# Patient Record
Sex: MALE | Race: WHITE | URBAN - METROPOLITAN AREA
[De-identification: names, ages, dates, MRNs, and addresses within clinical notes are randomized per-mention and may not be internally consistent; named-entity substitution may affect disease eponyms.]

---

## 2017-06-29 ENCOUNTER — TRANSFERRED RECORDS (OUTPATIENT)
Dept: HEALTH INFORMATION MANAGEMENT | Facility: CLINIC | Age: 72
End: 2017-06-29

## 2017-07-07 ENCOUNTER — ANESTHESIA (OUTPATIENT)
Dept: SURGERY | Facility: CLINIC | Age: 72
DRG: 028 | End: 2017-07-07
Payer: COMMERCIAL

## 2017-07-07 ENCOUNTER — APPOINTMENT (OUTPATIENT)
Dept: GENERAL RADIOLOGY | Facility: CLINIC | Age: 72
DRG: 028 | End: 2017-07-07
Attending: NEUROLOGICAL SURGERY
Payer: COMMERCIAL

## 2017-07-07 ENCOUNTER — HOSPITAL ENCOUNTER (INPATIENT)
Facility: CLINIC | Age: 72
LOS: 19 days | Discharge: ANOTHER HOSPITAL | DRG: 028 | End: 2017-07-26
Attending: NEUROLOGICAL SURGERY | Admitting: NEUROLOGICAL SURGERY
Payer: COMMERCIAL

## 2017-07-07 ENCOUNTER — ANESTHESIA EVENT (OUTPATIENT)
Dept: SURGERY | Facility: CLINIC | Age: 72
DRG: 028 | End: 2017-07-07
Payer: COMMERCIAL

## 2017-07-07 ENCOUNTER — APPOINTMENT (OUTPATIENT)
Dept: MRI IMAGING | Facility: CLINIC | Age: 72
DRG: 028 | End: 2017-07-07
Attending: STUDENT IN AN ORGANIZED HEALTH CARE EDUCATION/TRAINING PROGRAM
Payer: COMMERCIAL

## 2017-07-07 PROBLEM — G06.2 EPIDURAL ABSCESS: Status: ACTIVE | Noted: 2017-07-07

## 2017-07-07 PROBLEM — M48.02 CERVICAL STENOSIS OF SPINE: Status: ACTIVE | Noted: 2017-07-07

## 2017-07-07 LAB
ABO + RH BLD: NORMAL
ABO + RH BLD: NORMAL
ALBUMIN UR-MCNC: 10 MG/DL
ANION GAP SERPL CALCULATED.3IONS-SCNC: 9 MMOL/L (ref 3–14)
APPEARANCE UR: CLEAR
APTT PPP: 29 SEC (ref 22–37)
BASE DEFICIT BLDA-SCNC: 1.4 MMOL/L
BASE EXCESS BLDA CALC-SCNC: 0 MMOL/L
BILIRUB UR QL STRIP: NEGATIVE
BLD GP AB SCN SERPL QL: NORMAL
BLOOD BANK CMNT PATIENT-IMP: NORMAL
BUN SERPL-MCNC: 18 MG/DL (ref 7–30)
CA-I BLD-MCNC: 4.8 MG/DL (ref 4.4–5.2)
CA-I BLD-MCNC: 5 MG/DL (ref 4.4–5.2)
CALCIUM SERPL-MCNC: 9.2 MG/DL (ref 8.5–10.1)
CHLORIDE SERPL-SCNC: 98 MMOL/L (ref 94–109)
CO2 SERPL-SCNC: 26 MMOL/L (ref 20–32)
COLOR UR AUTO: YELLOW
CREAT SERPL-MCNC: 0.7 MG/DL (ref 0.66–1.25)
CRP SERPL-MCNC: 27 MG/L (ref 0–8)
ERYTHROCYTE [DISTWIDTH] IN BLOOD BY AUTOMATED COUNT: 14 % (ref 10–15)
ERYTHROCYTE [SEDIMENTATION RATE] IN BLOOD BY WESTERGREN METHOD: 50 MM/H (ref 0–20)
GFR SERPL CREATININE-BSD FRML MDRD: ABNORMAL ML/MIN/1.7M2
GLUCOSE BLD-MCNC: 186 MG/DL (ref 70–99)
GLUCOSE BLD-MCNC: 191 MG/DL (ref 70–99)
GLUCOSE BLDC GLUCOMTR-MCNC: 143 MG/DL (ref 70–99)
GLUCOSE BLDC GLUCOMTR-MCNC: 146 MG/DL (ref 70–99)
GLUCOSE BLDC GLUCOMTR-MCNC: 151 MG/DL (ref 70–99)
GLUCOSE BLDC GLUCOMTR-MCNC: 166 MG/DL (ref 70–99)
GLUCOSE BLDC GLUCOMTR-MCNC: 188 MG/DL (ref 70–99)
GLUCOSE SERPL-MCNC: 190 MG/DL (ref 70–99)
GLUCOSE UR STRIP-MCNC: 30 MG/DL
GRAM STN SPEC: NORMAL
HCO3 BLD-SCNC: 23 MMOL/L (ref 21–28)
HCO3 BLD-SCNC: 25 MMOL/L (ref 21–28)
HCT VFR BLD AUTO: 34 % (ref 40–53)
HGB BLD-MCNC: 11.5 G/DL (ref 13.3–17.7)
HGB BLD-MCNC: 11.8 G/DL (ref 13.3–17.7)
HGB BLD-MCNC: 11.8 G/DL (ref 13.3–17.7)
HGB UR QL STRIP: ABNORMAL
HYALINE CASTS #/AREA URNS LPF: 1 /LPF (ref 0–2)
INR PPP: 1.1 (ref 0.86–1.14)
KETONES UR STRIP-MCNC: 80 MG/DL
LEUKOCYTE ESTERASE UR QL STRIP: ABNORMAL
Lab: NORMAL
MAGNESIUM SERPL-MCNC: 2 MG/DL (ref 1.6–2.3)
MCH RBC QN AUTO: 33.2 PG (ref 26.5–33)
MCHC RBC AUTO-ENTMCNC: 34.7 G/DL (ref 31.5–36.5)
MCV RBC AUTO: 96 FL (ref 78–100)
MICRO REPORT STATUS: NORMAL
MRSA DNA SPEC QL NAA+PROBE: NORMAL
MUCOUS THREADS #/AREA URNS LPF: PRESENT /LPF
NITRATE UR QL: NEGATIVE
O2/TOTAL GAS SETTING VFR VENT: 50 %
O2/TOTAL GAS SETTING VFR VENT: 73 %
PCO2 BLD: 35 MM HG (ref 35–45)
PCO2 BLD: 42 MM HG (ref 35–45)
PH BLD: 7.38 PH (ref 7.35–7.45)
PH BLD: 7.42 PH (ref 7.35–7.45)
PH UR STRIP: 5.5 PH (ref 5–7)
PHOSPHATE SERPL-MCNC: 3.7 MG/DL (ref 2.5–4.5)
PLATELET # BLD AUTO: 217 10E9/L (ref 150–450)
PO2 BLD: 290 MM HG (ref 80–105)
PO2 BLD: 380 MM HG (ref 80–105)
POTASSIUM BLD-SCNC: 4 MMOL/L (ref 3.4–5.3)
POTASSIUM BLD-SCNC: 4.1 MMOL/L (ref 3.4–5.3)
POTASSIUM SERPL-SCNC: 4.6 MMOL/L (ref 3.4–5.3)
RBC # BLD AUTO: 3.55 10E12/L (ref 4.4–5.9)
RBC #/AREA URNS AUTO: 10 /HPF (ref 0–2)
SODIUM BLD-SCNC: 133 MMOL/L (ref 133–144)
SODIUM BLD-SCNC: 133 MMOL/L (ref 133–144)
SODIUM SERPL-SCNC: 133 MMOL/L (ref 133–144)
SP GR UR STRIP: 1.02 (ref 1–1.03)
SPECIMEN EXP DATE BLD: NORMAL
SPECIMEN SOURCE: NORMAL
SPECIMEN SOURCE: NORMAL
SQUAMOUS #/AREA URNS AUTO: <1 /HPF (ref 0–1)
URN SPEC COLLECT METH UR: ABNORMAL
UROBILINOGEN UR STRIP-MCNC: NORMAL MG/DL (ref 0–2)
WBC # BLD AUTO: 7.3 10E9/L (ref 4–11)
WBC #/AREA URNS AUTO: 2 /HPF (ref 0–2)

## 2017-07-07 PROCEDURE — 84100 ASSAY OF PHOSPHORUS: CPT | Performed by: STUDENT IN AN ORGANIZED HEALTH CARE EDUCATION/TRAINING PROGRAM

## 2017-07-07 PROCEDURE — 86140 C-REACTIVE PROTEIN: CPT | Performed by: STUDENT IN AN ORGANIZED HEALTH CARE EDUCATION/TRAINING PROGRAM

## 2017-07-07 PROCEDURE — 25000128 H RX IP 250 OP 636: Performed by: STUDENT IN AN ORGANIZED HEALTH CARE EDUCATION/TRAINING PROGRAM

## 2017-07-07 PROCEDURE — 25000128 H RX IP 250 OP 636

## 2017-07-07 PROCEDURE — 40000556 ZZH STATISTIC PERIPHERAL IV START W US GUIDANCE

## 2017-07-07 PROCEDURE — 25000125 ZZHC RX 250: Performed by: ANESTHESIOLOGY

## 2017-07-07 PROCEDURE — 00NW0ZZ RELEASE CERVICAL SPINAL CORD, OPEN APPROACH: ICD-10-PCS | Performed by: NEUROLOGICAL SURGERY

## 2017-07-07 PROCEDURE — 87070 CULTURE OTHR SPECIMN AEROBIC: CPT | Performed by: NEUROLOGICAL SURGERY

## 2017-07-07 PROCEDURE — 00000146 ZZHCL STATISTIC GLUCOSE BY METER IP

## 2017-07-07 PROCEDURE — 36000061 ZZH SURGERY LEVEL 3 W FLUORO 1ST 30 MIN - UMMC: Performed by: NEUROLOGICAL SURGERY

## 2017-07-07 PROCEDURE — 83735 ASSAY OF MAGNESIUM: CPT | Performed by: STUDENT IN AN ORGANIZED HEALTH CARE EDUCATION/TRAINING PROGRAM

## 2017-07-07 PROCEDURE — 85027 COMPLETE CBC AUTOMATED: CPT | Performed by: STUDENT IN AN ORGANIZED HEALTH CARE EDUCATION/TRAINING PROGRAM

## 2017-07-07 PROCEDURE — 25000565 ZZH ISOFLURANE, EA 15 MIN: Performed by: NEUROLOGICAL SURGERY

## 2017-07-07 PROCEDURE — A9585 GADOBUTROL INJECTION: HCPCS | Performed by: NEUROLOGICAL SURGERY

## 2017-07-07 PROCEDURE — 86850 RBC ANTIBODY SCREEN: CPT | Performed by: STUDENT IN AN ORGANIZED HEALTH CARE EDUCATION/TRAINING PROGRAM

## 2017-07-07 PROCEDURE — 84132 ASSAY OF SERUM POTASSIUM: CPT | Performed by: ANESTHESIOLOGY

## 2017-07-07 PROCEDURE — 36415 COLL VENOUS BLD VENIPUNCTURE: CPT | Performed by: STUDENT IN AN ORGANIZED HEALTH CARE EDUCATION/TRAINING PROGRAM

## 2017-07-07 PROCEDURE — 40000277 XR SURGERY CARM FLUORO LESS THAN 5 MIN W STILLS: Mod: TC

## 2017-07-07 PROCEDURE — 82803 BLOOD GASES ANY COMBINATION: CPT | Performed by: ANESTHESIOLOGY

## 2017-07-07 PROCEDURE — 40000014 ZZH STATISTIC ARTERIAL MONITORING DAILY

## 2017-07-07 PROCEDURE — 87641 MR-STAPH DNA AMP PROBE: CPT | Performed by: NEUROLOGICAL SURGERY

## 2017-07-07 PROCEDURE — 40000275 ZZH STATISTIC RCP TIME EA 10 MIN

## 2017-07-07 PROCEDURE — 25000128 H RX IP 250 OP 636: Performed by: NEUROLOGICAL SURGERY

## 2017-07-07 PROCEDURE — 25000125 ZZHC RX 250: Performed by: STUDENT IN AN ORGANIZED HEALTH CARE EDUCATION/TRAINING PROGRAM

## 2017-07-07 PROCEDURE — 36000059 ZZH SURGERY LEVEL 3 EA 15 ADDTL MIN UMMC: Performed by: NEUROLOGICAL SURGERY

## 2017-07-07 PROCEDURE — 25000125 ZZHC RX 250: Performed by: NURSE ANESTHETIST, CERTIFIED REGISTERED

## 2017-07-07 PROCEDURE — 84295 ASSAY OF SERUM SODIUM: CPT | Performed by: ANESTHESIOLOGY

## 2017-07-07 PROCEDURE — 25000128 H RX IP 250 OP 636: Performed by: NURSE ANESTHETIST, CERTIFIED REGISTERED

## 2017-07-07 PROCEDURE — 80048 BASIC METABOLIC PNL TOTAL CA: CPT | Performed by: STUDENT IN AN ORGANIZED HEALTH CARE EDUCATION/TRAINING PROGRAM

## 2017-07-07 PROCEDURE — 37000009 ZZH ANESTHESIA TECHNICAL FEE, EACH ADDTL 15 MIN: Performed by: NEUROLOGICAL SURGERY

## 2017-07-07 PROCEDURE — 37000008 ZZH ANESTHESIA TECHNICAL FEE, 1ST 30 MIN: Performed by: NEUROLOGICAL SURGERY

## 2017-07-07 PROCEDURE — 87040 BLOOD CULTURE FOR BACTERIA: CPT | Performed by: NEUROLOGICAL SURGERY

## 2017-07-07 PROCEDURE — 25000308 HC RX OP HPI UCR WEL MED 250 IP 250: Performed by: STUDENT IN AN ORGANIZED HEALTH CARE EDUCATION/TRAINING PROGRAM

## 2017-07-07 PROCEDURE — 85610 PROTHROMBIN TIME: CPT | Performed by: STUDENT IN AN ORGANIZED HEALTH CARE EDUCATION/TRAINING PROGRAM

## 2017-07-07 PROCEDURE — 82947 ASSAY GLUCOSE BLOOD QUANT: CPT | Performed by: ANESTHESIOLOGY

## 2017-07-07 PROCEDURE — 87640 STAPH A DNA AMP PROBE: CPT | Performed by: NEUROLOGICAL SURGERY

## 2017-07-07 PROCEDURE — C9399 UNCLASSIFIED DRUGS OR BIOLOG: HCPCS | Performed by: NURSE ANESTHETIST, CERTIFIED REGISTERED

## 2017-07-07 PROCEDURE — 82330 ASSAY OF CALCIUM: CPT | Performed by: ANESTHESIOLOGY

## 2017-07-07 PROCEDURE — 86900 BLOOD TYPING SEROLOGIC ABO: CPT | Performed by: STUDENT IN AN ORGANIZED HEALTH CARE EDUCATION/TRAINING PROGRAM

## 2017-07-07 PROCEDURE — 71000016 ZZH RECOVERY PHASE 1 LEVEL 3 FIRST HR: Performed by: NEUROLOGICAL SURGERY

## 2017-07-07 PROCEDURE — 72156 MRI NECK SPINE W/O & W/DYE: CPT

## 2017-07-07 PROCEDURE — 85730 THROMBOPLASTIN TIME PARTIAL: CPT | Performed by: STUDENT IN AN ORGANIZED HEALTH CARE EDUCATION/TRAINING PROGRAM

## 2017-07-07 PROCEDURE — P9047 ALBUMIN (HUMAN), 25%, 50ML: HCPCS | Performed by: NEUROLOGICAL SURGERY

## 2017-07-07 PROCEDURE — 87205 SMEAR GRAM STAIN: CPT | Performed by: NEUROLOGICAL SURGERY

## 2017-07-07 PROCEDURE — 27110028 ZZH OR GENERAL SUPPLY NON-STERILE: Performed by: NEUROLOGICAL SURGERY

## 2017-07-07 PROCEDURE — 25000131 ZZH RX MED GY IP 250 OP 636 PS 637: Performed by: STUDENT IN AN ORGANIZED HEALTH CARE EDUCATION/TRAINING PROGRAM

## 2017-07-07 PROCEDURE — 25000132 ZZH RX MED GY IP 250 OP 250 PS 637: Performed by: STUDENT IN AN ORGANIZED HEALTH CARE EDUCATION/TRAINING PROGRAM

## 2017-07-07 PROCEDURE — 81001 URINALYSIS AUTO W/SCOPE: CPT | Performed by: NEUROLOGICAL SURGERY

## 2017-07-07 PROCEDURE — 27210794 ZZH OR GENERAL SUPPLY STERILE: Performed by: NEUROLOGICAL SURGERY

## 2017-07-07 PROCEDURE — 85652 RBC SED RATE AUTOMATED: CPT | Performed by: STUDENT IN AN ORGANIZED HEALTH CARE EDUCATION/TRAINING PROGRAM

## 2017-07-07 PROCEDURE — 71010 XR CHEST PORT 1 VW: CPT

## 2017-07-07 PROCEDURE — 86901 BLOOD TYPING SEROLOGIC RH(D): CPT | Performed by: STUDENT IN AN ORGANIZED HEALTH CARE EDUCATION/TRAINING PROGRAM

## 2017-07-07 PROCEDURE — 71000017 ZZH RECOVERY PHASE 1 LEVEL 3 EA ADDTL HR: Performed by: NEUROLOGICAL SURGERY

## 2017-07-07 PROCEDURE — 20000004 ZZH R&B ICU UMMC

## 2017-07-07 PROCEDURE — 36415 COLL VENOUS BLD VENIPUNCTURE: CPT | Performed by: NEUROLOGICAL SURGERY

## 2017-07-07 PROCEDURE — 40000170 ZZH STATISTIC PRE-PROCEDURE ASSESSMENT II: Performed by: NEUROLOGICAL SURGERY

## 2017-07-07 RX ORDER — HYDROMORPHONE HYDROCHLORIDE 1 MG/ML
.3-.5 INJECTION, SOLUTION INTRAMUSCULAR; INTRAVENOUS; SUBCUTANEOUS EVERY 5 MIN PRN
Status: DISCONTINUED | OUTPATIENT
Start: 2017-07-07 | End: 2017-07-07 | Stop reason: HOSPADM

## 2017-07-07 RX ORDER — CEFAZOLIN SODIUM 1 G/3ML
1 INJECTION, POWDER, FOR SOLUTION INTRAMUSCULAR; INTRAVENOUS SEE ADMIN INSTRUCTIONS
Status: DISCONTINUED | OUTPATIENT
Start: 2017-07-07 | End: 2017-07-07 | Stop reason: HOSPADM

## 2017-07-07 RX ORDER — FENTANYL CITRATE 50 UG/ML
25-50 INJECTION, SOLUTION INTRAMUSCULAR; INTRAVENOUS
Status: DISCONTINUED | OUTPATIENT
Start: 2017-07-07 | End: 2017-07-07 | Stop reason: HOSPADM

## 2017-07-07 RX ORDER — LIDOCAINE 50 MG/G
3 PATCH TOPICAL
Status: DISCONTINUED | OUTPATIENT
Start: 2017-07-07 | End: 2017-07-26 | Stop reason: HOSPADM

## 2017-07-07 RX ORDER — CEFAZOLIN SODIUM 2 G/100ML
2 INJECTION, SOLUTION INTRAVENOUS
Status: COMPLETED | OUTPATIENT
Start: 2017-07-07 | End: 2017-07-07

## 2017-07-07 RX ORDER — SODIUM CHLORIDE 9 MG/ML
INJECTION, SOLUTION INTRAVENOUS CONTINUOUS
Status: DISCONTINUED | OUTPATIENT
Start: 2017-07-07 | End: 2017-07-18

## 2017-07-07 RX ORDER — POTASSIUM CHLORIDE 1500 MG/1
20-40 TABLET, EXTENDED RELEASE ORAL
Status: DISCONTINUED | OUTPATIENT
Start: 2017-07-07 | End: 2017-07-09

## 2017-07-07 RX ORDER — SODIUM CHLORIDE 9 MG/ML
INJECTION, SOLUTION INTRAVENOUS CONTINUOUS
Status: DISCONTINUED | OUTPATIENT
Start: 2017-07-07 | End: 2017-07-07

## 2017-07-07 RX ORDER — DEXTROSE MONOHYDRATE 25 G/50ML
25-50 INJECTION, SOLUTION INTRAVENOUS
Status: DISCONTINUED | OUTPATIENT
Start: 2017-07-07 | End: 2017-07-13

## 2017-07-07 RX ORDER — SODIUM CHLORIDE, SODIUM LACTATE, POTASSIUM CHLORIDE, CALCIUM CHLORIDE 600; 310; 30; 20 MG/100ML; MG/100ML; MG/100ML; MG/100ML
INJECTION, SOLUTION INTRAVENOUS CONTINUOUS PRN
Status: DISCONTINUED | OUTPATIENT
Start: 2017-07-07 | End: 2017-07-07

## 2017-07-07 RX ORDER — FOLIC ACID 1 MG/1
1 TABLET ORAL DAILY
Status: DISCONTINUED | OUTPATIENT
Start: 2017-07-07 | End: 2017-07-26 | Stop reason: HOSPADM

## 2017-07-07 RX ORDER — CEFTRIAXONE 2 G/1
2 INJECTION, POWDER, FOR SOLUTION INTRAMUSCULAR; INTRAVENOUS EVERY 12 HOURS
Status: CANCELLED | OUTPATIENT
Start: 2017-07-07

## 2017-07-07 RX ORDER — GLYCOPYRROLATE 0.2 MG/ML
INJECTION, SOLUTION INTRAMUSCULAR; INTRAVENOUS PRN
Status: DISCONTINUED | OUTPATIENT
Start: 2017-07-07 | End: 2017-07-07

## 2017-07-07 RX ORDER — SODIUM CHLORIDE, SODIUM LACTATE, POTASSIUM CHLORIDE, CALCIUM CHLORIDE 600; 310; 30; 20 MG/100ML; MG/100ML; MG/100ML; MG/100ML
INJECTION, SOLUTION INTRAVENOUS CONTINUOUS
Status: DISCONTINUED | OUTPATIENT
Start: 2017-07-07 | End: 2017-07-07 | Stop reason: HOSPADM

## 2017-07-07 RX ORDER — POLYETHYLENE GLYCOL 3350 17 G/17G
17 POWDER, FOR SOLUTION ORAL 2 TIMES DAILY
Status: DISCONTINUED | OUTPATIENT
Start: 2017-07-07 | End: 2017-07-26 | Stop reason: HOSPADM

## 2017-07-07 RX ORDER — LABETALOL HYDROCHLORIDE 5 MG/ML
10-40 INJECTION, SOLUTION INTRAVENOUS EVERY 10 MIN PRN
Status: DISCONTINUED | OUTPATIENT
Start: 2017-07-07 | End: 2017-07-12

## 2017-07-07 RX ORDER — SODIUM CHLORIDE 9 MG/ML
INJECTION, SOLUTION INTRAVENOUS
Status: COMPLETED
Start: 2017-07-07 | End: 2017-07-07

## 2017-07-07 RX ORDER — HYDRALAZINE HYDROCHLORIDE 20 MG/ML
10-20 INJECTION INTRAMUSCULAR; INTRAVENOUS EVERY 30 MIN PRN
Status: DISCONTINUED | OUTPATIENT
Start: 2017-07-07 | End: 2017-07-12

## 2017-07-07 RX ORDER — GADOBUTROL 604.72 MG/ML
10 INJECTION INTRAVENOUS ONCE
Status: COMPLETED | OUTPATIENT
Start: 2017-07-07 | End: 2017-07-07

## 2017-07-07 RX ORDER — FENTANYL CITRATE 50 UG/ML
INJECTION, SOLUTION INTRAMUSCULAR; INTRAVENOUS PRN
Status: DISCONTINUED | OUTPATIENT
Start: 2017-07-07 | End: 2017-07-07

## 2017-07-07 RX ORDER — OXYCODONE HYDROCHLORIDE 5 MG/1
5-10 TABLET ORAL
Status: DISCONTINUED | OUTPATIENT
Start: 2017-07-07 | End: 2017-07-26 | Stop reason: HOSPADM

## 2017-07-07 RX ORDER — BISACODYL 10 MG
10 SUPPOSITORY, RECTAL RECTAL DAILY PRN
Status: DISCONTINUED | OUTPATIENT
Start: 2017-07-07 | End: 2017-07-26 | Stop reason: HOSPADM

## 2017-07-07 RX ORDER — LIDOCAINE 40 MG/G
CREAM TOPICAL
Status: DISCONTINUED | OUTPATIENT
Start: 2017-07-07 | End: 2017-07-07 | Stop reason: HOSPADM

## 2017-07-07 RX ORDER — CYCLOBENZAPRINE HCL 10 MG
10 TABLET ORAL 3 TIMES DAILY PRN
Status: DISCONTINUED | OUTPATIENT
Start: 2017-07-07 | End: 2017-07-26 | Stop reason: HOSPADM

## 2017-07-07 RX ORDER — NICOTINE POLACRILEX 4 MG
15-30 LOZENGE BUCCAL
Status: DISCONTINUED | OUTPATIENT
Start: 2017-07-07 | End: 2017-07-13

## 2017-07-07 RX ORDER — ONDANSETRON 2 MG/ML
4 INJECTION INTRAMUSCULAR; INTRAVENOUS EVERY 30 MIN PRN
Status: DISCONTINUED | OUTPATIENT
Start: 2017-07-07 | End: 2017-07-07 | Stop reason: HOSPADM

## 2017-07-07 RX ORDER — DEXMEDETOMIDINE HYDROCHLORIDE 4 UG/ML
0.2-1.2 INJECTION, SOLUTION INTRAVENOUS CONTINUOUS
Status: DISCONTINUED | OUTPATIENT
Start: 2017-07-07 | End: 2017-07-07

## 2017-07-07 RX ORDER — MULTIPLE VITAMINS W/ MINERALS TAB 9MG-400MCG
1 TAB ORAL DAILY
Status: DISCONTINUED | OUTPATIENT
Start: 2017-07-07 | End: 2017-07-09

## 2017-07-07 RX ORDER — ONDANSETRON 4 MG/1
4 TABLET, ORALLY DISINTEGRATING ORAL EVERY 6 HOURS PRN
Status: DISCONTINUED | OUTPATIENT
Start: 2017-07-07 | End: 2017-07-26 | Stop reason: HOSPADM

## 2017-07-07 RX ORDER — ATENOLOL 25 MG/1
25 TABLET ORAL DAILY
Status: CANCELLED | OUTPATIENT
Start: 2017-07-07

## 2017-07-07 RX ORDER — MAGNESIUM SULFATE HEPTAHYDRATE 40 MG/ML
4 INJECTION, SOLUTION INTRAVENOUS EVERY 4 HOURS PRN
Status: DISCONTINUED | OUTPATIENT
Start: 2017-07-07 | End: 2017-07-09

## 2017-07-07 RX ORDER — LIDOCAINE 40 MG/G
CREAM TOPICAL
Status: DISCONTINUED | OUTPATIENT
Start: 2017-07-07 | End: 2017-07-12

## 2017-07-07 RX ORDER — PROPOFOL 10 MG/ML
INJECTION, EMULSION INTRAVENOUS PRN
Status: DISCONTINUED | OUTPATIENT
Start: 2017-07-07 | End: 2017-07-07

## 2017-07-07 RX ORDER — PROCHLORPERAZINE MALEATE 5 MG
5 TABLET ORAL EVERY 6 HOURS PRN
Status: DISCONTINUED | OUTPATIENT
Start: 2017-07-07 | End: 2017-07-26 | Stop reason: HOSPADM

## 2017-07-07 RX ORDER — AMOXICILLIN 250 MG
1-2 CAPSULE ORAL 2 TIMES DAILY
Status: DISCONTINUED | OUTPATIENT
Start: 2017-07-07 | End: 2017-07-26 | Stop reason: HOSPADM

## 2017-07-07 RX ORDER — ACETAMINOPHEN 325 MG/1
975 TABLET ORAL EVERY 8 HOURS
Status: DISPENSED | OUTPATIENT
Start: 2017-07-07 | End: 2017-07-10

## 2017-07-07 RX ORDER — ONDANSETRON 4 MG/1
4 TABLET, ORALLY DISINTEGRATING ORAL EVERY 30 MIN PRN
Status: DISCONTINUED | OUTPATIENT
Start: 2017-07-07 | End: 2017-07-07 | Stop reason: HOSPADM

## 2017-07-07 RX ORDER — POTASSIUM CHLORIDE 29.8 MG/ML
20 INJECTION INTRAVENOUS
Status: DISCONTINUED | OUTPATIENT
Start: 2017-07-07 | End: 2017-07-09

## 2017-07-07 RX ORDER — HYDROMORPHONE HYDROCHLORIDE 1 MG/ML
.3-.5 INJECTION, SOLUTION INTRAMUSCULAR; INTRAVENOUS; SUBCUTANEOUS
Status: DISPENSED | OUTPATIENT
Start: 2017-07-07 | End: 2017-07-08

## 2017-07-07 RX ORDER — ONDANSETRON 2 MG/ML
INJECTION INTRAMUSCULAR; INTRAVENOUS PRN
Status: DISCONTINUED | OUTPATIENT
Start: 2017-07-07 | End: 2017-07-07

## 2017-07-07 RX ORDER — CEFAZOLIN SODIUM 1 G/3ML
1 INJECTION, POWDER, FOR SOLUTION INTRAMUSCULAR; INTRAVENOUS SEE ADMIN INSTRUCTIONS
Status: DISCONTINUED | OUTPATIENT
Start: 2017-07-07 | End: 2017-07-07

## 2017-07-07 RX ORDER — POTASSIUM CHLORIDE 7.45 MG/ML
10 INJECTION INTRAVENOUS
Status: DISCONTINUED | OUTPATIENT
Start: 2017-07-07 | End: 2017-07-09

## 2017-07-07 RX ORDER — ALBUMIN (HUMAN) 12.5 G/50ML
12.5 SOLUTION INTRAVENOUS ONCE
Status: COMPLETED | OUTPATIENT
Start: 2017-07-07 | End: 2017-07-07

## 2017-07-07 RX ORDER — LIDOCAINE HYDROCHLORIDE 40 MG/ML
SOLUTION TOPICAL PRN
Status: DISCONTINUED | OUTPATIENT
Start: 2017-07-07 | End: 2017-07-07

## 2017-07-07 RX ORDER — POTASSIUM CHLORIDE 1.5 G/1.58G
20-40 POWDER, FOR SOLUTION ORAL
Status: DISCONTINUED | OUTPATIENT
Start: 2017-07-07 | End: 2017-07-09

## 2017-07-07 RX ORDER — POTASSIUM CL/LIDO/0.9 % NACL 10MEQ/0.1L
10 INTRAVENOUS SOLUTION, PIGGYBACK (ML) INTRAVENOUS
Status: DISCONTINUED | OUTPATIENT
Start: 2017-07-07 | End: 2017-07-09

## 2017-07-07 RX ORDER — NALOXONE HYDROCHLORIDE 0.4 MG/ML
.1-.4 INJECTION, SOLUTION INTRAMUSCULAR; INTRAVENOUS; SUBCUTANEOUS
Status: DISCONTINUED | OUTPATIENT
Start: 2017-07-07 | End: 2017-07-12

## 2017-07-07 RX ORDER — ONDANSETRON 2 MG/ML
4 INJECTION INTRAMUSCULAR; INTRAVENOUS EVERY 6 HOURS PRN
Status: DISCONTINUED | OUTPATIENT
Start: 2017-07-07 | End: 2017-07-26 | Stop reason: HOSPADM

## 2017-07-07 RX ORDER — ACETAMINOPHEN 10 MG/ML
1000 INJECTION, SOLUTION INTRAVENOUS ONCE
Status: COMPLETED | OUTPATIENT
Start: 2017-07-07 | End: 2017-07-07

## 2017-07-07 RX ORDER — CALCIUM CARBONATE 500 MG/1
1-2 TABLET, CHEWABLE ORAL 4 TIMES DAILY PRN
Status: CANCELLED | OUTPATIENT
Start: 2017-07-07

## 2017-07-07 RX ORDER — ACETAMINOPHEN 325 MG/1
650 TABLET ORAL EVERY 4 HOURS PRN
Status: DISCONTINUED | OUTPATIENT
Start: 2017-07-10 | End: 2017-07-09

## 2017-07-07 RX ADMIN — MIDAZOLAM HYDROCHLORIDE 2 MG: 1 INJECTION, SOLUTION INTRAMUSCULAR; INTRAVENOUS at 07:20

## 2017-07-07 RX ADMIN — ALBUMIN (HUMAN): 12.5 SOLUTION INTRAVENOUS at 07:20

## 2017-07-07 RX ADMIN — ROCURONIUM BROMIDE 20 MG: 10 INJECTION INTRAVENOUS at 07:41

## 2017-07-07 RX ADMIN — ONDANSETRON 4 MG: 2 INJECTION INTRAMUSCULAR; INTRAVENOUS at 09:12

## 2017-07-07 RX ADMIN — PHENYLEPHRINE HYDROCHLORIDE 100 MCG: 10 INJECTION, SOLUTION INTRAMUSCULAR; INTRAVENOUS; SUBCUTANEOUS at 09:22

## 2017-07-07 RX ADMIN — Medication 1000 ML: at 19:07

## 2017-07-07 RX ADMIN — PHENYLEPHRINE HYDROCHLORIDE 5 ML: 10 INJECTION INTRAVENOUS at 15:45

## 2017-07-07 RX ADMIN — GLYCOPYRROLATE 0.2 MG: 0.2 INJECTION, SOLUTION INTRAMUSCULAR; INTRAVENOUS at 06:43

## 2017-07-07 RX ADMIN — LIDOCAINE HYDROCHLORIDE 5 ML: 40 SOLUTION TOPICAL at 06:49

## 2017-07-07 RX ADMIN — PHENYLEPHRINE HYDROCHLORIDE 100 MCG: 10 INJECTION, SOLUTION INTRAMUSCULAR; INTRAVENOUS; SUBCUTANEOUS at 07:09

## 2017-07-07 RX ADMIN — PHENYLEPHRINE HYDROCHLORIDE 100 MCG: 10 INJECTION, SOLUTION INTRAMUSCULAR; INTRAVENOUS; SUBCUTANEOUS at 07:24

## 2017-07-07 RX ADMIN — SUGAMMADEX 200 MG: 100 INJECTION, SOLUTION INTRAVENOUS at 09:26

## 2017-07-07 RX ADMIN — FENTANYL CITRATE 100 MCG: 50 INJECTION, SOLUTION INTRAMUSCULAR; INTRAVENOUS at 06:49

## 2017-07-07 RX ADMIN — GADOBUTROL 10 ML: 604.72 INJECTION INTRAVENOUS at 18:28

## 2017-07-07 RX ADMIN — LIDOCAINE 1 PATCH: 50 PATCH CUTANEOUS at 15:15

## 2017-07-07 RX ADMIN — CEFAZOLIN SODIUM 2 G: 2 INJECTION, SOLUTION INTRAVENOUS at 07:25

## 2017-07-07 RX ADMIN — PROPOFOL 100 MG: 10 INJECTION, EMULSION INTRAVENOUS at 06:53

## 2017-07-07 RX ADMIN — SODIUM CHLORIDE: 900 INJECTION, SOLUTION INTRAVENOUS at 10:30

## 2017-07-07 RX ADMIN — PHENYLEPHRINE HYDROCHLORIDE 100 MCG: 10 INJECTION, SOLUTION INTRAMUSCULAR; INTRAVENOUS; SUBCUTANEOUS at 08:43

## 2017-07-07 RX ADMIN — ROCURONIUM BROMIDE 50 MG: 10 INJECTION INTRAVENOUS at 06:53

## 2017-07-07 RX ADMIN — HYDROMORPHONE HYDROCHLORIDE 0.3 MG: 1 INJECTION, SOLUTION INTRAMUSCULAR; INTRAVENOUS; SUBCUTANEOUS at 15:11

## 2017-07-07 RX ADMIN — SODIUM CHLORIDE, POTASSIUM CHLORIDE, SODIUM LACTATE AND CALCIUM CHLORIDE: 600; 310; 30; 20 INJECTION, SOLUTION INTRAVENOUS at 07:00

## 2017-07-07 RX ADMIN — PROPOFOL 20 MG: 10 INJECTION, EMULSION INTRAVENOUS at 09:25

## 2017-07-07 RX ADMIN — PROPOFOL 50 MG: 10 INJECTION, EMULSION INTRAVENOUS at 07:04

## 2017-07-07 RX ADMIN — SODIUM CHLORIDE, POTASSIUM CHLORIDE, SODIUM LACTATE AND CALCIUM CHLORIDE: 600; 310; 30; 20 INJECTION, SOLUTION INTRAVENOUS at 06:28

## 2017-07-07 RX ADMIN — BENZOCAINE 2 SPRAY: 220 SPRAY, METERED PERIODONTAL at 06:46

## 2017-07-07 RX ADMIN — SODIUM CHLORIDE 1000 ML: 9 INJECTION, SOLUTION INTRAVENOUS at 19:07

## 2017-07-07 RX ADMIN — PHENYLEPHRINE HYDROCHLORIDE 0.3 MCG/KG/MIN: 10 INJECTION, SOLUTION INTRAMUSCULAR; INTRAVENOUS; SUBCUTANEOUS at 07:17

## 2017-07-07 RX ADMIN — INSULIN ASPART 1 UNITS: 100 INJECTION, SOLUTION INTRAVENOUS; SUBCUTANEOUS at 16:24

## 2017-07-07 RX ADMIN — ACETAMINOPHEN 1000 MG: 10 INJECTION, SOLUTION INTRAVENOUS at 12:48

## 2017-07-07 RX ADMIN — PHENYLEPHRINE HYDROCHLORIDE 100 MCG: 10 INJECTION, SOLUTION INTRAMUSCULAR; INTRAVENOUS; SUBCUTANEOUS at 07:37

## 2017-07-07 RX ADMIN — THIAMINE HYDROCHLORIDE 500 MG: 100 INJECTION, SOLUTION INTRAMUSCULAR; INTRAVENOUS at 18:39

## 2017-07-07 RX ADMIN — SODIUM CHLORIDE: 900 INJECTION, SOLUTION INTRAVENOUS at 22:18

## 2017-07-07 ASSESSMENT — VISUAL ACUITY
OU: OTHER (SEE COMMENT)
OU: GLASSES
OU: OTHER (SEE COMMENT)
OU: NORMAL ACUITY
OU: OTHER (SEE COMMENT)
OU: GLASSES
OU: OTHER (SEE COMMENT)
OU: GLASSES
OU: OTHER (SEE COMMENT)
OU: NORMAL ACUITY
OU: GLASSES
OU: OTHER (SEE COMMENT)

## 2017-07-07 NOTE — ANESTHESIA PROCEDURE NOTES
Arterial Line Procedure Note  Staff:     Anesthesiologist:  LUIS RO  Location: In OR After Induction  Procedure Start/Stop Times:     patient identified, IV checked, site marked, risks and benefits discussed, informed consent, monitors and equipment checked, pre-op evaluation and at physician/surgeon's request      Correct Patient: Yes      Correct Position: Yes      Correct Site: Yes      Correct Procedure: Yes      Correct Laterality:  Yes    Site Marked:  Yes  Line Placement:     Procedure:  Arterial Line    Insertion Site:  Radial    Insertion laterality:  Left    Skin Prep: Chloraprep      Patient Prep: patient draped, mask, sterile gloves and hand hygiene      Catheter size:  20 gauge, 12 cm    Cath secured with: suture      Dressing:  Tegaderm    Complications:  None obvious    Arterial waveform: Yes      IBP within 10% of NIBP: Yes

## 2017-07-07 NOTE — OR NURSING
Pt arrived in pre-op and was emergently taken to the OR before the admission was completed. AMPARO Swain from  informed me in report that the pt was a DNR/DNI, had not received a scrub, and had labs drawn. Pt arrived with a cervical collar on which the pt asked me to remove. The pt was slightly confused, and did not seem aware or upset that his extremities' mobility were affected. 2 scabs, each on one knee noted, and multiple bruises. Dressing noted to Right wrist and one to Right foot. Pt has gross motor movement to upper extremities, but no sensation, and can slightly dorsi/plantarflex his Left foot and bend left knee, and contract his Right leg and dorsiflex his Right foot. . Family was called but they did not arrive.. Anesthesia team and surgical team took the pt. No EKG was done. Labs were in process.

## 2017-07-07 NOTE — ANESTHESIA CARE TRANSFER NOTE
Patient: Ernesto Rawls    Procedure(s):  Posterior cervical 3-4-5 and partial cervical 6 laminectomy and decompression - Wound Class: I-Clean    Diagnosis: Cervical Compression  Diagnosis Additional Information: No value filed.    Anesthesia Type:   No value filed.     Note:  Airway :Face Mask  Patient transferred to:PACU  Comments: Anesthesia Care Transfer Note    Patient: Ernesto Rawls    Transferred to: PACU    Patient vital signs: stable    Airway: none    Monitors on, VSS, pt. Stable, Report given to PACU RN.     Chris Crooks CRNA  7/7/2017 9:49 AM            Vitals: (Last set prior to Anesthesia Care Transfer)    CR[facility] VITALS  7/7/2017 0912 - 7/7/2017 0949      7/7/2017             Pulse: 85    ART BP: 175/65    ART Mean: 113    SpO2: 99 %    Resp Rate (set): 10                Electronically Signed By: NIK Mckenna CRNA  July 7, 2017  9:49 AM

## 2017-07-07 NOTE — OR NURSING
Dr. Griffin from neurosurg came to bedside in PACU. Assessed pt. Reviewed status. ICU now ready for pt. Prepared to transfer per policy. Dr. Griffin said he would be sure Op note was completed.

## 2017-07-07 NOTE — IP AVS SNAPSHOT
` `     [address] [facility]: [phone]                 INTERAGENCY TRANSFER FORM - NOTES (H&P, Discharge Summary, Consults, Procedures, Therapies)   2017                    Hospital Admission Date: 2017  IVAN TERRAZAS   : 1945  Sex: Male        Patient PCP Information     Provider PCP Type    Roderick Martines MD, MD General         History & Physicals      H&P by Tommy Edwards MD at 2017  9:54 AM     Author:  Tommy Edwards MD Service:  Neurosurgery Author Type:  Resident    Filed:  2017 10:24 AM Date of Service:  2017  9:54 AM Creation Time:  2017  9:54 AM    Status:  Attested :  Tommy Edwards MD (Resident)    Cosigner:  Derick Holly MD at 2017  4:50 PM        Attestation signed by Derick Holly MD at 2017  4:50 PM        Attestation:  I, Derick Holly MD, saw and evaluated Ivan Terrazas as part of a shared visit.  I have reviewed and discussed with the resident their history, physical and plan.    I personally reviewed the vital signs, medications, labs and imaging .    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     Derick Holly MD  2017                                 [facility]  Neurosurgery     History and Physical    CC: Quickly increasing weakness    HPI: Mr. Terrazas is a 72 year old  with a history of rheumatoid arthritis, diabetes, CAD and wet gangrene of his right foot great toe. He underwent amputation of that digit on 2017 @ 1430 under MAC at the [facility][DF1.1]. Following the procedure he was found to be weak in his bilateral upper extremities as well as his lower extremities. He was  "taken for an emergent CT and MRI to evaluate for stroke. An MRI of his cervical spine was also performed which demonstrated significant cervical stenosis. He was then transferred to [facility], where his exam declined to near total paralysis of the upper and lower extremities.[DF1.2]     No recent fevers, chills, weight change, chest pain, shortness of breath, cough, abdominal pain, nausea, vomiting, and syncope. The patient also denies headaches, double vision, blurry vision, weakness, LOC, changes in sensation, taste, smell, trouble speaking, bowel or bladder incontinence, or other neurologic symptoms.    Past Medical History[DF1.1]  RA  DM  CAD[DF1.2]    Past Surgical History[DF1.1]   has no past surgical history on file.[DF1.3]    Family History[DF1.1]  family history is not on file.[DF1.3]    Social History  - Occupation:[DF1.1] Retired[DF1.2]  - Marital Status:[DF1.1]  . Wife and 2 adult children present with him.[DF1.2]   - Tobacco use:[DF1.1] Denies[DF1.2]  - Alcohol use:[DF1.1]  Social[DF1.2]       Medications[DF1.1]  No prescriptions prior to admission.[DF1.3]        Allergies[DF1.1]  Allergies   Allergen Reactions     Contrast Dye[DF1.3]        ROS: 10 point ROS of systems were all negative except for pertinent positives noted in HPI.     PE:[DF1.1]  Blood pressure 165/64, pulse 64, temperature 95.6  F (35.3  C), temperature source Oral, resp. rate 14, height 1.854 m (6' 1\"), weight 94.8 kg (209 lb), SpO2 98 %.[DF1.3]    NEUROLOGIC:  -- Awake; Alert; oriented x[DF1.1] 2[DF1.2]  -- no gaze preference. No apparent hemineglect.[DF1.1]  --[DF1.2] PERRL 3-2mm bilat and brisk, extraocular movements intact  -- face symmetrical, tongue midline  -- palate elevates symmetrically, uvula midline  -- hearing grossly intact bilat  -- Trapezii 5/5 strength bilat symmetri    Motor:  Normal bulk / tone; no tremor, rigidity, or bradykinesia.[DF1.1]    1/5 in all 4 extremities.[DF1.2]     Sensory:[DF1.1]  Light touch " absent.[DF1.2]       I/O    Intake/Output Summary (Last 24 hours) at 07/07/17 0955  Last data filed at 07/07/17 0946   Gross per 24 hour   Intake             1050 ml   Output              400 ml   Net              650 ml       Labs    Arterial Blood Gases[DF1.1]     Recent Labs  Lab 07/07/17  0848 07/07/17  0705   PH 7.42 7.38   PCO2 35 42   PO2 290* 380*   HCO3 23 25[DF1.3]     Complete Blood Count[DF1.1]     Recent Labs  Lab 07/07/17  0848 07/07/17  0705 07/07/17  0552   WBC  --   --  7.3   HGB 11.8* 11.5* 11.8*   PLT  --   --  217[DF1.3]     Basic Metabolic Panel[DF1.1]    Recent Labs  Lab 07/07/17  0848 07/07/17  0705 07/07/17  0552    133 133   POTASSIUM 4.0 4.1 4.6   CHLORIDE  --   --  98   CO2  --   --  26   BUN  --   --  18   CR  --   --  0.70   * 186* 190*[DF1.3]       Coagulation Profile[DF1.1]    Recent Labs  Lab 07/07/17  0552   INR 1.10   PTT 29[DF1.3]     CSF[DF1.1]No results for input(s): CGLU, CTP in the last 168 hours.    Invalid input(s): CCSF[DF1.3]  MICRO[DF1.1]No results for input(s): CULT in the last 168 hours.[DF1.3]  Liver Function Tests[DF1.1]    Recent Labs  Lab 07/07/17  0552   INR 1.10[DF1.3]     Lactate[DF1.1]  Invalid input(s): LACTATE[DF1.3]    IMAGING:[DF1.1]  XR Surgery BONITA Fluoro L/T 5 Min w Stills   Final Result      XR Chest 2 Views    (Results Pending)[DF1.3]       Assessment: Ernesto Rawls is a 72 year old male[DF1.1] with severe cervical stenosis and quickly worsening weakness.[DF1.2]       Plan:  - NPO  - Plan for operative decompression  - Family consented, patient marked.       The patient was discussed with the neurosurgery chief resident, who agrees with the above stated plan.    Contact the neurosurgery resident on call with questions.    Dial * * *[phone] Jmond [phone] when prompted.   Tommy Edwards MD, PhD  Neurosurgery PGY-3[DF1.1]         Revision History        User Key Date/Time User Provider Type Action    > DF1.2 7/7/2017 10:24 AM Tommy Edwards MD  Resident Sign     DF1.3 7/7/2017  9:55 AM Tommy Edwards MD Resident      DF1.1 7/7/2017  9:54 AM Tommy Edwards MD Resident                      Discharge Summaries      Discharge Summaries by Vaishali Duffy APRN CNP at 7/21/2017 11:18 AM     Author:  Vaishali Duffy APRN CNP Service:  Neurosurgery Author Type:  Nurse Practitioner    Filed:  7/26/2017  9:36 AM Date of Service:  7/21/2017 11:18 AM Creation Time:  7/21/2017 11:03 AM    Status:  Signed :  Vaishali Duffy APRN CNP (Nurse Practitioner)         [facility] Discharge Summary and Instructions    Ernesto Rawls MRN# [id number]   Age: 72 year old YOB: 1945     Date of Admission:  7/7/2017  Date of Discharge::[SS1.1]  7/26/2017[SH1.1]  Admitting Physician:  Rafael Lo MD  Discharge Physician:  Rafael Lo MD          Admission Diagnoses:   Epidural abscess [G06.2]  Cervical stenosis of spinal canal [M48.02]          Discharge Diagnosis:   Epidural abscess [G06.2]  Cervical stenosis of spinal canal [M48.02]          Procedures:[SS1.1]   7/7/2017:  C3-C5 Laminectomy and Partial C6 Laminectomy  7/12/2017: C5 Corpectomy and Cage Placement  7/16/2017: Posterior C3-C7 Instrumented Fusion[SS1.2]           Brief History of Illness:[SS1.1]   Ernesto Rawls is a 72 year old male patient whose past medical history is significant for Rheumatoid Arthritis, Diabetes Mellitus, Coronary Artery Disease, Alcohol Abuse, and Wet Gangrene of his Right Foot Great Toe. He underwent amputation of the right great toe at the [facility] on 7/6/2017 under MAC anesthesia. Following his surgery, the patient awoke with significant weakness in his bilateral upper and lower extremities. He was evaluated with a CT Head and MRI Brain which w[SS1.2]ere[SS1.3] negative for Stroke. An MRI of the Cervical spine was obtained which revealed significant cervical spinal stenosis[SS1.2] as well as abscess of the  anterior cervical spine and the prevertebral region[SS1.4] at the level of C3/C4[SS1.3]. He was subsequently transferred to [facility] for further evaluation.[SS1.2]            Hospital Course:[SS1.1]   Upon the patient's arrival to [facility], his upper and lower extremity weakness significantly progressed to near total paralysis. The patient was taken to the OR on 7/7/17 and underwent a Posterior Cervical 3, 4, and 5 Laminectomy and Decompression and Partial Cervical 6 laminectomy and decompression. On 7/12/17, the patient returned to the OR and underwent[SS1.2] a Cervical 5 Corpectomy with Cage and Plate Reconstruction and Fusion and then finally underwent a Posterior Cervical 3 - Cervical 7 instrumented fusion. The patient's surgical interventions were uncomplicated.[SS1.4] Cultures obtained during the patient's operation grew 1 colony of Streptococcus Mitis. The patient has been treated with intravenous Cefazolin 2G IV every 8 hours (7/16/17- 8/27/17). During his hospitalization, the patient underwent placement of a PICC Line for long-term antibiotic therapy.[SS1.3] Patient will need f/u with Infectious disease[SH1.2] i[SS1.3]n mid-August for consideration of post treatment suppressive therapy given new hardware.  Will need CRP every 2 weeks.[SH1.2]  The patient is also in an [place] Collar which he will need to wear for 6 weeks.     The patient's[SS1.3] hospital course was notable for hyperglycemia for which the patient required an insulin infusion. Endocrinology was consulted and we greatly appreciate their recommendations and assistance in the management of the patient's insulin dosing. Presently, the patient is on Lantus[SS1.4] 65 units daily in the morning as well at 17 units daily in the evening in addition to a sliding scale Aspart 2 units/30 blood glucose level for blood sugars > 140.     In regards to the patient's nutrition, the patient was evaluated by Speech Therapy and the patient was not deemed safe for oral  nutrition due to the fact that he is at high risk for aspiration. Presently, he has an NJ tube.[SS1.3] The patient will require[SH1.2] PEG[SS1.3] placement [facility]1.2]. The patient was also evaluated by Physical and Occupational Therapy. Th[SS1.3]e patient will be [facility].[SH1.2]     The patient had difficulty managing his oral secretions. He was treated with intravenous Lasix as well as Chest Physiotherapy.[SS1.3]  These issues had improved upon discharge.[SH1.2]      On the day of hospital transfer, the patient was deemed medically and neurologically stable to transfer to the [facility].     Neurological Examination:  General Appearance:[SS1.3]   Awake and Alert; Very Pleasant and In No Apparent Distress[SS1.5]    Mental Status:[SS1.3]  Alert and Oriented to Person and Place; Disoriented to Month and Date[SS1.5]    Motor:  Upper Extremities:     C5 (Deltoid) C5/6 (Bicep) C7 (Tricep) C8 (Finger Flexion) T1 (Interosseous)   Right[SS1.3] 4[SS1.5]/5[SS1.3] 4[SS1.5]/5[SS1.3] 3[SS1.5]/5[SS1.3] 3[SS1.5]/5[SS1.3] 3[SS1.5]/5   Left[SS1.3] 4[SS1.5]/5[SS1.3] 4[SS1.5]/5[SS1.3] 3[SS1.5]/5[SS1.3] 3[SS1.5]/5[SS1.3] 3[SS1.5]/5     Lower Extremities:   L2 (Hip Flexion) L3 (Knee Extension)  L4 (Foot Dorsiflexion) L5 (EHL Extesnion) S1 (Foot Plantar Extension)   Right[SS1.3] 4[SS1.5]/5[SS1.3] 4[SS1.5]/5[SS1.3] 4[SS1.5]/5[SS1.3] 4[SS1.5]/5[SS1.3] 4[SS1.5]/5   Left[SS1.3] 4[SS1.5]/5[SS1.3] 4[SS1.5]/5[SS1.3] 4[SS1.5]/5[SS1.3] 4[SS1.5]/5[SS1.3] 4[SS1.5]/5       Deep Tendon Reflexes:   Biceps Triceps Brachioradialis Patellar Achilles Babinski   Right[SS1.3] 1[SS1.5]+[SS1.3] 1[SS1.5]+[SS1.3] 1[SS1.5]+[SS1.3] 1[SS1.5]+[SS1.3] 1[SS1.5]+[SS1.3] NT[SS1.5]   Left[SS1.3] 1[SS1.5]+[SS1.3] 1[SS1.5]+[SS1.3] 1[SS1.5]+[SS1.3] 1[SS1.5]+[SS1.3] 1[SS1.5]+[SS1.3] NT[SS1.5]     De Leon's: Absent Bilaterally[SS1.3]     Sensory: Intact to LT in the BUE and  BLE[SS1.5]    Wounds:  Posterior:  Clean, dry, wound edges are approximated, sutures have been removed   Anterior:  Clean, dry, intact, monocryl absorbable sutures in place[SH1.2]         Discharge Medications:[SS1.1]     Current Discharge Medication List      START taking these medications    Details   !! insulin aspart (NOVOLOG PEN) 100 UNIT/ML injection Inject 1-22 Units Subcutaneous every 4 hours    Comments: Indication: Diabetes Mellitus  Associated Diagnoses: Uncontrolled type 2 diabetes mellitus without complication, with long-term current use of insulin (H)      !! insulin glargine (LANTUS) 100 UNIT/ML injection Inject 17 Units Subcutaneous every evening    Comments: Indication: Diabetes Mellitus      !! insulin glargine (LANTUS) 100 UNIT/ML injection Inject 65 Units Subcutaneous every morning    Comments: Indication: Diabetes Mellitus      oxyCODONE (ROXICODONE) 5 MG IR tablet Take 1-2 tablets (5-10 mg) by mouth every 4 hours as needed for moderate to severe pain  Refills: 0    Comments: Indication: Post-Operative Pain      acetaminophen (TYLENOL) 325 MG tablet 2 tablets (650 mg) by Per NG tube route every 4 hours as needed for mild pain or fever  Qty: 100 tablet    Comments: Indication: Mild Pain; Fever      albuterol (2.5 MG/3ML) 0.083% neb solution Take 1 vial (2.5 mg) by nebulization every 6 hours as needed for wheezing  Qty: 360 mL    Comments: Indication: Shortness of Breath; Wheezing      enoxaparin (LOVENOX) 30 MG/0.3ML injection Inject 0.3 mLs (30 mg) Subcutaneous every 12 hours    Comments: Indication: Venous Thromboembolism Prophylaxis      !! heparin lock flush 10 UNIT/ML SOLN injection 5-10 mLs by Intracatheter route every 24 hours    Comments: Indication: PICC Flush      !! heparin lock flush 10 UNIT/ML SOLN injection 5-10 mLs by Intracatheter route every hour as needed for other (to lock each CVC - Open Ended (Tunneled and Non-Tunneled) dormant lumen.)    Comments: Indication: PICC Flush       !! insulin aspart (NOVOLOG PEN) 100 UNIT/ML injection Inject 1-12 Units Subcutaneous every 4 hours    Comments: Indication: Diabetes Mellitus      metoprolol (LOPRESSOR) 25 MG tablet 1 tablet (25 mg) by Per NG tube route 2 times daily  Qty: 60 tablet    Comments: Indication: Hypertension      ceFAZolin sodium-dextrose (ANCEF) 2-4 GM/100ML-% SOLN infusion Inject 100 mLs (2 g) into the vein every 8 hours    Comments: Indication: Strep Mitis Infection      folic acid (FOLVITE) 1 MG tablet Take 1 tablet (1 mg) by mouth daily  Qty: 30 tablet    Comments: Indication: H/O ETOH Abuse      senna-docusate (SENOKOT-S;PERICOLACE) 8.6-50 MG per tablet Take 2 tablets by mouth 2 times daily  Qty: 100 tablet    Comments: Indication: Prevent constipation      polyethylene glycol (MIRALAX/GLYCOLAX) Packet Take 17 g by mouth 2 times daily  Qty: 7 packet    Comments: Indication: Prevent constipation      bisacodyl (DULCOLAX) 10 MG Suppository Place 1 suppository (10 mg) rectally daily as needed for constipation  Qty: 30 suppository    Comments: Indication: Prevent constipation      multivitamins with minerals (CERTAVITE/CEROVITE) LIQD liquid 15 mLs by Per Feeding Tube route daily    Comments: Indication: H/O ETOH Abuse      protein modular (PROSTAT SUGAR FREE) 1 packet by Per Feeding Tube route 3 times daily  Qty: 900 packet    Comments: Indication: Poor Nutrition      cyclobenzaprine (FLEXERIL) 10 MG tablet Take 1 tablet (10 mg) by mouth 3 times daily as needed for muscle spasms  Qty: 42 tablet    Comments: Indication: Muscle Spasm      omeprazole (PRILOSEC) 2 mg/mL SUSP 10 mLs (20 mg) by Oral or Feeding Tube route daily    Comments: Indication: Stress Ulcer Prophylaxis       !! - Potential duplicate medications found. Please discuss with provider.[SS1.6]                Discharge Instructions and Follow-Up:   Discharge diet:[SS1.1] - Continue Tube Feedings with 2  HN 45 mL/hour  - Free Water Flush 30-60 mL Q 4 HR  - Continue NPO  Status; All nutrition and medications should be administered with NJ Tube[SS1.3]  -[facility][SH1.2]     Discharge activity:[SS1.1] - Increase Activity as Tolerated  - Up with Nursing Assistance  - Continue Physical and Occupational Therapy Evaluations  - Maintain [place] Collar on for 6 weeks       Discharge Follow-Up: - Continue to follow-up with Neurosurgery at [facility]  - Follow-up with Infectious Disease Service in 2-3 weeks; Obtain CRP levels every 2 weeks.  - Follow-up with Endocrinology for ongoing management of Diabetes  - Follow-up with Podiatry for evaluation following toe amputation[SS1.3]   -[facility][SH1.2]    Wound care:[SS1.1] - Maintain Posterior Cervical Spine Incision open to air[SS1.3]; Maintain Right Anterior Neck Incision Open to Air; Maintain Right Foot 3rd Digit Amputation Site Open to Air[SS1.5]  - Monitor for signs and symptoms of infection (Redness, Swelling, Draining, Warmth)  - Pat your incision[SS1.3]s[SS1.5] dry should it get wet.[SS1.3]      Please call if you have:  1. increased pain, redness, drainage, swelling at your incision  2. fevers > 101.5 F degrees  3. with any questions or concerns.  You may reach the Neurosurgery clinic at [phone] during regular work hours. ER at [phone].    and ask for the Neurosurgery Resident on call at [phone], during off hours or weekends.         Discharge Disposition:[SS1.1]   Transferred to [facility][SS1.3]             Revision History        User Key Date/Time User Provider Type Action    > SH1.1 7/26/2017  9:36 AM Vaishali Duffy APRN CNP Nurse Practitioner Sign     SH1.2 7/26/2017  9:26 AM Vaishali Duffy APRN CNP Nurse Practitioner      SS1.5 7/24/2017 12:56 PM Kristina Andino APRN CNP Nurse Practitioner Share     SS1.6 7/24/2017 11:46 AM Kristina Andino APRN CNP Nurse Practitioner Share     SS1.3 7/24/2017  11:21 AM Kristina Andino APRN CNP Nurse Practitioner      SS1.4 7/21/2017 12:02 PM Kristina Andino APRN CNP Nurse Practitioner Share     SS1.2 7/21/2017 11:45 AM Kristina Andino APRN CNP Nurse Practitioner Share     SS1.1 7/21/2017 11:03 AM Kristina Andino APRN CNP Nurse Practitioner                      Consult Notes      Consults by Elizabeth Altman RPA at 7/25/2017  9:02 AM     Author:  Elizabeth Altman RPA Service:  Interventional Radiology Author Type:  Radiology Practioner Assistant    Filed:  7/25/2017  9:02 AM Date of Service:  7/25/2017  9:02 AM Creation Time:  7/25/2017  9:01 AM    Status:  Signed :  Elizabeth Altman RPA (Radiology Practioner Assistant)     Consult Orders:    1. Interventional Radiology IP Consult: PEG placement prior to DC; Consultant may enter orders: Yes; Patient to be seen: Routine - within 24 hours [[id number]] ordered by Vaishali Duffy APRN CNP at 07/24/17 1334                Patient is on IR schedule 7/26/17 for a Gastrojejunal feeding tube placement    Labs WNL for procedure.    Orders for NPO, scrubs and antibiotics have been entered.   Consent will be done prior to procedure.     [place] [phone] for estimated time of procedure.       Elizabeth Altman IR RPA  [phone] Call pager  [phone] pager[SY1.1]             Revision History        User Key Date/Time User Provider Type Action    > SY1.1 7/25/2017  9:02 AM Elizabeth Altman RPA Radiology Practioner Assistant Sign            Consults by Angelita Pandey PA-C at 7/22/2017  2:24 PM     Author:  Angelita Pandey PA-C Service:  Endocrinology Author Type:  Physician Assistant - C    Filed:  7/22/2017  2:24 PM Date of Service:  7/22/2017  2:24 PM Creation Time:  7/22/2017  2:06 PM    Status:  Signed :  Angelita Pandey PA-C (Physician Assistant - C)     Consult Orders:    1. Endocrine Diabetes Adult IP Consult:  Patient to be seen: Routine within 24 hrs; Call back #: pager [phone]; post operative hyperglymecia; Consultant may enter orders: Yes [[id number]] ordered by Vaishali Duffy APRN CNP at 07/20/17 1450                Diabetes/Hyperglycemia Management Consult    Chief Complaint type 2 diabetes, hyperglycemia on enteral feeds  Consult requested by: Vaishali Duffy NP, attending: Dr. Rafael Lo  History of Present Illness Mr. Ernesto Rawls is a 71 yo man with a history of type 2 diabetes, RA, CAD, and wet gangrene of his R foot great toe s/p [facility], who [facility] on 7/7/17 with post op weakness in bilateral UE and LE.  Found to have significant spinal stenosis, he is now s/p C3-5 and C6 laminectomy, C5 corpetomy with cage placement, and C3-7 posterior fusion.    Ernesto is having some altered mental status, and he was not able to recall when he was diagnosed with diabetes or what medications he is taking.  I spoke with his wife via phone- she had medications written down at home, but did not have the list with her at that time.  She knows that he was not taking insulin PTA, and also reports that he never checked his glucoses at home.  H&P does not list any medications prior to admission.  On review of paper chart, [facility] metformin 500mg BID as being given while pt was inpatient, but I could not find an outpatient medication list.  Hemoglobin A1c was 7.3% from earlier this admission but pt was anemic at that time.    Ernesto was started on enteral feeds during this admission, he is currently on TwoCal at 45cc/h.  This went to goal rate on 7/17 at 0600.  He is NPO.  While on IV insulin infusion his rates were frequently around 5units/h.  He was transitioned off IV insulin on 7/17 with glargine 30 units.  Glucoses were mostly in the 200s, so glargine was increased to 40 units.  Yesterday we spoke with primary team and recommended increasing to glargine 50  "units.  BG were spiking as high as 300s yesterday despite this increased dose, likely due to repletion of glycogen stores.  Today we increased glargine to 65 units and glucose is down to 244 midday.  Electrolyte replacements were in dextrose fluids, but this was switched to NS on 7/20.    Ernesto had no complaints when I visited.  He is somewhat confused, poor memory.      Recent Labs  Lab 07/22/17  1059 07/22/17  0648 07/22/17  0240 07/21/17  2219 07/21/17  2201 07/21/17  1804  07/20/17  0929  07/20/17  0643  07/19/17  1430  07/17/17  0741  07/16/17  1320  07/16/17  0950   GLC  --   --   --   --   --   --   --  305*  --  266*  --  215*  --  169*  --  154*  --  169*   * 306* 197* 271* 256* 233*  < >  --   < >  --   < >  --   < >  --   < >  --   < >  --    < > = values in this interval not displayed.      Diabetes Type: type 2  Diabetes Duration: unknown- pt does not recall  Usual Diabetes Regimen: oral: likely metformin 500mg BID, perhaps others? Cannot find any records in chart and pt does not recall (wife will bring med list tomorrow).  Ability to [place] Prescribed Regimen: unknown.  Wife reports that pt \"never\" checked his glucoses at home.  Diabetes Control:   Lab Results   Component Value Date    A1C 7.3 07/08/2017     Diabetes Complications: none known  Able to Detect Hypoglycemia: yes, per pt  [facility]  Factors Impacting Glucose Control: continuous enteral feeds, NPO status      Review of Systems  10 point ROS completed with pertinent positives and negatives noted in the HPI    Past medical, family and social histories are reviewed and updated.    Past Medical History  Type 2 diabetes  RA  CAD    Family History  Pt could not recall if he has family hx of diabets  Social History  Social History     Social History     Marital status:      Spouse name: N/A     Number of children: N/A     Years of education: N/A     Social History Main Topics     Smoking status: None     " "Smokeless tobacco: None     Alcohol use None     Drug use: None     Sexual activity: Not Asked     Other Topics Concern     None     Social History Narrative   Pt is  (wife Cristian).  He could not recall where he lives, chart states: [place].      Physical Exam[BJ1.1]  /58 (BP Location: Left arm)  Pulse 96  Temp 95.8  F (35.4  C) (Oral)  Resp 16  Ht 1.854 m (6' 1\")  Wt 88.5 kg (195 lb 1.6 oz)  SpO2 97%  BMI 25.74 kg/m2[BJ1.2]    General:  pleasant man resting in bed, in no distress.   HEENT: Cervial brace, PER and anicteric, non-injected, oral mucous membranes moist. NJ tube in L nares  Lungs: unlabored respiration, no cough  Skin: warm and dry, no obvious lesions  Lymp:  no LE edema   Mental status:  alert, becomes confused, disoriented  Psych:  calm, even mood    Laboratory  Recent Labs   Lab Test  07/20/17   0929  07/20/17   0643   NA  136  134   POTASSIUM  3.6  3.6   CHLORIDE  96  98   CO2  32  31   ANIONGAP  8  6   GLC  305*  266*   BUN  14  14   CR  0.42*  0.40*   KAROL  8.8  8.4*     CBC RESULTS:   Recent Labs   Lab Test  07/20/17   0929   WBC  10.8   RBC  3.03*   HGB  9.6*   HCT  28.4*   MCV  94   MCH  31.7   MCHC  33.8   RDW  14.3   PLT  226       Liver Function Studies - No results for input(s): PROTTOTAL, ALBUMIN, BILITOTAL, ALKPHOS, AST, ALT, BILIDIRECT in the last 11636 hours.    Active Medications  Current Facility-Administered Medications   Medication     insulin glargine (LANTUS) injection 65 Units     glycopyrrolate (ROBINUL) injection 0.2 mg     insulin aspart (NovoLOG) inj (RAPID ACTING)     enoxaparin (LOVENOX) injection 30 mg     sodium chloride (PF) 0.9% PF flush 10-20 mL     heparin lock flush 10 UNIT/ML injection 5-10 mL     heparin lock flush 10 UNIT/ML injection 5-10 mL     potassium phosphate 10 mmol in NaCl 0.9 % 250 mL intermittent infusion     potassium phosphate 15 mmol in NaCl 0.9 % 250 mL intermittent infusion     potassium phosphate 20 mmol in NaCl 0.9 % 250 mL " intermittent infusion     potassium phosphate 20 mmol in NaCl 0.9 % 500 mL intermittent infusion     potassium phosphate 20 mmol in NaCl 0.9 % intermittent infusion     potassium phosphate 25 mmol in NaCl 0.9 % 500 mL intermittent infusion     albuterol neb solution 2.5 mg     lidocaine (viscous) (XYLOCAINE) 2 % solution 5 mL     lidocaine 1 % 1 mL     lidocaine (LMX4) kit     sodium chloride (PF) 0.9% PF flush 10-20 mL     sodium chloride (PF) 0.9% PF flush 10 mL     lidocaine 1 % 0.5-5 mL     lidocaine (LMX4) kit     sodium chloride (PF) 0.9% PF flush 5-50 mL     ceFAZolin sodium-dextrose (ANCEF) infusion 2 g     dextrose 10 % 1,000 mL infusion     glucose 40 % gel 15-30 g    Or     dextrose 50 % injection 25-50 mL    Or     glucagon injection 1 mg     metoprolol (LOPRESSOR) tablet 25 mg     hydrALAZINE (APRESOLINE) injection 10-20 mg     protein modular (PROSTAT SUGAR FREE) packet 1 packet     acetaminophen (TYLENOL) tablet 650 mg     lidocaine 1 % 1 mL     lidocaine (LMX4) kit     sodium chloride (PF) 0.9% PF flush 3 mL     sodium chloride (PF) 0.9% PF flush 3 mL     labetalol (NORMODYNE/TRANDATE) injection 10-40 mg     naloxone (NARCAN) injection 0.1-0.4 mg     lidocaine (viscous) (XYLOCAINE) 2 % solution 5 mL     carboxymethylcellulose (REFRESH PLUS) 0.5 % ophthalmic solution 2 drop     potassium chloride SA (K-DUR/KLOR-CON M) CR tablet 20-40 mEq     potassium chloride (KLOR-CON) Packet 20-40 mEq     potassium chloride 10 mEq in 100 mL intermittent infusion     potassium chloride 10 mEq in 100 mL intermittent infusion with 10 mg lidocaine     potassium chloride 20 mEq in 50 mL intermittent infusion     magnesium sulfate 2 g in NS intermittent infusion (PharMEDium or FV Cmpd)     magnesium sulfate 4 g in 100 mL sterile water (premade)     multivitamins with minerals (CERTAVITE/CEROVITE) liquid 15 mL     omeprazole (priLOSEC) suspension 20 mg     acetaminophen (TYLENOL) Suppository 650 mg     magnesium sulfate 2  g in NS intermittent infusion (PharMEDium or FV Cmpd)     benzocaine-menthol (CHLORASEPTIC) 6-10 MG lozenge 1-2 lozenge     oxyCODONE (ROXICODONE) IR tablet 5-10 mg     lidocaine (LIDODERM) 5 % Patch 3 patch     lidocaine (LIDODERM) patch REMOVAL     lidocaine (LIDODERM) patch in PLACE     menthol (ICY HOT) 5 % patch 1 patch    And     menthol (ICY HOT) Patch in Place    And     menthol (ICY HOT) patch REMOVAL     ondansetron (ZOFRAN-ODT) ODT tab 4 mg    Or     ondansetron (ZOFRAN) injection 4 mg     prochlorperazine (COMPAZINE) injection 5 mg    Or     prochlorperazine (COMPAZINE) tablet 5 mg     senna-docusate (SENOKOT-S;PERICOLACE) 8.6-50 MG per tablet 1-2 tablet     polyethylene glycol (MIRALAX/GLYCOLAX) Packet 17 g     bisacodyl (DULCOLAX) Suppository 10 mg     folic acid (FOLVITE) tablet 1 mg     cyclobenzaprine (FLEXERIL) tablet 10 mg     Current Outpatient Prescriptions   Medication Sig Dispense Refill     oxyCODONE (ROXICODONE) 5 MG IR tablet Take 1-2 tablets (5-10 mg) by mouth every 4 hours as needed for moderate to severe pain  0     acetaminophen (TYLENOL) 325 MG tablet 2 tablets (650 mg) by Per NG tube route every 4 hours as needed for mild pain or fever 100 tablet      albuterol (2.5 MG/3ML) 0.083% neb solution Take 1 vial (2.5 mg) by nebulization every 6 hours as needed for wheezing 360 mL      enoxaparin (LOVENOX) 30 MG/0.3ML injection Inject 0.3 mLs (30 mg) Subcutaneous every 12 hours       heparin lock flush 10 UNIT/ML SOLN injection 5-10 mLs by Intracatheter route every 24 hours       heparin lock flush 10 UNIT/ML SOLN injection 5-10 mLs by Intracatheter route every hour as needed for other (to lock each CVC - Open Ended (Tunneled and Non-Tunneled) dormant lumen.)       insulin aspart (NOVOLOG PEN) 100 UNIT/ML injection Inject 1-12 Units Subcutaneous every 4 hours       insulin glargine (LANTUS) 100 UNIT/ML injection Inject 50 Units Subcutaneous every morning (before breakfast)       metoprolol  (LOPRESSOR) 25 MG tablet 1 tablet (25 mg) by Per NG tube route 2 times daily 60 tablet      ceFAZolin sodium-dextrose (ANCEF) 2-4 GM/100ML-% SOLN infusion Inject 100 mLs (2 g) into the vein every 8 hours       folic acid (FOLVITE) 1 MG tablet Take 1 tablet (1 mg) by mouth daily 30 tablet      senna-docusate (SENOKOT-S;PERICOLACE) 8.6-50 MG per tablet Take 2 tablets by mouth 2 times daily 100 tablet      polyethylene glycol (MIRALAX/GLYCOLAX) Packet Take 17 g by mouth 2 times daily 7 packet      bisacodyl (DULCOLAX) 10 MG Suppository Place 1 suppository (10 mg) rectally daily as needed for constipation 30 suppository      multivitamins with minerals (CERTAVITE/CEROVITE) LIQD liquid 15 mLs by Per Feeding Tube route daily       protein modular (PROSTAT SUGAR FREE) 1 packet by Per Feeding Tube route 3 times daily 900 packet      cyclobenzaprine (FLEXERIL) 10 MG tablet Take 1 tablet (10 mg) by mouth 3 times daily as needed for muscle spasms 42 tablet      omeprazole (PRILOSEC) 2 mg/mL SUSP 10 mLs (20 mg) by Oral or Feeding Tube route daily         Current Diet    Active Diet Order      Advance Diet as Tolerated      NPO for Medical/Clinical Reasons      Assessment  Mr. Ernesto Rawls is a 71 yo man with a history of type 2 diabetes, RA, CAD, and wet gangrene of his R foot great toe s/p [facility], who [facility] on 7/7/17 with post op weakness in bilateral UE and LE.  Found to have significant spinal stenosis, he is now s/p C3-5 and C6 laminectomy, C5 corpetomy with cage placement, and C3-7 posterior fusion.  Home regimen and glucose control PTA is not certain- on orals, likely metformin.  Requiring large amounts of insulin while on continuous enteral feeds.      Plan  -glargine increased to 65 units qAM this morning.  We will add an evening dose of glargine if glucoses remain in the 200s later today.  -correction aspart: 2 units/30 for BG >140 q4h  -Please start D10 (prn order) if  TFs are interrupted to prevent hypoglycemia.    We will continue to follow.    Angelita Pandey PA-C [phone]    Diabetes Management Team job code: [id number][BJ1.1]       Revision History        User Key Date/Time User Provider Type Action    > BJ1.2 7/22/2017  2:24 PM Angelita Pandey PA-C Physician Assistant Lio MORELAND Sign     BJ1.1 7/22/2017  2:06 PM Angelita Pandey PA-C Physician Assistant - MERLY             Consults by Shamika Kam APRN CNS at 7/20/2017  4:27 PM     Author:  Shamika Kam APRN CNS Service:  Endocrinology Author Type:  Advanced Practice RN    Filed:  7/20/2017  4:27 PM Date of Service:  7/20/2017  4:27 PM Creation Time:  7/20/2017  4:25 PM    Status:  Signed :  Shamika Kam APRN CNS (Advanced Practice RN)         Endocrinology Diabetes brief note-  Pt's glargine has been appropriately increased today.  Added pharmacy consult requesting change Kphos to normal saline for diluent.  Will contact primary team in morning tomorrow to complete recommendations.  Shamika ZARAGOZA [facility] [phone]  Diabetes Management Team job code: [id number][JH1.1]       Revision History        User Key Date/Time User Provider Type Action    > JH1.1 7/20/2017  4:27 PM Shamika Kam APRN CNS Advanced Practice RN Sign            Consults by Bear Jimenez MD at 7/8/2017  9:41 AM     Author:  Bear Jimenez MD Service:  Dental Author Type:  Resident    Filed:  7/8/2017  9:55 AM Date of Service:  7/8/2017  9:41 AM Creation Time:  7/8/2017  9:33 AM    Status:  Attested :  Bear Jimenez MD (Resident)    Cosigner:  Ed Mckeon DDS at 7/14/2017  9:37 AM         Consult Orders:    1. Dentistry Adult IP Consult: Patient to be seen: Routine within 24 hrs; Call back #: [phone]; Concern for peridontal abscess; Consultant may enter orders: Yes [[id number]] ordered by Maynor Griffin MD at 07/08/17 0746           Attestation signed by Ed Mckeon DDS at 7/14/2017  9:37 AM         Attestation:  I, Ed Mckeon, discussed the patient with the resident immediately after the patient s visit on Jul 8, 2017, and agree with the resident s assessment and plan of care.  I was immediately available to the patient should the need have arisen.  Key findings: This is an incomplete evaluation without dental imaging.                                  Dental Service Consultation        Ernesto Rawls MRN# [id number]   YOB: 1945 Age: 72 year old   Date of Admission: 7/7/2017     Reason for consult: I was asked by Dr. Griffin to evaluate this patient for periodontal abscess.           Assessment and Plan:   Assessment: EO and IO revealed #8 right upper incisor mobility (class III). No IO pathology noted. No draining purulence noted.    Plan: Advised resident attending that there is nothing acute or immediate resolution that can be done at this time or is warranted. Pt needs to be seen by a general dentist to develop long term plan regarding #8.              Chief Complaint:   Consulted by Dr Griffin for suspected periodontal abscess.    History is obtained from the resident and PCP.         History of Present Illness:   This patient is a 72 year old male who presents with suspected periodontal abscess.               Past Medical History:   I have reviewed this patient's past medical history          Past Surgical History:   I have reviewed this patient's past surgical history            Social History:   I have reviewed and updated this patient's social history          Family History:   I have reviewed this patient's family history          Immunizations:   Immunization status is unknown          Allergies:   All allergies reviewed and addressed          Medications:   I have reviewed this patient's current medications          Review of Systems:   The 10 point Review of Systems is negative other than noted in the HPI            Physical Exam:   Vitals were reviewed[MH1.1]  Temp: 99.1  F  (37.3  C) Temp src: Oral BP: 150/74   Heart Rate: 118 Resp: 12 SpO2: 96 % O2 Device: None (Room air) Oxygen Delivery: 1 LPM[MH1.2]    Head and neck exam: No unilateral swelling noted. No lymph node fluctuation or mass noted.    Intraoral exam: #8 presents with class III mobility. PDs ranging from 4-7 mm. No IO swellings or inflammation noted. Pt does have anterior open bite allowing lower incisors to abraded anterior hard palate.        Data:   Radiographic interpretation: NA   Orthopantomogram: Not indicated  Osseous pathology: NA  Pulpal Pathology: NA  Periodontal Pathology: #8 (Right upper incisor) presents with class III mobility.Localized 5-6 mm pocket depths.  Caries: none noted  Odontogenic pathology: none noted      PGY1 Bear Jimenez DDS  Pager: [phone][MH1.1]       Revision History        User Key Date/Time User Provider Type Action    > MH1.2 2017  9:55 AM Bear Jimenez MD Resident Sign     MH1.1 2017  9:33 AM Bear Jimenez MD Resident             Consults by Angella Sarkar at 2017  3:53 PM     Author:  Angella Sarkar Service:  Social Work Author Type:      Filed:  2017  4:05 PM Date of Service:  2017  3:53 PM Creation Time:  2017  3:36 PM    Status:  Signed :  Angella Sarkar ()         Social Work: Assessment with Discharge Plan    Patient Name:  Ernesto Rawls  :  1945  Age:  72 year old  MRN:  [id number]  Risk/Complexity Score:  Filed Complexity Screen Score: 5  Completed assessment with:  Patient, pt's ex-wife/S.O. Cristian, team rounds, RNCC, chart review    Presenting Information   Reason for Referral:  length of stay and support  Date of Intake:  2017  Referral Source:  Chart Review  Decision Maker:  self  Alternate Decision Maker:  Per AMBAR Gaines, pt's leigh Hare ([phone])  Health Care Directive:  Not on file  Living Situation:[HS1.1]  House with ex-wife/S.O. Cristian [place] the nearest  [facility] and 60 miles [facility] the nearest [facility].[HS1.2]  Previous Functional Status:[HS1.1]  Independent[HS1.2]  Patient and family understanding of hospitalization:[HS1.1]  restorative[HS1.2]  Cultural/Language/Spiritual Considerations:[HS1.1]  undesignated[HS1.2]  Adjustment to Illness:[HS1.1]  Unable to assess pt d/t needing frequent suctioning; pt's ex-wife appears to be adjusting appropriately.[HS1.2]    Physical Health  Reason for Admission:  No diagnosis found.  Services Needed/Recommended:[HS1.1]  Return to [facility] for continued medical admission.  Will need ARU vs. TCU following admission.[HS1.2]    Mental Health/Chemical Dependency  Diagnosis:[HS1.1]  None[HS1.2]  Support/Services in Place:[HS1.1]  N/a[HS1.2]  Services Needed/Recommended:[HS1.1]  N/a[HS1.2]    Support System  Significant relationship at present time:[HS1.1]  Ex-wife/S.O. Cristian with whom pt lives and is very involved and supportive.  Pt also has two adult children who are involved and supportive.[HS1.2]  Family of origin is available for support:[HS1.1]  Yes, pt's ex-wife can provide support but limited assistance as she has spinal stenosis.  Pt's dtr Ananya lives in [place] and is supportive.  Unclear where pt's other adult child lives.[HS1.2]  Other support available:[HS1.1]  None.[HS1.2]  Gaps in support system:[HS1.1]  Pt has very limited support near his home as all friends are elderly.[HS1.2]  Patient is caregiver to:[HS1.1]  None[HS1.2]     Provider Information   Primary Care Physician:  Roderick Martines MD   [phone]   Clinic:  [address] / [facility] [address]      :[HS1.1]  [facility] , but unclear contact information.[HS1.2]    Financial   Income Source:[HS1.1]  SSI[HS1.2]  Financial Concerns:[HS1.1]  Pt's ex-wife voices concern that they will not be able to afford a TCU stay past 20 days if under his Medicare benefit.  If stay is covered by [facility] then this will not be an  issue.[HS1.2]  Insurance:    Payor/Plan Subscriber Name Rel Member # Group #   COMMERCIAL - VA MEDIC* IVAN TERRAZAS         [id number] FEE BASIS, [address][HS1.1]   Pt also has Medicare according to Cristian.[HS1.2]    Discharge Plan   Patient and family discharge goal:[HS1.1]  Pt and Cristian's hope to [facility] prior to going to rehab.  RNCC is working on transfer.[HS1.2]  Provided education on discharge plan:[HS1.1]  YES[HS1.2]  Patient agreeable to discharge plan:[HS1.1]  YES[HS1.2]  A list of Medicare Certified Facilities was provided to the patient and/or family to encourage patient choice. Patient's choices for facility are:[HS1.1]  Did not provide list at this time d/t the hope that pt will [facility] and [facility] the [facility].[HS1.2]  Will NH provide Skilled rehabilitation or complex medical:[HS1.1]  YES[HS1.2]  General information regarding anticipated insurance coverage and possible out of pocket cost was discussed. Patient and patient's family are aware patient may incur the cost of transportation to the facility, pending insurance payment:[HS1.1] YES[HS1.2]  Barriers to discharge:[HS1.1]  Pt is not medically stable.[HS1.2]    Discharge Recommendations   Anticipated Disposition:[HS1.1]  Facility:  Pt will need ARU vs. TCU, though likely that [facility] will [facility].[HS1.2]  Transportation Needs:[HS1.1]  TBD pending d/c plan.[HS1.2]  Name of Transportation Company and Phone:[HS1.1]  TBD[HS1.2]    Additional comments[HS1.1]   ETELVINA met with pt and his ex-wife/S.O. Cristian to introduce SW role and offer support.  Provided education relating to all d/c options and notified her that [facility] continues to [facility].  Answered questions and offered support.  Cristian states that pt also has Medicare along with [facility] coverage.    RNCC will continue to work [facility]  [facility].    LIZZETH Wood, [facility]  Adult ICU Clinical   Pager [phone][HS1.2]         Revision History        User Key Date/Time User Provider Type Action    > HS1.2 7/13/2017  4:05 PM Angella Sarkar  Sign     HS1.1 7/13/2017  3:36 PM Angella Sarkar              Consults by Pranay Rincon MD at 7/11/2017  9:49 AM     Author:  Pranay Rincon MD Service:  Infectious Disease Author Type:  Physician    Filed:  7/11/2017  4:40 PM Date of Service:  7/11/2017  9:49 AM Creation Time:  7/11/2017  9:09 AM    Status:  Signed :  Pranay Rincon MD (Physician)     Consult Orders:    1. Infectious Disease General Adult IP Consult: Patient to be seen: Routine within 24 hrs; Call back #: [phone]; concern for epidural abcess; Consultant may enter orders: Yes [[id number]] ordered by David Guido MD at 07/10/17 1706                [facility] [place]   General Infectious Disease Consultation     Patient:  Ernesto Rawls, Date of birth 1945, Medical record number [id number]  Date of Visit:  July 11, 2017  Date of Admission: 7/7/2017  Consult Requester:Derick Holly            Assessment and Recommendations:[GH1.1]   PROBLEM LIST[RR1.1]:  1. Epidural[GH1.1] a[GH1.2]bscess[GH1.1] with adjacent osteomyelitis and discitis of C3-5 disc space  2. Cervical stenosis s/p C3-5 and parital C6 laminectomy and decompression.  3. Recent history of wet gangrene of toe on right foot with subsequent amputation.  4. Bibasilar atelectasis versus pneumonia.  5. Type 2 Diabetes Mellitus  6. Rheumatoid Arthritis[GH1.2]      Assessment and[RR1.1] Recommendations:  1.[GH1.2]  E[GH1.3]pidural[GH1.1] a[GH1.2]bscess[GH1.1] with adjacent osteomyelitis and discitis of C3-5 disc space:  Etiology may be from seeding secondary to toe infecti[GH1.2]on v.[GH1.3] d[RR1.1]irect oropharyngeal migration v. others[GH1.3].  Patient has a complex history,  notable for increased falls[GH1.2] and constitutional symptoms in the past month[GH1.3], poorly-controlled diabetes mellitus, therapy for RA consisting of Hydroxychloroquine and MTX, and recent surgery for a gangrenous right toe.  He is S/P right toe amputation and C3-5 and partial C6 laminectomy/decompression.  MRI on 7/07 demonstrated discitis, osteomyelitis, and an epidural abscess in the C3-5 region.  Echocardiogram was within normal limits.[GH1.2]  White count has lenin stable, with elevated inflammatory markers[GH1.3].  Patient had a fever of 101.3 on 7/09, but has[GH1.2] otherwise been afebrile[GH1.3].  Blood cultures have been negative to date.  No drainage and culture of abscess has been performed yet.  MRSA of nares was negative[GH1.2] on 7/08[GH1.4].  Dental consult stated they noted no immediate issues.  ENT could not locate the abscess via[GH1.2] pharyngoscopy[GH1.4].  Patient has been on Cefepime, Flagyl, and Vancomycin since 7/08, with discontinuation of Flagyl today (7/11).[GH1.2]      Vertebral osteomyelitis occurs from hematogenous spread. It is commonly monomicrobial with Staphylococcus aureus being the most common cause. If the patient has had a Staphylococcus aureas blood stream infection within the last 3 months and an MRI c/w vertebral osteomyelitis, then there is no need for a biopsy.     Risk factors include old age, immunocompromised state, IVDA, central lines, and recent instrumentation.    Brucella and M. tuberculosis are common causes in endemic areas.     Fever occurs in 45%; 40% have normal WBC; 43% have extension to epidural/paravertebral areas; 50% have positive blood cultures.    CRP/ESR will increase 2-3 weeks after treatment is initiated.    If there is a 25-33% reduction in CRP at 4 weeks = low risk of treatment failure  If there is a 50% reduction in CRP at 4 weeks = rare risk of treatment failure    There is a 10-30% failure rate identified in clinical trials of vertebral  osteomyelitis.    No role for routine MRI follow up imaging.    Current regimen of[RR1.1] Cefepime and Vancomycin[GH1.3] is appropriate[RR1.1]     -[GH1.2] Recommendation[RR1.1]:  Current regimen of Cefepime and Vancomycin is appropriate.[GH1.2]  Need cultures of abscess or bone[GH1.3] for gram stain and cultures to target therapy[GH1.2] as soon as possible[GH1.4].  Would like Gram stain & culture, fungal culture, and AFB culture (in order by priority).[GH1.2]  Per neurosurgery, patient is not scheduled for surgery until Thursday, with no plan of obtaining samples until that time.  C[GH1.4]onsider consulting Gastroenterology for EUS and transesophageal aspiration[GH1.3] of the epidural abscess if possible.[GH1.4]    2.[GH1.2]  Bibasilar atelectasis v. pneumonia[GH1.3]:  Patient had CXR on 7/09 that demonstrated bibasilar streaky opacities, thought to be atelectasis v. Pneumonia.  Sputum culture on 7/09 demonstrated[GH1.2] Enterobacter aerogenes[GH1.5].  Bibasilar opacities likely represent atelectasis due to patient's difficulty inspiring, and cultures may represent[GH1.2] colonization[GH1.4].  Pat[GH1.2]ient in no acute respiratory distress[GH1.3].  Current antibiotic regimen[GH1.2] ha[GH1.5]s[GH1.2] gram negative coverage[GH1.5].   -[GH1.2] Recommendation[RR1.1]:  Continue to watch patient respiratory status[GH1.2] clinically[GH1.3], and continue current antibiotic regimen.[GH1.2]      Scribe Disclosure:   I, Rey Iraheta, am serving as a scribe; to document services personally performed by Keila[GH1.1]MONTSERRAT Rincon[RR1.1]- -based on data collection and the provider's statements to me.     Provider Disclosure:  I agree with above History, Review of Systems, Physical exam and Plan.  I have reviewed the content of the documentation and have edited it as needed. I have personally performed the services documented here and the documentation accurately represents those services and the decisions I have made.[GH1.1]       MONTSERRAT Rincon M.D.  [facility] Service Staff  [phone][RR1.1]   ________________________________________________________________    Consult Question:[GH1.1]  Possibly epidural abscess[GH1.2]  Admission Diagnosis: Epidural abscess [G06.2]         History of Present Illness:   Mr. Rawls is a 71yo M with a PMH notable for rheumatoid arthritis, diabetes, coronary artery disease, and wet gangrene of his right foot great toe (with subsequent amputation on 7/06/17).  He presented to this hospital on 7/07/17, secondary to a transfer from the [facility].[GH1.1]  Patient presented to the [facility] on[GH1.2] 6/29/17[GH1.1], with subsequent amputation of his right third toe on 7/06/17.[GH1.2] Post operatively, patient became unresponsive and was unable to move bilateral upper and lower extremities.  [facility] but no basilar clot.  Upon admission to this facility, patient was oriented to self only, and had minimal movement of bilateral lower extremities with absent sensation.  On 7/0[GH1.1]7[GH1.3]/17, patient had a cervical laminectomy and decompression of C3-5 and partial laminectomy and decompression of C6 at this facility via Neurosurgery.[GH1.1] An MRI post-surgery demonstrated osteomyelitis & discitis with an abscess in C3-C5 disc space with extension into the prevertebral space, as well as a second abscess that extended posteriorly into the epidural space.  Dental service was consulted to evaluate for a periodontal abscess, and stated there were no immediate concerns[GH1.2] on 7/08[GH1.3].  Otolaryngology was consulted to attempt to drain the abscess, but could not access it or visualize it endoscopically[GH1.2] on 7/07[GH1.3].    Upon visit today, patient is present with wife Maryam, as well as daughter Ananya.  Prior to a month ago, patient reports independent lifestyle.  Further history reveals that patient has had two to three falls in the past month, with  "increased use of his cane.  Also notable is night sweats for the past three weeks, with which wife reports having to change the bed sheets, and states that patient is \"good at hiding his issues.\"  Patient reports having difficulty getting his DM under control[GH1.2], and w[GH1.3]doroteo reports poor glycemic control.  Also notable is a 60-lb weight loss in the last year, as well as decreased appetite.  Denies any recent travel, no sick contacts.  Last admission to a hospital was five years ago for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 Deleon     Revision History        User Key Date/Time User Provider Type Action    > TM1.5 7/8/2017  6:48 PM Moris Deleon MD Resident Sign     TM1.4 7/8/2017 11:55 AM Moris Deleon MD Resident Sign     [N/A] 7/8/2017  9:56 AM Moris Deleon MD Resident Sign     TM1.3 7/8/2017  9:55 AM Moris Deleon MD Resident Sign     TM1.2 7/8/2017  9:54 AM Moris Deleon MD  Resident Sign     TM1.1 7/8/2017  9:11 AM Moris Deleon MD Resident             Consults by Everardo Burk MD at 7/7/2017  3:30 PM     Author:  Everardo Burk MD Service:  Otolaryngology/ENT Author Type:  Resident    Filed:  7/7/2017  7:05 PM Date of Service:  7/7/2017  3:30 PM Creation Time:  7/7/2017  4:00 PM    Status:  Cosign Needed :  Everardo Burk MD (Resident)    Cosign Required:  Yes             Otolaryngology Consult    Reason for Consult: Pre-vertebral abscess drainage    HPI: Ernesto Rawls is a 72 year old male with rheumatoid arthritis, DM, CAD, wet gangrene of right great toe, and cervical spinal stenosis, who underwent amputation of the right great toe on 7/6 at the [facility]. Afterwards, he was found to be weak in his lower extremities and was taken for emergent CT & MR to evaluate for CVA. MRI demonstrated significant cervical stenosis and he was transferred here to the neurosurgical team. He subsequently developed total paralysis of the upper extremities as well upon his arrival. He was brought to the OR and underwent posterior cervical 3-4-5 and partial cervical 6 laminectomy and decompression. Subsequently, our service was consulted[MH1.1] to evaluate for[JD1.1] a possible anterior vertebral[MH1.1] space[JD1.1] abscess and for potential[MH1.1] transoral[JD1.1] drainage. He has not had any fevers, chills, weight changes, shortness of breath, pain, or syncope. The patient has had a couple of falls recently.     Allergies:   Contrast dye    PMH:[MH1.1]   No past medical history on file.[MH1.2]   RA  DM  CAD  Gangrene right great toe    PSH:[MH1.1]   History reviewed. No pertinent surgical history.[MH1.2]    Medications:[MH1.1] Reviewed in EPIC[JD1.1]    Social History:[MH1.1]   Social History     Social History     Marital status: N/A     Spouse name: N/A     Number of children: N/A     Years of education: N/A     Occupational History     Not on file.     Social  "History Main Topics     Smoking status: Not on file     Smokeless tobacco: Not on file     Alcohol use Not on file     Drug use: Not on file     Sexual activity: Not on file     Other Topics Concern     Not on file     Social History Narrative     No narrative on file[MH1.3]       Family History:[MH1.1]   History reviewed. No pertinent family history.[MH1.3]    ROS: 12 point review of systems is negative unless noted in HPI.    Physical Exam:[MH1.1]   Temp: 98.3  F (36.8  C) Temp src: Oral BP: 136/60 Pulse: 64 Heart Rate: 75 Resp: 20 SpO2: 100 % O2 Device: Nasal cannula Oxygen Delivery: 2 LPM Height: 185.4 cm (6' 1\") Weight: 94.8 kg (209 lb)  Estimated body mass index is 27.57 kg/(m^2) as calculated from the following:    Height as of this encounter: 1.854 m (6' 1\").    Weight as of this encounter: 94.8 kg (209 lb).[MH1.4]  General: laying in bed shortly after returning from PACU, no acute distress  HEAD: normocephalic, atraumatic  Face: symmetrical, no swelling, edema, or erythema, no facial droop  Eyes: eomi, perrl, clear sclera  Nose: no anterior drainage, intact and midline septum without perforation or hematoma   Mouth: moist, no ulcers, no jaw or tooth tenderness, tongue midline and symmetric  Oropharynx: tonsils within normal limits, uvula midline, no oropharyngeal erythema      Flexible Laryngoscope  Detailed description of procedure:  Scope was passed into the left nostril, noting normal nasal anatomy. Patent choana. Adenoid pad was nonobstructive. Base of tongue and vallecula were normal. Epiglottis as crisp. Slight[MH1.1] broad based edema of the[JD1.1] posterior[MH1.1] oropharyngeal[JD1.1] wall.[MH1.1] No significant erythema or area of clear abscess.[JD1.1] Arytenoid[MH1.1]s[JD1.1] were non-edematous without prolapse and with normal movement. Aryepiglottic folds were normal. Pyriform sinuses had no lesions or ulcerations. The post cricoid mucosa showed no signs of edema or reflux.     Left true focal " fold had no lesions or ulcerations and was not edematous. The left true vocal fold had normal movement. Right true focal fold had no lesions or ulcerations and was not edematous. The right true vocal fold had normal movement. The immediate subglottis was visualized and widely patent.[MH1.1]     Minimal pooling of secretions.[JD1.1]       Labs:   CBC: Hgb 11.8, o/w WNL (WBC 7.3)  BMP: Glu 190, o/w WNL  Phosphorus: 3.7  Magnesium: 2.0  ESR: 50  CRP: 27  PTT: 29  PT: 1.1  ABO/RH: A pos    Imaging:   MR: image[MH1.1] reviewed showing a 1.7x0.4 cm fluid collection near C4/5 with adjacent soft tissue edema appear to connect with posterior process.[JD1.1]     Assessment/Plan:   Ernesto Rawls is a 72 year old male with RA, DM, CAD, wet gangrene of R great toe, and cervical spinal stenosis, who underwent amputation of R great toe 7/6 at the [facility] and evaluated for LE weakness with emergent CT & MR for potential CVA. MRI demonstrated cervical stenosis he developed[MH1.1] 4 extremity weakness[JD1.2] and underwent cervical 3-4-5 and partial cervical 6 laminectomy and decompression. Subsequently, our service was consulted to evaluate for a possible anterior[MH1.1] pre-[JD1.1]vertebral[MH1.1] space[JD1.1] abscess and[MH1.1] to assess for the possibility of a trans-oral drainage.[JD1.1] A flexible laryngoscopy was performed that did not reveal any bulging or mucosal changes in the area of concern on MR.[MH1.1] The area of concern is likely lower down than we are able to appreciate on examination.[JD1.1] If the patient requires drainage of this abscess[MH1.1] it would require a cervical/[JD1.2]ACDF approach[MH1.1] as the patient is not a good candidate for[MH1.5] a transoral approach. We also recommend obtaining new imaging after the posterior decompression to re-assess the anterior pre-vertebral space.[JD1.1]     The patient was discussed with staff, [MH1.5] Sheryl[JD1.1]. Thank you for involving us in the care of  this patient.  Please feel free to contact us with questions or concerns at any time.[MH1.5]    Everardo Burk MD  Otolaryngology Resident[JD1.2]     Revision History        User Key Date/Time User Provider Type Action    > JD1.2 7/7/2017  7:05 PM Everardo Burk MD Resident Sign     JD1.1 7/7/2017  6:14 PM Everardo Burk MD Resident      MH1.5 7/7/2017  4:58 PM Olayinka Anton Medical Student Pend     MH1.3 7/7/2017  4:41 PM Olayinka Anton Medical Student      MH1.2 7/7/2017  4:16 PM Olayinka Anton Medical Student      MH1.4 7/7/2017  4:05 PM Olayinka Anton Medical Student      MH1.1 7/7/2017  4:00 PM Olayinka Anton Medical Student                      Progress Notes - Physician (Notes from 07/23/17 through 07/26/17)      Progress Notes by Yarelis Simpson APRN CNP at 7/26/2017  9:27 AM     Author:  Yarelis Simpson APRN CNP Service:  Interventional Radiology Author Type:  Nurse Practitioner    Filed:  7/26/2017  9:27 AM Date of Service:  7/26/2017  9:27 AM Creation Time:  7/26/2017  9:27 AM    Status:  Signed :  Yarelis Simpson APRN CNP (Nurse Practitioner)         IR GJ tube placement on the schedule for 7/26 was cancelled per neurosurgery. Pt removed from IR schedule. [facility]    Yarelis Simpson DNP, APRN  Interventional Radiology  Phone: [phone]  Pager: [phone][KM1.1]       Revision History        User Key Date/Time User Provider Type Action    > KM1.1 7/26/2017  9:27 AM Yarelis Simpson APRN CNP Nurse Practitioner Sign            Progress Notes by Luisa Marlow BSW at 7/25/2017  2:15 PM     Author:  Luisa Marlow BSW Service:  (none) Author Type:      Filed:  7/25/2017  2:24 PM Date of Service:  7/25/2017  2:15 PM Creation Time:  7/25/2017  2:15 PM    Status:  Signed :  Luisa Marlow BSW ()         Social Work Services Discharge Note      Patient Name:   Ernesto MACHADO Sinai     Anticipated Discharge Date:  7/26/17    Discharge Disposition:   [facility] Spinal Cord Rehab Unit ([phone])    Following MD:  Dr. Ha     Pre-Admission Screening (PAS) online form has been completed.  The Level of Care (LOC) is:  Determined  Confirmation Code is:  Not required as pt is going to the [facility].  Patient/caregiver informed of referral to [facility] Line for Pre-Admission Screening for skilled nursing facility (SNF) placement and to expect a phone call post discharge from SNF.     Additional Services/Equipment Arranged:  Spoke with [facility] referral , Agens Wolf [phone]) who confirmed acceptance.  Agnes will arrange for stretcher transport through [facility], to pick pt up at [facility] tomorrow at 10am.       Patient / Family response to discharge plan:  Pt was confused and stated that he would be going to stay at [facility] and then to the [facility].  Cristian voices understanding of the discharge plan and agreement with the discharge plan.     Persons notified of above discharge plan:[TK1.1]  Pt, Cristian (pt's ex-wife/Significant other), 6A nursing and Vaishali Duffy Neurosurgery NP[TK1.2]    Staff Discharge Instructions:  Please fax discharge orders and signed hard scripts for any controlled substances[TK1.1] (SW will complete this task)[TK1.2].  Please print a packet and send with patient.     CTS Handoff completed:[TK1.1]  YES[TK1.2]    Medicare Notice of Rights provided to the patient/family:[TK1.1]  NO, as per face sheet, [facility].    FREDDY Dooley  Social Work, 6A  Phone:  [phone]  Pager:  [phone]  7/25/2017[TK1.2]           Revision History        User Key Date/Time User Provider Type Action    > TK1.2 7/25/2017  2:24 PM Luisa Marlow BSW  Sign     TK1.1 7/25/2017  2:15 PM Luisa Marlow BSW              Progress Notes by Luisa Marlow BSW at 7/25/2017 12:18 PM     Author:   Luisa Marlow BSW Service:  (none) Author Type:      Filed:  7/25/2017 12:27 PM Date of Service:  7/25/2017 12:18 PM Creation Time:  7/25/2017 12:18 PM    Status:  Signed :  Luisa Marlow BSW ()         Social Work Services Progress Note    Hospital Day: 19  Date of Initial Social Work Evaluation:  7/13/17  Collaborated with:  Referral  (Agnes Wolf) at the [facility]    Data:  Neuro Surgery continues to maintain that pt is medically ready for discharge back to the [facility].      Intervention:  SW faxed updated notes to Referral  (Agnes Wolf) at the [facility].  SW phoned Agnes and informed her that pt is medically ready for discharge from [facility].  SW informed Agnes that Neurosurgery would like pt's peg tube placed at the [facility].  Per Agnes, they will review updated notes and get back to this SW in regards to if they will accept pt for admit.      Assessment:  Per Neurosurgery, pt is medically stable for discharge.    Plan:    Anticipated Disposition:  Rehab placement    Barriers to d/c plan:  Awaiting decision from the [facility] regarding acceptance for admission.    Follow Up:  ETELVINA will continue to follow.    FREDDY Dooley  Social Work, 6A  Phone:  [phone]  Pager:  [phone]  7/25/2017[TK1.1]           Revision History        User Key Date/Time User Provider Type Action    > TK1.1 7/25/2017 12:27 PM Luisa Marlow BSW  Sign            Progress Notes by Natalia Degroot APRN CNP at 7/25/2017 11:17 AM     Author:  Natalia Degroot APRN CNP Service:  Endocrinology Author Type:  Nurse Practitioner    Filed:  7/25/2017 11:24 AM Date of Service:  7/25/2017 11:17 AM Creation Time:  7/25/2017 11:17 AM    Status:  Signed :  Natalia Degroot APRN CNP (Nurse Practitioner)                              Diabetes Consult Daily  Progress Note          Assessment/Plan:   Ernesto Rawls is a 71 yo man with a  "history of type 2 diabetes, RA, CAD, and wet gangrene of his R foot great toe s/p [facility], who [facility] on 7/7/17 with post op weakness in bilateral UE and LE.  Found to have significant spinal stenosis, he is now s/p C3-5 and C6 laminectomy, C5 corpetomy with cage placement, and C3-7 posterior fusion.   Type 2 diabetes: hyperglycemia related to continuous  Enteral feeds    Plan:  glargine 65 units every am  glargine 17 units every pm  Novolog correction 2 units per 30 > 140 every 4 hours  -monitor glucose every 4 hours    Will continue to follow  -To prevent hypoglycemia: please run D10 at 75ml/h if TFs interrupted, including if TFs stop for [facility] to [facility].    Diet: continuous TF : TwoCal HN at 45 cc/hour via nasojejunal           Interval History:     I reviewed the last 24 hours progress note  Blood sugars have improved with increase in PM basal insulin. Glucose < 200 and no hypoglycemia  Discussed diabetic plan with Mr. And Mrs. Rawls  Tolerating TF, denies nausea/vomiting or abdominal pain    Recent Labs  Lab 07/25/17  0755 07/25/17  0347 07/24/17  2356 07/24/17  2017 07/24/17  1608 07/24/17  1137  07/20/17  0929  07/20/17  0643  07/19/17  1430   GLC  --   --   --   --   --   --   --  305*  --  266*  --  215*   * 191* 181* 168* 238* 206*  < >  --   < >  --   < >  --    < > = values in this interval not displayed.            Review of Systems:      please see interval history       Medications:       Active Diet Order      Advance Diet as Tolerated      NPO for Medical/Clinical Reasons     Physical Exam:  Gen: Alert, resting in bed, in NAD   HEENT: NC/AT, mucous membranes are moist  Resp: Unlabored  Ext: moving all extremities   Neuro:oriented x3, communicating clearly  /66 (BP Location: Right arm)  Pulse 94  Temp 97  F (36.1  C) (Oral)  Resp 18  Ht 1.854 m (6' 1\")  Wt 88.5 kg (195 lb 1.6 oz)  SpO2 99%  BMI 25.74 kg/m2           Data:     Lab " Results   Component Value Date    A1C 7.3 07/08/2017              CBC RESULTS:   Recent Labs   Lab Test  07/20/17   0929   WBC  10.8   RBC  3.03*   HGB  9.6*   HCT  28.4*   MCV  94   MCH  31.7   MCHC  33.8   RDW  14.3   PLT  226     Recent Labs   Lab Test  07/23/17   1246  07/20/17   0929  07/20/17   0643   NA  138  136  134   POTASSIUM   --   3.6  3.6   CHLORIDE   --   96  98   CO2   --   32  31   ANIONGAP   --   8  6   GLC   --   305*  266*   BUN   --   14  14   CR   --   0.42*  0.40*   KAROL   --   8.8  8.4*     Liver Function Studies - No results for input(s): PROTTOTAL, ALBUMIN, BILITOTAL, ALKPHOS, AST, ALT, BILIDIRECT in the last 40164 hours.  Lab Results   Component Value Date    INR 1.12 07/15/2017    INR 1.23 07/13/2017    INR 1.32 07/12/2017    INR 1.44 07/10/2017    INR 1.17 07/08/2017    INR 1.10 07/07/2017           Natalia Degroot CNP pager [phone]  Diabetes Management Job Code 0243[JM1.1]             Revision History        User Key Date/Time User Provider Type Action    > JM1.1 7/25/2017 11:24 AM Natalia Degroot APRN CNP Nurse Practitioner Sign            Progress Notes by Vaishali Duffy APRN CNP at 7/25/2017  9:36 AM     Author:  Vaishali Duffy APRN CNP Service:  Neurosurgery Author Type:  Nurse Practitioner    Filed:  7/25/2017  9:42 AM Date of Service:  7/25/2017  9:36 AM Creation Time:  7/25/2017  9:36 AM    Status:  Signed :  Vaishali Duffy APRN CNP (Nurse Practitioner)         Brief Neurosurgery update:   In respnse to Jerilyn Holbrook note dated 7/24/17 at 1007:    1.  Patient may be up with assistance, cervical collar to be worn at all times- will be required for 6 weeks post operatively.  No lifting over 10 pounds.    2.  Patient will require Cefazolin for 6 weeks, will require CRP levels ever 2 weeks.  Infectious disease f/u in mid August.    3.  Insulin gtt has been discontinued, patient is on sliding scale insulin and lantus   4.   Leukocytosis has improved- patient has been afebrile   5. See therapy notes for further details[SH1.1]      Revision History        User Key Date/Time User Provider Type Action    > SH1.1 7/25/2017  9:42 AM Vaishali Duffy APRN CNP Nurse Practitioner Sign            Progress Notes by Luisa Marlow BSW at 7/24/2017  3:25 PM     Author:  Luisa Marlow BSW Service:  (none) Author Type:      Filed:  7/24/2017  3:34 PM Date of Service:  7/24/2017  3:25 PM Creation Time:  7/24/2017  3:25 PM    Status:  Signed :  Luisa Marlow BSW ()         Social Work Services Progress Note[TK1.1]    Hospital Day: 18[TK1.2]  Date of Initial Social Work Evaluation:  7/13/17  Collaborated with:  Dr. Marcus (Neuro Surgery), Jerilyn Holbrook - 6A RN Care Coordinator    Data:  Per Dr. Marcus, pt is ready for discharge to the [facility] with a goal of now having pt placed on a inpt hospital unit with peg placement needed.    Per 6A RN CC, the [facility] will not accept pt for hospital to hospital transfer (for inpt hospitalization on a medical unit).  Neuro Surgery is requesting that the [facility] place a peg.    Intervention:  Spoke with Dr. Marcus and Jerilyn Holbrook.  Phoned Agnes Wolf, Referral  at the [facility] and left a message informing her that pt is ready for discharge and requesting to know whether or not they will accept pt to their spinal cord rehab program.  Faxed updated assessment materials to Agnes.  The [facility] would like the answer to the following questions as referenced in Jerilyn Holbrook 7/24/17 progress note entry:    1) need to clarify activity, including weight restrictions, collar restrictions and length of time for each.   2) Clarify ID plan of care; include labs and length of therapy.   3) needs to be off insulin drip  4) Leukocytosis needs to be improving  5) Up to date PT, OT & ST evaluations. Level of assist needed.     SW text paged Dr. Marcus to call to  ask that he address the above questions in his 7/24/17 progress note entry.    Assessment:  Neurosurgery maintains that pt is medically ready for discharge    Plan:    Anticipated Disposition:  Attempting to secure placement at the Spinal Cord Rehab program - [facility]    Barriers to d/c plan:  Awaiting acceptance at the [facility] Spinal Cord Rehab program    Follow Up:  SW will continue to follow.    FREDDY Dooley  Social Work, 6A  Phone:  [phone]  Pager:  [phone]  7/24/2017[TK1.1]           Revision History        User Key Date/Time User Provider Type Action    > TK1.2 7/24/2017  3:34 PM Luisa Marlow BSW  Sign     TK1.1 7/24/2017  3:25 PM Luisa Marlow BSW              Progress Notes by María Elena Holbrook RN at 7/24/2017 12:27 PM     Author:  María Elena Holbrook RN Service:  Care Coordinator Author Type:  Care Coordinator    Filed:  7/24/2017 12:41 PM Date of Service:  7/24/2017 12:27 PM Creation Time:  7/24/2017 12:27 PM    Status:  Signed :  María Elena Holbrook RN (Care Coordinator)           Care Coordinator Progress Note     Admission Date/Time:  7/7/2017  Attending MD:  Dr Rafael Lo     Data  Received call from Vaishali Duffy NP; she reported Dr Killian spoke with Dr HIEN Nielsen, Neurosurgery at the [facility]. Dr Nielsen accepted pt for admission to the hospital there. Vaishali requested I contact the [facility] for transfer to the hospital. Informed her the [facility] didn't have a hospital bed this AM; their rehab MD had questions & I had asked Dr Marcus to address them. I will contact the [facility] Referral Desk and inform them Neurosurgery has accept pt to the hospital.     Coordination of Care and Referrals  Called Sophia [facility]. Informed her Neurosurgery at the  spoke with Dr Nielsen, Neurosurgery at the [facility] and the [facility] Neurosurgery team has accepted pt for admission. She also spoke with Dr Nielsen this AM. They do not have a hospital bed available today.   In the meantime, Sophia will  continue to work with the SCI rehab MD for rehab placement. As soon as updated MD progress notes are available I will fax to Sophia at the [facility] for the rehab doctor to review.     Assessment  [facility]surgery [facility] the [facility] has [facility] but the [facility] not [facility].      Plan  --Fax updated MD progress note addressing questions from the [facility] rehab doctor.        Jerilyn Holbrook RN Care Coordinator  Unit 6A, [facility][SJ1.1]               Revision History        User Key Date/Time User Provider Type Action    > SJ1.1 7/24/2017 12:41 PM María Elena Holbrook RN Care Coordinator Sign            Progress Notes by María Elena Holbrook RN at 7/24/2017 10:07 AM     Author:  María Elena Holbrook RN Service:  Care Coordinator Author Type:  Care Coordinator    Filed:  7/24/2017 12:27 PM Date of Service:  7/24/2017 10:07 AM Creation Time:  7/24/2017 10:07 AM    Status:  Addendum :  María Elena Holbrook RN (Care Coordinator)           Care Coordinator Progress Note     Admission Date/Time:  7/7/2017  Attending MD:  Dr Rafael Lo     Data  Chart reviewed, discussed with interdisciplinary team. In 6A Discharge Rounds Dr Marcus reported [facility]. Will not have g-tube placed here, it can be done at the [facility]. Continuing to need glucose management; IV antibiotics x6 weeks; ID & Endocrine following.     Coordination of Care and Referrals  At 9:55am called and left a message for Sophia, [facility] Referral Specialist at the [facility]. Phone: [phone]. Informed her [facility] is [facility], will not have g-tube placed here. Received call back from Sophia at 10:15am. She said they do not have an accepting Medicine service to take pt in the hospital. She said their rehab doctor has been reviewing pt for admission and they are working on SCI rehab admission, not hospital admission. I will fax updated notes to fax# [phone]. Informed Sophia Stock does not plan to  place a g-tube here. This will need to be in the progress notes. Sophia said they do not have a bed available in their hospital this AM.    The rehab doctor has questions that need to be addressed. From my previous note on 7-21:   1) need to clarify activity, including weight restrictions, collar restrictions and length of time for each.   2) Clarify ID plan of care; include labs and length of therapy.   3) needs to be off insulin drip  4) Leukocytosis needs to be improving  5) Up to date PT, OT & ST evaluations. Level of assist needed.   I asked Dr Marcus to address g-tube plan and other questions in a progress note.     ETELVINA Dooley, updated and she will follow for rehab placement.[SJ1.1]   Updated pt's significant other, Cristian and her daughter. Informed Cristian I have been communicating with the [facility][SJ1.2] for hospital transfer.[SJ1.3] Will be sending updated progress notes, they are assessing for SCI rehab placement. No plan to place g-tube here. Cristian expressed understanding.[SJ1.2]   Cristian said she was  to [facility] [facility], but then . They got back together again about 10 years ago. They live together, but aren't remarried. Cristian [facility] [place] [facility]. However, she is satisfied with the [facility] and will be OK with transfer back there. Informed Cristian that ETELVINA Moran or I will keep her updated with discharge plans.[SJ1.3]     Assessment  [facility] is [facility] to their [facility], not hospital to hospital transfer. Pt stable for transfer to the [facility] for continued care.      Plan  Anticipated Discharge Date:  When hospital or rehab bed available at the [facility].  Anticipated Discharge Plan:  [facility] vs [facility]. Will continue to update the [facility] with pt's status.[SJ1.1]   --Care Coordinator will fax updated MD notes, nursing and therapy note when available.[SJ1.4]       Jerilyn Holbrook, RN Care Coordinator  Unit [address], [facility][SJ1.1]                  Revision History        User Key Date/Time User Provider Type Action    > [N/A] 7/24/2017 12:27 PM María Elena Holbrook RN Care Coordinator Addend     SJ1.4 7/24/2017 12:25 PM María Elena Holbrook RN Care Coordinator Addend     SJ1.3 7/24/2017 11:08 AM María Elena Holbrook RN Care Coordinator Addend     SJ1.2 7/24/2017 11:04 AM María Elena Holbrook RN Care Coordinator Addend     SJ1.1 7/24/2017 10:36 AM María Elena Holbrook, RN Care Coordinator Sign            Progress Notes by Angelita Pandey PA-C at 7/24/2017  9:32 AM     Author:  Angelita Pandey PA-C Service:  Endocrinology Author Type:  Physician Assistant - C    Filed:  7/24/2017  9:42 AM Date of Service:  7/24/2017  9:32 AM Creation Time:  7/24/2017  9:32 AM    Status:  Addendum :  Angelita Pandey PA-C (Physician Assistant - C)                                   Diabetes Consult Daily  Progress Note          Assessment/Plan:   Mr. 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  Found to have significant spinal stenosis, he is now s/p C3-5 and C6 laminectomy, C5 corpetomy with cage placement, and C3-7 posterior fusion.  Hyperglycemia related to continuous enteral feeds.    Glucoses improved with BID dosing- mostly mid to high 100s overnight, popped up to low 200s this morning[BJ1.1].  Morning increase likely related to pt getting many morning meds in solutions/suspensions/elixirs that contain dextrose.[BJ1.2]    Plan:  -Glargine: continue 65 units qAM, increased evening glargine from 10 to 17 units qPM  -correction aspart 2unit/30 for BG >140 q4h     To prevent hypoglycemia: please run D10 at 75ml/h if TFs interrupted, including if TFs stop for transport to [facility]. Discussed with bedside RN.    [facility]: [facility].  Plan discussed with bedside RN.            "Interval History:   The last 24 hours progress and nursing notes reviewed.   Continuous TFs: TwoCal at 45ml/h.   Ernesto had no complaints this morning.  No word yet if he will [facility] to [facility] today- plan was to transfer if bed is available.  Glucoses improved with BID glargine dosing.[BJ1.1]  Pt pulled out his PICC this morning. Plan to place peripheral IV now, as it may take awhile to re-place the PICC.[BJ1.2]          Recent Labs  Lab 07/24/17  0843 07/24/17  0349 07/24/17  0017 07/23/17  1948 07/23/17  1630 07/23/17  1211  07/20/17  0929  07/20/17  0643  07/19/17  1430   GLC  --   --   --   --   --   --   --  305*  --  266*  --  215*   * 138* 180* 164* 172* 156*  < >  --   < >  --   < >  --    < > = values in this interval not displayed.            Review of Systems:   See interval hx          Medications:       Active Diet Order      Advance Diet as Tolerated      NPO for Medical/Clinical Reasons     Physical Exam:  Gen: Alert, in NAD   HEENT: Cervical collar, mucous membranes are moist, NJ tube in L nares bridled  Resp: Unlabored  Neuro:alert, oriented to person only  /80 (BP Location: Left arm)  Pulse 87  Temp 96.5  F (35.8  C) (Axillary)  Resp 20  Ht 1.854 m (6' 1\")  Wt 88.5 kg (195 lb 1.6 oz)  SpO2 95%  BMI 25.74 kg/m2           Data:     Lab Results   Component Value Date    A1C 7.3 07/08/2017            Recent Labs   Lab Test  07/23/17   1246  07/20/17   0929  07/20/17   0643   NA  138  136  134   POTASSIUM   --   3.6  3.6   CHLORIDE   --   96  98   CO2   --   32  31   ANIONGAP   --   8  6   GLC   --   305*  266*   BUN   --   14  14   CR   --   0.42*  0.40*   KAROL   --   8.8  8.4*     CBC RESULTS:   Recent Labs   Lab Test  07/20/17   0929   WBC  10.8   RBC  3.03*   HGB  9.6*   HCT  28.4*   MCV  94   MCH  31.7   MCHC  33.8   RDW  14.3   PLT  226       Angelita Pandey PA-C [phone]  Diabetes Management job code [id number][BJ1.1]             Revision History        User Key Date/Time User Provider " Type Action    > BJ1.2 7/24/2017  9:42 AM Angelita Pandey PA-C Physician Assistant Lio MORELAND Addend     BJ1.1 7/24/2017  9:38 AM Angelita Pandey PA-C Physician Assistant - MERLY Sign            Progress Notes by Tommy Edwards MD at 7/21/2017  3:08 AM     Author:  Tommy Edwards MD Service:  Neurosurgery Author Type:  Resident    Filed:  7/21/2017  3:11 AM Date of Service:  7/21/2017  3:08 AM Creation Time:  7/21/2017  3:08 AM    Status:  Attested :  Tommy Edwards MD (Resident)    Cosigner:  Rafael Lo MD at 7/23/2017  9:22 PM        Attestation signed by Rafael Lo MD at 7/23/2017  9:22 PM        Attestation:  Physician Attestation   I, Rafael Lo, saw this patient with the resident and agree with the resident s findings and plan of care as documented in the resident s note.      I personally reviewed vital signs.    Key findings: Awaiting placement.    Rafael Lo  Date of Service (when I saw the patient): 7/21/17                               [facility], [place]    Neurosurgery Daily Progress Note         Assessment   Ernesto Rawls is a 72 year old male who is postoperative day # 14/9/5 from C3-5 and C6 laminectomy, C5 corpectomy w/cage placement, and C3-C7 posterior fusion.          Plan     Routine neuro exams per unit protocol.     Continue current pain control regimen    Feeding tube in place    Post-operative X-rays: stable    Aspen collar on at all times for next 6 weeks.      Tolerating room air.      ID: OR cultures positive for MSSA. On Cefazolin with vancomycin and cefepime discontinued as per ID recs.     Diabetes management: increased lantus, endocrine consulted, appreciate recommendations.     Dentistry: follow up with/ dentist as outpatient    Podiatry: daily dressing changes    MAP > 80, has been maintaining without pressors.     Incentive spirometry Q1H while awake.     Bowel regimen. Anti-emetics.     SCDs while in  bed.    PT/OT: ARU    Dispo; Medically stable on 6A. [facility] .       Please dial * * * and enter job code [phone] to reach the neurosurgery team if you have any questions.    Patient and above plan discussed with neurosurgery chief resident.         Subjective   Making improvements. Strength continues to improve and mental status is becoming coherent and stable.          Objective   Temp:  [95.5  F (35.3  C)-98.9  F (37.2  C)] 98.7  F (37.1  C)  Heart Rate:  [] 89  Resp:  [16-18] 18  BP: (120-156)/(54-69) 143/62  SpO2:  [93 %-97 %] 96 %    I/O last 3 completed shifts:  In: 2070 [I.V.:840; NG/GT:330]  Out: 2425 [Urine:2425]    Physical Exam  General: Aspen cervical collar in place.   Wound: Anterior incision c/d/i, no significant swelling, erythema, or drainage.  Right foot wrapped in bandage.      Neurologic Exam:  - Alert and oriented x2 but [facility].  - Strength: 5/5 in deltoids bilaterally, left biceps/triceps are 4+/5 on the left, 4-/5 on the right, wrist extensors are 3/5 on the left and 2/5 on the right, finger intrinsics are 4-/5 on the left, 2/5 on the right. Iliopsoas are 4-/5 on the left and 2/5 on the right, quadriceps are 4-/5 on the left and 2/5 on the right, tibialis anterior/gastrocnemius are 4+/5 on the left, 4-/5 on the right. Poor participation in lower extremity exam.  - PERRL, EOMI.  Motor: Normal bulk/tone; no tremor, rigidity, or bradykinesia.        Labs and Imaging     BMP    Recent Labs  Lab 07/20/17  0929 07/20/17  0643 07/19/17  1430 07/19/17  0746 07/17/17  1223 07/17/17  0741    134 133  --  130* 130*   POTASSIUM 3.6 3.6 3.5 3.5  --  3.7   CHLORIDE 96 98 97  --   --  95   KAROL 8.8 8.4* 8.3*  --   --  8.6   CO2 32 31 31  --   --  29   BUN 14 14 17  --   --  13   CR 0.42* 0.40* 0.46*  --   --  0.50*   * 266* 215*  --   --  169*     CBC    Recent Labs  Lab 07/20/17  0929 07/19/17  1430 07/17/17  0741 07/16/17  1320   WBC 10.8 11.9* 11.9*  10.0   RBC 3.03* 2.89* 3.18* 3.05*   HGB 9.6* 9.0* 10.0* 9.6*   HCT 28.4* 27.1* 30.0* 28.7*   MCV 94 94 94 94   MCH 31.7 31.1 31.4 31.5   MCHC 33.8 33.2 33.3 33.4   RDW 14.3 14.4 14.1 14.2    214 229 243     INR    Recent Labs  Lab 07/15/17  1618   INR 1.12       Imaging: Reviewed      Contact the neurosurgery resident on call with questions.    Dial * * *[phone]: Enter [phone] when prompted.[DF1.1]              Revision History        User Key Date/Time User Provider Type Action    > DF1.1 7/21/2017  3:11 AM Tommy Edwards MD Resident Sign            Progress Notes by Tommy Edwards MD at 7/19/2017 12:00 PM     Author:  Tommy Edwards MD Service:  Neurosurgery Author Type:  Resident    Filed:  7/20/2017  3:54 PM Date of Service:  7/19/2017 12:00 PM Creation Time:  7/19/2017 12:00 PM    Status:  Attested :  Tommy Edwards MD (Resident)    Cosigner:  Rafael Lo MD at 7/23/2017  9:22 PM        Attestation signed by Rafael Lo MD at 7/23/2017  9:22 PM        Attestation:  Physician Attestation   I, Rafael Lo, saw this patient with the resident and agree with the resident s findings and plan of care as documented in the resident s note.      I personally reviewed vital signs.    Key findings: Awaiting placement.    Rafael Lo  Date of Service (when I saw the patient): 7/19/17                               [facility], [place]    Neurosurgery Daily Progress Note         Assessment   Ernesto Rawls is a 72 year old male who is postoperative day # 1[DM1.1]3[DF1.1]/[DM1.1]8[DF1.1]/[DM1.1]4[DF1.1] from C3-5 and C6 laminectomy, C5 corpectomy w/cage placement, and C3-C7 posterior fusion.          Plan       Routine neuro exams per unit protocol.     Continue current pain control regimen    Feeding tube back in place    Hemovac DC'd 7/18    Post-operative X-rays cervical spi[DM1.1]ne on 7/18 are stable[DM1.2]    Aspen collar on at all times.      Tolerating room air.      ID: OR cultures positive for MSSA. On Cefazolin with vancomycin and cefepime discontinued as per ID recs.     DC'd insulin gtt; f/up blood glucose    Dentistry: follow up with a dentist as an outpatient    Podiatry: daily dressing changes    MAP > 80, has been maintaining without pressors.     Several runs of V-tach. Cardiology consulted: thought to be due to infection. Metoprolol 25 mg BID and electrolyte replacements.         Incentive spirometry Q1H while awake.     Bowel regimen. Anti-emetics.     SCDs while in bed.    PT/OT    Dispo; Stable on 6A.  [facility] 1-2 days after surgery.       Please dial * * * and enter job code [phone] to reach the neurosurgery team if you have any questions.      Patient and above plan discussed with neurosurgery chief resident.         Subjective   Doing well.[DM1.1] Respiratory secretions able to be controlled.[DF1.1]          Objective   Temp:  [96.4  F (35.8  C)-100.1  F (37.8  C)] 99.1  F (37.3  C)  Pulse:  [] 101  Resp:  [16-20] 16  BP: (139-167)/(55-82) 151/68  SpO2:  [91 %-96 %] 96 %    I/O last 3 completed shifts:  In: 1320 [NG/GT:600]  Out: 650 [Urine:650]    Physical Exam  General: Aspen cervical collar in place.   Wound: Anterior incision c/d/i, no significant swelling, erythema, or drainage.  Right foot wrapped.      Neurologic Exam:  - Alert and oriented to hospital and year but [facility].  - Strength: 5/5 in deltoids bilaterally, left biceps/triceps are 4+/5 on the left, 4-/5 on the right, wrist extensors are 3/5 on the left and 2/5 on the right, finger intrinsics are 4-/5 on the left, 2/5 on the right. Iliopsoas are 4-/5 on the left and 2/5 on the right, quadriceps are 4-/5 on the left and 2/5 on the right, tibialis anterior/gastrocnemius are 4+/5 on the left, 4-/5 on the right. Poor participation in lower extremity exam.  - PERRL,  EOMI.  Motor: Normal bulk/tone; no tremor, rigidity, or bradykinesia.        Labs and Imaging     BMP    Recent Labs  Lab 07/19/17  0746 07/17/17  1223 07/17/17  0741 07/16/17  1320 07/16/17  0950 07/16/17  0847 07/15/17  1618 07/15/17  0804   NA  --  130* 130* 133 133 133 135 135   POTASSIUM 3.5  --  3.7 3.7 3.4 3.7 3.9 3.8   CHLORIDE  --   --  95 99  --   --  100 100   KAROL  --   --  8.6 8.3*  --   --  8.6 8.5   CO2  --   --  29 29  --   --  28 28   BUN  --   --  13 10  --   --  10 10   CR  --   --  0.50* 0.41*  --   --  0.40* 0.40*   GLC  --   --  169* 154* 169* 170* 252* 345*     CBC    Recent Labs  Lab 07/17/17  0741 07/16/17  1320 07/16/17  0950 07/16/17  0847 07/15/17  1618 07/15/17  0804   WBC 11.9* 10.0  --   --  8.6 7.5   RBC 3.18* 3.05*  --   --  3.27* 3.19*   HGB 10.0* 9.6* 10.1* 10.0* 10.6* 10.2*   HCT 30.0* 28.7*  --   --  31.2* 30.1*   MCV 94 94  --   --  95 94   MCH 31.4 31.5  --   --  32.4 32.0   MCHC 33.3 33.4  --   --  34.0 33.9   RDW 14.1 14.2  --   --  13.9 13.7    243  --   --  275 289     INR    Recent Labs  Lab 07/15/17  1618 07/13/17  0452   INR 1.12 1.23*       Imaging: Reviewed      Contact the neurosurgery resident on call with questions.    Dial * * *[phone]: Enter [phone] when prompted.   Bob Marcus MD,PhD  Neurosurgery PGY-2[DM1.1]             Revision History        User Key Date/Time User Provider Type Action    > DF1.1 7/20/2017  3:54 PM Tommy Edwards MD Resident Sign     DM1.2 7/19/2017 12:10 PM Derick Farooq Medical Student Share     DM1.1 7/19/2017 12:06 PM Derick Farooq Medical Student Share            Progress Notes by Tommy Edwards MD at 7/19/2017  9:53 AM     Author:  Tommy Edwards MD Service:  Neurosurgery Author Type:  Resident    Filed:  7/19/2017  3:34 PM Date of Service:  7/19/2017  9:53 AM Creation Time:  7/19/2017  3:33 PM    Status:  Attested :  Tommy Edwards MD (Resident)    Cosigner:  Rafael Lo MD at 7/23/2017  9:21 PM         Attestation signed by Rafael Lo MD at 7/23/2017  9:21 PM        Attestation:  Physician Attestation   I, Rafael Lo, saw this patient with the resident and agree with the resident s findings and plan of care as documented in the resident s note.      I personally reviewed vital signs.    Key findings: Awaiting placement.    Rafael Lo  Date of Service (when I saw the patient): 7/19/17                               [facility], [place]    Neurosurgery Daily Progress Note         Assessment   Ernesto Rawls is a 72 year old male who is postoperative day # 12/7/3 from C3-5 and C6 laminectomy, C5 corpectomy w/cage placement, and C3-C7 posterior fusion.          Plan       Routine neuro exams per unit protocol.     Continue current pain control regimen    post-operative X-rays completed    Aspen collar on at all times for 6 weeks.       ID: OR cultures positive for MSSA. On Cefazolin with vancomycin and cefepime discontinued as per ID recs. PICC line placed.     Transition off insulin drip.     Dentistry: follow up with a dentist as an outpatient    NJ tube placed.     Podiatry: daily dressing changes    MAP > 80, has been maintaining without pressors.     Incentive spirometry Q1H while awake.     Bowel regimen. Anti-emetics.     SCDs while in bed.    PT/OT    Dispo; Stable on 6A.  [facility]       Please dial * * * and enter job code [phone] to reach the neurosurgery team if you have any questions.      Patient and above plan discussed with neurosurgery chief resident.         Subjective     Continuing to improve. More oriented and following commands.             Objective   Temp:  [96.9  F (36.1  C)-100.1  F (37.8  C)] 96.9  F (36.1  C)  Pulse:  [] 98  Resp:  [16-20] 16  BP: (139-167)/(55-82) 167/66  SpO2:  [91 %-98 %] 98 %    I/O last 3 completed shifts:  In: 1700 [I.V.:40; NG/GT:760]  Out: 1500  [Urine:1500]    Physical Exam  General: Aspen cervical collar in place.   Wound: Anterior incision c/d/i, no significant swelling, erythema, or drainage.  Right foot wrapped.      Neurologic Exam:  - A&Ox2. Following commands in the upper extremities but not in the lower extremities. Generally does not participate in exam.  - PERRL, EOMI.  Motor: Normal bulk/tone; no tremor, rigidity, or bradykinesia.        Labs and Imaging     BMP    Recent Labs  Lab 07/19/17  1430 07/19/17  0746 07/17/17  1223 07/17/17  0741 07/16/17  1320 07/16/17  0950  07/15/17  1618     --  130* 130* 133 133  < > 135   POTASSIUM 3.5 3.5  --  3.7 3.7 3.4  < > 3.9   CHLORIDE 97  --   --  95 99  --   --  100   KAROL 8.3*  --   --  8.6 8.3*  --   --  8.6   CO2 31  --   --  29 29  --   --  28   BUN 17  --   --  13 10  --   --  10   CR 0.46*  --   --  0.50* 0.41*  --   --  0.40*   *  --   --  169* 154* 169*  < > 252*   < > = values in this interval not displayed.  CBC    Recent Labs  Lab 07/19/17  1430 07/17/17  0741 07/16/17  1320 07/16/17  0950  07/15/17  1618   WBC 11.9* 11.9* 10.0  --   --  8.6   RBC 2.89* 3.18* 3.05*  --   --  3.27*   HGB 9.0* 10.0* 9.6* 10.1*  < > 10.6*   HCT 27.1* 30.0* 28.7*  --   --  31.2*   MCV 94 94 94  --   --  95   MCH 31.1 31.4 31.5  --   --  32.4   MCHC 33.2 33.3 33.4  --   --  34.0   RDW 14.4 14.1 14.2  --   --  13.9    229 243  --   --  275   < > = values in this interval not displayed.  INR    Recent Labs  Lab 07/15/17  1618 07/13/17  0452   INR 1.12 1.23*       Imaging: Reviewed      Contact the neurosurgery resident on call with questions.    Dial * * *[phone]: Enter [phone] when prompted.   Bob Marcus MD,PhD  Neurosurgery PGY-2[DF1.1]             Revision History        User Key Date/Time User Provider Type Action    > DF1.1 7/19/2017  3:34 PM Tommy Edwards MD Resident Sign            Progress Notes by Angelita Pandey PA-C at 7/23/2017  3:26 PM     Author:  nAgelita Pandey PA-C  Service:  Endocrinology Author Type:  Physician Assistant - C    Filed:  7/23/2017  3:31 PM Date of Service:  7/23/2017  3:26 PM Creation Time:  7/23/2017  3:26 PM    Status:  Signed :  Angelita Pandey PA-C (Physician Assistant - C)                                   Diabetes Consult Daily  Progress Note          Assessment/Plan:   Mr. Ernesto Rawls is a 71 yo man with a history of type 2 diabetes, RA, CAD, and wet gangrene of his R foot great toe s/p [facility], who [facility] to [facility] on 7/7/17 with post op weakness in bilateral UE and LE.  Found to have significant spinal stenosis, he is now s/p C3-5 and C6 laminectomy, C5 corpetomy with cage placement, and C3-7 posterior fusion.  Hyperglycemia related to continuous enteral feeds.    Glucoses down to mid 100s at times, but then pops back up to the 200s.  It seems that high glucoses are more likely overnight- further out from glargine dose.    Plan:  -Glargine: continue 65 units qAM, adding 10 units qPM starting tonight to help with overnight hyperglycemia  -correction aspart 2unit/30 for BG >140 q4h     [facility]  Plan discussed with bedside RN.           Interval History:   The last 24 hours progress and nursing notes reviewed.   Continuous TFs: TwoCal at 45ml/h.   Ernesto was sleeping soundly when I visited.  Per rehab therapist, who had just worked with him, AMS seemed worse today.  No med list left by Cristian (wife) per RN.  Still planning [facility] if [facility].  Glucoses somewhat improved, but still up to 200s at times.          Recent Labs  Lab 07/23/17  1211 07/23/17  0908 07/23/17  0540 07/23/17  0142 07/22/17  2141 07/22/17  2128  07/20/17  0929  07/20/17  0643  07/19/17  1430  07/17/17  0741   GLC  --   --   --   --   --   --   --  305*  --  266*  --  215*  --  169*   * 238* 244* 233* 192* 210*  < >  --   < >  --   < >  --   < >  --    < > = values in this  "interval not displayed.            Review of Systems:   See interval hx          Medications:       Active Diet Order      Advance Diet as Tolerated      NPO for Medical/Clinical Reasons     Physical Exam:  Gen: Sleeping soundly, in NAD   HEENT: Cervical collar, mucous membranes are moist  Resp: Unlabored  Neuro:sleeping  /68 (BP Location: Left arm)  Pulse 87  Temp 96.9  F (36.1  C) (Axillary)  Resp 18  Ht 1.854 m (6' 1\")  Wt 88.5 kg (195 lb 1.6 oz)  SpO2 98%  BMI 25.74 kg/m2           Data:     Lab Results   Component Value Date    A1C 7.3 07/08/2017              CBC RESULTS:   Recent Labs   Lab Test  07/20/17   0929   WBC  10.8   RBC  3.03*   HGB  9.6*   HCT  28.4*   MCV  94   MCH  31.7   MCHC  33.8   RDW  14.3   PLT  226     Recent Labs   Lab Test  07/23/17   1246  07/20/17   0929  07/20/17   0643   NA  138  136  134   POTASSIUM   --   3.6  3.6   CHLORIDE   --   96  98   CO2   --   32  31   ANIONGAP   --   8  6   GLC   --   305*  266*   BUN   --   14  14   CR   --   0.42*  0.40*   KAROL   --   8.8  8.4*       Angelita Pandey PA-C [phone]  Diabetes Management job code [id number][BJ1.1]             Revision History        User Key Date/Time User Provider Type Action    > BJ1.1 7/23/2017  3:31 PM Angelita Pandey PA-C Physician Assistant - C Sign                     Procedure Notes      Procedures by Jackie Koo RD at 7/10/2017 10:48 AM     Author:  Jackie Koo RD Service:  Nutrition Author Type:  Registered Dietitian    Filed:  7/10/2017 10:48 AM Date of Service:  7/10/2017 10:48 AM Creation Time:  7/10/2017 10:48 AM    Status:  Signed :  Jackie Koo RD (Registered Dietitian)         Bridle Placement:   Reason for bridle placement: Securement of FT per RN   Medicine delivered during procedure: lubricating jelly    Procedure: Successful  Location of top of clip on FT: @ 108 cm marker   Condition of nose/skin at time of bridle placement: Unremarkable   Face to Face time with " patient: <5 minutes.  Jackie Koo RDN, LD  Pgr: [phone][CM1.1]     Revision History        User Key Date/Time User Provider Type Action    > CM1.1 7/10/2017 10:48 AM Jackie Koo RD Registered Dietitian Sign            Procedures by Jackie Koo RD at 7/10/2017 10:47 AM     Author:  Jackie Koo RD Service:  Nutrition Author Type:  Registered Dietitian    Filed:  7/10/2017 10:48 AM Date of Service:  7/10/2017 10:47 AM Creation Time:  7/10/2017 10:47 AM    Status:  Signed :  Jackie Koo RD (Registered Dietitian)         Small Bowel Feeding Tube Placement Assessment  Reason for Feeding Tube Placement: Provider request for post-pyloric FT  Cortrak Start Time: 10:26   Cortrak End Time: 10:32  Medicine Delivered During Procedure: Lidocaine  Placement Successful:  Presume post-pyloric (pending AXR confirmation)    Procedure Complications: None  Final Placement Ed at exit of nare 107 cm  Face to Face time with patient: 15 minutes    Jackie Koo RDN, LD  Pgr: [phone][CM1.1]     Revision History        User Key Date/Time User Provider Type Action    > CM1.1 7/10/2017 10:48 AM Jackie Koo RD Registered Dietitian Sign                  Progress Notes - Therapies (Notes from 07/23/17 through 07/26/17)     No notes of this type exist for this encounter.

## 2017-07-07 NOTE — ANESTHESIA POSTPROCEDURE EVALUATION
Patient: Ernesto Rawls    Procedure(s):  Posterior cervical 3-4-5 and partial cervical 6 laminectomy and decompression - Wound Class: I-Clean    Diagnosis:Cervical Compression  Diagnosis Additional Information: No value filed.    Anesthesia Type:  General, ETT    Note:  Anesthesia Post Evaluation    Patient location during evaluation: PACU  Patient participation: Other/plan (See Comments) (Somewhat confused but improving)  Level of consciousness: awake  Pain management: adequate  Airway patency: patent  Cardiovascular status: acceptable  Respiratory status: airway suctioned and acceptable  Hydration status: acceptable  PONV: none     Anesthetic complications: None    Comments: Some concerns re. adequacy of cough given quadriparesis.  Desaturated early after PACU arrival (80s) but resolved with aggressive airway suctioning of thick mucous (copious).  Stable oxygenation for last hour (SpO2 100).           Last vitals:  Vitals:    07/07/17 1045 07/07/17 1100 07/07/17 1115   BP: 150/70 149/83 155/66   Pulse:      Resp: 20 20 16   Temp:   36.4  C (97.6  F)   SpO2: 100% 100% 100%         Electronically Signed By: Bear Mercado MD  July 7, 2017  11:31 AM

## 2017-07-07 NOTE — IP AVS SNAPSHOT
"    UNIT 6A [facility]: [phone]                                              INTERAGENCY TRANSFER FORM - PHYSICIAN ORDERS   2017                    Hospital Admission Date: 2017  IVAN TERRAZAS   : 1945  Sex: Male        Attending Provider: Rafael Lo MD     Allergies:  Contrast Dye    Infection:  None   Service:  NEUROSURGERY    Ht:  1.854 m (6' 1\")   Wt:  88.5 kg (195 lb 1.6 oz)   Admission Wt:  94.8 kg (209 lb)    BMI:  25.74 kg/m 2   BSA:  2.14 m 2            Patient PCP Information     Provider PCP Type    Roderick Martines MD, MD General      ED Clinical Impression     Diagnosis Description Comment Added By Time Added    Uncontrolled type 2 diabetes mellitus without complication, with long-term current use of insulin (H) [E11.65, Z79.4] Uncontrolled type 2 diabetes mellitus without complication, with long-term current use of insulin (H) [E11.65, Z79.4]  Kristina Andino APRN CNP 2017 11:18 AM      Hospital Problems as of 2017              Priority Class Noted POA    Cervical stenosis of spine Medium  2017 Yes    Epidural abscess Medium  2017 Yes    Myelopathy (H) Medium  2017 Yes    Uncontrolled type 2 diabetes mellitus without complication, with long-term current use of insulin (H) Medium  2017 Unknown      Non-Hospital Problems as of 2017     None      Code Status History     Date Active Date Inactive Code Status Order ID Comments User Context    2017 10:59 AM  Full Code [id number]  Kristina Andino APRN CNP Outpatient         Medication Review      START taking        Dose / Directions Comments    acetaminophen 325 MG tablet   Commonly known as:  TYLENOL        Dose:  650 mg   2 tablets (650 mg) by Per NG tube route every 4 hours as needed for mild pain or fever   Quantity:  100 tablet   Refills:  0    Indication: Mild Pain; Fever       albuterol (2.5 MG/3ML) 0.083% neb solution        Dose:  2.5 mg   Take 1 vial (2.5 mg) " by nebulization every 6 hours as needed for wheezing   Quantity:  360 mL   Refills:  0    Indication: Shortness of Breath; Wheezing       bisacodyl 10 MG Suppository   Commonly known as:  DULCOLAX        Dose:  10 mg   Place 1 suppository (10 mg) rectally daily as needed for constipation   Quantity:  30 suppository   Refills:  0    Indication: Prevent constipation       ceFAZolin sodium-dextrose 2-4 GM/100ML-% Soln infusion   Commonly known as:  ANCEF   Indication:  Infection of Bone and Bone Marrow, Infection Following Surgery        Dose:  2 g   Inject 100 mLs (2 g) into the vein every 8 hours   Refills:  0    Indication: Strep Mitis Infection       cyclobenzaprine 10 MG tablet   Commonly known as:  FLEXERIL        Dose:  10 mg   Take 1 tablet (10 mg) by mouth 3 times daily as needed for muscle spasms   Quantity:  42 tablet   Refills:  0    Indication: Muscle Spasm       enoxaparin 30 MG/0.3ML injection   Commonly known as:  LOVENOX        Dose:  30 mg   Inject 0.3 mLs (30 mg) Subcutaneous every 12 hours   Refills:  0    Indication: Venous Thromboembolism Prophylaxis       folic acid 1 MG tablet   Commonly known as:  FOLVITE        Dose:  1 mg   Take 1 tablet (1 mg) by mouth daily   Quantity:  30 tablet   Refills:  0    Indication: H/O ETOH Abuse       * heparin lock flush 10 UNIT/ML Soln injection        Dose:  5-10 mL   5-10 mLs by Intracatheter route every 24 hours   Refills:  0    Indication: PICC Flush       * heparin lock flush 10 UNIT/ML Soln injection        Dose:  5-10 mL   5-10 mLs by Intracatheter route every hour as needed for other (to lock each CVC - Open Ended (Tunneled and Non-Tunneled) dormant lumen.)   Refills:  0    Indication: PICC Flush       insulin aspart 100 UNIT/ML injection   Commonly known as:  NovoLOG PEN        Dose:  1-22 Units   Inject 1-22 Units Subcutaneous every 4 hours   Refills:  0    Indication: Diabetes Mellitus       * insulin glargine 100 UNIT/ML injection   Commonly known  as:  LANTUS        Dose:  17 Units   Inject 17 Units Subcutaneous every evening   Refills:  0    Indication: Diabetes Mellitus       * insulin glargine 100 UNIT/ML injection   Commonly known as:  LANTUS        Dose:  65 Units   Inject 65 Units Subcutaneous every morning   Refills:  0    Indication: Diabetes Mellitus       metoprolol 25 MG tablet   Commonly known as:  LOPRESSOR        Dose:  25 mg   1 tablet (25 mg) by Per NG tube route 2 times daily   Quantity:  60 tablet   Refills:  0    Indication: Hypertension       multivitamins with minerals Liqd liquid        Dose:  15 mL   15 mLs by Per Feeding Tube route daily   Refills:  0    Indication: H/O ETOH Abuse       omeprazole 2 mg/mL Susp   Commonly known as:  priLOSEC        Dose:  20 mg   10 mLs (20 mg) by Oral or Feeding Tube route daily   Refills:  0    Indication: Stress Ulcer Prophylaxis       oxyCODONE 5 MG IR tablet   Commonly known as:  ROXICODONE        Dose:  5-10 mg   Take 1-2 tablets (5-10 mg) by mouth every 4 hours as needed for moderate to severe pain   Refills:  0    Indication: Post-Operative Pain       polyethylene glycol Packet   Commonly known as:  MIRALAX/GLYCOLAX        Dose:  17 g   Take 17 g by mouth 2 times daily   Quantity:  7 packet   Refills:  0    Indication: Prevent constipation       protein modular        Dose:  1 packet   1 packet by Per Feeding Tube route 3 times daily   Quantity:  900 packet   Refills:  0    Indication: Poor Nutrition       senna-docusate 8.6-50 MG per tablet   Commonly known as:  SENOKOT-S;PERICOLACE        Dose:  2 tablet   Take 2 tablets by mouth 2 times daily   Quantity:  100 tablet   Refills:  0    Indication: Prevent constipation       * Notice:  This list has 4 medication(s) that are the same as other medications prescribed for you. Read the directions carefully, and ask your doctor or other care provider to review them with you.            Summary of Visit     Reason for your hospital stay       The  patient was hospitalized and treated for quadriparesis. He underwent the following procedures:  1) C3-C5 Laminectomy and Partial C6 Laminectomy (7/7/2017)  2) C5 Corpectomy and Cage Placement (7/12/2017)  3) Posterior C3-C7 Instrumented Fusion (7/16/2017)             After Care     Activity - Up with nursing assistance           Additional Discharge Instructions       - The patient will need to maintain the Aspen Collar on at all times for 6 weeks.    -[facility]       Advance Diet as Tolerated       Follow this diet upon discharge: Orders Placed This Encounter      Adult Formula Drip Feeding: Continuous TwoCal HN; Nasojejunal; Goal Rate: 45; mL/hr; Medication - Tube Feeding Flush Frequency: At least 15-30 mL water before and after medication administration and with tube clogging; FW flush 30-60 ml q4 hr; adv...       Fall precautions           General info for SNF       Length of Stay Estimate: Short Term Care: Estimated # of Days <30  Condition at Discharge: Improving  Level of care:skilled   Rehabilitation Potential: Fair  Admission H&P remains valid and up-to-date: Yes  Recent Chemotherapy: N/A  Use Nursing Home Standing Orders: N/A       Glucose monitor nursing POCT       Before meals and at bedtime       IV access       PICC (Indicated for long-term antibiotic therapy)       NPO for Medical/Clinical Reasons       The patient is NPO status per Speech Therapy evaluation; Patient is at high risk for aspiration.       Wound care       Site:   Right Foot; 3rd Digit Amputation  Instructions:  Maintain incision open to air.       Wound care (specify)       Site:   Posterior Midline Neck Incision -- Intact with Sutures  Instructions:    1) Maintain incision open to air  2) If the incision gets wet, pat dry  3) Monitor for redness, swelling, drainage and warmth    Site: Right Anterior Neck Incision  1) Maintain the incision open to air with glue intact  4) Sutures may be removed on  post-operative day #14 (7/26/2017)             Referrals     Occupational Therapy Adult Consult       Evaluate and treat as clinically indicated.    Reason:  Quadriparesis       Physical Therapy Adult Consult       Evaluate and treat as clinically indicated.    Reason:  Quadriparesis       Speech Language Path Adult Consult       Evaluate and treat as clinically indicated.    Reason:  Dysphagia             Supplies     Pneumatic Compression Device        Bilateral calf. Remove 30 mins BID.             Follow-Up Appointment Instructions     Future Labs/Procedures    Follow Up and recommended labs and tests     Comments:    - The patient will need follow-up with the Infectious Disease Service at the [facility] in approximately 2.5 weeks.     - Please obtain CRP levels every 2 weeks    - The patient will need follow-up in the Neurosurgery Clinic in approximately 6 weeks. Please call [phone] to schedule this appointment with Camilla Smith PA-C or Dr. Rafael Lo. The patient will need cervical radiographs immediately prior to his follow-up appointment.    -Needs close f/u of blood glucose levels [facility] or [facility]      Follow-Up Appointment Instructions     Follow Up and recommended labs and tests       - The patient will need follow-up with the Infectious Disease Service at the [facility] in approximately 2.5 weeks.     - Please obtain CRP levels every 2 weeks    - The patient will need follow-up in the Neurosurgery Clinic in approximately 6 weeks. Please call [phone] to schedule this appointment with Camilla Smith PA-C or Dr. aRfael Lo. The patient will need cervical radiographs immediately prior to his follow-up appointment.    -Needs close f/u of blood glucose levels with provider at [facility] or [facility]             Statement of Approval     Ordered          07/26/17 0556  I have reviewed and agree with all the recommendations and orders detailed in this document.  EFFECTIVE  NOW     Approved and electronically signed by:  Vaishali Duffy APRN CNP

## 2017-07-07 NOTE — LETTER
Transition Communication Hand-off for Care Transitions to Next Level of Care Provider    Name: Jeffery Rawls  MRN #: [id number]    Primary Care Provider: Roderick Martines MD   Primary Clinic: [address] [address]    Admit Date/Time: 7/7/2017  4:53 AM  Discharge Date:  7-26-17    Reason for Hospitalization:  Epidural abscess [G06.2]  Cervical stenosis of spinal canal [M48.02]    Procedures:  7/7/2017:  C3-C5 Laminectomy and Partial C6 Laminectomy  7/12/2017: C5 Corpectomy and Cage Placement  7/16/2017: Posterior C3-C7 Instrumented Fusion    Payor Source: Payor: ARE / Plan: [employer] NON FPA / Product Type: HMO /   Readmission Assessment Measure (GEOVANNA) Risk Score/category:  Elevated       Reason for Communication Hand-off Referral: Multiple providers/specialties    Discharge Plan:  Discharged to the Spinal Cord Rehab Unit at the [facility]. Phone:  [phone].     Discharged on IV Cefazolin for 6 weeks.  Discharged with an NJ tube, on continuous tube feedings.     The patient will need follow-up with the Infectious Disease Service at the [facility] in approximately 2.5 weeks.     - Please obtain CRP levels every 2 weeks     - The patient will need follow-up in the Neurosurgery Clinic in approximately 6 weeks. Please call [phone] to schedule this appointment with Camilla Smith PA-C or Dr. Rafael Lo. The patient will need cervical radiographs immediately prior to his follow-up appointment.     -Needs close f/u of blood glucose levels with provider [facility] or [facility]                        ______________________________________________________________________________    Any outstanding tests or procedures:        Referrals     Future Labs/Procedures    Occupational Therapy Adult Consult     Comments:    Evaluate and treat as clinically indicated.    Reason:  Quadriparesis    Physical Therapy Adult Consult     Comments:    Evaluate and treat as clinically  indicated.    Reason:  Quadriparesis    Speech Language Path Adult Consult     Comments:    Evaluate and treat as clinically indicated.    Reason:  Dysphagia          Supplies     Future Labs/Procedures    Pneumatic Compression Device      Comments:    Bilateral calf. Remove 30 mins BID.                Jerilyn Holbrook RN Care Coordinator  Unit 6A, [facility]        AVS/Discharge Summary is the source of truth; this is a helpful guide for improved communication of patient story

## 2017-07-07 NOTE — CONSULTS
Otolaryngology Consult    Reason for Consult: Pre-vertebral abscess drainage    HPI: Ernesto Rawls is a 72 year old male with rheumatoid arthritis, DM, CAD, wet gangrene of right great toe, and cervical spinal stenosis, who underwent amputation of the right great toe on 7/6 at the [facility]. Afterwards, he was found to be weak in his lower extremities and was taken for emergent CT & MR to evaluate for CVA. MRI demonstrated significant cervical stenosis and he was transferred here to the neurosurgical team. He subsequently developed total paralysis of the upper extremities as well upon his arrival. He was brought to the OR and underwent posterior cervical 3-4-5 and partial cervical 6 laminectomy and decompression. Subsequently, our service was consulted to evaluate for a possible anterior vertebral space abscess and for potential transoral drainage. He has not had any fevers, chills, weight changes, shortness of breath, pain, or syncope. The patient has had a couple of falls recently.     Allergies:   Contrast dye    PMH:   No past medical history on file.   RA  DM  CAD  Gangrene right great toe    PSH:   History reviewed. No pertinent surgical history.    Medications: Reviewed in EPIC    Social History:   Social History     Social History     Marital status: N/A     Spouse name: N/A     Number of children: N/A     Years of education: N/A     Occupational History     Not on file.     Social History Main Topics     Smoking status: Not on file     Smokeless tobacco: Not on file     Alcohol use Not on file     Drug use: Not on file     Sexual activity: Not on file     Other Topics Concern     Not on file     Social History Narrative     No narrative on file       Family History:   History reviewed. No pertinent family history.    ROS: 12 point review of systems is negative unless noted in HPI.    Physical Exam:   Temp: 98.3  F (36.8  C) Temp src: Oral BP: 136/60 Pulse: 64 Heart Rate: 75 Resp: 20 SpO2: 100 % O2 Device:  "Nasal cannula Oxygen Delivery: 2 LPM Height: 185.4 cm (6' 1\") Weight: 94.8 kg (209 lb)  Estimated body mass index is 27.57 kg/(m^2) as calculated from the following:    Height as of this encounter: 1.854 m (6' 1\").    Weight as of this encounter: 94.8 kg (209 lb).  General: laying in bed shortly after returning from PACU, no acute distress  HEAD: normocephalic, atraumatic  Face: symmetrical, no swelling, edema, or erythema, no facial droop  Eyes: eomi, perrl, clear sclera  Nose: no anterior drainage, intact and midline septum without perforation or hematoma   Mouth: moist, no ulcers, no jaw or tooth tenderness, tongue midline and symmetric  Oropharynx: tonsils within normal limits, uvula midline, no oropharyngeal erythema      Flexible Laryngoscope  Detailed description of procedure:  Scope was passed into the left nostril, noting normal nasal anatomy. Patent choana. Adenoid pad was nonobstructive. Base of tongue and vallecula were normal. Epiglottis as crisp. Slight broad based edema of the posterior oropharyngeal wall. No significant erythema or area of clear abscess. Arytenoids were non-edematous without prolapse and with normal movement. Aryepiglottic folds were normal. Pyriform sinuses had no lesions or ulcerations. The post cricoid mucosa showed no signs of edema or reflux.     Left true focal fold had no lesions or ulcerations and was not edematous. The left true vocal fold had normal movement. Right true focal fold had no lesions or ulcerations and was not edematous. The right true vocal fold had normal movement. The immediate subglottis was visualized and widely patent.     Minimal pooling of secretions.       Labs:   CBC: Hgb 11.8, o/w WNL (WBC 7.3)  BMP: Glu 190, o/w WNL  Phosphorus: 3.7  Magnesium: 2.0  ESR: 50  CRP: 27  PTT: 29  PT: 1.1  ABO/RH: A pos    Imaging:   MR: image reviewed showing a 1.7x0.4 cm fluid collection near C4/5 with adjacent soft tissue edema appear to connect with posterior process. "     Assessment/Plan:   Ernesto Rawls is a 72 year old male with RA, DM, CAD, wet gangrene of R great toe, and cervical spinal stenosis, who underwent amputation of R great toe 7/6 at the [facility] and evaluated for LE weakness with emergent CT & MR for potential CVA. MRI demonstrated cervical stenosis he developed 4 extremity weakness and underwent cervical 3-4-5 and partial cervical 6 laminectomy and decompression. Subsequently, our service was consulted to evaluate for a possible anterior pre-vertebral space abscess and to assess for the possibility of a trans-oral drainage. A flexible laryngoscopy was performed that did not reveal any bulging or mucosal changes in the area of concern on MR. The area of concern is likely lower down than we are able to appreciate on examination. If the patient requires drainage of this abscess it would require a cervical/ACDF approach as the patient is not a good candidate for a transoral approach. We also recommend obtaining new imaging after the posterior decompression to re-assess the anterior pre-vertebral space.     The patient was discussed with staff, Dr. Saucedo. Thank you for involving us in the care of this patient.  Please feel free to contact us with questions or concerns at any time.    Everardo Burk MD  Otolaryngology Resident

## 2017-07-07 NOTE — ANESTHESIA PREPROCEDURE EVALUATION
"                      Anesthesia Plan      History & Physical Review      ASA Status:  4 emergent.    NPO Status:  Unknown    Plan for General and Awake Technique with Intravenous induction. Maintenance will be Other.      Additional equipment: Fiberoptic bronchoscope, 2nd IV and Arterial Line      Postoperative Care      Consents  Anesthetic plan, risks, benefits and alternatives discussed with:  Other (See Comment) (Info from surgeons)..         ANESTHESIA PREOP EVALUATION    Procedure: LAMINECTOMY CERVICAL POSTERIOR THREE+ LEVELS    HPI:     PMHx/PSHx/ROS:  No past medical history on file.    No past surgical history on file.      Past Anes Hx: No personal or family h/o anesthesia problems    Soc Hx:   Tobacco:   EtOH:     Allergies:   Allergies   Allergen Reactions     Contrast Dye        Meds:   No prescriptions prior to admission.       No current outpatient prescriptions on file.       Physical Exam:  VS: T 95.6, P 64, /64, R 14, SpO2 98%     Weight:   Wt Readings from Last 2 Encounters:   17 94.8 kg (209 lb)     Height:   Ht Readings from Last 2 Encounters:   17 1.854 m (6' 1\")       NPO Status:     Labs:        CBC:  Lab Results   Component Value Date    WBC 7.3 2017     Lab Results   Component Value Date    HGB 11.8 2017     Lab Results   Component Value Date    HCT 34.0 2017     Lab Results   Component Value Date     2017        Imaging:  No images are attached to the encounter.    EK/7/2017  6:17 AM    "

## 2017-07-07 NOTE — BRIEF OP NOTE
[facility], [place]    Brief Operative Note    Pre-operative diagnosis: Cervical Compression  Post-operative diagnosis Same  Procedure: Procedure(s):  Posterior cervical 3-4-5 and partial cervical 6 laminectomy and decompression - Wound Class: I-Clean  Surgeon: Surgeon(s) and Role:     * Derick Holly MD - Primary     * Bob Marcus MD - Resident - Assisting     * Dequan Killian MD  Anesthesia: General   Estimated blood loss: Less than 10 ml  Drains: Hemovac  Specimens: * No specimens in log *  Findings:   None.  Complications: None.  Implants: None.

## 2017-07-07 NOTE — IP AVS SNAPSHOT
` ` Patient Information     Patient Name Sex     Ernesto Rawls ([id number]) Male 1945       Room Bed    6214 6214-02      Patient Demographics     Address Phone    [address] [place] [id number] [phone] (Home)      Patient Ethnicity & Race     Ethnic Group Patient Race    American White      Emergency Contact(s)     Name Relation Home Work Mobile    Cristian Rawls Significant other [phone]      Ananya Daughter [phone]        Documents on File        Status Date Received Description       Documents for the Patient    Consent for EHR Access Received 17     Insurance Card       External Medication Information Consent       Patient ID       [facility] Specified Other       Consent for Services/Privacy Notice - Hospital/Clinic Received 17     Privacy Notice - [place] Received 17     HIM ELSIE Authorization  17 [facility]       Documents for the Encounter    CMS IM for Patient Signature Received 17     Assessment/Questionnaire  17 MRI HISTORY QUESTIONNAIRE AND CONTRAST NOTICE    Photograph   WOC 17 left arm      Admission Information     Attending Provider Admitting Provider Admission Type Admission Date/Time    Rafael Lo MD Hunt, Matthew Allan, MD Urgent 17  0453    Discharge Date Hospital Service Auth/Cert Status Service Area     Neurosurgery Incomplete [facility]    Unit Room/Bed Admission Status       UU U6A 6214/6214-02 Admission (Confirmed)       Admission     Complaint    Cervical epidural hematoma/abscess, Cervical stenosis of spine, Cervical Compression, Epidural abscess, Epidural abscess, Spinal Epidural Abscess , Spinal Epidural Abscess, Osteomyelitis Anterior Epidural Abscess, spinal epidural abcess, Myelopathy (H), Cervical Stenosis      Hospital Account     Name Acct ID Class Status Primary Coverage    [facility] [id number] Inpatient Open COMMERCIAL - [facility]            Guarantor Account (for  Hospital Account #[id number])     Name Relation to Pt Service Area Active? Acct Type    Ernesto Rawls Self FCS Yes Other    Address Phone          [address]  [place] [address] [phone](H)              Coverage Information (for Hospital Account #[id number])     F/O Payor/Plan Precert #    COMMERCIAL/[facility]     Subscriber Subscriber #    Ernesto Rawls [id number]    Address Phone    [address] [place] [address] [phone]

## 2017-07-07 NOTE — OR NURSING
"Pt has been awake since PACU arrival at 0944. Initially, pt agitated and upset with requests to do neuro checks. Has become calmer, wants to go to a room \"with the sun shining.\" At this time, pt is able to have spontaneous purposeful gross movement of upper extremities, able to lift arms to shoulder level. Attempts to grasp with hands but not able to grasp. Has intermittent gross movement of Left foot, no movement noted on Right foot- which is [facility]. Pt denies able to sense touch to extremities. Although does report feeling the \"leg boots\" (SCD's) feel tight and hot and wants them off. Talks a lot about surroundings and what he is hearing. (Thought a phone ringing was a timer for an oven and told me to take the food out.) Pt reoriented as needed.   Wife, Cristian, and stepdaughter, Ananya were reached by phone, updated,  and are on their way to [facility]. VS have been stable and able to keep MAP >80 as per orders.   "

## 2017-07-07 NOTE — PLAN OF CARE
Problem: Goal Outcome Summary  Goal: Goal Outcome Summary  Outcome: No Change  [facility]. [facility] report, pt had R third toe amputated yesterday. Was up and walking independently w/ a walker prior to surgery. Post-operatively pt became unresponsive and was unable to move extremities. Pt now has slight contraction in BLE and absent sensation. Minimal movement in BUE. Oriented to self only. Pt arrived w/ c-collar on. To remain on strict flat bedrest. BPs were monitored to keep MAPS above 80. MD notified of MAP below 60, fluid bolus ordered. New PIV placed just before transfer to pre-op. [place] [facility] at [facility]. Wife and daughter present at bedside. Emergently taken down to the OR around 0615. Report given to pre-op nurse.

## 2017-07-07 NOTE — PLAN OF CARE
Problem: Goal Outcome Summary  Goal: Goal Outcome Summary  Orders received for STAT swallowing evaluation. Pt failed RN screen following OR this date. Pt having difficulty managing his oral secretions. Suctioned pt until his voice was clear. ENT arrived and scoped pt. It appeared that pt is unable to manage his oral secretions at this time and thus not appropriate for po intake/trials. Encouraged pt to cough up secretions. Will follow up to complete full evaluation tomorrow as appropriate.

## 2017-07-07 NOTE — LETTER
Transition Communication Hand-off for Care Transitions to Next Level of Care Provider    Name: Ernesto Rawls  MRN #: [id number]  Primary Care Provider: Roderick Martines MD     Primary Clinic: [facility] [address] [place] [id number]     Reason for Hospitalization:  Epidural abscess [G06.2]  Cervical stenosis of spinal canal [M48.02]  Admit Date/Time: 7/7/2017  4:53 AM  Discharge Date: 7/26/17  Payor Source: Payor: [employer] / Plan: [employer] S[employer]IORS NON FPA / Product Type: HMO /              Reason for Communication Hand-off Referral:     Discharge Plan:    Social Work Services Discharge Note     Patient Name:  Ernesto Rawls     Anticipated Discharge Date:  7/26/17     Discharge Disposition:   [facility] ([phone])     Following MD:  Dr. Ha     Pre-Admission Screening (PAS) online form has been completed.  The Level of Care (LOC) is:  Determined  Confirmation Code is:  Not required as pt is going to the [facility].  Patient/caregiver informed of referral to [facility] for Pre-Admission Screening for skilled nursing facility (SNF) placement and to expect a phone call post discharge from SNF.     Additional Services/Equipment Arranged:  Spoke with [facility] referral , Agnes Wolf [phone]) who confirmed acceptance.  Agnes will arrange for stretcher transport through [facility], to pick pt up at [facility] tomorrow at 10am.       Patient / Family response to discharge plan:  Pt was confused and stated that he would be going to stay at [facility] and then to the [facility].  Cristian voices understanding of the discharge plan and agreement with the discharge plan.     Persons notified of above discharge plan:  Pt, Cristian (pt's ex-wife/Significant other), 6A nursing and Vaishali Duffy Neurosurgery NP     Staff Discharge Instructions:  Please fax discharge orders and signed hard scripts for any controlled substances (SW will complete this task).  Please print a packet and send  with patient.      CTS Handoff completed:  YES     Medicare Notice of Rights provided to the patient/family:  NO, as per face sheet, [facility] is [facility] source.     FREDDY Dooley  Social Work, 6A  Phone:  [phone]  Pager:  [phone]  7/25/2017

## 2017-07-07 NOTE — PROGRESS NOTES
CLINICAL NUTRITION SERVICES - ASSESSMENT NOTE     Nutrition Prescription    RECOMMENDATIONS FOR MDs/PROVIDERS TO ORDER:  - ADAT as soon as medically able; per SLP if swallow eval is indicated  - IF unable to advance diet within 24-48 hours (would not delay much beyond this, as pt nutrition status very poor x several months per family), may need to consider nutrition support. Consider NGT/gastric feeds if appropriate and able to elevated HOB. Consult for post-pyloric FT if desired/indicated.     Malnutrition Status:    - Severe malnutrition in the context of acute on chronic illness     Recommendations already ordered by Registered Dietitian (RD):  - ordered MVI   - additonal none currently while NPO     Future/Additional Recommendations:  - ability to advance diet vs FT/FT?  - once diet advanced, add supps and calorie counts    - IF unable to advance diet and pt requires EN, recommend TwoCal HN @ 45 ml/hr (1080 ml/day) to provide 2160 kcals (23 Kcals/kg/day) 91 g PRO ( 1.0 gms/kg/day), 756 ml free H2O, 237 g CHO and 5 g Fiber daily.  - Recommend additional Prostat packet TID: provides 2460 kcals (25 kcals/kg) and 136 gm PRO (1.4 gm/kg)  - Initiate @ 10 ml/hr and advance by 10 ml q8hr as tolerated  - Do not start or advance until lytes (Mg++,K+) WNL and phos>1.9   - Recommend 30-60 ml q4hr fluid flushes for tube patency. Additional fluids and/or adjustments per MD.   - Order multivitamin/mineral (15 ml/day via FT) to help ensure micronutrient needs being met with suspected hypermetabolic demands and potential interruptions to TF infusions.  - IF feeding via large bore NGT, Check residuals q 4 hrs and hold TF if >500 mL.  And elevated HOB.      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 He underwent amputation of that digit on  "7/6/2017. Pt with severe cervical stenosis and weakness. [facility], where his exam declined to near total paralysis of the upper and lower extremities.     NUTRITION HISTORY  Per family, pt has had a poor appetite and minimal intake x 2 months. Family reports minimal intake during previous admit at other facility: milk and ice cream daily and that's it. Pt usual appetite is not good, but minimal appetite is new. Family reports ~ 15 lb weight loss over the last 2 weeks and overall 60 lbs over 1 year.     CURRENT NUTRITION ORDERS  Diet: NPO    LABS  Labs reviewed  CRP: 27.0 (H)    MEDICATIONS  Medications reviewed  Insulin   Folic acid   Miralax/senokot   Thiamine     ANTHROPOMETRICS  Height: 185.4 cm (6' 1\") 73\"   Most Recent Weight: 94.8 kg (209 lb)    IBW: 84 kg  BMI: Overweight BMI 25-29.9  Weight History:   Wt Readings from Last 8 Encounters:   07/07/17 94.8 kg (209 lb)   Per family, usual weight is 260 lbs last year. Pt has lost 15 lbs in 2 weeks per family = 7% weight loss in 2 weeks     Dosing Weight: 95 kg (current weight)    ASSESSED NUTRITION NEEDS  Estimated Energy Needs: 5206-4370 kcals/day (25 - 30 kcals/kg)  Justification: Maintenance  Estimated Protein Needs: 114-143 grams protein/day (1.2 - 1.5 grams of pro/kg)  Justification: Increased needs  Estimated Fluid Needs: 5405-7464 mL/day (1 mL/kcal)   Justification: Maintenance and Per provider pending fluid status    PHYSICAL FINDINGS  See malnutrition section below.  Skin: dry, ecchymotic, pale  Nails: unable to observe left foot. Thick and discolored on right foot. Fingernails are dry and possibly brittle.  Hair: thin, likely c/w age  Muscle loss   Abd round, obese, soft    Left foot wrapped: toe amputation 7/6    MALNUTRITION  % Intake: </=50% for >/= 1 month (severe)  % Weight Loss: > 5% in 1 month (severe)  Subcutaneous Fat Loss: None observed  Muscle Loss: Scapular bone:, Thoracic region (clavicle, " acromium bone, deltoid, trapezius, pectoral):, Upper arm (bicep, tricep):, Upper leg (quadricep, hamstring):, Patellar region: and Posterior calf: moderate   Fluid Accumulation/Edema: None noted  Malnutrition Diagnosis: Severe malnutrition in the context of acute on chronic illness     NUTRITION DIAGNOSIS  Inadequate protein-energy intake related to inability to take oral PO/current diet status as evidenced by NPO and pt meeting 0% kcal/PRO needs.       INTERVENTIONS  Implementation  Nutrition Education: Provided education on role of RD   Medical food supplement therapy and calorie counts once diet advanced     Goals  Diet adv v nutrition support within 2-3 days.     Monitoring/Evaluation  Progress toward goals will be monitored and evaluated per protocol.     Brayan Goldstein RD, LD  Neuro ICU  Pager: [phone]

## 2017-07-07 NOTE — H&P
"[facility]  Neurosurgery     History and Physical    CC: Quickly increasing weakness    HPI: Mr. 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 He underwent amputation of that digit on 7/6/2017 @ 1430 under MAC at the [facility]. Following the procedure he was found to be weak in his bilateral upper extremities as well as his lower extremities. He was taken for an emergent CT and MRI to evaluate for stroke. An MRI of his cervical spine was also performed which demonstrated significant cervical stenosis. He was then transferred to [facility], where his exam declined to near total paralysis of the upper and lower extremities.     No recent fevers, chills, weight change, chest pain, shortness of breath, cough, abdominal pain, nausea, vomiting, and syncope. The patient also denies headaches, double vision, blurry vision, weakness, LOC, changes in sensation, taste, smell, trouble speaking, bowel or bladder incontinence, or other neurologic symptoms.    Past Medical History  RA  DM  CAD    Past Surgical History   has no past surgical history on file.    Family History  family history is not on file.    Social History  - Occupation: Retired  - Marital Status:  . Wife and 2 adult children present with him.   - Tobacco use: Denies  - Alcohol use:  Social       Medications  No prescriptions prior to admission.        Allergies  Allergies   Allergen Reactions     Contrast Dye        ROS: 10 point ROS of systems were all negative except for pertinent positives noted in HPI.     PE:  Blood pressure 165/64, pulse 64, temperature 95.6  F (35.3  C), temperature source Oral, resp. rate 14, height 1.854 m (6' 1\"), weight 94.8 kg (209 lb), SpO2 98 %.    NEUROLOGIC:  -- Awake; Alert; oriented x 2  -- no gaze preference. No apparent hemineglect.  -- PERRL 3-2mm bilat and brisk, extraocular movements intact  -- face " symmetrical, tongue midline  -- palate elevates symmetrically, uvula midline  -- hearing grossly intact bilat  -- Trapezii 5/5 strength bilat symmetri    Motor:  Normal bulk / tone; no tremor, rigidity, or bradykinesia.    1/5 in all 4 extremities.     Sensory:  Light touch absent.       I/O    Intake/Output Summary (Last 24 hours) at 07/07/17 0955  Last data filed at 07/07/17 0946   Gross per 24 hour   Intake             1050 ml   Output              400 ml   Net              650 ml       Labs    Arterial Blood Gases     Recent Labs  Lab 07/07/17  0848 07/07/17  0705   PH 7.42 7.38   PCO2 35 42   PO2 290* 380*   HCO3 23 25     Complete Blood Count     Recent Labs  Lab 07/07/17  0848 07/07/17 0705 07/07/17  0552   WBC  --   --  7.3   HGB 11.8* 11.5* 11.8*   PLT  --   --  217     Basic Metabolic Panel    Recent Labs  Lab 07/07/17  0848 07/07/17 0705 07/07/17  0552    133 133   POTASSIUM 4.0 4.1 4.6   CHLORIDE  --   --  98   CO2  --   --  26   BUN  --   --  18   CR  --   --  0.70   * 186* 190*       Coagulation Profile    Recent Labs  Lab 07/07/17  0552   INR 1.10   PTT 29     CSFNo results for input(s): CGLU, CTP in the last 168 hours.    Invalid input(s): CCSF  MICRONo results for input(s): CULT in the last 168 hours.  Liver Function Tests    Recent Labs  Lab 07/07/17  0552   INR 1.10     Lactate  Invalid input(s): LACTATE    IMAGING:  XR Surgery BONITA Fluoro L/T 5 Min w Stills   Final Result      XR Chest 2 Views    (Results Pending)       Assessment: Ernesto Rawls is a 72 year old male with severe cervical stenosis and quickly worsening weakness.       Plan:  - NPO  - Plan for operative decompression  - Family consented, patient marked.       The patient was discussed with the neurosurgery chief resident, who agrees with the above stated plan.    Contact the neurosurgery resident on call with questions.    Dial * * *[phone]: Enter [phone] when prompted.   Tommy Edwards MD, PhD  Neurosurgery PGY-3

## 2017-07-07 NOTE — IP AVS SNAPSHOT
"    UNIT 6A [facility]: [id number]                                              INTERAGENCY TRANSFER FORM - LAB / IMAGING / EKG / EMG RESULTS   2017                    Hospital Admission Date: 2017  IVAN TERRAZAS   : 1945  Sex: Male        Attending Provider: Rafael Lo MD     Allergies:  Contrast Dye    Infection:  None   Service:  NEUROSURGERY    Ht:  1.854 m (6' 1\")   Wt:  88.5 kg (195 lb 1.6 oz)   Admission Wt:  94.8 kg (209 lb)    BMI:  25.74 kg/m 2   BSA:  2.14 m 2            Patient PCP Information     Provider PCP Type    Roedrick Martines MD, MD General         Lab Results - 3 Days      Glucose by meter [[id number]] (Abnormal)  Resulted: 17 0856, Result status: Final result    Ordering provider: Rafael Lo MD  17 0840 Resulting lab: POINT OF CARE TEST, GLUCOSE    Specimen Information    Type Source Collected On     17 0840          Components       Value Reference Range Flag Lab   Glucose 202 70 - 99 mg/dL H 170            Glucose by meter [[id number]] (Abnormal)  Resulted: 17 0836, Result status: Final result    Ordering provider: Rafael Lo MD  17 0829 Resulting lab: POINT OF CARE TEST, GLUCOSE    Specimen Information    Type Source Collected On     17 0829          Components       Value Reference Range Flag Lab   Glucose 196 70 - 99 mg/dL H 170            Glucose by meter [[id number]] (Abnormal)  Resulted: 17 0405, Result status: Final result    Ordering provider: Rafael Lo MD  17 0358 Resulting lab: POINT OF CARE TEST, GLUCOSE    Specimen Information    Type Source Collected On     17 0358          Components       Value Reference Range Flag Lab   Glucose 182 70 - 99 mg/dL H 170            Glucose by meter [[id number]] (Abnormal)  Resulted: 17 0050, Result status: Final result    Ordering provider: Rafael Lo MD  17 0039 Resulting lab: POINT OF CARE TEST, GLUCOSE    " Specimen Information    Type Source Collected On     07/26/17 0039          Components       Value Reference Range Flag Lab   Glucose 145 70 - 99 mg/dL H 170            INR [[id number]]  Resulted: 07/26/17 0002, Result status: Final result    Ordering provider: Rafael Lo MD  07/25/17 1800 Resulting lab: [facility]    Specimen Information    Type Source Collected On   Blood  07/25/17 2329          Components       Value Reference Range Flag Lab   INR 1.12 0.86 - 1.14  51            CBC with platelets [[id number]] (Abnormal)  Resulted: 07/25/17 2334, Result status: Final result    Ordering provider: Rafael Lo MD  07/25/17 1800 Resulting lab: [facility]    Specimen Information    Type Source Collected On   Blood  07/25/17 2329          Components       Value Reference Range Flag Lab   WBC 9.4 4.0 - 11.0 10e9/L  51   RBC Count 3.34 4.4 - 5.9 10e12/L L 51   Hemoglobin 10.3 13.3 - 17.7 g/dL L 51   Hematocrit 31.8 40.0 - 53.0 % L 51   MCV 95 78 - 100 fl  51   MCH 30.8 26.5 - 33.0 pg  51   MCHC 32.4 31.5 - 36.5 g/dL  51   RDW 14.7 10.0 - 15.0 %  51   Platelet Count 335 150 - 450 10e9/L  51            Glucose by meter [[id number]] (Abnormal)  Resulted: 07/25/17 2007, Result status: Final result    Ordering provider: Rafael Lo MD  07/25/17 2002 Resulting lab: POINT OF CARE TEST, GLUCOSE    Specimen Information    Type Source Collected On     07/25/17 2002          Components       Value Reference Range Flag Lab   Glucose 197 70 - 99 mg/dL H 170            UA with Microscopic reflex to Culture [[id number]] (Abnormal)  Resulted: 07/25/17 1825, Result status: Final result    Ordering provider: Mario Vieyra MD  07/23/17 0708 Resulting lab: [facility]    Specimen Information    Type Source Collected On   Urine Urine catheter 07/25/17 1620          Components       Value Reference Range Flag Lab   Color  Urine Yellow   51   Appearance Urine Clear   51   Glucose Urine Negative NEG mg/dL  51   Bilirubin Urine Negative NEG  51   Ketones Urine Negative NEG mg/dL  51   Specific Gravity Urine 1.026 1.003 - 1.035  51   Blood Urine Negative NEG  51   pH Urine 6.5 5.0 - 7.0 pH  51   Protein Albumin Urine 30 NEG mg/dL A 51   Urobilinogen mg/dL 2.0 0.0 - 2.0 mg/dL  51   Nitrite Urine Negative NEG  51   Leukocyte Esterase Urine Negative NEG  51   Source Catheterized Urine   51   WBC Urine 1 0 - 2 /HPF  51   RBC Urine 20 0 - 2 /HPF H 51   Transitional Epi <1 0 - 1 /HPF  51   Mucous Urine Present NEG /LPF A 51   Calcium Oxalate Few NEG /HPF A 51            Glucose by meter [[id number]] (Abnormal)  Resulted: 07/25/17 1630, Result status: Final result    Ordering provider: Rafael Lo MD  07/25/17 1600 Resulting lab: POINT OF CARE TEST, GLUCOSE    Specimen Information    Type Source Collected On     07/25/17 1600          Components       Value Reference Range Flag Lab   Glucose 183 70 - 99 mg/dL H 170            Glucose by meter [[id number]] (Abnormal)  Resulted: 07/25/17 1206, Result status: Final result    Ordering provider: Rafael Lo MD  07/25/17 1203 Resulting lab: POINT OF CARE TEST, GLUCOSE    Specimen Information    Type Source Collected On     07/25/17 1203          Components       Value Reference Range Flag Lab   Glucose 160 70 - 99 mg/dL H 170            Glucose by meter [[id number]] (Abnormal)  Resulted: 07/25/17 0806, Result status: Final result    Ordering provider: Rafael Lo MD  07/25/17 0755 Resulting lab: POINT OF CARE TEST, GLUCOSE    Specimen Information    Type Source Collected On     07/25/17 0755          Components       Value Reference Range Flag Lab   Glucose 191 70 - 99 mg/dL H 170            Glucose by meter [[id number]] (Abnormal)  Resulted: 07/25/17 0355, Result status: Final result    Ordering provider: Rafael Lo MD  07/25/17 0275 Resulting lab: POINT OF CARE  TEST, GLUCOSE    Specimen Information    Type Source Collected On     07/25/17 0347          Components       Value Reference Range Flag Lab   Glucose 191 70 - 99 mg/dL H 170   Comment:  Dr/RN Notified            Glucose by meter [[id number]] (Abnormal)  Resulted: 07/24/17 1146, Result status: Final result    Ordering provider: Rafael Lo MD  07/24/17 1137 Resulting lab: POINT OF CARE TEST, GLUCOSE    Specimen Information    Type Source Collected On     07/24/17 1137          Components       Value Reference Range Flag Lab   Glucose 206 70 - 99 mg/dL H 170            Glucose by meter [[id number]] (Abnormal)  Resulted: 07/24/17 0851, Result status: Final result    Ordering provider: Rafael Lo MD  07/24/17 0843 Resulting lab: POINT OF CARE TEST, GLUCOSE    Specimen Information    Type Source Collected On     07/24/17 0843          Components       Value Reference Range Flag Lab   Glucose 215 70 - 99 mg/dL H 170            Glucose by meter [[id number]] (Abnormal)  Resulted: 07/24/17 0406, Result status: Final result    Ordering provider: Rafael Lo MD  07/24/17 0349 Resulting lab: POINT OF CARE TEST, GLUCOSE    Specimen Information    Type Source Collected On     07/24/17 0349          Components       Value Reference Range Flag Lab   Glucose 138 70 - 99 mg/dL H 170            Glucose by meter [[id number]] (Abnormal)  Resulted: 07/24/17 0025, Result status: Final result    Ordering provider: Rafael Lo MD  07/24/17 0017 Resulting lab: POINT OF CARE TEST, GLUCOSE    Specimen Information    Type Source Collected On     07/24/17 0017          Components       Value Reference Range Flag Lab   Glucose 180 70 - 99 mg/dL H 170            Glucose by meter [[id number]] (Abnormal)  Resulted: 07/23/17 2031, Result status: Final result    Ordering provider: Rafael Lo MD  07/23/17 1948 Resulting lab: POINT OF CARE TEST, GLUCOSE    Specimen Information    Type Source Collected On      07/23/17 1948          Components       Value Reference Range Flag Lab   Glucose 164 70 - 99 mg/dL H 170            Glucose by meter [[id number]] (Abnormal)  Resulted: 07/23/17 1635, Result status: Final result    Ordering provider: Rafael Lo MD  07/23/17 1630 Resulting lab: POINT OF CARE TEST, GLUCOSE    Specimen Information    Type Source Collected On     07/23/17 1630          Components       Value Reference Range Flag Lab   Glucose 172 70 - 99 mg/dL H 170            Anaerobic bacterial culture [[id number]] (Abnormal)  Resulted: 07/23/17 1433, Result status: Edited Result - FINAL    Ordering provider: Rafael Lo MD  07/12/17 1630 Resulting lab: [facility]    Specimen Information    Type Source Collected On   Fluid Spine, Cervical 07/12/17 1628          Components       Value Reference Range Flag Lab   Specimen Description Spine EPIDURAL Abscess SPECIMEN 3   75   Special Requests --   75   Result:         Received in anaerobic tubes. This specimen was received on a swab. Results may   not be optimal. For maximum sensitivity of detection, submit tissue, fluid, or   needle aspirate.     Culture Micro --  A 225   Result:         Single colony Streptococcus mitis group Susceptibility testing not routinely done  not isolated or reported on routine culture These bacteria are part of normal   skin teresa, but on occasion, may be true pathogens.  Clinical correlation must   be applied to interpreting this microbiology result.  No anaerobes isolated  Susceptibility testing requested by Dr Maynor Griffin. 7.19.17 @ 1414. sb     Micro Report Status FINAL 07/23/2017   225   Result:     Organism: --   225   Result:         Single colony Streptococcus mitis group Susceptibility testing not routinely done  not isolated or reported on routine culture These bacteria are part of normal   skin teresa, but on occasion, may be true pathogens.  Clinical correlation must   be applied to  interpreting this microbiology result.              Sodium [[id number]]  Resulted: 07/23/17 1339, Result status: Final result    Ordering provider: Mario Vieyra MD  07/23/17 1210 Resulting lab: [facility]    Specimen Information    Type Source Collected On   Blood  07/23/17 1246          Components       Value Reference Range Flag Lab   Sodium 138 133 - 144 mmol/L  51            Blood gas arterial [[id number]] (Abnormal)  Resulted: 07/23/17 1317, Result status: Final result    Ordering provider: Mario Vieyra MD  07/23/17 1211 Resulting lab: [facility]    Specimen Information    Type Source Collected On   Blood  07/23/17 1313          Components       Value Reference Range Flag Lab   pH Arterial 7.50 7.35 - 7.45 pH H 51   pCO2 Arterial 37 35 - 45 mm Hg  51   pO2 Arterial 81 80 - 105 mm Hg  51   Bicarbonate Arterial 29 21 - 28 mmol/L H 51   Base Excess Art 5.6 mmol/L  51   Comment:  Abnormal Result, Ref range: -9.0 to 1.8   FIO2 21%   51            Glucose by meter [[id number]] (Abnormal)  Resulted: 07/23/17 1226, Result status: Final result    Ordering provider: Rafael Lo MD  07/23/17 1211 Resulting lab: POINT OF CARE TEST, GLUCOSE    Specimen Information    Type Source Collected On     07/23/17 1211          Components       Value Reference Range Flag Lab   Glucose 156 70 - 99 mg/dL H 170            Glucose by meter [[id number]] (Abnormal)  Resulted: 07/23/17 0911, Result status: Final result    Ordering provider: Rafael Lo MD  07/23/17 0908 Resulting lab: POINT OF CARE TEST, GLUCOSE    Specimen Information    Type Source Collected On     07/23/17 0908          Components       Value Reference Range Flag Lab   Glucose 238 70 - 99 mg/dL H 170            Lactic acid whole blood [[id number]]  Resulted: 07/23/17 0711, Result status: Edited Result - FINAL    Ordering provider: Jose Khanna MD  07/22/17 2343 Resulting lab: [facility]    Specimen Information    Type Source Collected On   Blood  07/23/17 0032          Components       Value Reference Range Flag Lab   Lactic Acid 1.5 0.7 - 2.1 mmol/L  51            Lactic acid whole blood [[id number]]  Resulted: 07/23/17 0642, Result status: Final result    Ordering provider: Rafael Lo MD  07/23/17 0606 Resulting lab: [facility]    Specimen Information    Type Source Collected On     07/23/17 0627          Components       Value Reference Range Flag Lab   Lactic Acid 1.6 0.7 - 2.1 mmol/L  51            Glucose by meter [[id number]] (Abnormal)  Resulted: 07/23/17 0550, Result status: Final result    Ordering provider: Rafael Lo MD  07/23/17 0540 Resulting lab: POINT OF CARE TEST, GLUCOSE    Specimen Information    Type Source Collected On     07/23/17 0540          Components       Value Reference Range Flag Lab   Glucose 244 70 - 99 mg/dL H 170            Glucose by meter [[id number]] (Abnormal)  Resulted: 07/23/17 0150, Result status: Final result    Ordering provider: Rafael Lo MD  07/23/17 0142 Resulting lab: POINT OF CARE TEST, GLUCOSE    Specimen Information    Type Source Collected On     07/23/17 0142          Components       Value Reference Range Flag Lab   Glucose 233 70 - 99 mg/dL H 170            Lactic acid level STAT [[id number]]  Resulted: 07/22/17 2236, Result status: In process    Ordering provider: Jose Khanna MD  07/22/17 2146 Resulting lab: [facility]    Specimen Information    Type Source Collected On   Blood  07/22/17 2235            Glucose by meter [[id number]] (Abnormal)  Resulted: 07/22/17 2146, Result status: Final result    Ordering provider: Rafael Lo MD  07/22/17 2141 Resulting lab: POINT OF CARE TEST, GLUCOSE    Specimen Information    Type Source Collected On     07/22/17 2141          Components       Value Reference Range Flag Lab   Glucose 192 70 - 99 mg/dL H 170  "           Testing Performed By     Lab - Abbreviation Name Director Address Valid Date Range    45 - [id number] [facility] Unknown Unknown 01/28/02 0000 - Present    51 - Unknown [facility] Unknown [address] [facility] [id number] 12/31/14 1010 - Present    75 - Unknown [facility] Unknown [address] [facility] [address] 01/15/15 1019 - Present    170 - Unknown POINT OF CARE TEST, GLUCOSE Unknown Unknown 10/31/11 1114 - Present    225 - Unknown [facility] Unknown [address] [facility] [id number] 12/19/14 0954 - Present            Unresulted Labs (24h ago through future)    Start       Ordered    07/26/17 0945  CRP inflammation  TIMED - ONCE,   Timed      07/26/17 0932    Unscheduled  Magnesium  (Magnesium Replacement - Adult - \"High\" - Replacement for all levels less than or equal to 2 mg/dL)  CONDITIONAL (SPECIFY),   Routine     Comments:  Obtain Magnesium Level for these conditions:  *IF no magnesium result within 24 hrs before initiation of order set, draw magnesium level with next lab collect.    *2 HOURS AFTER last magnesium replacement dose when magnesium replacement given for level less than 1.6  *Next morning after magnesium dose.     Repeat Magnesium Replacement if necessary.    07/12/17 0058    Unscheduled  Potassium  (Potassium Replacement - \"High\" - Replacement for all levels less than 4.1 mmol/L - UU,UR,UA,RH,SH,PH,WY )  CONDITIONAL (SPECIFY),   Routine     Comments:  Obtain Potassium Level for these conditions:  *IF no potassium result within 24 hrs before initiation of order set, draw potassium level with next lab collect.    *2 HOURS AFTER last IV potassium replacement dose and 4 hours after an oral replacement dose when potassium replacement given for level less than 3.4.  *Next morning after potassium dose.     Repeat Potassium Replacement if necessary.    07/12/17 0058    Unscheduled  Glucose - " "Diabetes  CONDITIONAL X 1 STAT,   STAT     Comments:  for changes in mental status, diaphoresis, or unexplained tachycardia    07/15/17 2004    Unscheduled  Magnesium  (Magnesium Replacement -  Adult - \"Standard\" - Replacement for all levels less than 1.6 mg/dL )  CONDITIONAL (SPECIFY),   Routine     Comments:  Obtain Magnesium Level for these conditions:  *IF no magnesium result within 24 hrs before initiation of order set, draw magnesium level with next lab collect.    *2 HOURS AFTER last magnesium replacement dose when magnesium replacement given for level less than 1.6   *Next morning after magnesium dose.     Repeat Magnesium Replacement if necessary.    07/21/17 0305    Unscheduled  Magnesium  (Magnesium Replacement - Adult - \"High\" - Replacement for all levels less than or equal to 2 mg/dL)  CONDITIONAL (SPECIFY),   Routine     Comments:  Obtain Magnesium Level for these conditions:  *IF no magnesium result within 24 hrs before initiation of order set, draw magnesium level with next lab collect.    *2 HOURS AFTER last magnesium replacement dose when magnesium replacement given for level less than 1.6  *Next morning after magnesium dose.     Repeat Magnesium Replacement if necessary.    07/21/17 0305    Unscheduled  Phosphorus  (POTASSIUM Phosphate - \"Standard\" - Replacement for levels less than or equal to 2.4 mg/dL )  CONDITIONAL (SPECIFY),   Routine     Comments:  Obtain Phosphorus Level for these conditions:  *IF no phosphorus result within 24 hrs before initiation of order set, draw phosphorus level with next lab collect.    *2 HOURS AFTER last phosphorus replacement dose for levels less than 2.0.  *Next morning after phosphorus dose.     Repeat Phosphorus Replacement if necessary.    07/21/17 0305         Imaging Results - 3 Days      IR PICC Vascular [[id number]]  Resulted: 07/24/17 1631, Result status: In process    Ordering provider: Bob Mracus MD  07/24/17 1428 Performed: 07/24/17 1631 - " 17 1631    Resulting lab: [facility]             Testing Performed By     Lab - Abbreviation Name Director Address Valid Date Range    178 - [facility] Unknown Unknown 12 1135 - Present               ECG/EMG Results      ECHO COMPLETE WITH OPTISON [[id number]]  Resulted: 17 1002, Result status: Edited Result - FINAL    Ordering provider: Marciano Edwards MD  17 0005 Resulted by: Nancy Juárez MD    Performed: 17 1014 - 17 1025 Resulting lab: RAD[facility]OLOG[facility] RESULTS    Narrative:       [id number]  [id number]  [id number]  [id number]^TACOS^MARCIANO^EDWIN           [facility]  Echocardiography Laboratory  [address] [place], [place] [address]     Name: IVAN TERRAZAS  MRN: [id number]  : 1945  Study Date: 2017 10:02 AM  Age: 72 yrs  Gender: Male  Patient Location: [facility]  Reason For Study: Edema  Ordering Physician: MARCIANO EDWARDS  Performed By: Lucio Tilley RDCS     BSA: 2.2 m2  Height: 73 in  Weight: 209 lb  HR: 99  BP: 145/56 mmHg  _____________________________________________________________________________  __        Procedure  Complete Portable Echo Adult. Contrast Optison. Echocardiogram with two-  dimensional, color and spectral Doppler performed. Optison (NDC #2943-5372-69)  given intravenously. Patient was given 4 ml mixture of 3 ml Optison and 6 ml  saline. 5 ml wasted.  _____________________________________________________________________________  __        Interpretation Summary  Global and regional left ventricular function is normal with an EF of 60-65%.  Global right ventricular function is normal.  No significant valvular abnormalities were noted.  The inferior vena cava was normal in size with preserved respiratory  variability.  No pericardial effusion is present.  _____________________________________________________________________________  __        Left Ventricle  Left ventricular size is normal. Left ventricular wall thickness is  normal.  Global and regional left ventricular function is normal with an EF of 60-65%.  Normal left ventricular filling for age. No regional wall motion abnormalities  are seen.     Right Ventricle  The right ventricle is normal size. Global right ventricular function is  normal.     Atria  Both atria appear normal.     Mitral Valve  The mitral valve is normal.        Aortic Valve  Mild aortic valve sclerosis is present.     Tricuspid Valve  The tricuspid valve is normal. Trace tricuspid insufficiency is present. The  peak velocity of the tricuspid regurgitant jet is not obtainable. Pulmonary  artery systolic pressure cannot be assessed.     Pulmonic Valve  The pulmonic valve is normal.     Vessels  The aorta root is normal. The inferior vena cava was normal in size with  preserved respiratory variability.     Pericardium  No pericardial effusion is present.        Compared to Previous Study  Previous study not available for comparison.  _____________________________________________________________________________  __  MMode/2D Measurements & Calculations     IVSd: 0.99 cm  LVIDd: 5.2 cm  LVIDs: 3.4 cm  LVPWd: 0.86 cm  FS: 34.7 %  EDV(Teich): 128.4 ml  ESV(Teich): 46.8 ml  LV mass(C)d: 174.0 grams  LV mass(C)dI: 79.4 grams/m2  asc Aorta Diam: 3.3 cm  LVOT diam: 2.8 cm  LVOT area: 6.2 cm2  LA Volume (BP): 65.6 ml  LA Volume Index (BP): 30.0 ml/m2           Doppler Measurements & Calculations  MV E max vivienne: 44.1 cm/sec  MV A max vivienne: 63.5 cm/sec  MV E/A: 0.69  MV dec time: 0.22 sec  Ao V2 max: 105.0 cm/sec  Ao max P.0 mmHg  ZULLY(V,D): 5.0 cm2  LV V1 max P.9 mmHg  LV V1 max: 85.4 cm/sec  Lateral E/e': 5.7  Medial E/e': 7.4           _____________________________________________________________________________  __           Report approved by: Tres Kern 2017 01:22 PM       1    Type Source Collected On     17 1002          View Image (below)        Echo limited (Adults Only) [[id number]]   Resulted: 07/08/17 1020, Result status: In process    Ordering provider: Tommy Edwards MD  07/08/17 0005 Performed: 07/08/17 1014 - 07/08/17 1014    Resulting lab: [facility]                   Encounter-Level Documents:     There are no encounter-level documents.      Order-Level Documents:     There are no order-level documents.

## 2017-07-07 NOTE — IP AVS SNAPSHOT
` `     [address] [facility]ANK: [phone]            Medication Administration Report for Ernesto Rawls as of 07/26/17 1022   Legend:    Given Hold Not Given Due Canceled Entry Other Actions    Time Time (Time) Time  Time-Action       Inactive    Active    Linked        Medications 07/20/17 07/21/17 07/22/17 07/23/17 07/24/17 07/25/17 07/26/17    acetaminophen (TYLENOL) Suppository 650 mg  Dose: 650 mg Freq: EVERY 4 HOURS PRN Route: RE  PRN Reason: fever  PRN Comment: fever  Start: 07/09/17 2022   Admin Instructions: Maximum acetaminophen dose from all sources = 75 mg/kg/day not to exceed 4 grams/day.               acetaminophen (TYLENOL) tablet 650 mg  Dose: 650 mg Freq: EVERY 4 HOURS PRN Route: PO  PRN Reasons: mild pain,fever  Start: 07/13/17 1716   Admin Instructions: Maximum acetaminophen dose from all sources = 75 mg/kg/day not to exceed 4 grams/day.       0445 (650 mg)-Given       1051 (650 mg)-Given        2251 (650 mg)-Given        1638 (650 mg)-Given             albuterol neb solution 2.5 mg  Dose: 2.5 mg Freq: EVERY 6 HOURS PRN Route: NEBULIZATION  PRN Reason: wheezing  Start: 07/19/17 1200              benzocaine-menthol (CHLORASEPTIC) 6-10 MG lozenge 1-2 lozenge  Dose: 1-2 lozenge Freq: EVERY 1 HOUR PRN Route: BU  PRN Reason: sore throat  Start: 07/07/17 1421   Admin Instructions: For sore throat without fever.               bisacodyl (DULCOLAX) Suppository 10 mg  Dose: 10 mg Freq: DAILY PRN Route: RE  PRN Reason: constipation  Start: 07/07/17 1404              carboxymethylcellulose (REFRESH PLUS) 0.5 % ophthalmic solution 2 drop  Dose: 2 drop Freq: 3 TIMES DAILY PRN Route: Both Eyes  PRN Reason: dry eyes  Start: 07/10/17 1646              ceFAZolin sodium-dextrose (ANCEF) infusion 2 g  Dose: 2 g Freq: PRE-OP/PRE-PROCEDURE Route: IV  Indications of Use: SURGICAL PROPHYLAXIS  Start: 07/26/17 0000   Admin Instructions: Give dose within 1 hour PRIOR to procedure.  If patient weight is greater than or  equal to 120 kg change dose to 3 g.               ceFAZolin sodium-dextrose (ANCEF) infusion 2 g  Dose: 2 g Freq: EVERY 8 HOURS Route: IV  Indications of Use: OSTEOMYELITIS,POSTOPERATIVE INFECTION  Start: 07/16/17 1800    0438 (2 g)-Given       1125 (2 g)-Given       2101 (2 g)-Given        0431 (2 g)-Given       1252 (2 g)-Given       2011 (2 g)-Given        0445 (2 g)-Given       1345 (2 g)-Given       2017 (2 g)-Given        0411 (2 g)-Given       1328 (2 g)-Given       2122 (2 g)-Given        0510 (2 g)-Given       1310 (2 g)-Given       2220 (2 g)-Given        0530 (2 g)-Given       1301 (2 g)-Given       2142 (2 g)-Given        0558 (2 g)-Given       [ ] 1330       [ ] 2130           cyclobenzaprine (FLEXERIL) tablet 10 mg  Dose: 10 mg Freq: 3 TIMES DAILY PRN Route: PO  PRN Reason: muscle spasms  Start: 07/07/17 1404      1052 (10 mg)-Given        1137 (10 mg)-Given         0109 (10 mg)-Given        0051 (10 mg)-Given           dextrose 10 % 1,000 mL infusion  Freq: CONTINUOUS PRN Route: IV  PRN Comment: Give if on IV Insulin Infusion, and Parenteral or Enteral nutrition held or cycled off.   Start: 07/15/17 2003   Admin Instructions: Infuse IV D10W at TPN/TF rate whenever nutrition is held or cycled off.               dextrose 10% infusion  Rate: 75 mL/hr Freq: CONTINUOUS PRN Route: IV  PRN Comment: Please start D10 fluids if TFs are interrupted while pt is on basal insulin.  Start: 07/22/17 1425          1019 ( )-New Bag           diphenhydrAMINE (BENADRYL) capsule 25-50 mg  Dose: 25-50 mg Freq: ONCE Route: PO  Start: 07/25/17 3435   Admin Instructions: in addition to steroids.   Administer 30 min - 2 hours pre - IV contrast injection.                        folic acid (FOLVITE) tablet 1 mg  Dose: 1 mg Freq: DAILY Route: PO  Start: 07/07/17 1415    0925 (1 mg)-Given        0855 (1 mg)-Given        0914 (1 mg)-Given        0907 (1 mg)-Given        0848 (1 mg)-Given        0809 (1 mg)-Given        0842 (1  mg)-Given           glucose 40 % gel 15-30 g  Dose: 15-30 g Freq: EVERY 15 MIN PRN Route: PO  PRN Reason: low blood sugar  Start: 07/15/17 2003   Admin Instructions: Give 15 g for BG 51 to 69 mg/dL IF patient is conscious and able to swallow. Give 30 g for BG less than or equal to 50 mg/dL IF patient is conscious and able to swallow. Do NOT give glucose gel via enteral tube.  IF patient has enteral tube: give apple juice 120 mL (4 oz or 15 g of CHO) via enteral tube for BG 51 to 69 mg/dL.  Give apple juice 240 mL (8 oz or 30 g of CHO) via enteral tube for BG less than or equal to 50 mg/dL.    ~Oral gel is preferable for conscious and able to swallow patient.   ~IF gel unavailable or patient refuses may provide apple juice 120 mL (4 oz or 15 g of CHO). Document juice on I and O flowsheet.              Or  dextrose 50 % injection 25-50 mL  Dose: 25-50 mL Freq: EVERY 15 MIN PRN Route: IV  PRN Reason: low blood sugar  Start: 07/15/17 2003   Admin Instructions: Use if have IV access, BG less than 70 mg/dL and meet dose criteria below:  Dose if conscious and alert (or disorientated) and NPO = 25 mL  Dose if unconscious / not alert = 50 mL  Vesicant.              Or  glucagon injection 1 mg  Dose: 1 mg Freq: EVERY 15 MIN PRN Route: SC  PRN Reason: low blood sugar  PRN Comment: May repeat x 1 only  Start: 07/15/17 2003   Admin Instructions: May give SQ or IM. ONLY use glucagon IF patient has NO IV access AND is UNABLE to swallow AND blood glucose is LESS than or EQUAL to 50 mg/dL.               glycopyrrolate (ROBINUL) injection 0.2 mg  Dose: 0.2 mg Freq: 2 TIMES DAILY Route: IV  Start: 07/21/17 0800     0855 (0.2 mg)-Given       2022 (0.2 mg)-Given        0914 (0.2 mg)-Given       2018 (0.2 mg)-Given        0907 (0.2 mg)-Given       2046 (0.2 mg)-Given        1303 (0.2 mg)-Given       2042 (0.2 mg)-Given        0813 (0.2 mg)-Given       2148 (0.2 mg)-Given        0842 (0.2 mg)-Given       [ ] 2000           heparin lock  flush 10 UNIT/ML injection 2-5 mL  Dose: 2-5 mL Freq: ONCE PRN Route: IK  PRN Reason: line flush  PRN Comment: for locking each dormant lumen with line placement  Start: 07/24/17 0932   Admin Instructions: May repeat x 1               heparin lock flush 10 UNIT/ML injection 5-10 mL  Dose: 5-10 mL Freq: EVERY 1 HOUR PRN Route: IK  PRN Reason: other  PRN Comment: to lock each CVC - Open Ended (Tunneled and Non-Tunneled) dormant lumen.  Start: 07/20/17 1017   Admin Instructions: MAX: 5 mL per lumen.      2104 (5 mL)-Given        0537 (5 mL)-Given        0626 (5 mL)-Given              heparin lock flush 10 UNIT/ML injection 5-10 mL  Dose: 5-10 mL Freq: EVERY 24 HOURS Route: IK  Start: 07/20/17 1030   Admin Instructions: To lock each CVC - Open Ended (Tunneled and Non-Tunneled) dormant lumen. Check PRN heparin flush order to see when last dose of PRN heparin was given before administering.  MAX: 5 mL per lumen..     1244 (5 mL)-Given [C]        1057 (5 mL)-Given        1442 (10 mL)-Given        0926 (5 mL)-Given       (1057)-Not Given [C]        (1226)-Not Given [C]                (0936)-Not Given [C]           hydrALAZINE (APRESOLINE) injection 10-20 mg  Dose: 10-20 mg Freq: EVERY 30 MIN PRN Route: IV  PRN Reason: high blood pressure  Start: 07/14/17 1212   Admin Instructions: IF Heart Rate less than 60 initiate hydrALAZINE (APRESOLINE) for hypertension. For Systolic Blood Pressure greater than 160 mmHg. Give 10 mg, wait 30 minutes. If not effective then repeat 10 mg. Wait 30 minutes. If not effective then give 20 mg. If still not effective then start nicardipine (CARDENE) IV infusion IF ORDERED. Notify provider within 1 hour if Blood Pressure parameters are not met.               insulin aspart (NovoLOG) inj (RAPID ACTING)  Dose: 1-22 Units Freq: EVERY 4 HOURS Route: SC  Start: 07/23/17 1000   Admin Instructions: Correction Scale - VERY HIGH INSULIN RESISTANCE DOSING     Do Not give Correction Insulin if BG less than  140  For  - 149 give 1 unit.  For  - 159 give 2 units.  For  - 169 give 3 units.  For  - 179 give 4 units.  For  - 189 give 5 units.  For  - 199 give 6 units.  For  - 209 give 7 units.  For  - 219 give 8 units.  For  - 229 give 9 units.  For  - 239 give 10 units.  For  - 249 give 11 units.  For  - 259 give 12 units.  For  - 269 give 13 units.  For  - 279 give 14 units.  For  - 289 give 15 units.  For  - 299 give 16 units.  For  - 309 give 17 units.  For  - 319 give 18 units.  For  - 329 give 19 units.  For  - 339 give 20 units.  For  - 349 give 21 units.  For BG greater than or equal to 350 give 22 units  Check blood glucose Q4H and administer based on blood glucose.  Notify provider if glucose greater than or equal to 350 mg/dL after administration.  If given at mealtime, must be administered 5 min before meal or immediately after.        0910 (10 Units)-Given       1220 (2 Units)-Given       1639 (4 Units)-Given       2045 (3 Units)-Given        0018 (5 Units)-Given       (0349)-Not Given       0851 (8 Units)-Given [C]       1214 (7 Units)-Given [C]       1712 (10 Units)-Given       2042 (3 Units)-Given        0006 (5 Units)-Given       0358 (6 Units)-Given       0814 (6 Units)-Given       1255 (3 Units)-Given       1600 (5 Units)-Given       2145 (6 Units)-Given        0047 (1 Units)-Given       0359 (5 Units)-Given       0844 (7 Units)-Given [C]       [ ] 1200       [ ] 1600       [ ] 2000           insulin glargine (LANTUS) injection 17 Units  Dose: 17 Units Freq: EVERY EVENING Route: SC  Start: 07/24/17 2000        2042 (17 Units)-Given        2146 (17 Units)-Given        [ ] 2000           insulin glargine (LANTUS) injection 65 Units  Dose: 65 Units Freq: EVERY MORNING Route: SC  Start: 07/22/17 0804      0914 (65 Units)-Given        1045 (65 Units)-Given        0851 (65  Units)-Given        0815 (65 Units)-Given        1013 (65 Units)-Given [C]           iohexol (OMNIPAQUE) solution 50 mL  Dose: 50 mL Freq: ONCE Route: PO  Start: 07/25/17 1745   Admin Instructions: Mix 50 mL of oral iohexol (OMNIPAQUE) with 600 mL of water.  Give evening before the procedure.  IF the oral contrast is not available, please obtain from CT at [phone].                      labetalol (NORMODYNE/TRANDATE) injection 10-40 mg  Dose: 10-40 mg Freq: EVERY 10 MIN PRN Route: IV  PRN Reason: high blood pressure  Start: 07/12/17 2253   Admin Instructions: IF Heart Rate 60 beats per minute or greater initiate labetalol (NORMODYNE,TRANDATE) for hypertension. For Systolic Blood Preater greater than 160 mmHg. Hold if Heart Rate less than 60 beats per minute. Give dose over 1-2 minutes. Increase or repeat the dose if Blood Pressure goal not met. Give 10 mg, wait 10 minutes.  If not effective then give 20 mg. Wait 10 minutes.  If not effective then give 40 mg. If still not effective then start nicardipine (CARDENE) IV infusion IF ORDERED. Notify provider within 1 hour if Blood Pressure parameters are not met.               lidocaine (LIDODERM) 5 % Patch 3 patch  Dose: 3 patch Freq: EVERY 24 HOURS 0800 Route: TD  Start: 07/07/17 1430   Admin Instructions: Apply patch(s) to effected area. To prevent lidocaine toxicity, patient should be patch free for 12 hrs daily. Patches may be cut to smaller size prior to removing release liner.  NEVER APPLY HEAT OVER PATCH which will increase absorption and may lead to risk of local anesthetic toxicity. Do not apply over area where liposomal bupivacaine was injected for 96 hours post injection.     0926 (3 patch)-Given [C]        (0856)-Not Given [C]        (0938)-Not Given        1138 (3 patch)-Given        0850 (3 patch)-Given [C]        (0831)-Not Given [C]       1603 (3 patch)-Given        (0845)-Not Given [C]           lidocaine (LIDODERM) patch in PLACE  Freq: EVERY 8  "HOURS Route: TD  Start: 07/07/17 1430   Admin Instructions: Chart every shift, confirming that patch is still in place on patient (no barcode scan needed). See patch order for dose information.  NEVER APPLY HEAT OVER PATCH which will increase absorption and may lead to risk of local anesthetic toxicity. Do not apply over area where liposomal bupivacaine injected for 96 hours.            1443 ( )-Patch in Place                      1352 ( )-Negative                                            1324 ( )-Patch in Place       (2104)-Not Given [C]        0514 ( )-Patch in Place       1304 ( )-Patch in Place       (2226)-Not Given [C]                      2332 ( )-Patch Removed               [ ] 1400       [ ] 2200           lidocaine (LIDODERM) patch REMOVAL  Freq: EVERY 24 HOURS 2000 Route: TD  Start: 07/07/17 2000   Admin Instructions: Patient should have a 12 hour patch free interval     2107 ( )-Patch Removed                        2057 ( )-Patch Removed        2226 ( )-Patch Removed        2120 ( )-Patch Removed        [ ] 2000           lidocaine (LMX4) kit  Freq: EVERY 1 HOUR PRN Route: Top  PRN Reason: pain  PRN Comment: with VAD insertion or accessing implanted port.  Start: 07/25/17 1744   Admin Instructions: Do NOT give if patient has a history of allergy to any local anesthetic or any \"nohemi\" product.   Apply 30 minutes prior to VAD insertion or port access. MAX Dose: 2.5 g (  of 5 g tube)               lidocaine (LMX4) kit  Freq: ONCE PRN Route: Top  PRN Reason: moderate pain  PRN Comment: for local anesthetic during PICC insertion  Start: 07/24/17 0932   Admin Instructions: Apply to PICC Insertion Site. Apply 30 minutes prior to procedure. MAX Dose: 2.5 gm  (1/2 of 5 gm tube)                 lidocaine (LMX4) kit  Freq: EVERY 1 HOUR PRN Route: Top  PRN Reason: moderate pain  PRN Comment: with VAD insertion or accessing implanted port  Start: 07/18/17 1432   Admin Instructions: Do NOT give if patient has a " "history of allergy to any local anesthetic or any \"nohemi\" product.   Apply 30 minutes prior to VAD insertion or port access.  MAX Dose:  2.5 g (  of 5 g tube)               lidocaine (LMX4) kit  Freq: ONCE PRN Route: Top  PRN Reason: moderate pain  PRN Comment: for local anesthetic during PICC insertion  Start: 07/17/17 2036   Admin Instructions: Apply to PICC Insertion Site. Apply 30 minutes prior to procedure. MAX Dose: 2.5 gm  (1/2 of 5 gm tube)                 lidocaine (LMX4) kit  Freq: EVERY 1 HOUR PRN Route: Top  PRN Reason: pain  PRN Comment: with VAD insertion or accessing implanted port.  Start: 07/12/17 2253   Admin Instructions: Do NOT give if patient has a history of allergy to any local anesthetic or any \"nohemi\" product.   Apply 30 minutes prior to VAD insertion or port access.  MAX Dose:  2.5 g (  of 5 g tube)               lidocaine (viscous) (XYLOCAINE) 2 % solution 5 mL  Dose: 5 mL Freq: ONCE Route: Top  Start: 07/18/17 0630   Admin Instructions: For feeding tube placement  To be applied into the nasal area by specially trained dietitian or RN to ease insertion of feeding tube.               lidocaine (viscous) (XYLOCAINE) 2 % solution 5 mL  Dose: 5 mL Freq: ONCE Route: Top  Start: 07/10/17 0815   Admin Instructions: For feeding tube placement  To be applied into the nasal area by specially trained dietitian or RN to ease insertion of feeding tube.               lidocaine 1 % 0.5-5 mL  Dose: 0.5-5 mL Freq: ONCE PRN Route: OTHER  PRN Comment: mild pain For local anesthetic during PICC insertion.  Start: 07/17/17 2036   Admin Instructions: Give Sub-Q/Intradermal.  Give in divided doses as needed.               lidocaine 1 % 1 mL  Dose: 1 mL Freq: EVERY 1 HOUR PRN Route: OTHER  PRN Comment: mild pain with VAD insertion or accessing implanted port  Start: 07/25/17 6934   Admin Instructions: Do NOT give if patient has a history of allergy to any local anesthetic or any \"nohemi\" product. MAX dose 1 mL " "subcutaneous OR intradermal in divided doses.               lidocaine 1 % 1 mL  Dose: 1 mL Freq: EVERY 1 HOUR PRN Route: OTHER  PRN Comment: mild pain with VAD insertion or accessing implanted port  Start: 07/18/17 1432   Admin Instructions: Do NOT give if patient has a history of allergy to any local anesthetic or any \"nohemi\" product. MAX dose 1 mL subcutaneous OR intradermal in divided doses.               lidocaine 1 % 1 mL  Dose: 1 mL Freq: EVERY 1 HOUR PRN Route: OTHER  PRN Comment: mild pain with VAD insertion or accessing implanted port  Start: 07/12/17 2253   Admin Instructions: Do NOT give if patient has a history of allergy to any local anesthetic or any \"nohemi\" product. MAX dose 1 mL subcutaneous OR intradermal in divided doses.               magnesium sulfate 2 g in NS intermittent infusion (PharMEDium or FV Cmpd)  Dose: 2 g Freq: ONCE Route: IV  Start: 07/09/17 2315              magnesium sulfate 2 g in NS intermittent infusion ([facility]ar[employer] or FV Cmpd)  Dose: 2 g Freq: DAILY PRN Route: IV  PRN Reason: magnesium supplementation  Last Dose: 2 g (07/15/17 1124)  Start: 07/09/17 0806   Admin Instructions: For Serum Mg++ 1.6 - 2 mg/dL  Give 2 g and recheck magnesium level next AM.     1605 (2 g)-New Bag                 magnesium sulfate 4 g in 100 mL sterile water (premade)  Dose: 4 g Freq: EVERY 4 HOURS PRN Route: IV  PRN Reason: magnesium supplementation  Start: 07/09/17 0806   Admin Instructions: For serum Mg++ less than 1.6 mg/dL  Give 4 g and recheck magnesium level 2 hours after dose, and next AM.               May take regular AM medications except those listed below  Freq: CONTINUOUS PRN Route: XX  Start: 07/26/17 0000   Admin Instructions: May take regular AM medications except those listed below               menthol (ICY HOT) 5 % patch 1 patch  Dose: 1 patch Freq: EVERY 8 HOURS PRN Route: Top  PRN Reason: muscle soreness  Start: 07/07/17 1421   Admin Instructions: Apply to Skin.  Remove 'old patch' " and chart on Medication Patch Removal order when new patch is applied. Avoid placing heating pad over the patch.              And  menthol (ICY HOT) Patch in Place  Freq: EVERY 8 HOURS Route: TD  Start: 07/07/17 1421   Admin Instructions: Chart every shift, confirming that patch is still in place on patient (no barcode scan needed). See patch order for dose information.                                  1352 ( )-Negative [C]                                            (1324)-Not Given [C]       (2105)-Not Given [C]        (0514)-Not Given [C]       (1304)-Not Given [C]       (2226)-Not Given        (0631)-Not Given              (2200)-Not Given               [ ] 1400       [ ] 2200          And  menthol (ICY HOT) patch REMOVAL  Freq: EVERY 8 HOURS PRN Route: TD  PRN Comment: for patch removal  Start: 07/08/17 0000   Admin Instructions: Remove patch when new patch is applied or patch is discontinued.               methylPREDNISolone (MEDROL) tablet 32 mg  Dose: 32 mg Freq: ONCE Route: PO  Start: 07/26/17 0200   Admin Instructions: 12 hours prior to the procedure with IV contrast                     Followed by  methylPREDNISolone (MEDROL) tablet 32 mg  Dose: 32 mg Freq: ONCE Route: PO  Start: 07/26/17 1200   Admin Instructions: 2 hours prior to the procedure with IV contrast           [ ] 1200           metoprolol (LOPRESSOR) tablet 25 mg  Dose: 25 mg Freq: 2 TIMES DAILY Route: PO  Start: 07/14/17 0125    0939 (25 mg)-Given       2059 (25 mg)-Given        0854 (25 mg)-Given       2021 (25 mg)-Given        0914 (25 mg)-Given       2018 (25 mg)-Given        0907 (25 mg)-Given       2045 (25 mg)-Given        0848 (25 mg)-Given       2043 (25 mg)-Given        0809 (25 mg)-Given       2147 (25 mg)-Given        0842 (25 mg)-Given       [ ] 2000           multivitamins with minerals (CERTAVITE/CEROVITE) liquid 15 mL  Dose: 15 mL Freq: DAILY Route: PER FEEDING   Start: 07/09/17 1400    0925 (15 mL)-Given        0855 (15  mL)-Given        0914 (15 mL)-Given        0907 (15 mL)-Given        0849 (15 mL)-Given        0815 (15 mL)-Given        0842 (15 mL)-Given           NaCl 0.9 % infusion  Start: 07/26/17 0600   End: 07/26/17 1814   Admin Instructions: Uzma Durán : cabinet override                  1814-Med Discontinued       naloxone (NARCAN) injection 0.1-0.4 mg  Dose: 0.1-0.4 mg Freq: EVERY 2 MIN PRN Route: IV  PRN Reason: opioid reversal  Start: 07/12/17 2253   Admin Instructions: For respiratory rate LESS than or EQUAL to 8.  Partial reversal dose:  0.1 mg titrated q 2 minutes for Analgesia Side Effects Monitoring Sedation Level of 3 (frequently drowsy, arousable, drifts to sleep during conversation).Full reversal dose:  0.4 mg bolus for Analgesia Side Effects Monitoring Sedation Level of 4 (somnolent, minimal or no response to stimulation).               omeprazole (priLOSEC) suspension 20 mg  Dose: 20 mg Freq: DAILY Route: ORAL OR FEED  Start: 07/09/17 1300   Admin Instructions: Shake well.     0925 (20 mg)-Given        0858 (20 mg)-Given        0914 (20 mg)-Given        0906 (20 mg)-Given        0858 (20 mg)-Given        0815 (20 mg)-Given        0842 (20 mg)-Given           ondansetron (ZOFRAN-ODT) ODT tab 4 mg  Dose: 4 mg Freq: EVERY 6 HOURS PRN Route: PO  PRN Reasons: nausea,vomiting  Start: 07/07/17 1421   Admin Instructions: This is Step 1 of nausea and vomiting management.  If nausea not resolved in 15 minutes, go to Step 2 prochlorperazine (COMPAZINE). Do not push through foil backing. Peel back foil and gently remove. Place on tongue immediately. Administration with liquid unnecessary              Or  ondansetron (ZOFRAN) injection 4 mg  Dose: 4 mg Freq: EVERY 6 HOURS PRN Route: IV  PRN Reasons: nausea,vomiting  Start: 07/07/17 1421   Admin Instructions: This is Step 1 of nausea and vomiting management.  If nausea not resolved in 15 minutes, go to Step 2 prochlorperazine (COMPAZINE).  Irritant.                oxyCODONE (ROXICODONE) IR tablet 5-10 mg  Dose: 5-10 mg Freq: EVERY 3 HOURS PRN Route: PO  PRN Reason: moderate to severe pain  Start: 07/07/17 1421   Admin Instructions: IF CrCl is UNKNOWN start at lowest end of dosing range. Hold while on PCA or with regular IV opioid dosing.      0855 (10 mg)-Given            0051 (5 mg)-Given           polyethylene glycol (MIRALAX/GLYCOLAX) Packet 17 g  Dose: 17 g Freq: 2 TIMES DAILY Route: PO  Start: 07/07/17 2000   Admin Instructions: Give in 8oz of  water, juice, or soda. Hold for loose stools.  This is the second step of a three step constipation treatment protocol.  1 Packet = 17 grams. Mixed prescribed dose in 8 ounces of water. Follow with 8 oz. of water.     (0927)-Not Given       (2106)-Not Given        (1033)-Not Given       (2026)-Not Given        (0919)-Not Given       (2017)-Not Given        (0800)-Not Given       (1951)-Not Given        0848 (17 g)-Given       (2043)-Not Given        (1036)-Not Given       2148 (17 g)-Given        (0734)-Not Given       [ ] 2000           potassium chloride (KLOR-CON) Packet 20-40 mEq  Dose: 20-40 mEq Freq: EVERY 2 HOURS PRN Route: ORAL OR FEED  PRN Reason: potassium supplementation  Start: 07/09/17 0806   Admin Instructions: Use if unable to tolerate tablets.    If Serum K+ 3.4-4.0, dose = 20 mEq x1. Recheck K+ level the next AM.  If Serum K+ 3.0-3.3, dose = 60 mEq po total dose (40 mEq x 1 followed in 2 hours by 20 mEq X1). Recheck K+ level 4 hours after dose and the next AM.  If Serum K+ 2.5-2.9, dose = 80 mEq po total dose (40 mEq Q2H x2). Recheck K+ level 4 hours after dose and the next AM.  If Serum K+ less than 2.5, See IV order.  Dissolve packet contents in 4-8 ounces of cold water or juice.     1125 (20 mEq)-Given                 potassium chloride 10 mEq in 100 mL intermittent infusion  Dose: 10 mEq Freq: EVERY 1 HOUR PRN Route: IV  PRN Reason: potassium supplementation  Start: 07/09/17 0806   Admin Instructions: Infuse  via PERIPHERAL LINE or CENTRAL LINE. Use for central line replacement if patient weight less than 65 kg, if patient is on TPN with high potassium content or if unit does not stock 20 mEq bags.  If Serum K+ 3.4-4.0, dose = 10 mEq/hr x2 doses. Recheck K+ level the next AM.  If Serum K+ 3.0-3.3, dose = 10 mEq/hr x4 doses (40 mEq IV total dose). Recheck K+ level 2 hours after dose and the next AM.  If Serum K+ less than 3.0, dose = 10 mEq/hr x6 doses (60 mEq IV total dose). Recheck K+ level 2 hours after dose and the next AM.               potassium chloride 10 mEq in 100 mL intermittent infusion with 10 mg lidocaine  Dose: 10 mEq Freq: EVERY 1 HOUR PRN Route: IV  PRN Reason: potassium supplementation  Start: 07/09/17 0806   Admin Instructions: Infuse via PERIPHERAL LINE. Use potassium with lidocaine for pain with peripheral administration.  If Serum K+ 3.4-4.0, dose = 10 mEq/hr x2 doses. Recheck K+ level the next AM.  If Serum K+ 3.0-3.3, dose = 10 mEq/hr x4 doses (40 mEq IV total dose). Recheck K+ level 2 hours after dose and the next AM.  If Serum K+ less than 3.0, dose = 10 mEq/hr x6 doses (60 mEq IV total dose). Recheck K+ level 2 hours after dose and the next AM.               potassium chloride 20 mEq in 50 mL intermittent infusion  Dose: 20 mEq Freq: EVERY 1 HOUR PRN Route: IV  PRN Reason: potassium supplementation  Start: 07/09/17 0806   Admin Instructions: Infuse via CENTRAL LINE Only.  May need EKG if less than 65 kg or on TPN - Max rate is 0.3 mEq/kg/hr for patients not on EKG monitoring.    If Serum K+ 3.4-4.0, dose = 20 mEq/hr x1 doses. Recheck K+ level the next AM.  If Serum K+ 3.0-3.3, dose = 20 mEq/hr x2 doses (40 mEq IV total dose).  Recheck K+ level 2 hours after dose and the next AM.  If Serum K+ less than 3.0, dose = 20 mEq/hr x3 doses (60 mEq IV total dose). Recheck K+ level 2 hours after dose and the next AM.               potassium chloride SA (K-DUR/KLOR-CON M) CR tablet 20-40 mEq  Dose: 20-40  mEq Freq: EVERY 2 HOURS PRN Route: PO  PRN Reason: potassium supplementation  Start: 07/09/17 0806   Admin Instructions: Use if able to take PO.   If Serum K+ 3.4-4.0, dose = 20 mEq x1. Recheck K+ level the next AM.  If Serum K+ 3.0-3.3, dose = 60 mEq po total dose (40 mEq x1 followed in 2 hours by 20 mEq x1). Recheck K+ level 4 hours after dose and the next AM.  If Serum K+ 2.5-2.9, dose = 80 mEq po total dose (40 mEq Q2H x2). Recheck K+ level 4 hours after dose and the next AM.  If Serum K+ less than 2.5, See IV order.  DO NOT CRUSH.               potassium phosphate 10 mmol in NaCl 0.9 % 250 mL intermittent infusion  Dose: 10 mmol Freq: DAILY PRN Route: IV  PRN Reason: phosphorous supplementation  Start: 07/20/17 1808   Admin Instructions: For serum phosphorus level 2.5-2.7  Do not infuse Phosphorus in the same line as TPN.   Give 10 mmol and recheck phosphorus level the next AM.               potassium phosphate 15 mmol in NaCl 0.9 % 250 mL intermittent infusion  Dose: 15 mmol Freq: DAILY PRN Route: IV  PRN Reason: phosphorous supplementation  Start: 07/20/17 1808   Admin Instructions: For serum phosphorus level 2.0-2.4  Do not infuse Phosphorus in the same line as TPN.   Give 15 mmol and recheck phosphorus level next AM.               potassium phosphate 20 mmol in NaCl 0.9 % 250 mL intermittent infusion  Dose: 20 mmol Freq: EVERY 6 HOURS PRN Route: IV  PRN Reason: phosphorous supplementation  Start: 07/20/17 1808   Admin Instructions: For serum phosphorus level 1.1-1.9  For CENTRAL Line ONLY  Do not infuse Phosphorus in the same line as TPN.   Give 20 mmol and recheck phosphorus level 2 hours after dose and next AM.               potassium phosphate 20 mmol in NaCl 0.9 % 500 mL intermittent infusion  Dose: 20 mmol Freq: EVERY 6 HOURS PRN Route: IV  PRN Reason: phosphorous supplementation  Start: 07/20/17 1808   Admin Instructions: For serum phosphorus level 1.1-1.9  For peripheral line  Do not infuse  Phosphorus in the same line as TPN.   Give 20 mmol and recheck phosphorus level 2 hours after dose and next AM. Repeat if necessary.               potassium phosphate 20 mmol in NaCl 0.9 % intermittent infusion  Dose: 20 mmol Freq: ONCE Route: IV  Start: 07/20/17 1815    (2134)-Not Given                 potassium phosphate 25 mmol in NaCl 0.9 % 500 mL intermittent infusion  Dose: 25 mmol Freq: EVERY 8 HOURS PRN Route: IV  PRN Reason: phosphorous supplementation  Start: 07/20/17 1808   Admin Instructions: For serum phosphorus level less than 1.1  Do not infuse Phosphorus in the same line as TPN.   Give 25 mmol and recheck phosphorus level 2 hours after dose and next AM.               prochlorperazine (COMPAZINE) injection 5 mg  Dose: 5 mg Freq: EVERY 6 HOURS PRN Route: IV  PRN Reasons: nausea,vomiting  Start: 07/07/17 1421   Admin Instructions: This is Step 2 of nausea and vomiting management.   If nausea not resolved in 15 minutes, give metoclopramide (REGLAN) if ordered (step 3 of nausea and vomiting management)              Or  prochlorperazine (COMPAZINE) tablet 5 mg  Dose: 5 mg Freq: EVERY 6 HOURS PRN Route: PO  PRN Reasons: nausea,vomiting  Start: 07/07/17 1421   Admin Instructions: This is Step 2 of nausea and vomiting management.   If nausea not resolved in 15 minutes, give metoclopramide (REGLAN) if ordered (step 3 of nausea and vomiting management)               protein modular (PROSTAT SUGAR FREE) packet 1 packet  Dose: 1 packet Freq: 3 TIMES DAILY Route: PER FEEDING   Start: 07/13/17 0845   Admin Instructions: Mix each packet with 60 mL water before administering. Do NOT mix with other medications. SUPPLIED BY NUTRITION DEPARTMENT.     0928 (1 packet)-Given       (1443)-Not Given       1448 (1 packet)-Given       (2106)-Not Given        0858 (1 packet)-Given       1411 (1 packet)-Given       (2028)-Not Given        0920 (1 packet)-Given       1443 (1 packet)-Given       2020 (1 packet)-Given        0910  (1 packet)-Given       1332 (1 packet)-Given       2057 (1 packet)-Given        (1217)-Not Given [C]       (1524)-Not Given       (2043)-Not Given        0813 (1 packet)-Given       1602 (1 packet)-Given       2154 (1 packet)-Given        0849 (1 packet)-Given       [ ] 1400       [ ] 2000           senna-docusate (SENOKOT-S;PERICOLACE) 8.6-50 MG per tablet 1-2 tablet  Dose: 1-2 tablet Freq: 2 TIMES DAILY Route: PO  Start: 07/07/17 2000   Admin Instructions: Start with 1 tablet PO BID, If no bowel movement in 24 hours, increase to 2 tablets PO BID.  Hold for loose stools.     (0928)-Not Given       2107 (1 tablet)-Given        (1034)-Not Given       2026 (2 tablet)-Given        (0914)-Not Given       (2018)-Not Given        (0800)-Not Given       (1951)-Not Given        0848 (2 tablet)-Given       (2043)-Not Given        0809 (2 tablet)-Given       (2120)-Not Given        (0734)-Not Given       [ ] 2000           sodium chloride (PF) 0.9% PF flush 10 mL  Dose: 10 mL Freq: EVERY 7 DAYS Route: IK  Start: 07/18/17 1445   Admin Instructions: And Q1H PRN, to lock each CVC - Valved (Tunneled and Non-Tunneled) dormant lumen.          1619 (10 mL)-Given            sodium chloride (PF) 0.9% PF flush 10-20 mL  Dose: 10-20 mL Freq: EVERY 1 HOUR PRN Route: IK  PRN Reasons: line flush,post meds or blood draw  Start: 07/20/17 1017   Admin Instructions: to flush CVC - Open Ended (Tunneled and Non-Tunneled).   10 mL post IV meds; 20 mL post blood draw.      0811 (20 mL)-Given        1350 (10 mL)-Given       1442 (20 mL)-Given          1816 (20 mL)-Given            sodium chloride (PF) 0.9% PF flush 10-20 mL  Dose: 10-20 mL Freq: EVERY 1 HOUR PRN Route: IK  PRN Reasons: line flush,post meds or blood draw  Start: 07/18/17 1432   Admin Instructions: to flush CVC - Valved (Tunneled and Non-Tunneled).   10 mL post IV meds; 20 mL post blood draw.     0640 (20 mL)-Given       0933 (20 mL)-Given          0623 (20 mL)-Given       1241 (20  mL)-Given              sodium chloride (PF) 0.9% PF flush 3 mL  Dose: 3 mL Freq: EVERY 8 HOURS Route: IK  Start: 07/25/17 1745   Admin Instructions: And Q1H PRN, to lock peripheral IV dormant line.                         0847 (3 mL)-Given       [ ] 1745           sodium chloride (PF) 0.9% PF flush 3 mL  Dose: 3 mL Freq: EVERY 1 HOUR PRN Route: IK  PRN Reason: line flush  Start: 07/25/17 1744   Admin Instructions: for peripheral IV flush post IV meds               sodium chloride (PF) 0.9% PF flush 3 mL  Dose: 3 mL Freq: EVERY 8 HOURS Route: IK  Start: 07/12/17 2300   Admin Instructions: And Q1H PRN, to lock peripheral IV dormant line.     (0944)-Not Given       (1444)-Not Given       2337 (3 mL)-Given        (0821)-Not Given       1411 (3 mL)-Given       2331 (3 mL)-Given                              (0910)-Not Given       (1630)-Not Given       (2236)-Not Given        0518 (3 mL)-Given              1638 (3 mL)-Given       (2227)-Not Given        0815 (3 mL)-Given                      0847 (3 mL)-Given       [ ] 1600           sodium chloride (PF) 0.9% PF flush 3 mL  Dose: 3 mL Freq: EVERY 1 HOUR PRN Route: IK  PRN Reason: line flush  PRN Comment: for peripheral IV flush post IV meds  Start: 07/12/17 2253              sodium chloride (PF) 0.9% PF flush 5-50 mL  Dose: 5-50 mL Freq: ONCE PRN Route: IK  PRN Reason: line flush  PRN Comment: to flush each lumen with line placement  Start: 07/24/17 0932   Admin Instructions: May repeat x 1              Completed Medications  Medications 07/20/17 07/21/17 07/22/17 07/23/17 07/24/17 07/25/17 07/26/17         Dose: 0.5-5 mL Freq: ONCE PRN Route: OTHER  PRN Comment: mild pain For local anesthetic during PICC insertion.  Start: 07/24/17 0932   End: 07/24/17 1635   Admin Instructions: Give Sub-Q/Intradermal.  Give in divided doses as needed.         1635 (3.5 mL)-Given               Dose: 5-50 mL Freq: ONCE PRN Route: IK  PRN Reason: line flush  PRN Comment: to flush each  lumen with line placement  Start: 07/17/17 2036   End: 07/24/17 1635   Admin Instructions: May repeat x 1         1635 (20 mL)-Given            Discontinued Medications  Medications 07/20/17 07/21/17 07/22/17 07/23/17 07/24/17 07/25/17 07/26/17         Dose: 30 mg Freq: EVERY 12 HOURS Route: SC  Start: 07/20/17 0915   End: 07/24/17 1334    1126 (30 mg)-Given       2102 (30 mg)-Given        0855 (30 mg)-Given       2219 (30 mg)-Given        0914 (30 mg)-Given       2017 (30 mg)-Given        0906 (30 mg)-Given       2046 (30 mg)-Given        0848 (30 mg)-Given       1334-Med Discontinued           Dose: 10 Units Freq: EVERY EVENING Route: SC  Start: 07/23/17 2000   End: 07/24/17 0932       2045 (10 Units)-Given        0932-Med Discontinued           Dose: 32 mg Freq: ONCE Route: PO  Start: 07/25/17 1745   End: 07/25/17 1745   Admin Instructions: 12 hours prior to the procedure with IV contrast          1745-Med Discontinued             Followed by    Dose: 32 mg Freq: ONCE Route: PO  Start: 07/26/17 0341   End: 07/25/17 1745   Admin Instructions: 2 hours prior to the procedure with IV contrast          1745-Med Discontinued          Dose: 32 mg Freq: ONCE Route: PO  Start: 07/26/17 0000   End: 07/25/17 1746   Admin Instructions: 12 hours prior to the procedure with IV contrast          1746-Med Discontinued       Followed by    Dose: 32 mg Freq: ONCE Route: PO  Start: 07/26/17 1000   End: 07/25/17 1746   Admin Instructions: 2 hours prior to the procedure with IV contrast          1746-Med Discontinued     Medications 07/20/17 07/21/17 07/22/17 07/23/17 07/24/17 07/25/17 07/26/17

## 2017-07-07 NOTE — IP AVS SNAPSHOT
"` `           [address] [facility]: [phone]                                              INTERAGENCY TRANSFER FORM - NURSING   2017                    Hospital Admission Date: 2017  IVAN TERRAZAS   : 1945  Sex: Male        Attending Provider: Rafael Lo MD     Allergies:  Contrast Dye    Infection:  None   Service:  NEUROSURGERY    Ht:  1.854 m (6' 1\")   Wt:  88.5 kg (195 lb 1.6 oz)   Admission Wt:  94.8 kg (209 lb)    BMI:  25.74 kg/m 2   BSA:  2.14 m 2            Patient PCP Information     Provider PCP Type    Roderick Martines MD, MD General      Current Code Status     Date Active Code Status Order ID Comments User Context       Prior      Code Status History     Date Active Date Inactive Code Status Order ID Comments User Context    2017 10:59 AM  Full Code [id number]  Kristina Andino, NIK CNP Outpatient      Advance Directives        Does patient have a scanned Advance Directive/ACP document in EPIC?           No        Hospital Problems as of 2017              Priority Class Noted POA    Cervical stenosis of spine Medium  2017 Yes    Epidural abscess Medium  2017 Yes    Myelopathy (H) Medium  2017 Yes    Uncontrolled type 2 diabetes mellitus without complication, with long-term current use of insulin (H) Medium  2017 Unknown      Non-Hospital Problems as of 2017     None      Immunizations     None         END      ASSESSMENT     Discharge Profile Flowsheet     EXPECTED DISCHARGE     GI Signs/Symptoms  fecal incontinence 17 1215    Expected Discharge Date  -- ([facility]) 17 1127   COMMUNICATION ASSESSMENT      DISCHARGE NEEDS ASSESSMENT     Patient's communication style  spoken language (English or Bilingual) 17 1612    Equipment Currently Used at Home  none 17 1633   SKIN      Transportation Available  car;family or friend will provide 17 1558   Inspection of bony prominences  Full " "07/26/17 0240    FUNCTIONAL LEVEL CURRENT     Full except areas not inspected   Buttock, right;Buttock, left;Coccyx;Sacrum 07/25/17 1817    Ambulation  4-->completely dependent 07/25/17 1817   Procedural focused assessment (identify areas inspected)   Spine 07/26/17 0240    Transferring  4-->completely dependent 07/25/17 1817   Skin WDL  ex 07/26/17 0240    Toileting  4-->completely dependent 07/25/17 1817   Skin Color/Characteristics  bruised (ecchymotic) 07/26/17 0240    Bathing  4-->completely dependent 07/25/17 1817   Skin Temperature  warm 07/26/17 0240    Dressing  4-->completely dependent 07/25/17 1817   Skin Moisture  dry 07/26/17 0240    Eating  4-->completely dependent 07/25/17 1817   Skin Integrity  abrasion(s);bruise(s);incision(s) 07/26/17 0240    Communication  2-->difficulty speaking (not related to language barrier) 07/25/17 1817   Skin Elasticity  quick return to original state 07/25/17 1817    Swallowing  2-->difficulty swallowing liquids/foods 07/25/17 1817   SAFETY      GASTROINTESTINAL (ADULT,PEDIATRIC,OB)     Safety WDL  WDL 07/26/17 0240    GI WDL  WDL 07/26/17 0240   Safety Factors  bed in low position;wheels locked;call light in reach;upper side rails raised x 2;ID band on 07/25/17 1817    Abdominal Appearance  rounded 07/25/17 1817   Aspiration Risk Screen  inability to handle secretions 07/23/17 1724    All Quadrants Bowel Sounds  hypoactive 07/25/17 1817   Airway Safety Measures  suction equipment;suction regulator 07/23/17 1724    Last Bowel Movement  07/23/17 07/23/17 1215   Safety Equipment  oxygen flowmeter;suction equipment;suction regulator 07/16/17 1357                 Assessment WDL (Within Defined Limits) Definitions           Safety WDL     Effective: 09/28/15    Row Information: <b>WDL Definition:</b> Bed in low position, wheels locked; call light in reach; upper side rails up x 2; ID band on<br> <font color=\"gray\"><i>Item=AS safety wdl>>List=AS safety " "wdl>>Version=F14</i></font>      Skin WDL     Effective: 09/28/15    Row Information: <b>WDL Definition:</b> Warm; dry; intact; elastic; without discoloration; pressure points without redness<br> <font color=\"gray\"><i>Item=AS skin wdl>>List=AS skin wdl>>Version=F14</i></font>      Vitals     Vital Signs Flowsheet     COMMENTS     Facial Expression  0 07/16/17 1506    Comments  20mg labetolol given 07/14/17 0515   Body Movements  0 07/16/17 1506    VITAL SIGNS     Compliance w/ventilator (intubated patients)  Extubated 07/16/17 1506    Temp  96.6  F (35.9  C) 07/26/17 0756   Vocalization (extubated patients)  Intubated 07/16/17 1506    Temp src  Axillary 07/26/17 0756   Muscle Tension  0 07/16/17 1506    Resp  24 07/26/17 0756   Total  0 07/16/17 1506    Pulse  80 07/26/17 0756   ANALGESIA SIDE EFFECTS MONITORING      Heart Rate  97 07/25/17 1507   Side Effects Monitoring: Respiratory Quality  R 07/26/17 0937    Pulse/Heart Rate Source  Monitor 07/26/17 0756   Side Effects Monitoring: Respiratory Depth  N 07/26/17 0937    BP  143/72 07/26/17 0756   Side Effects Monitoring: Sedation Level  1 07/26/17 0937    BP Location  Left arm 07/26/17 0756   RAHEEM COMA SCALE      Patient Position  Lying 07/12/17 1208   Best Eye Response  4-->(E4) spontaneous 07/25/17 2234    OXYGEN THERAPY     Best Motor Response  6-->(M6) obeys commands 07/25/17 2234    SpO2  98 % 07/26/17 0756   Best Verbal Response  4-->(V4) confused 07/25/17 2234    O2 Device  None (Room air) 07/26/17 0756   [place] Coma Scale Score  14 07/25/17 2234    FiO2 (%)  60 % 07/16/17 1255   Assessment Qualifiers  patient not sedated/intubated 07/24/17 1319    Oxygen Delivery  2 LPM 07/17/17 1218   HEIGHT AND WEIGHT      Suction Occurrance  1 07/16/17 1255   Height  1.854 m (6' 1\") 07/07/17 0457    RESPIRATORY MONITORING     Height Method  Stated (by wife) 07/07/17 0457    Respiratory Monitoring (EtCO2)  38 mmHg 07/17/17 1117   Weight  88.5 kg (195 lb 1.6 oz) " 07/22/17 1257    Integrated Pulmonary Index (IPI)  10 07/17/17 1117   Weight Method  Bed scale 07/19/17 1531    PAIN/COMFORT     BSA (Calculated - sq m)  2.21 07/07/17 0457    Patient Currently in Pain  denies 07/26/17 0937   BMI (Calculated)  27.63 07/07/17 0457    Preferred Pain Scale  CAPA (Clinically Aligned Pain Assessment) ([facility] Adults Only) 07/26/17 0937   POSITIONING      Patient's Stated Pain Goal  No pain 07/16/17 1506   Body Position  other (see comments) (sitting in chair) 07/25/17 1817    Pain Location  Neck 07/26/17 0231   Head of Bed (HOB)  HOB at 30 degrees 07/26/17 0240    Pain Orientation  Posterior 07/22/17 1413   Positioning/Transfer Devices  mechanical lift utilized 07/25/17 1817    Pain Descriptors  Aching 07/26/17 0231   Chair  Upright in chair 07/26/17 0240    Pain Intervention(s)  Medication (See eMAR) 07/26/17 0231   DAILY CARE      Response to Interventions  Decrease in pain 07/26/17 0250   Activity Type  activity adjusted per tolerance 07/26/17 0240    CLINICALLY ALIGNED PAIN ASSESSMENT (CAPA) ([facility] ADULTS ONLY)     Activity Level of Assistance  assistance, 2 people (lift use) 07/25/17 1817    Comfort  negligible pain 07/26/17 0937   Activity Assistive Device  mechanical lift 07/25/17 1817    Change in Pain  getting better 07/26/17 0231   ECG      Pain Control  partially effective 07/26/17 0231   ECG Rhythm  Sinus arrhythmia 07/16/17 1504    Functioning  can do most things, but pain gets in the way of some 07/26/17 0231   Ectopy  None 07/16/17 1504    Sleep  awake with occasional pain 07/26/17 0231   Ectopy Frequency  Frequent 07/14/17 0858    PAIN ASSESSMENT/NONVERBAL ICU (ADULT)     Lead Monitored  Lead II;V 5 07/16/17 1504    Presence of Pain  No nonverbal indicators of pain present 07/16/17 1512   Equipment  electrodes changed 07/12/17 0545    Assessment Indicator  Post intervention 07/12/17 2228   Rhythm Comment  ST Segment Normal 07/16/17  1255    Nonverbal Indicators of Pain  Grimace;Restless 07/17/17 0207   POINT OF CARE TESTING      Pain Management Interventions  Multiple medication modalities 07/12/17 2228   Puncture Site  fingertip 07/16/17 1512    CRITICAL-CARE PAIN OBSERVATION TOOL (CPOT)     Bedside Glucose (mg/dl )   120 mg/dl 07/16/17 1512            Patient Lines/Drains/Airways Status    Active LINES/DRAINS/AIRWAYS     Name: Placement date: Placement time: Site: Days: Last dressing change:    NG/OG Tube Nasoenteric feeding tube 07/18/17   1200      7     Urethral Catheter 07/20/17   0120      6     Peripheral IV 07/24/17 Left;Anterior Lower forearm 07/24/17   1301   Lower forearm   1     Incision/Surgical Site 07/07/17 Anterior Neck 07/07/17   0902    19     Incision/Surgical Site 07/07/17 Right Foot 07/07/17   0944    18     Incision/Surgical Site 07/14/17 Posterior Neck 07/14/17   1408    11     Incision/Surgical Site 07/16/17 Posterior;Midline Neck 07/16/17   0948    9     Incision/Surgical Site 07/16/17 Bilateral Back 07/16/17   1005    9             Patient Lines/Drains/Airways Status    Active PICC/CVC     Name: Placement date: Placement time: Site: Days: Additional Info Last dressing change:    PICC Double Lumen 07/24/17 Right Brachial vein lateral 07/24/17   1521   Brachial vein lateral   1 External Cath Length (cm): 3 cm            Size (Fr): 5 Fr            Orientation: Right            Extremity Circumference (cm): 33 cm            Catheter Brand: [employer]            Dressing Intervention: Chlorhexidine patch;Transparent;Securing device            Description: Power PICC            Total Catheter Length (cm) Trimmed: 42 cm            Site Prep: Chlorhexidine            Local Anesthetic: Injectable            Inserted by: Tanner Sin RN            Insertion attempts with ultrasound: 1            Patient Tolerance: Tolerated well            Placement Verification: Blood Return;Flouroscopy            Difficulty with threading  line: No            Tip location: SVC            Full barrier precautions done: Yes            Consent Signed: Yes            Time Out performed: Yes            Lot #: [id number]            Use for : Access. RN notified PICC was reay do to use .               Intake/Output Detail Report     Date Intake               Output       Net    Shift P.O. I.V. NG/GT IV Piggyback Colloid Enteral Platelets Blood Components Total Urine Emesis/NG output Drains Blood Total       Day 07/25/17 0000 - 07/25/17 0659 -- -- -- -- -- 315 -- -- 315 650 -- -- -- 650 -335    Tereza 07/25/17 0700 - 07/25/17 1459 -- -- 130 -- -- 360 -- -- 490 350 -- -- -- 350 140    Noc 07/25/17 1500 - 07/25/17 2359 -- 20 170 -- -- 135 -- -- 325 300 -- -- -- 300 25    Day 07/26/17 0000 - 07/26/17 0659 -- -- -- -- -- 315 -- -- 315 350 -- -- -- 350 -35    Tereza 07/26/17 0700 - 07/26/17 1459 -- -- 120 -- -- 180 -- -- 300 250 -- -- -- 250 50      Last Void/BM       Most Recent Value    Urine Occurrence     Stool Occurrence 1 at 07/23/2017 1100      Case Management/Discharge Planning     Case Management/Discharge Planning Flowsheet     REFERRAL INFORMATION     Transportation Available  car;family or friend will provide 07/09/17 1558    Admission Type  inpatient 07/20/17 1100   FINAL RESOURCES      Arrived From  [facility] 07/20/17 1100   Equipment Currently Used at Home  none 07/09/17 1633    LIVING ENVIRONMENT     / CAREGIVER      Lives With  spouse 07/09/17 1558   Filed Complexity Screen Score  11 07/25/17 1050    Living Arrangements  house 07/09/17 1558   ABUSE RISK SCREEN      COPING/STRESS     QUESTION TO PATIENT:  Has a member of your family or a partner(now or in the past) intimidated, hurt, manipulated, or controlled you in any way?  no 07/16/17 0724    Major Change/Loss/Stressor  hospitalization;loss of independence 07/09/17 0327   QUESTION TO PATIENT: Do you feel safe going back to the place where you are living?  yes 07/16/17 0724     EXPECTED DISCHARGE     OBSERVATION: Is there reason to believe there has been maltreatment of a vulnerable adult (ie. Physical/Sexual/Emotional abuse, self neglect, lack of adequate food, shelter, medical care, or financial exploitation)?  no 07/16/17 0724    Expected Discharge Date  -- ([facility]) 07/24/17 1127   (R) MENTAL HEALTH SUICIDE RISK      DISCHARGE PLANNING     Are you depressed or being treated for depression?  No 07/09/17 032

## 2017-07-08 ENCOUNTER — APPOINTMENT (OUTPATIENT)
Dept: SPEECH THERAPY | Facility: CLINIC | Age: 72
DRG: 028 | End: 2017-07-08
Attending: NEUROLOGICAL SURGERY
Payer: COMMERCIAL

## 2017-07-08 ENCOUNTER — APPOINTMENT (OUTPATIENT)
Dept: CARDIOLOGY | Facility: CLINIC | Age: 72
DRG: 028 | End: 2017-07-08
Attending: NEUROLOGICAL SURGERY
Payer: COMMERCIAL

## 2017-07-08 LAB
ANION GAP SERPL CALCULATED.3IONS-SCNC: 8 MMOL/L (ref 3–14)
APTT PPP: 31 SEC (ref 22–37)
BASOPHILS # BLD AUTO: 0 10E9/L (ref 0–0.2)
BASOPHILS NFR BLD AUTO: 0.2 %
BLD PROD TYP BPU: NORMAL
BLD PROD TYP BPU: NORMAL
BLD UNIT ID BPU: 0
BLOOD PRODUCT CODE: NORMAL
BPU ID: NORMAL
BUN SERPL-MCNC: 12 MG/DL (ref 7–30)
CA-I BLD-MCNC: 4.7 MG/DL (ref 4.4–5.2)
CALCIUM SERPL-MCNC: 8.3 MG/DL (ref 8.5–10.1)
CHLORIDE SERPL-SCNC: 104 MMOL/L (ref 94–109)
CO2 SERPL-SCNC: 24 MMOL/L (ref 20–32)
CREAT SERPL-MCNC: 0.55 MG/DL (ref 0.66–1.25)
DIFFERENTIAL METHOD BLD: ABNORMAL
EOSINOPHIL # BLD AUTO: 0 10E9/L (ref 0–0.7)
EOSINOPHIL NFR BLD AUTO: 0.4 %
ERYTHROCYTE [DISTWIDTH] IN BLOOD BY AUTOMATED COUNT: 13.9 % (ref 10–15)
ERYTHROCYTE [DISTWIDTH] IN BLOOD BY AUTOMATED COUNT: 14 % (ref 10–15)
ERYTHROCYTE [DISTWIDTH] IN BLOOD BY AUTOMATED COUNT: 14.2 % (ref 10–15)
FIBRINOGEN PPP-MCNC: 466 MG/DL (ref 200–420)
FIBRINOGEN PPP-MCNC: 474 MG/DL (ref 200–420)
GFR SERPL CREATININE-BSD FRML MDRD: ABNORMAL ML/MIN/1.7M2
GLUCOSE BLDC GLUCOMTR-MCNC: 131 MG/DL (ref 70–99)
GLUCOSE BLDC GLUCOMTR-MCNC: 133 MG/DL (ref 70–99)
GLUCOSE BLDC GLUCOMTR-MCNC: 174 MG/DL (ref 70–99)
GLUCOSE BLDC GLUCOMTR-MCNC: 176 MG/DL (ref 70–99)
GLUCOSE SERPL-MCNC: 160 MG/DL (ref 70–99)
HBA1C MFR BLD: 7.3 % (ref 4.3–6)
HCT VFR BLD AUTO: 29.4 % (ref 40–53)
HCT VFR BLD AUTO: 30.6 % (ref 40–53)
HCT VFR BLD AUTO: 31.4 % (ref 40–53)
HGB BLD-MCNC: 10.1 G/DL (ref 13.3–17.7)
HGB BLD-MCNC: 10.5 G/DL (ref 13.3–17.7)
HGB BLD-MCNC: 10.7 G/DL (ref 13.3–17.7)
IMM GRANULOCYTES # BLD: 0 10E9/L (ref 0–0.4)
IMM GRANULOCYTES NFR BLD: 0.2 %
INR PPP: 1.17 (ref 0.86–1.14)
LYMPHOCYTES # BLD AUTO: 0.6 10E9/L (ref 0.8–5.3)
LYMPHOCYTES NFR BLD AUTO: 11.3 %
MAGNESIUM SERPL-MCNC: 1.7 MG/DL (ref 1.6–2.3)
MAGNESIUM SERPL-MCNC: 1.8 MG/DL (ref 1.6–2.3)
MCH RBC QN AUTO: 33.1 PG (ref 26.5–33)
MCH RBC QN AUTO: 33.1 PG (ref 26.5–33)
MCH RBC QN AUTO: 33.2 PG (ref 26.5–33)
MCHC RBC AUTO-ENTMCNC: 34.1 G/DL (ref 31.5–36.5)
MCHC RBC AUTO-ENTMCNC: 34.3 G/DL (ref 31.5–36.5)
MCHC RBC AUTO-ENTMCNC: 34.4 G/DL (ref 31.5–36.5)
MCV RBC AUTO: 97 FL (ref 78–100)
MONOCYTES # BLD AUTO: 0.5 10E9/L (ref 0–1.3)
MONOCYTES NFR BLD AUTO: 10.1 %
NEUTROPHILS # BLD AUTO: 3.8 10E9/L (ref 1.6–8.3)
NEUTROPHILS NFR BLD AUTO: 77.8 %
NRBC # BLD AUTO: 0 10*3/UL
NRBC BLD AUTO-RTO: 0 /100
NUM BPU REQUESTED: 1
PHOSPHATE SERPL-MCNC: 1.7 MG/DL (ref 2.5–4.5)
PHOSPHATE SERPL-MCNC: 2.1 MG/DL (ref 2.5–4.5)
PLATELET # BLD AUTO: 247 10E9/L (ref 150–450)
PLATELET # BLD AUTO: 254 10E9/L (ref 150–450)
PLATELET # BLD AUTO: 88 10E9/L (ref 150–450)
POTASSIUM SERPL-SCNC: 3.1 MMOL/L (ref 3.4–5.3)
POTASSIUM SERPL-SCNC: 4.4 MMOL/L (ref 3.4–5.3)
RBC # BLD AUTO: 3.04 10E12/L (ref 4.4–5.9)
RBC # BLD AUTO: 3.17 10E12/L (ref 4.4–5.9)
RBC # BLD AUTO: 3.23 10E12/L (ref 4.4–5.9)
SODIUM SERPL-SCNC: 137 MMOL/L (ref 133–144)
TRANSFUSION STATUS PATIENT QL: NORMAL
TRANSFUSION STATUS PATIENT QL: NORMAL
TROPONIN I SERPL-MCNC: NORMAL UG/L (ref 0–0.04)
WBC # BLD AUTO: 4.9 10E9/L (ref 4–11)
WBC # BLD AUTO: 7.8 10E9/L (ref 4–11)
WBC # BLD AUTO: 9.4 10E9/L (ref 4–11)

## 2017-07-08 PROCEDURE — 93306 TTE W/DOPPLER COMPLETE: CPT | Mod: 26 | Performed by: INTERNAL MEDICINE

## 2017-07-08 PROCEDURE — 92610 EVALUATE SWALLOWING FUNCTION: CPT | Mod: GN | Performed by: SPEECH-LANGUAGE PATHOLOGIST

## 2017-07-08 PROCEDURE — 82330 ASSAY OF CALCIUM: CPT | Performed by: NEUROLOGICAL SURGERY

## 2017-07-08 PROCEDURE — 84484 ASSAY OF TROPONIN QUANT: CPT | Performed by: NEUROLOGICAL SURGERY

## 2017-07-08 PROCEDURE — 25000128 H RX IP 250 OP 636: Performed by: STUDENT IN AN ORGANIZED HEALTH CARE EDUCATION/TRAINING PROGRAM

## 2017-07-08 PROCEDURE — 40000264 ECHO COMPLETE WITH OPTISON

## 2017-07-08 PROCEDURE — 40000014 ZZH STATISTIC ARTERIAL MONITORING DAILY

## 2017-07-08 PROCEDURE — 25500064 ZZH RX 255 OP 636: Performed by: NEUROLOGICAL SURGERY

## 2017-07-08 PROCEDURE — 20000004 ZZH R&B ICU UMMC

## 2017-07-08 PROCEDURE — P9037 PLATE PHERES LEUKOREDU IRRAD: HCPCS | Performed by: NEUROLOGICAL SURGERY

## 2017-07-08 PROCEDURE — 85384 FIBRINOGEN ACTIVITY: CPT | Performed by: STUDENT IN AN ORGANIZED HEALTH CARE EDUCATION/TRAINING PROGRAM

## 2017-07-08 PROCEDURE — 84100 ASSAY OF PHOSPHORUS: CPT | Performed by: NEUROLOGICAL SURGERY

## 2017-07-08 PROCEDURE — 85025 COMPLETE CBC W/AUTO DIFF WBC: CPT | Performed by: NEUROLOGICAL SURGERY

## 2017-07-08 PROCEDURE — 40000275 ZZH STATISTIC RCP TIME EA 10 MIN

## 2017-07-08 PROCEDURE — 85730 THROMBOPLASTIN TIME PARTIAL: CPT | Performed by: STUDENT IN AN ORGANIZED HEALTH CARE EDUCATION/TRAINING PROGRAM

## 2017-07-08 PROCEDURE — 92526 ORAL FUNCTION THERAPY: CPT | Mod: GN | Performed by: SPEECH-LANGUAGE PATHOLOGIST

## 2017-07-08 PROCEDURE — 25000125 ZZHC RX 250: Performed by: STUDENT IN AN ORGANIZED HEALTH CARE EDUCATION/TRAINING PROGRAM

## 2017-07-08 PROCEDURE — 36415 COLL VENOUS BLD VENIPUNCTURE: CPT | Performed by: STUDENT IN AN ORGANIZED HEALTH CARE EDUCATION/TRAINING PROGRAM

## 2017-07-08 PROCEDURE — 25000125 ZZHC RX 250: Performed by: NEUROLOGICAL SURGERY

## 2017-07-08 PROCEDURE — 40000225 ZZH STATISTIC SLP WARD VISIT: Performed by: SPEECH-LANGUAGE PATHOLOGIST

## 2017-07-08 PROCEDURE — 00000146 ZZHCL STATISTIC GLUCOSE BY METER IP

## 2017-07-08 PROCEDURE — 84100 ASSAY OF PHOSPHORUS: CPT | Performed by: STUDENT IN AN ORGANIZED HEALTH CARE EDUCATION/TRAINING PROGRAM

## 2017-07-08 PROCEDURE — 25000132 ZZH RX MED GY IP 250 OP 250 PS 637: Performed by: STUDENT IN AN ORGANIZED HEALTH CARE EDUCATION/TRAINING PROGRAM

## 2017-07-08 PROCEDURE — 85384 FIBRINOGEN ACTIVITY: CPT | Performed by: NEUROLOGICAL SURGERY

## 2017-07-08 PROCEDURE — 83735 ASSAY OF MAGNESIUM: CPT | Performed by: NEUROLOGICAL SURGERY

## 2017-07-08 PROCEDURE — 80048 BASIC METABOLIC PNL TOTAL CA: CPT | Performed by: NEUROLOGICAL SURGERY

## 2017-07-08 PROCEDURE — 84132 ASSAY OF SERUM POTASSIUM: CPT | Performed by: STUDENT IN AN ORGANIZED HEALTH CARE EDUCATION/TRAINING PROGRAM

## 2017-07-08 PROCEDURE — 85027 COMPLETE CBC AUTOMATED: CPT | Performed by: NEUROLOGICAL SURGERY

## 2017-07-08 PROCEDURE — 85610 PROTHROMBIN TIME: CPT | Performed by: STUDENT IN AN ORGANIZED HEALTH CARE EDUCATION/TRAINING PROGRAM

## 2017-07-08 PROCEDURE — 83036 HEMOGLOBIN GLYCOSYLATED A1C: CPT | Performed by: NEUROLOGICAL SURGERY

## 2017-07-08 PROCEDURE — 85027 COMPLETE CBC AUTOMATED: CPT | Performed by: STUDENT IN AN ORGANIZED HEALTH CARE EDUCATION/TRAINING PROGRAM

## 2017-07-08 PROCEDURE — 83735 ASSAY OF MAGNESIUM: CPT | Performed by: STUDENT IN AN ORGANIZED HEALTH CARE EDUCATION/TRAINING PROGRAM

## 2017-07-08 PROCEDURE — 87040 BLOOD CULTURE FOR BACTERIA: CPT | Performed by: NEUROLOGICAL SURGERY

## 2017-07-08 RX ORDER — LORAZEPAM 0.5 MG/1
.5-4 TABLET ORAL SEE ADMIN INSTRUCTIONS
Status: DISCONTINUED | OUTPATIENT
Start: 2017-07-08 | End: 2017-07-15

## 2017-07-08 RX ORDER — LORAZEPAM 2 MG/ML
.5-4 INJECTION INTRAMUSCULAR SEE ADMIN INSTRUCTIONS
Status: DISCONTINUED | OUTPATIENT
Start: 2017-07-08 | End: 2017-07-15

## 2017-07-08 RX ADMIN — INSULIN ASPART 1 UNITS: 100 INJECTION, SOLUTION INTRAVENOUS; SUBCUTANEOUS at 08:21

## 2017-07-08 RX ADMIN — LORAZEPAM 1 MG: 2 INJECTION INTRAMUSCULAR; INTRAVENOUS at 22:13

## 2017-07-08 RX ADMIN — CEFEPIME HYDROCHLORIDE 2 G: 2 INJECTION, POWDER, FOR SOLUTION INTRAVENOUS at 22:14

## 2017-07-08 RX ADMIN — METRONIDAZOLE 500 MG: 500 INJECTION, SOLUTION INTRAVENOUS at 16:44

## 2017-07-08 RX ADMIN — SODIUM CHLORIDE: 900 INJECTION, SOLUTION INTRAVENOUS at 10:32

## 2017-07-08 RX ADMIN — METRONIDAZOLE 500 MG: 500 INJECTION, SOLUTION INTRAVENOUS at 20:57

## 2017-07-08 RX ADMIN — THIAMINE HYDROCHLORIDE 500 MG: 100 INJECTION, SOLUTION INTRAMUSCULAR; INTRAVENOUS at 08:15

## 2017-07-08 RX ADMIN — SODIUM PHOSPHATE, MONOBASIC, MONOHYDRATE AND SODIUM PHOSPHATE, DIBASIC, ANHYDROUS 15 MMOL: 276; 142 INJECTION, SOLUTION INTRAVENOUS at 06:38

## 2017-07-08 RX ADMIN — VANCOMYCIN HYDROCHLORIDE 2250 MG: 10 INJECTION, POWDER, LYOPHILIZED, FOR SOLUTION INTRAVENOUS at 13:56

## 2017-07-08 RX ADMIN — CEFEPIME HYDROCHLORIDE 2 G: 2 INJECTION, POWDER, FOR SOLUTION INTRAVENOUS at 15:32

## 2017-07-08 RX ADMIN — THIAMINE HYDROCHLORIDE 500 MG: 100 INJECTION, SOLUTION INTRAMUSCULAR; INTRAVENOUS at 14:24

## 2017-07-08 RX ADMIN — Medication 2 G: at 23:25

## 2017-07-08 RX ADMIN — THIAMINE HYDROCHLORIDE 500 MG: 100 INJECTION, SOLUTION INTRAMUSCULAR; INTRAVENOUS at 21:40

## 2017-07-08 RX ADMIN — HUMAN ALBUMIN MICROSPHERES AND PERFLUTREN 4 ML: 10; .22 INJECTION, SOLUTION INTRAVENOUS at 10:30

## 2017-07-08 RX ADMIN — LIDOCAINE 2 PATCH: 50 PATCH CUTANEOUS at 08:15

## 2017-07-08 RX ADMIN — Medication 2 G: at 04:13

## 2017-07-08 RX ADMIN — INSULIN ASPART 1 UNITS: 100 INJECTION, SOLUTION INTRAVENOUS; SUBCUTANEOUS at 11:40

## 2017-07-08 ASSESSMENT — VISUAL ACUITY
OU: GLASSES
OU: GLASSES
OU: NORMAL ACUITY;GLASSES
OU: GLASSES
OU: NORMAL ACUITY;GLASSES
OU: GLASSES
OU: NORMAL ACUITY;GLASSES
OU: GLASSES
OU: NORMAL ACUITY;GLASSES
OU: GLASSES
OU: NORMAL ACUITY;GLASSES
OU: GLASSES

## 2017-07-08 NOTE — PLAN OF CARE
Problem: Goal Outcome Summary  Goal: Goal Outcome Summary  Clinical swallow eval completed per MD orders. Pt demonstrates severe pharyngeal dysphagia at this time as characterized by multiple swallow with each trial, wet vocal quality, throat clearing. Pt demonstrated increased work of breathing following 1 trail of thin liquid indicating aspiration. Pt able to throat clear and expectorate some of the material which remained in his throat. Pt is at high risk for aspiration at this time. Recommend NPO with alternative methods of nutrition/hydration/medication. SLP to follow daily for ongoing swallowing assessment and dysphagia therapy.

## 2017-07-08 NOTE — PLAN OF CARE
Problem: Goal Outcome Summary  Goal: Goal Outcome Summary  Outcome: Improving  S: 0200 pt began having short runs PSVT. SICU resident notified. See orders. Labs sent early. Had one 26 beat run Vtach during 0400 neuro check--pt was speaking during and wanted to know why I was asking him if he felt ok. Lytes and rhythm called to resident, see orders, replacing Mg and Phos. Pan cx sent per orders. Noted dorsal surface right upper incisor black. Resident examined. Platelets replaced per order.  B: S/p cervical lami.  A: No further ventricular ectopy. Is tachy in 110's with PACs, no more runs PSVT.  R: Recheck lytes later. DEVONTE pending. Podiatry consult pending for right toe drsg change orders. Consider dental consult. Holding po meds until passes swallow eval.

## 2017-07-08 NOTE — PHARMACY-VANCOMYCIN DOSING SERVICE
"Pharmacy Vancomycin Consult Initial Note    Date of Service 2017  Patient's  1945  72 year old, male    Indication: Abscess and Meningitis    Current estimated CrCl = Estimated Creatinine Clearance: 149.6 mL/min (based on Cr of 0.55).    Creatinine for last 3 days  2017:  5:52 AM Creatinine 0.70 mg/dL  2017:  2:56 AM Creatinine 0.55 mg/dL    Recent vancomycin level(s) for last 3 days  No results found for requested labs within last 72 hours.    Body mass index is 25.71 kg/(m^2).  Height is 6' 1\".   Wt Readings from Last 2 Encounters:   17 88.4 kg (194 lb 14.2 oz)         Vancomycin IV Administrations (past 72 hours)      No vancomycin orders with administrations in past 72 hours.              Nephrotoxins and other renal medications (Future)    Start     Dose/Rate Route Frequency Ordered Stop    17 0200  vancomycin (VANCOCIN) 1,500 mg in NaCl 0.9 % 250 mL intermittent infusion      1,500 mg Intravenous EVERY 12 HOURS 17 1244      17 1400  vancomycin (VANCOCIN) 2,250 mg in NaCl 0.9 % 500 mL intermittent infusion      2,250 mg Intravenous ONCE 17 1244          Contrast Orders - past 72 hours (72h ago through future)    Start     Dose/Rate Route Frequency Ordered Stop    17 1030  perflutren diluted 1mL to 2mL with saline (OPTISON) diluted injection 4 mL      4 mL Intravenous ONCE 17 1018 17 1030    17 1730  gadobutrol (GADAVIST) injection 10 mL      10 mL Intravenous ONCE 17 1729 17 1828          Plan:  1.  Start vancomycin 2250 mg IV x 1 now (25 mg/kg) followed by maintenance dose of 1500 mg IV q12h (17.5 mg/kg).   2.  Goal Trough Level: 15-20 mg/L   3.  Pharmacy will check trough levels as appropriate in 1-3 Days.    4.  Serum creatinine levels will be ordered daily for the first week of therapy and at least twice weekly for subsequent weeks.    5.  [place] method utilized to dose vancomycin therapy: Method 2, utilizing 17.5 " mg/kg per dose given patient age and PMH of diabetes (increased risk of nephrotoxicity).    Olya Steel, PharmD  July 8, 2017

## 2017-07-08 NOTE — PLAN OF CARE
Problem: Goal Outcome Summary  Goal: Goal Outcome Summary  D/I: Pt transferred from PACU this afternoon on c spine precautions with no HOB limitations. Gross movements of upper extremities, spontaneous movements of LLE and no contraction of RLE. Sensations very limited on all extremities. Alert and oriented to self, situation, place, and sometimes time. Pt on capnography, lung sounds clear, RA. Pt was scoped earlier this evening and also went down for an MRI. Pt SR, given bolus of fluid to keep map above 80, and left radial a line. Adequate u/o from armstrong 50-75ml/hour. Pt unsure when last BM was. NPO at this time, failed swallow study for writer.     A/P: continue to follow poc, notify team for any changes or concern.

## 2017-07-08 NOTE — CONSULTS
Dental Service Consultation        Ernesto Rawls MRN# [id number]   YOB: 1945 Age: 72 year old   Date of Admission: 7/7/2017     Reason for consult: I was asked by Dr. Griffin to evaluate this patient for periodontal abscess.           Assessment and Plan:   Assessment: EO and IO revealed #8 right upper incisor mobility (class III). No IO pathology noted. No draining purulence noted.    Plan: Advised resident attending that there is nothing acute or immediate resolution that can be done at this time or is warranted. Pt needs to be seen by a general dentist to develop long term plan regarding #8.              Chief Complaint:   Consulted by Dr Griffin for suspected periodontal abscess.    History is obtained from the resident and PCP.         History of Present Illness:   This patient is a 72 year old male who presents with suspected periodontal abscess.               Past Medical History:   I have reviewed this patient's past medical history          Past Surgical History:   I have reviewed this patient's past surgical history            Social History:   I have reviewed and updated this patient's social history          Family History:   I have reviewed this patient's family history          Immunizations:   Immunization status is unknown          Allergies:   All allergies reviewed and addressed          Medications:   I have reviewed this patient's current medications          Review of Systems:   The 10 point Review of Systems is negative other than noted in the HPI            Physical Exam:   Vitals were reviewed  Temp: 99.1  F (37.3  C) Temp src: Oral BP: 150/74   Heart Rate: 118 Resp: 12 SpO2: 96 % O2 Device: None (Room air) Oxygen Delivery: 1 LPM    Head and neck exam: No unilateral swelling noted. No lymph node fluctuation or mass noted.    Intraoral exam: #8 presents with class III mobility. PDs ranging from 4-7 mm. No IO swellings or inflammation noted. Pt does have anterior open bite allowing  lower incisors to abraded anterior hard palate.        Data:   Radiographic interpretation: NA   Orthopantomogram: Not indicated  Osseous pathology: NA  Pulpal Pathology: NA  Periodontal Pathology: #8 (Right upper incisor) presents with class III mobility.Localized 5-6 mm pocket depths.  Caries: none noted  Odontogenic pathology: none noted      PGY1 Bear Jimenez DDS  Pager: [phone]

## 2017-07-08 NOTE — PROGRESS NOTES
07/08/17 0819   General Information   Onset Date 07/07/17   Start of Care Date 07/08/17   Referring Physician Dr Derick Holly   Patient Profile Review/OT: Additional Occupational Profile Info See Profile for full history and prior level of function   Patient/Family Goals Statement Pt stated desire for coffee   Swallowing Evaluation Bedside swallow evaluation   Behaviorial Observations WFL (within functional limits)   Mode of current nutrition NPO   Respiratory Status O2 Supply   Type of O2 supply Nasal cannula   Comments Orders received for clinical swallowing evaluation. 72 year old male with rheumatoid arthritis, DM, CAD, wet gangrene of right great toe, and cervical spinal stenosis, who underwent amputation of the right great toe on 7/6 at the [facility]. Afterwards, he was found to be weak in his lower extremities and was taken for emergent CT & MR to evaluate for CVA. MRI demonstrated significant cervical stenosis and he was transferred here to the neurosurgical team. He subsequently developed total paralysis of the upper extremities as well upon his arrival. He was brought to the OR and underwent posterior cervical 3-4-5 and partial cervical 6 laminectomy and decompression.   Clinical Swallow Evaluation   Oral Musculature generally intact   Structural Abnormalities none present   Dentition present and adequate   Mucosal Quality dry   Mandibular Strength and Mobility intact   Oral Labial Strength and Mobility WFL   Lingual Strength and Mobility WFL   Velar Elevation intact   Buccal Strength and Mobility intact   Laryngeal Function Cough;Throat clear;Swallow;Voicing initiated  (Weak cough and quiet voice)   Clinical Swallow Eval: Thin Liquid Texture Trial   Mode of Presentation, Thin Liquids spoon;fed by clinician   Volume of Liquid or Food Presented ice chip x2, tsp water x2   Oral Phase of Swallow WFL   Pharyngeal Phase of Swallow impaired;no awareness of problems;repeated swallows;throat  clearing;wet vocal quality after swallow   Diagnostic Statement Pt demonstrated overt clinical s/sx of aspiration/penetration on all thin liquid trials. Pt noted to have wet vocal quality and intermittent choking. Pt able to throat clear and expectorate material however he has difficulty coughing.    Clinical Swallow Eval: Nectar Thick Liquid Texture Trial   Mode of Presentation, Nectar spoon;fed by clinician   Volume of Nectar Presented tps x2   Oral Phase, [place] WFL   Pharyngeal Phase, Nectar impaired;repeated swallows;wet vocal quality after swallow   Diagnostic Statement Pt demonstrates over clinical s/sx of aspiration/penetration on nectar thickened liquid trials.    Esophageal Phase of Swallow   Patient reports or presents with symptoms of esophageal dysphagia No   General Therapy Interventions   Planned Therapy Interventions Dysphagia Treatment   Dysphagia treatment Oropharyngeal exercise training;Modified diet education;Instruction of safe swallow strategies   Swallow Eval: Clinical Impressions   Skilled Criteria for Therapy Intervention Skilled criteria met.  Treatment indicated.   Functional Assessment Scale (FAS) 1   Treatment Diagnosis Severe pharyngeal dysphagia demonstrated   Diet texture recommendations NPO  (Alternative methods of nutrition/hydration)   Demonstrates Need for Referral to Another Service occupational therapy;physical therapy;dietitian;respiratory therapy   Therapy Frequency daily   Predicted Duration of Therapy Intervention (days/wks) 4 weeks   Anticipated Discharge Disposition inpatient rehabilitation facility   Risks and Benefits of Treatment have been explained. Yes   Patient, family and/or staff in agreement with Plan of Care Yes   Clinical Impression Comments Clinical swallow eval completed per MD orders. Pt demonstrates severe pharyngeal dysphagia at this time as characterized by multiple swallow with each trial, wet vocal quality, throat clearing. Pt demonstrated increased work  of breathing following 1 trail of thin liquid indicating aspiration. Pt able to throat clear and expectorate some of the material which remained in his throat. Pt is at high risk for aspiration at this time. Recommend NPO with alternative methods of nutrition/hydration/medication. SLP to follow daily for ongoing swallowing assessment and dysphagia therapy.    Total Evaluation Time   Total Evaluation Time (Minutes) 15

## 2017-07-08 NOTE — CONSULTS
[facility], [place]    NEUROCRITICAL CARE CONSULT NOTE    Reason for Consult:  Cervical myelopathy      Consult requested by:  Neurosurgery    Consult Team:  Neuro-Critical Care    Patient Name:  Ernesto Rawls       Date of Admission:  7/7/2017       Problem List    Active Hospital Problems    Cervical stenosis of spine      Epidural fluid collection         PMX  Hx of RA, CAD, Diabetes    Surgical HX  Recent right toe amputation due     Family History   History reviewed. No pertinent family history.    Social History   Social History     Social History     Marital status: N/A     Spouse name: N/A     Number of children: N/A     Years of education: N/A     Occupational History     Not on file.     Social History Main Topics     Smoking status: Not on file     Smokeless tobacco: Not on file     Alcohol use Not on file     Drug use: Not on file     Sexual activity: Not on file     Other Topics Concern     Not on file     Social History Narrative     No narrative on file       Allergies   Allergies   Allergen Reactions     Contrast Dye           Brief summary of hospital stay to date:  Mr Rawls is a 73 y/o male with RA, DM, CAD, wet gangrene of right great toe, and cervical spinal stenosis, who underwent amputation of the right great toe on 7/6 at the [facility]. Afterwards, he was found to be weak in his lower extremities and was taken for emergent CT & MR to evaluate for stroke. MRI showed significant cervical stenosis and he was tx here for further management. He subsequently had worsening of weakness in all four extremities and was taken to the OR. He underwent posterior cervical 3-4-5 and partial cervical 6 laminectomy and decompression.    Present Medications    sodium chloride (PF)  3 mL Intracatheter Q8H     acetaminophen  975 mg Oral Q8H     lidocaine  3 patch Transdermal Q24H     lidocaine   Transdermal Q24h     lidocaine   Transdermal Q8H     menthol   Transdermal Q8H      "senna-docusate  1-2 tablet Oral BID     polyethylene glycol  17 g Oral BID     insulin aspart  1-7 Units Subcutaneous TID AC     insulin aspart  1-5 Units Subcutaneous At Bedtime     thiamine  500 mg Intravenous TID     omeprazole  20 mg Oral QAM AC     folic acid  1 mg Oral Daily     multivitamin, therapeutic with minerals  1 tablet Oral Daily       NaCl 100 mL/hr at 17 2218       EXAMINATION    Vital Signs  /69  Pulse 64  Temp 99.1  F (37.3  C) (Oral)  Resp 12  Ht 1.854 m (6' 1\")  Wt 88.4 kg (194 lb 14.2 oz)  SpO2 96%  BMI 25.71 kg/m2    Tmax: Temp (24hrs), Av.9  F (36.6  C), Min:96.6  F (35.9  C), Max:99.1  F (37.3  C)      Intake/Output:   Intake/Output Summary (Last 24 hours) at 17 0911  Last data filed at 17 0730   Gross per 24 hour   Intake             3530 ml   Output             2470 ml   Net             1060 ml             General Examination   Level of Alertness: awake  Orientation: x 3 (time, place, person)  HEENT: intact - Drain S/P Cervical cord decompression surgery - Posterior approach    Nutrition None      NeuroCritical Neurologic Examination  Patient is alert oriented - mildly delirious after surgery. Cranial nerves 2-12 intact. BL UE LE weakness proximal and distal 3-4/5, localizes touch all four extremities. DTR BL UE 1+ , absent BL LE. Left toe up.    Cardiovascular:  RRR  Pulmonary:  no respiratory distress  Abdominal:  soft  Genitourinary:    No suprapubic tenderness  No costovertebral angle tenderness  Adequate urine output in last 24 hours  Extremities:  no edema    Laboratory Findings    Data     CMP   Recent Labs  Lab 17  0256 17  0848 17  0705 17  0552    133 133 133   POTASSIUM 4.4 4.0 4.1 4.6   CHLORIDE 104  --   --  98   CO2 24  --   --  26   ANIONGAP 8  --   --  9   * 191* 186* 190*   BUN 12  --   --  18   CR 0.55*  --   --  0.70   GFRESTIMATED >90Non  GFR Calc  --   --  >90Non  " GFR Calc   GFRESTBLACK >90African American GFR Calc  --   --  >90African American GFR Calc   KAROL 8.3*  --   --  9.2   MAG 1.8  --   --  2.0   PHOS 2.1*  --   --  3.7        CBC   Recent Labs  Lab 07/08/17  0256 07/07/17  0848 07/07/17  0705 07/07/17  0552   WBC 4.9  --   --  7.3   RBC 3.23*  --   --  3.55*   HGB 10.7* 11.8* 11.5* 11.8*   HCT 31.4*  --   --  34.0*   MCV 97  --   --  96   MCH 33.1*  --   --  33.2*   MCHC 34.1  --   --  34.7   RDW 14.2  --   --  14.0   PLT 88*  --   --  217       INR, PTT   Recent Labs  Lab 07/07/17  0552   INR 1.10   PTT 29        Arterial Blood Gas   Recent Labs  Lab 07/07/17  0848 07/07/17  0705   PH 7.42 7.38   PCO2 35 42   PO2 290* 380*   HCO3 23 25   KENDRA  --  0.0   O2PER 50.0 73.0       UA    Recent Labs  Lab 07/07/17  2142   COLOR Yellow   APPEARANCE Clear   URINEGLC 30*   URINEBILI Negative   URINEKETONE 80*   SG 1.017   UBLD Small*   URINEPH 5.5   PROTEIN 10*   NITRITE Negative   LEUKEST Trace*   RBCU 10*   WBCU 2       Micro   Recent Labs  Lab 07/08/17  0256  07/07/17  2159   SDES Blood Arterial line  < > Sputum   SREQ  --   --  Screen   CULT No growth after 3 hours  < >  --    RPT Pending  < > FINAL 07/07/2017   < > = values in this interval not displayed.       Radiological Data  Data   Recent Results (from the past 48 hour(s))   XR Surgery BONITA Fluoro L/T 5 Min w Stills    Narrative    This exam was marked as non-reportable because it will not be read by a   radiologist or a [facility] non-radiologist provider.             MR Cervical Spine w/o & w Contrast    Narrative    MR CERVICAL SPINE W/O & W CONTRAST 7/7/2017 6:27 PM    Provided History: s/p decompression, abscesses    Comparison: MR 6/29/2017    Technique: Sagittal T1-weighted, sagittal T2-weighted, sagittal  diffusion weighted, axial T2-weighted, and axial T2* gradient echo  images of the cervical spine were obtained without intravenous  contrast. Following intravenous administration of gadolinium, axial  and  sagittal T1-weighted images with fat saturation were also  obtained.    Contrast: 10 ml Gadavist    Findings:  Surgical changes of bilateral laminectomy at levels C3-C5. There is  edematous changes in the posterior vertebral musculature presumably  related to surgery.    Again seen are the increased T2 signal in the C3-C5 vertebral bodies,  with a small degree of destructive changes of the opposing endplates  at level C4-C5.     There is an rim-enhancing fluid collection posterior to the cervical  spine in the epidural space of levels C4-C5 (series 9, image 6. This  measures approximately 0.3 x 1.2 x 0.9 cm and is located in the  central portion of the spinal canal. There is flattening of the spinal  cord at this level, and moderate spinal canal narrowing. There is  likely a small focus of myelomalacia in the right side of the cervical  cord at this level best seen on image 7 of series 5 and 3. This was  not definitely present on the prior study.    There is a second fluid collection which is continuous with the  intervertebral disc space fluid collection at C4-C5 extending into the  soft tissues anterior to the cervical spine. This measures  approximately 0.9 x 2.0 cm (AP, CC; series 9, image 7).       Impression    Impression:   1. Post surgical changes of a decompressive surgery at levels from C3  through C5.  2. Osteomyelitis/discitis with an abscess extending anteriorly from  the C4-C5 disc space into the prevertebral space, and a second abscess  extending posteriorly into the epidural space. There is moderate  thecal sac narrowing at this level and a new small focus of  myelomalacia in the right hemicord at this level.    I have personally reviewed the examination and initial interpretation  and I agree with the findings.    SUKUMAR BATISTA MD   XR Chest Port 1 View    Narrative    Exam: XR CHEST PORT 1 VW  7/7/2017 10:11 PM      History: infection    Comparison: None    Findings: The cardiac silhouette is  within normal limits. Trachea is  midline. Pulmonary vasculature is unremarkable. There is no pleural  effusion. No pneumothorax. Left basilar streaky opacities. Visualized  abdomen is normal.      Impression    Impression: Left basilar streaky opacities, infection versus  atelectasis.    I have personally reviewed the examination and initial interpretation  and I agree with the findings.    ALEXEY LOPEZ MD          ASSESSMENT AND PLAN  Imaging and Laboratory investigations reviewed.    Neuro: Patient is alert - quadriparetic S/P Posterior approach C 3-4-5 and partial 6 laminectomy, mild clinical improvement post surgically. Epidural fluid collection cultures pending.    Respiratory: CXR with mild left base opacity. Currently maintaining airway - IPI 9-10.    CVS: MAP goal > 80. Currently maintaining. Hx of CAD- will need to obtain more information on home medication. Concern for endocarditis - initiating work up with TTE.    Endo: Maintain euglycemia    GI: NPO for now with severe dysphagia. Considering feeding tube.    Renal/ : Intact renal function at this time. Follow I/O    Heme: Platelets to 80s. Added labs for possible consumptive process in setting of suspected infection. Monitor CBC Q8H    ID: Possible systemic infection. ? Dental access being evaluated. Fluid collection drained and cultures are pending. At this time patient is afebrile and does not have leucocytosis - however unclear if he was on immune suppression for RA. Blood cx negative so far.    Consider broad spectrum empiric ABx coverage until fluid cultures are back.    Patient's next Methotrexate dose due on Monday per schedule that he is on for RA.     GI proph      : PPI  DVT proph : SCD    Working on obtaining more medical records.    Case discussed with attending: Dr. Melton    Thank you for involving the Neuro-Critical Care Team in the care of this patient.  We will continue to follow.  Please do not hesitate to contact us for any  further questions.    Moris Deleon

## 2017-07-09 ENCOUNTER — APPOINTMENT (OUTPATIENT)
Dept: PHYSICAL THERAPY | Facility: CLINIC | Age: 72
DRG: 028 | End: 2017-07-09
Attending: NEUROLOGICAL SURGERY
Payer: COMMERCIAL

## 2017-07-09 ENCOUNTER — APPOINTMENT (OUTPATIENT)
Dept: SPEECH THERAPY | Facility: CLINIC | Age: 72
DRG: 028 | End: 2017-07-09
Attending: NEUROLOGICAL SURGERY
Payer: COMMERCIAL

## 2017-07-09 ENCOUNTER — APPOINTMENT (OUTPATIENT)
Dept: OCCUPATIONAL THERAPY | Facility: CLINIC | Age: 72
DRG: 028 | End: 2017-07-09
Attending: NEUROLOGICAL SURGERY
Payer: COMMERCIAL

## 2017-07-09 ENCOUNTER — APPOINTMENT (OUTPATIENT)
Dept: GENERAL RADIOLOGY | Facility: CLINIC | Age: 72
DRG: 028 | End: 2017-07-09
Payer: COMMERCIAL

## 2017-07-09 DIAGNOSIS — G06.1 SPINAL EPIDURAL ABSCESS: Primary | ICD-10-CM

## 2017-07-09 LAB
ANION GAP SERPL CALCULATED.3IONS-SCNC: 7 MMOL/L (ref 3–14)
BACTERIA SPEC CULT: NORMAL
BASOPHILS # BLD AUTO: 0 10E9/L (ref 0–0.2)
BASOPHILS NFR BLD AUTO: 0.2 %
BUN SERPL-MCNC: 8 MG/DL (ref 7–30)
CALCIUM SERPL-MCNC: 8.3 MG/DL (ref 8.5–10.1)
CHLORIDE SERPL-SCNC: 102 MMOL/L (ref 94–109)
CO2 SERPL-SCNC: 28 MMOL/L (ref 20–32)
CREAT SERPL-MCNC: 0.48 MG/DL (ref 0.66–1.25)
DIFFERENTIAL METHOD BLD: ABNORMAL
EOSINOPHIL # BLD AUTO: 0 10E9/L (ref 0–0.7)
EOSINOPHIL NFR BLD AUTO: 0.7 %
ERYTHROCYTE [DISTWIDTH] IN BLOOD BY AUTOMATED COUNT: 13.7 % (ref 10–15)
GFR SERPL CREATININE-BSD FRML MDRD: ABNORMAL ML/MIN/1.7M2
GLUCOSE BLDC GLUCOMTR-MCNC: 145 MG/DL (ref 70–99)
GLUCOSE BLDC GLUCOMTR-MCNC: 148 MG/DL (ref 70–99)
GLUCOSE BLDC GLUCOMTR-MCNC: 156 MG/DL (ref 70–99)
GLUCOSE BLDC GLUCOMTR-MCNC: 156 MG/DL (ref 70–99)
GLUCOSE SERPL-MCNC: 161 MG/DL (ref 70–99)
GRAM STN SPEC: NORMAL
HCT VFR BLD AUTO: 30.6 % (ref 40–53)
HGB BLD-MCNC: 10.5 G/DL (ref 13.3–17.7)
IMM GRANULOCYTES # BLD: 0 10E9/L (ref 0–0.4)
IMM GRANULOCYTES NFR BLD: 0 %
LYMPHOCYTES # BLD AUTO: 0.6 10E9/L (ref 0.8–5.3)
LYMPHOCYTES NFR BLD AUTO: 11.6 %
Lab: NORMAL
MAGNESIUM SERPL-MCNC: 1.9 MG/DL (ref 1.6–2.3)
MAGNESIUM SERPL-MCNC: 2.1 MG/DL (ref 1.6–2.3)
MCH RBC QN AUTO: 32.9 PG (ref 26.5–33)
MCHC RBC AUTO-ENTMCNC: 34.3 G/DL (ref 31.5–36.5)
MCV RBC AUTO: 96 FL (ref 78–100)
MICRO REPORT STATUS: NORMAL
MICRO REPORT STATUS: NORMAL
MONOCYTES # BLD AUTO: 0.5 10E9/L (ref 0–1.3)
MONOCYTES NFR BLD AUTO: 9.8 %
NEUTROPHILS # BLD AUTO: 4.2 10E9/L (ref 1.6–8.3)
NEUTROPHILS NFR BLD AUTO: 77.7 %
NRBC # BLD AUTO: 0 10*3/UL
NRBC BLD AUTO-RTO: 0 /100
PHOSPHATE SERPL-MCNC: 2 MG/DL (ref 2.5–4.5)
PHOSPHATE SERPL-MCNC: 2.1 MG/DL (ref 2.5–4.5)
PLATELET # BLD AUTO: 215 10E9/L (ref 150–450)
POTASSIUM SERPL-SCNC: 3.4 MMOL/L (ref 3.4–5.3)
POTASSIUM SERPL-SCNC: 3.5 MMOL/L (ref 3.4–5.3)
RBC # BLD AUTO: 3.19 10E12/L (ref 4.4–5.9)
SODIUM SERPL-SCNC: 137 MMOL/L (ref 133–144)
SPECIMEN SOURCE: NORMAL
SPECIMEN SOURCE: NORMAL
TROPONIN I SERPL-MCNC: NORMAL UG/L (ref 0–0.04)
WBC # BLD AUTO: 5.4 10E9/L (ref 4–11)

## 2017-07-09 PROCEDURE — 87186 SC STD MICRODIL/AGAR DIL: CPT | Performed by: STUDENT IN AN ORGANIZED HEALTH CARE EDUCATION/TRAINING PROGRAM

## 2017-07-09 PROCEDURE — 97162 PT EVAL MOD COMPLEX 30 MIN: CPT | Mod: GP

## 2017-07-09 PROCEDURE — 97110 THERAPEUTIC EXERCISES: CPT | Mod: GO | Performed by: OCCUPATIONAL THERAPIST

## 2017-07-09 PROCEDURE — 83735 ASSAY OF MAGNESIUM: CPT | Performed by: STUDENT IN AN ORGANIZED HEALTH CARE EDUCATION/TRAINING PROGRAM

## 2017-07-09 PROCEDURE — 71010 XR CHEST PORT 1 VW: CPT

## 2017-07-09 PROCEDURE — 25000128 H RX IP 250 OP 636: Performed by: STUDENT IN AN ORGANIZED HEALTH CARE EDUCATION/TRAINING PROGRAM

## 2017-07-09 PROCEDURE — 93010 ELECTROCARDIOGRAM REPORT: CPT | Performed by: INTERNAL MEDICINE

## 2017-07-09 PROCEDURE — 97535 SELF CARE MNGMENT TRAINING: CPT | Mod: GO | Performed by: OCCUPATIONAL THERAPIST

## 2017-07-09 PROCEDURE — 97165 OT EVAL LOW COMPLEX 30 MIN: CPT | Mod: GO | Performed by: OCCUPATIONAL THERAPIST

## 2017-07-09 PROCEDURE — 92526 ORAL FUNCTION THERAPY: CPT | Mod: GN | Performed by: SPEECH-LANGUAGE PATHOLOGIST

## 2017-07-09 PROCEDURE — 93005 ELECTROCARDIOGRAM TRACING: CPT

## 2017-07-09 PROCEDURE — 00000146 ZZHCL STATISTIC GLUCOSE BY METER IP

## 2017-07-09 PROCEDURE — 27210913 ZZH NUTRITION PRODUCT INTERMEDIATE PACKET

## 2017-07-09 PROCEDURE — 80048 BASIC METABOLIC PNL TOTAL CA: CPT | Performed by: NEUROLOGICAL SURGERY

## 2017-07-09 PROCEDURE — 25000125 ZZHC RX 250: Performed by: STUDENT IN AN ORGANIZED HEALTH CARE EDUCATION/TRAINING PROGRAM

## 2017-07-09 PROCEDURE — 36415 COLL VENOUS BLD VENIPUNCTURE: CPT | Performed by: NEUROLOGICAL SURGERY

## 2017-07-09 PROCEDURE — 87077 CULTURE AEROBIC IDENTIFY: CPT | Performed by: STUDENT IN AN ORGANIZED HEALTH CARE EDUCATION/TRAINING PROGRAM

## 2017-07-09 PROCEDURE — 84100 ASSAY OF PHOSPHORUS: CPT | Performed by: STUDENT IN AN ORGANIZED HEALTH CARE EDUCATION/TRAINING PROGRAM

## 2017-07-09 PROCEDURE — 36415 COLL VENOUS BLD VENIPUNCTURE: CPT | Performed by: STUDENT IN AN ORGANIZED HEALTH CARE EDUCATION/TRAINING PROGRAM

## 2017-07-09 PROCEDURE — 97530 THERAPEUTIC ACTIVITIES: CPT | Mod: GP

## 2017-07-09 PROCEDURE — 40000133 ZZH STATISTIC OT WARD VISIT: Performed by: OCCUPATIONAL THERAPIST

## 2017-07-09 PROCEDURE — 25000125 ZZHC RX 250: Performed by: NEUROLOGICAL SURGERY

## 2017-07-09 PROCEDURE — 97110 THERAPEUTIC EXERCISES: CPT | Mod: GP

## 2017-07-09 PROCEDURE — 83735 ASSAY OF MAGNESIUM: CPT | Performed by: NEUROLOGICAL SURGERY

## 2017-07-09 PROCEDURE — 85025 COMPLETE CBC W/AUTO DIFF WBC: CPT | Performed by: NEUROLOGICAL SURGERY

## 2017-07-09 PROCEDURE — 84100 ASSAY OF PHOSPHORUS: CPT | Performed by: NEUROLOGICAL SURGERY

## 2017-07-09 PROCEDURE — 40000193 ZZH STATISTIC PT WARD VISIT

## 2017-07-09 PROCEDURE — 84132 ASSAY OF SERUM POTASSIUM: CPT | Performed by: STUDENT IN AN ORGANIZED HEALTH CARE EDUCATION/TRAINING PROGRAM

## 2017-07-09 PROCEDURE — 20000004 ZZH R&B ICU UMMC

## 2017-07-09 PROCEDURE — 25000132 ZZH RX MED GY IP 250 OP 250 PS 637: Performed by: STUDENT IN AN ORGANIZED HEALTH CARE EDUCATION/TRAINING PROGRAM

## 2017-07-09 PROCEDURE — 40000225 ZZH STATISTIC SLP WARD VISIT: Performed by: SPEECH-LANGUAGE PATHOLOGIST

## 2017-07-09 PROCEDURE — 87205 SMEAR GRAM STAIN: CPT | Performed by: STUDENT IN AN ORGANIZED HEALTH CARE EDUCATION/TRAINING PROGRAM

## 2017-07-09 PROCEDURE — 87070 CULTURE OTHR SPECIMN AEROBIC: CPT | Performed by: STUDENT IN AN ORGANIZED HEALTH CARE EDUCATION/TRAINING PROGRAM

## 2017-07-09 PROCEDURE — 27210429 ZZH NUTRITION PRODUCT INTERMEDIATE LITER

## 2017-07-09 PROCEDURE — 84484 ASSAY OF TROPONIN QUANT: CPT | Performed by: STUDENT IN AN ORGANIZED HEALTH CARE EDUCATION/TRAINING PROGRAM

## 2017-07-09 RX ORDER — POTASSIUM CL/LIDO/0.9 % NACL 10MEQ/0.1L
10 INTRAVENOUS SOLUTION, PIGGYBACK (ML) INTRAVENOUS
Status: DISCONTINUED | OUTPATIENT
Start: 2017-07-09 | End: 2017-07-26 | Stop reason: HOSPADM

## 2017-07-09 RX ORDER — POTASSIUM CHLORIDE 29.8 MG/ML
20 INJECTION INTRAVENOUS
Status: DISCONTINUED | OUTPATIENT
Start: 2017-07-09 | End: 2017-07-26 | Stop reason: HOSPADM

## 2017-07-09 RX ORDER — POTASSIUM CL/LIDO/0.9 % NACL 10MEQ/0.1L
10 INTRAVENOUS SOLUTION, PIGGYBACK (ML) INTRAVENOUS
Status: DISCONTINUED | OUTPATIENT
Start: 2017-07-09 | End: 2017-07-09

## 2017-07-09 RX ORDER — POTASSIUM CHLORIDE 7.45 MG/ML
10 INJECTION INTRAVENOUS
Status: DISCONTINUED | OUTPATIENT
Start: 2017-07-09 | End: 2017-07-09

## 2017-07-09 RX ORDER — POTASSIUM CHLORIDE 7.45 MG/ML
10 INJECTION INTRAVENOUS
Status: DISCONTINUED | OUTPATIENT
Start: 2017-07-09 | End: 2017-07-26 | Stop reason: HOSPADM

## 2017-07-09 RX ORDER — POTASSIUM CHLORIDE 750 MG/1
20-40 TABLET, EXTENDED RELEASE ORAL
Status: DISCONTINUED | OUTPATIENT
Start: 2017-07-09 | End: 2017-07-09

## 2017-07-09 RX ORDER — POTASSIUM CHLORIDE 750 MG/1
20-40 TABLET, EXTENDED RELEASE ORAL
Status: DISCONTINUED | OUTPATIENT
Start: 2017-07-09 | End: 2017-07-26 | Stop reason: HOSPADM

## 2017-07-09 RX ORDER — MAGNESIUM SULFATE HEPTAHYDRATE 40 MG/ML
4 INJECTION, SOLUTION INTRAVENOUS EVERY 4 HOURS PRN
Status: DISCONTINUED | OUTPATIENT
Start: 2017-07-09 | End: 2017-07-26 | Stop reason: HOSPADM

## 2017-07-09 RX ORDER — METOPROLOL TARTRATE 25 MG/1
25 TABLET, FILM COATED ORAL 2 TIMES DAILY
Status: DISCONTINUED | OUTPATIENT
Start: 2017-07-09 | End: 2017-07-09

## 2017-07-09 RX ORDER — ACETAMINOPHEN 650 MG/1
650 SUPPOSITORY RECTAL EVERY 4 HOURS PRN
Status: DISCONTINUED | OUTPATIENT
Start: 2017-07-09 | End: 2017-07-26 | Stop reason: HOSPADM

## 2017-07-09 RX ORDER — MAGNESIUM SULFATE HEPTAHYDRATE 40 MG/ML
4 INJECTION, SOLUTION INTRAVENOUS EVERY 4 HOURS PRN
Status: DISCONTINUED | OUTPATIENT
Start: 2017-07-09 | End: 2017-07-09

## 2017-07-09 RX ORDER — METOPROLOL TARTRATE 1 MG/ML
5 INJECTION, SOLUTION INTRAVENOUS EVERY 6 HOURS
Status: DISCONTINUED | OUTPATIENT
Start: 2017-07-09 | End: 2017-07-14

## 2017-07-09 RX ORDER — POTASSIUM CHLORIDE 29.8 MG/ML
20 INJECTION INTRAVENOUS
Status: DISCONTINUED | OUTPATIENT
Start: 2017-07-09 | End: 2017-07-09

## 2017-07-09 RX ORDER — AMINO ACIDS/PROTEIN HYDROLYS 15G-100/30
1 LIQUID (ML) ORAL 3 TIMES DAILY
Status: DISCONTINUED | OUTPATIENT
Start: 2017-07-09 | End: 2017-07-17

## 2017-07-09 RX ORDER — POTASSIUM CHLORIDE 1.5 G/1.58G
20-40 POWDER, FOR SOLUTION ORAL
Status: DISCONTINUED | OUTPATIENT
Start: 2017-07-09 | End: 2017-07-26 | Stop reason: HOSPADM

## 2017-07-09 RX ORDER — POTASSIUM CHLORIDE 1.5 G/1.58G
20 POWDER, FOR SOLUTION ORAL ONCE
Status: DISCONTINUED | OUTPATIENT
Start: 2017-07-09 | End: 2017-07-13

## 2017-07-09 RX ORDER — POTASSIUM CHLORIDE 1.5 G/1.58G
20-40 POWDER, FOR SOLUTION ORAL
Status: DISCONTINUED | OUTPATIENT
Start: 2017-07-09 | End: 2017-07-09

## 2017-07-09 RX ADMIN — CEFEPIME HYDROCHLORIDE 2 G: 2 INJECTION, POWDER, FOR SOLUTION INTRAVENOUS at 21:14

## 2017-07-09 RX ADMIN — METRONIDAZOLE 500 MG: 500 INJECTION, SOLUTION INTRAVENOUS at 15:58

## 2017-07-09 RX ADMIN — Medication 2 G: at 22:07

## 2017-07-09 RX ADMIN — CEFEPIME HYDROCHLORIDE 2 G: 2 INJECTION, POWDER, FOR SOLUTION INTRAVENOUS at 05:54

## 2017-07-09 RX ADMIN — SODIUM PHOSPHATE, MONOBASIC, MONOHYDRATE AND SODIUM PHOSPHATE, DIBASIC, ANHYDROUS 20 MMOL: 276; 142 INJECTION, SOLUTION INTRAVENOUS at 00:23

## 2017-07-09 RX ADMIN — POTASSIUM PHOSPHATE, MONOBASIC AND POTASSIUM PHOSPHATE, DIBASIC 15 MMOL: 224; 236 INJECTION, SOLUTION INTRAVENOUS at 23:39

## 2017-07-09 RX ADMIN — POTASSIUM CHLORIDE 10 MEQ: 14.9 INJECTION, SOLUTION, CONCENTRATE PARENTERAL at 23:04

## 2017-07-09 RX ADMIN — ACETAMINOPHEN 650 MG: 650 SUPPOSITORY RECTAL at 20:50

## 2017-07-09 RX ADMIN — THIAMINE HYDROCHLORIDE 500 MG: 100 INJECTION, SOLUTION INTRAMUSCULAR; INTRAVENOUS at 17:31

## 2017-07-09 RX ADMIN — VANCOMYCIN HYDROCHLORIDE 1500 MG: 10 INJECTION, POWDER, LYOPHILIZED, FOR SOLUTION INTRAVENOUS at 01:59

## 2017-07-09 RX ADMIN — METRONIDAZOLE 500 MG: 500 INJECTION, SOLUTION INTRAVENOUS at 07:55

## 2017-07-09 RX ADMIN — INSULIN ASPART 1 UNITS: 100 INJECTION, SOLUTION INTRAVENOUS; SUBCUTANEOUS at 17:30

## 2017-07-09 RX ADMIN — POTASSIUM PHOSPHATE, MONOBASIC AND POTASSIUM PHOSPHATE, DIBASIC 15 MMOL: 224; 236 INJECTION, SOLUTION INTRAVENOUS at 10:18

## 2017-07-09 RX ADMIN — POTASSIUM CHLORIDE 10 MEQ: 14.9 INJECTION, SOLUTION, CONCENTRATE PARENTERAL at 03:09

## 2017-07-09 RX ADMIN — METRONIDAZOLE 500 MG: 500 INJECTION, SOLUTION INTRAVENOUS at 01:58

## 2017-07-09 RX ADMIN — POTASSIUM CHLORIDE 10 MEQ: 14.9 INJECTION, SOLUTION, CONCENTRATE PARENTERAL at 04:19

## 2017-07-09 RX ADMIN — LIDOCAINE 3 PATCH: 50 PATCH CUTANEOUS at 07:53

## 2017-07-09 RX ADMIN — POTASSIUM CHLORIDE 10 MEQ: 14.9 INJECTION, SOLUTION, CONCENTRATE PARENTERAL at 10:43

## 2017-07-09 RX ADMIN — THIAMINE HYDROCHLORIDE 500 MG: 100 INJECTION, SOLUTION INTRAMUSCULAR; INTRAVENOUS at 20:08

## 2017-07-09 RX ADMIN — INSULIN ASPART 1 UNITS: 100 INJECTION, SOLUTION INTRAVENOUS; SUBCUTANEOUS at 14:44

## 2017-07-09 RX ADMIN — POTASSIUM CHLORIDE 10 MEQ: 14.9 INJECTION, SOLUTION, CONCENTRATE PARENTERAL at 00:42

## 2017-07-09 RX ADMIN — POTASSIUM CHLORIDE 10 MEQ: 14.9 INJECTION, SOLUTION, CONCENTRATE PARENTERAL at 05:28

## 2017-07-09 RX ADMIN — SODIUM CHLORIDE: 900 INJECTION, SOLUTION INTRAVENOUS at 06:49

## 2017-07-09 RX ADMIN — THIAMINE HYDROCHLORIDE 500 MG: 100 INJECTION, SOLUTION INTRAMUSCULAR; INTRAVENOUS at 12:00

## 2017-07-09 RX ADMIN — LORAZEPAM 0.5 MG: 2 INJECTION INTRAMUSCULAR; INTRAVENOUS at 21:00

## 2017-07-09 RX ADMIN — POTASSIUM CHLORIDE 10 MEQ: 14.9 INJECTION, SOLUTION, CONCENTRATE PARENTERAL at 09:15

## 2017-07-09 RX ADMIN — VANCOMYCIN HYDROCHLORIDE 1500 MG: 10 INJECTION, POWDER, LYOPHILIZED, FOR SOLUTION INTRAVENOUS at 14:21

## 2017-07-09 RX ADMIN — METOPROLOL TARTRATE 5 MG: 5 INJECTION INTRAVENOUS at 19:50

## 2017-07-09 RX ADMIN — CEFEPIME HYDROCHLORIDE 2 G: 2 INJECTION, POWDER, FOR SOLUTION INTRAVENOUS at 14:49

## 2017-07-09 RX ADMIN — METRONIDAZOLE 500 MG: 500 INJECTION, SOLUTION INTRAVENOUS at 22:15

## 2017-07-09 ASSESSMENT — VISUAL ACUITY
OU: NORMAL ACUITY
OU: NORMAL ACUITY
OU: NORMAL ACUITY;GLASSES
OU: NORMAL ACUITY
OU: NORMAL ACUITY;GLASSES
OU: NORMAL ACUITY

## 2017-07-09 ASSESSMENT — ACTIVITIES OF DAILY LIVING (ADL)
TRANSFERRING: 0-->INDEPENDENT
RETIRED_EATING: 0-->INDEPENDENT
AMBULATION: 1-->ASSISTIVE EQUIPMENT
DRESS: 0-->INDEPENDENT
TOILETING: 0-->INDEPENDENT
NUMBER_OF_TIMES_PATIENT_HAS_FALLEN_WITHIN_LAST_SIX_MONTHS: 3
PREVIOUS_RESPONSIBILITIES: MEAL PREP;HOUSEKEEPING;LAUNDRY;SHOPPING;YARDWORK;DRIVING
RETIRED_COMMUNICATION: 0-->UNDERSTANDS/COMMUNICATES WITHOUT DIFFICULTY
SWALLOWING: 0-->SWALLOWS FOODS/LIQUIDS WITHOUT DIFFICULTY
COGNITION: 0 - NO COGNITION ISSUES REPORTED
IADL_COMMENTS: PT IS RETIRED.
FALL_HISTORY_WITHIN_LAST_SIX_MONTHS: YES
BATHING: 0-->INDEPENDENT

## 2017-07-09 ASSESSMENT — PAIN DESCRIPTION - DESCRIPTORS: DESCRIPTORS: SORE

## 2017-07-09 NOTE — PROGRESS NOTES
"[facility], [place]    NEUROCRITICAL CARE PROGRESS NOTE      Problem List    Active Hospital Problems    Cervical stenosis of spine      Epidural abscess            Brief summary of hospital stay to date:  Overnight remained clinically stable. Mildly improving physical exam after surgical decompression.     Present Medications    ceFEPIme (MAIXPIME) IV  2 g Intravenous Q8H     metroNIDAZOLE  500 mg Intravenous Q6H     vancomycin (VANCOCIN) IV  1,500 mg Intravenous Q12H     lidocaine (viscous)  5 mL Topical Once     LORazepam  0.5-4 mg Intravenous See Admin Instructions    Or     LORazepam  0.5-4 mg Oral See Admin Instructions     sodium chloride (PF)  3 mL Intracatheter Q8H     acetaminophen  975 mg Oral Q8H     lidocaine  3 patch Transdermal Q24H     lidocaine   Transdermal Q24h     lidocaine   Transdermal Q8H     menthol   Transdermal Q8H     senna-docusate  1-2 tablet Oral BID     polyethylene glycol  17 g Oral BID     insulin aspart  1-7 Units Subcutaneous TID AC     insulin aspart  1-5 Units Subcutaneous At Bedtime     thiamine  500 mg Intravenous TID     omeprazole  20 mg Oral QAM AC     folic acid  1 mg Oral Daily     multivitamin, therapeutic with minerals  1 tablet Oral Daily       NaCl 100 mL/hr at 17 0649       EXAMINATION    Vital Signs  /78  Pulse 64  Temp 98.2  F (36.8  C) (Axillary)  Resp 20  Ht 1.854 m (6' 1\")  Wt 89.6 kg (197 lb 8.5 oz)  SpO2 95%  BMI 26.06 kg/m2    Tmax: Temp (24hrs), Av  F (36.7  C), Min:97.9  F (36.6  C), Max:98.4  F (36.9  C)      Intake/Output:   Intake/Output Summary (Last 24 hours) at 17 0948  Last data filed at 17 0800   Gross per 24 hour   Intake             3474 ml   Output             3400 ml   Net               74 ml           General Examination   Level of Alertness: awake  Orientation: x 3 (time, place, person)  HEENT: intact - Drain S/P Cervical cord decompression surgery - Posterior " approach     Nutrition None        NeuroCritical Neurologic Examination  Patient is alert oriented - mildly delirious after surgery. Cranial nerves 2-12 intact. BL UE LE weakness proximal and distal 3-4/5, localizes touch all four extremities. DTR BL UE 1+ , absent BL LE. Left toe up.     Cardiovascular:  RRR  Pulmonary:  no respiratory distress  Abdominal:  soft  Genitourinary:    No suprapubic tenderness  No costovertebral angle tenderness  Adequate urine output in last 24 hours  Extremities:  no edema         CMP   Recent Labs  Lab 07/09/17  0717 07/08/17  2211 07/08/17  0256 07/07/17  0848 07/07/17  0705 07/07/17  0552     --  137 133 133 133   POTASSIUM 3.4 3.1* 4.4 4.0 4.1 4.6   CHLORIDE 102  --  104  --   --  98   CO2 28  --  24  --   --  26   ANIONGAP 7  --  8  --   --  9   *  --  160* 191* 186* 190*   BUN 8  --  12  --   --  18   CR 0.48*  --  0.55*  --   --  0.70   GFRESTIMATED >90Non  GFR Calc  --  >90Non  GFR Calc  --   --  >90Non  GFR Calc   GFRESTBLACK >90African American GFR Calc  --  >90African American GFR Calc  --   --  >90African American GFR Calc   KAROL 8.3*  --  8.3*  --   --  9.2   MAG 2.1 1.7 1.8  --   --  2.0   PHOS 2.1* 1.7* 2.1*  --   --  3.7        CBC   Recent Labs  Lab 07/09/17  0717 07/08/17  1137 07/08/17  0920 07/08/17  0256   WBC 5.4 7.8 9.4 4.9   RBC 3.19* 3.04* 3.17* 3.23*   HGB 10.5* 10.1* 10.5* 10.7*   HCT 30.6* 29.4* 30.6* 31.4*   MCV 96 97 97 97   MCH 32.9 33.2* 33.1* 33.1*   MCHC 34.3 34.4 34.3 34.1   RDW 13.7 13.9 14.0 14.2    254 247 88*       INR, PTT   Recent Labs  Lab 07/08/17  1137 07/07/17  0552   INR 1.17* 1.10   PTT 31 29        Arterial Blood Gas   Recent Labs  Lab 07/07/17  0848 07/07/17  0705   PH 7.42 7.38   PCO2 35 42   PO2 290* 380*   HCO3 23 25   KENDRA  --  0.0   O2PER 50.0 73.0       UA    Recent Labs  Lab 07/07/17  2142   COLOR Yellow   APPEARANCE Clear   URINEGLC 30*   URINEBILI Negative    URINEKETONE 80*   SG 1.017   UBLD Small*   URINEPH 5.5   PROTEIN 10*   NITRITE Negative   LEUKEST Trace*   RBCU 10*   WBCU 2       Micro   Recent Labs  Lab 07/08/17  0256  07/07/17  2159   SDES Blood Arterial line  < > Sputum  Sputum   SREQ  --   --  Screen   CULT No growth after 17 hours  < > Culture negative < 24 hours, reincubate   RPT Pending  < > Pending  FINAL 07/07/2017   < > = values in this interval not displayed.       Radiological Data  Data   Recent Results (from the past 48 hour(s))   MR Cervical Spine w/o & w Contrast    Narrative    MR CERVICAL SPINE W/O & W CONTRAST 7/7/2017 6:27 PM    Provided History: s/p decompression, abscesses    Comparison: MR 6/29/2017    Technique: Sagittal T1-weighted, sagittal T2-weighted, sagittal  diffusion weighted, axial T2-weighted, and axial T2* gradient echo  images of the cervical spine were obtained without intravenous  contrast. Following intravenous administration of gadolinium, axial  and sagittal T1-weighted images with fat saturation were also  obtained.    Contrast: 10 ml Gadavist    Findings:  Surgical changes of bilateral laminectomy at levels C3-C5. There is  edematous changes in the posterior vertebral musculature presumably  related to surgery.    Again seen are the increased T2 signal in the C3-C5 vertebral bodies,  with a small degree of destructive changes of the opposing endplates  at level C4-C5.     There is an rim-enhancing fluid collection posterior to the cervical  spine in the epidural space of levels C4-C5 (series 9, image 6. This  measures approximately 0.3 x 1.2 x 0.9 cm and is located in the  central portion of the spinal canal. There is flattening of the spinal  cord at this level, and moderate spinal canal narrowing. There is  likely a small focus of myelomalacia in the right side of the cervical  cord at this level best seen on image 7 of series 5 and 3. This was  not definitely present on the prior study.    There is a second fluid  collection which is continuous with the  intervertebral disc space fluid collection at C4-C5 extending into the  soft tissues anterior to the cervical spine. This measures  approximately 0.9 x 2.0 cm (AP, CC; series 9, image 7).       Impression    Impression:   1. Post surgical changes of a decompressive surgery at levels from C3  through C5.  2. Osteomyelitis/discitis with an abscess extending anteriorly from  the C4-C5 disc space into the prevertebral space, and a second abscess  extending posteriorly into the epidural space. There is moderate  thecal sac narrowing at this level and a new small focus of  myelomalacia in the right hemicord at this level.    I have personally reviewed the examination and initial interpretation  and I agree with the findings.    SUKUMAR BATISTA MD   XR Chest Port 1 View    Narrative    Exam: XR CHEST PORT 1 VW  7/7/2017 10:11 PM      History: infection    Comparison: None    Findings: The cardiac silhouette is within normal limits. Trachea is  midline. Pulmonary vasculature is unremarkable. There is no pleural  effusion. No pneumothorax. Left basilar streaky opacities. Visualized  abdomen is normal.      Impression    Impression: Left basilar streaky opacities, infection versus  atelectasis.    I have personally reviewed the examination and initial interpretation  and I agree with the findings.    ALEXEY LOPEZ MD          ASSESSMENT AND PLAN    Neuro: Patient is alert - quadriparetic S/P Posterior approach C 3-4-5 and partial 6 laminectomy, mild clinical improvement post surgically. Epidural fluid collection cultures pending.     Respiratory: CXR with mild left base opacity. Currently maintaining airway - IPI 9-10.     CVS: MAP goal > 80. Currently maintaining. Hx of CAD- will need to obtain more information on home medication. Concern for endocarditis - TTE normal. After electrolyte replacement if patient still has runs of VT consider cardiology consult.      Endo: Maintain  euglycemia     GI: NPO for now with severe dysphagia. Considering feeding tube.     Renal/ : Intact renal function at this time. Follow I/O     Heme: Platelets to 80s. Added labs for possible consumptive process in setting of suspected infection. Monitor CBC Q8H     ID: Possible systemic infection. ? Dental access being evaluated. Fluid collection drained and cultures are pending. At this time patient is afebrile and does not have leucocytosis - however unclear if he was on immune suppression for RA. Blood cx negative so far.    Considering ID consult.      Consider broad spectrum empiric ABx coverage until fluid cultures are back.     Patient's next Methotrexate dose due on Monday per schedule that he is on for RA.      GI proph      : PPI  DVT proph : SCD    Thank you for involving the Neuro-Critical Care Team in the care of this patient.  We will continue to follow.  Please do not hesitate to contact us for any further questions.    Moris Deleon

## 2017-07-09 NOTE — PROGRESS NOTES
CLINICAL NUTRITION SERVICES - progress toward nutrition POC. See 7/7 note for full assessment.  **Received consult to start TF    -Diet: NPO per SLP. No EN access yet.   -Labs: Phos 1.7 --> 2 and K+ 3.1--> 3.4 from yesterday to today.    Interventions  Discussed w/ MDs about FT access, OK with waiting until tomorrow for small-bore FT vs NGT today. Asked MDs to order radiology placement as RD unable to release (d/t weekend).  Enteral Nutrition - Ordered TF as previously rec'd by RD: TwoCal HN @ 10 mL/hr adv 10 mL q8h to goal 45 mL/hr + 3 pkts ProStat daily. Free water flushes 30 mL Q4h + certavite.     RD will continue to follow.    Lakeisha Gao RD, LD, [facility]  Weekend pager: [phone]    Neuro RD to follow: [id number]

## 2017-07-09 NOTE — PROGRESS NOTES
07/09/17 1342   Quick Adds   Type of Visit Initial PT Evaluation   Living Environment   Lives With spouse   Living Arrangements house   Home Accessibility stairs within home;stairs to enter home;tub/shower is not walk in   Number of Stairs to Enter Home 5  (B railings, not reach both same time)   Number of Stairs Within Home 12  ( R railing )   Transportation Available car;family or friend will provide   Living Environment Comment Sauna in basement, bathroom in basement has walk-in shower, mainfloor is a tub/shower, basement has a wood boiler that heats the home goes down into basement in winter to feed boiler   Self-Care   Usual Activity Tolerance good   Current Activity Tolerance fair   Regular Exercise no   Equipment Currently Used at Home none   Activity/Exercise/Self-Care Comment 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; before ~3 weeks ago very active, used to enjoy fishing but cutting firewood takes all summer now   Functional Level Prior   Ambulation 1-->assistive equipment  (occasional use of cane prior to toe amputation)   Transferring 0-->independent   Toileting 0-->independent   Bathing 0-->independent   Dressing 0-->independent   Eating 0-->independent   Communication 0-->understands/communicates without difficulty   Swallowing 0-->swallows foods/liquids without difficulty   Cognition 0 - no cognition issues reported   Fall history within last six months yes   Number of times patient has fallen within last six months 3  (all in the last ~3 weeks)   Which of the above functional risks had a recent onset or change? ambulation;transferring;toileting;bathing;dressing;eating;swallowing;cognition;fall history   Prior Functional Level Comment Prior to about 3 weeks ago, pt was IND and active, Only used cane occasionally per family   General Information   Onset of Illness/Injury or Date of Surgery - Date 07/07/17   Referring Physician Bob Marcus MD   Patient/Family Goals Statement Just wants to get better    Pertinent History of Current Problem (include personal factors and/or comorbidities that impact the POC) 72 year old male who is postoperative day # 2 from C3-5 and C6 laminectomy   Precautions/Limitations fall precautions;spinal precautions  (C-spine precautions, needs to wear C-collar)   Weight-Bearing Status - RLE (Needs offloading boot R foot per neurosurgery per podiatry)   Heart Disease Risk Factors Diabetes;Overweight;Medical history;Gender;Age   General Info Comments R middle amputation   Cognitive Status Examination   Orientation person;place  (partially oriented to [facility])   Level of Consciousness confused;alert   Follows Commands and Answers Questions 75% of the time;able to follow single-step instructions   Personal Safety and Judgment impaired   Cognitive Comment Pt thought it was winter & January, re-oriented to month/date   Pain Assessment   Patient Currently in Pain No   Integumentary/Edema   Integumentary/Edema Comments Pt with R middle toe amputation   Posture    Posture Comments In C-collar   Range of Motion (ROM)   ROM Comment BLE WFL, BUE appears WFL but with minor hand stiffness   Strength   Strength Comments Grossly demonstrates L 2-/5 knee/hip extension, 3/5 DF L, 2-/5 adduction L, L 1/5 abduction hip, 2-/5 gross hip/knee flexion; R hip/knee flexion grossly 1/5, R hip/knee extension 2-/5 grossly, R ankle DF/PF 2/5, Adduction R hip 1/5 - decreased ability with isolated movements vs gross large motor movements   Bed Mobility   Bed Mobility Comments L rolling with mod A, R rolling max A   Transfer Skills   Transfer Comments Dependently bed > chair with lift and sling   Gait   Gait Comments Not assessed at this time   Balance   Balance Comments Supported sitting able to maintain postural control   Sensory Examination   Sensory Perception Comments Impaired sensation in BLEs, pt had intact light touch prior to amputation per discussion with wife & patient   General Therapy  "Interventions   Planned Therapy Interventions balance training;bed mobility training;gait training;neuromuscular re-education;motor coordination training;ROM;strengthening;stretching;transfer training;risk factor education;home program guidelines;progressive activity/exercise   Clinical Impression   Criteria for Skilled Therapeutic Intervention yes, treatment indicated   PT Diagnosis impaired functional mobility   Influenced by the following impairments decreased strength, sensation, coordination, cognition, activity tolerance, balance   Functional limitations due to impairments gait, stairs, transfers, bed mobility, functional endurance   Clinical Presentation Evolving/Changing   Clinical Presentation Rationale Pt with changing strength and possible return to OR following R middle toe amputation and Cervical laminectomy   Clinical Decision Making (Complexity) Moderate complexity   Therapy Frequency` daily   Predicted Duration of Therapy Intervention (days/wks) 4 weeks   Anticipated Discharge Disposition Acute Rehabilitation Facility   Risk & Benefits of therapy have been explained Yes   Patient, Family & other staff in agreement with plan of care Yes   [facility] TM \"6 Clicks\"   2016, Trustees of [facility], under license to [employer].  All rights reserved.   6 Clicks Short Forms Basic Mobility Inpatient Short Form   [facility]  \"6 Clicks\" V.2 Basic Mobility Inpatient Short Form   1. Turning from your back to your side while in a flat bed without using bedrails? 2 - A Lot   2. Moving from lying on your back to sitting on the side of a flat bed without using bedrails? 2 - A Lot   3. Moving to and from a bed to a chair (including a wheelchair)? 1 - Total   4. Standing up from a chair using your arms (e.g., wheelchair, or bedside chair)? 1 - Total   5. To walk in hospital room? 1 - Total   6. Climbing 3-5 steps with a railing? 1 - Total   Basic Mobility Raw Score (Score out of " 24.Lower scores equate to lower levels of function) 8   Total Evaluation Time   Total Evaluation Time (Minutes) 12

## 2017-07-09 NOTE — PLAN OF CARE
Problem: Goal Outcome Summary  Goal: Goal Outcome Summary  OT 4A Orders received, evaluation complete, treatment initiated.  Pt with continued confusion and possible hallucinations. Pleasant and agreeable.  Pt with deficits in UE function, weakness distal>proximal with 0 to trace strength in fingers and thumb on the L to 3/5 at the shoulders. R stronger than the L.  Pt currently D/Lift transfers due to LE weakness.  Educated family re assist to provide for cognition, strengthening and ROM.   [facility]  Rec: ARU to improve strength activity tolerance and compensation for ADLs and functional transfers prior to return to IND living.        Pt completed the Short Blessed Test of cognitive function (SBT).  Pt scored 12/28 indicating  impairment with memory and concentration.    Interpretation: 0 8 = normal to mild impairment, 9 19 = moderate impairment, 20 28 = severe impairment.  Per family this is below his baseline.

## 2017-07-09 NOTE — PLAN OF CARE
Problem: Goal Outcome Summary  Goal: Goal Outcome Summary  Outcome: No Change  D/I:  [facility] for emergent decompression of cervical hematoma vs. Abscess with posterior C3-5 & partial 6 lami.  A:   - Neuro:  Oriented x1-2; speech clear but frequently illogical; confused; probable hallucinations (black and white vermin running under bed, people in room, in walk-in cooler); labile mood with intermittent agitation, restlessness, and refusal of cares; denying attempts to reorient.  Gross voluntary movement in BUE and LLE; negligible movement in the RLE.  LUE strength consistently > RUE.  Minimal pain, only with repositioning to neck.   - Resp:  RA, lungs CTA, weak-fair cough with moderate amt of yellow secretions.   - CV:  Sinus-sinus tach with freq PAC, rare PVC; BPs becoming hypertensive late in shift but below prn parameters.  Afebrile, L radial art line removed.   - GI:  Continues to be NPO d/t failed swallow eval by SLP.  BS active, no BM.   - :  York in place with adequate amt of urine output.   - Skin:  C collar remains in place with blanchable redness to areas of contact at mandibular angle/ear region,  PIV x2, scattered scabs to knees, scalp, drain with SS output, diffuse bruising throughout.   - Social:  Wife and other family members to bedside today, supportive and asking pertinent questions, helpful in keeping pt calm and providing PMH.  [facility].  P:  Continue monitoring for neurologic changes, follow per POC, contact team prn for questions/problems/concerns.

## 2017-07-09 NOTE — PLAN OF CARE
Problem: Goal Outcome Summary  Goal: Goal Outcome Summary  4A - Evaluation completed and treatment initiated. Pt requires Ax2 with lift for transfers, noted to have gross LE 1 to 2/5 on LLE, 0 to 2-/5 on RLE. R rolling with mod A, L rolling with max A, C-collar on throughout session. Pt motivated to work with therapy, making some confused comments throughout session.  Currently recommend ARU when medically appropriate.

## 2017-07-09 NOTE — PLAN OF CARE
Problem: Goal Outcome Summary  Goal: Goal Outcome Summary  Outcome: No Change  Pt continues to have intermittent confusion, disoriented to place and time.Pt weak bilaterally,  L > R, sensation improving. PERRL. Pt is no longer hallucinating. MSSA 14. C collar in place.  Pt had run of SVT this am and 14 beat run of vtach. SR with frequent PVC/ PAC.  Electrolytes being replaced per protocol, MD aware- saw pt at the bedside. BP stable, pt states he is not feeling like his heart is racing, asymptomatic. Afebrile. No bm, bowel sounds faint/hypoactive. York patent, making adequate urine. Hemovac removed this am by nsg. No acute skin changes. No bm this shift. Continue to monitor per protocol, notify MD of any changes.

## 2017-07-09 NOTE — PROGRESS NOTES
[facility], [place]    Neurosurgery Daily Progress Note         Assessment   Ernesto Rawls is a 72 year old male who is postoperative day # 2 from C3-5 and C6 laminectomy.          Plan     Continue current pain control regimen.    Routine neuro exams per unit protocol.    Continue with constant cervical collar     Dentistry: no apparent oral abscess, should follow up with a dentist as an outpatient    Podiatry: daily dressing changes    ENT: esophageal scope - no abscess target to drain via esophagus    MAPS > 80, has been maintaining without pressors    Incentive spirometry Q1H while awake.    SLP: NPO    Nutrition consult for enteric access and starting TFs    Bowel regimen. Anti-emetics.     SCDs while in bed.    Started on CIWA protocol d/t hallucinations    PT/OT --- mobilize.    Ambulate with assistance as tolerated.    Dispo; Stable in ICU.     Please dial * * * and enter job [phone] to reach the neurosurgery team if you have any questions.      Patient and above plan discussed with neurosurgery chief resident.         Subjective     Short run of V-tach again overnight. Replaced potasium and phosphate aggressively. Run occurred while nurse was taking pt's pulse, reported strong pulses during the event. Hallucinating overnight.           Objective   Temp:  [97.9  F (36.6  C)-100  F (37.8  C)] (P) 100  F (37.8  C)  Heart Rate:  [] 91  Resp:  [11-21] 20  BP: (147-199)/() 157/78  SpO2:  [90 %-99 %] 95 %    I/O last 3 completed shifts:  In: 2659 [I.V.:2659]  Out: 2850 [Urine:2825; Drains:25]    Physical Exam  General: Appears comfortable, NAD  Wound: Incision, clean, dry, intact without strikethrough  Neurologic Exam:  - AOx2.  - Follows commands.  - Speech fluent, spontaneous. No aphasia or dysarthria.  - No gaze preference. No apparent hemineglect.  - PERRL, EOMI.  - Face symmetric with sensation intact to light touch.  Motor: Normal bulk/tone; no tremor, rigidity, or  bradykinesia.     Delt Bi Tri FE IP Quad Hamst TibAnt EHL Gastroc    C5 C6 C7 C8/T1 L2 L3 L4-S1 L4 L5 S1   R 4 4 4 2 1 1 1 4- 3 4-   L 4 4 4 2 1 1 1 4- 3 4-       Sensory:  Deltoid sensory level.       Labs and Imaging     BMP    Recent Labs  Lab 07/09/17 0717 07/08/17  2211 07/08/17  0256 07/07/17  0848 07/07/17  0705 07/07/17  0552     --  137 133 133 133   POTASSIUM 3.4 3.1* 4.4 4.0 4.1 4.6   CHLORIDE 102  --  104  --   --  98   KAROL 8.3*  --  8.3*  --   --  9.2   CO2 28  --  24  --   --  26   BUN 8  --  12  --   --  18   CR 0.48*  --  0.55*  --   --  0.70   *  --  160* 191* 186* 190*     CBC    Recent Labs  Lab 07/09/17  0717 07/08/17  1137 07/08/17  0920 07/08/17  0256   WBC 5.4 7.8 9.4 4.9   RBC 3.19* 3.04* 3.17* 3.23*   HGB 10.5* 10.1* 10.5* 10.7*   HCT 30.6* 29.4* 30.6* 31.4*   MCV 96 97 97 97   MCH 32.9 33.2* 33.1* 33.1*   MCHC 34.3 34.4 34.3 34.1   RDW 13.7 13.9 14.0 14.2    254 247 88*     INR    Recent Labs  Lab 07/08/17  1137 07/07/17  0552   INR 1.17* 1.10       Imaging: Reviewed      Contact the neurosurgery resident on call with questions.    Dial * * [phone]: Enter [phone] when prompted.   Maynor Griffin MD  Neurosurgery PGY-2      I have reviewed the history.I have seen and examined the patient myself and agree with the assessment and plan above. At risk for ETOH withdrawal. Watch in ICU for now.  MONTSERRAT Skelton MD

## 2017-07-09 NOTE — PROGRESS NOTES
[facility], [place]    Neurosurgery Daily Progress Note         Assessment   Ernesto Rawls is a 72 year old male who is postoperative day # 1 from C3-5 and C6 laminectomy.          Plan     Continue current pain control regimen.    Routine neuro exams per unit protocol.    Continue with constant cervical collar     Appreciate dentistry assistance.     Appreciate podiatry assistance.    MAPS > 80    Incentive spirometry Q1H while awake.    Regular diet.    Bowel regimen. Anti-emetics.     SCDs while in bed.    PT/OT --- mobilize.    Ambulate with assistance as tolerated.    Dispo; Stable in ICU.     Please dial * * * and enter job code [phone] to reach the neurosurgery team if you have any questions.      Patient and above plan discussed with neurosurgery chief resident.         Subjective     Overnight events: no acute events overnight.           Objective   Temp:  [97.3  F (36.3  C)-99.1  F (37.3  C)] 97.9  F (36.6  C)  Heart Rate:  [] 112  Resp:  [6-21] 19  BP: (119-199)/() 183/85  MAP:  [76 mmHg-178 mmHg] 178 mmHg  Arterial Line BP: ()/() 182/181  SpO2:  [95 %-99 %] 97 %    I/O last 3 completed shifts:  In: 3701 [I.V.:2369; IV Piggyback:1000]  Out: 2290 [Urine:2150; Drains:140]    Physical Exam  General: Appears comfortable, NAD  Wound: Incision, clean, dry, intact without strikethrough  Neurologic Exam:  - AOx2.  - Follows commands.  - Speech fluent, spontaneous. No aphasia or dysarthria.  - No gaze preference. No apparent hemineglect.  - PERRL, EOMI.  - Face symmetric with sensation intact to light touch.  Motor: Normal bulk/tone; no tremor, rigidity, or bradykinesia.     Delt Bi Tri FE IP Quad Hamst TibAnt EHL Gastroc    C5 C6 C7 C8/T1 L2 L3 L4-S1 L4 L5 S1   R 4- 4- 4- 1 1 1 1 1 1 1   L 4- 4- 4- 1 1 1 1 1 1 1       Sensory:  Deltoid sensory level.       Labs and Imaging     BMP  Recent Labs  Lab 07/08/17  0256 07/07/17  0848 07/07/17  0705 07/07/17  0552     133 133 133   POTASSIUM 4.4 4.0 4.1 4.6   CHLORIDE 104  --   --  98   KAROL 8.3*  --   --  9.2   CO2 24  --   --  26   BUN 12  --   --  18   CR 0.55*  --   --  0.70   * 191* 186* 190*     CBC  Recent Labs  Lab 07/08/17  1137 07/08/17  0920 07/08/17  0256 07/07/17  0848  07/07/17  0552   WBC 7.8 9.4 4.9  --   --  7.3   RBC 3.04* 3.17* 3.23*  --   --  3.55*   HGB 10.1* 10.5* 10.7* 11.8*  < > 11.8*   HCT 29.4* 30.6* 31.4*  --   --  34.0*   MCV 97 97 97  --   --  96   MCH 33.2* 33.1* 33.1*  --   --  33.2*   MCHC 34.4 34.3 34.1  --   --  34.7   RDW 13.9 14.0 14.2  --   --  14.0    247 88*  --   --  217   < > = values in this interval not displayed.  INR  Recent Labs  Lab 07/08/17 1137 07/07/17  0552   INR 1.17* 1.10       Imaging: Reviewed      Contact the neurosurgery resident on call with questions.    Dial * * *[phone]: Enter [phone] when prompted.   Tommy Edwards MD, PhD  Neurosurgery PGY-3      I have reviewed the history.I have seen and examined the patient myself and agree with the assessment and plan above.  MONTSERRAT Skelton MD

## 2017-07-09 NOTE — PLAN OF CARE
"Problem: Goal Outcome Summary  Goal: Goal Outcome Summary  Outcome: No Change  S: Beginning of shift pt was hallucinating. Said a black and white dog was in bed licking his face and a boat on the ceiling. [facility]. Discussed with NSG resident and Dr Armstrong who examined pt. Started MSSA protocol. Pt got ativan x1 with resolution of hallucinations, and improved mentation. Pt had runs of PSVT with aberrancy. Conditional electrolyte lab draw orders released. All were low. Replacements interspersed with sched antibiotics as access permitted. Runs resolved, still having isolated ectopy. Recheck pending. Expectorating thick, creamy sputum, becoming white and thinner . Occasionally needs post-glottal sx to clear, other times spits out. Voice still has \"wet\" quality.  B: S/p cervical laminectomy.  A: Electrolyte imbalance related to reported decreased nutrition at home. [facility] so likely coming to end of withdrawal.  R: Monitor and replace electrolytes-high level for suspicion when begins runs PSVT. Encourage pulmonary toilet. Keep NPO. RD scheduled to place FT tube.      "

## 2017-07-09 NOTE — PROGRESS NOTES
"Neurosurgery Brief Progress Note:    Writer was paged concerning patient's 14 beat run of ventricular tachycardia and SVT this AM. Patient was seen at bedside and found to be hemodynamically stable with the below vitals:    Blood pressure 157/78, pulse 64, temperature 98.2  F (36.8  C), temperature source Axillary, resp. rate 20, height 1.854 m (6' 1\"), weight 89.6 kg (197 lb 8.5 oz), SpO2 95 %.    Patient was oriented x 2 but this is at baseline as he is intermittently oriented. He denies any chest pain or shortness of breath. Pulses were bilaterally palpable at the radial. Based on telemetry, pt was back in normal sinus rhythm by the time he was seen at bedside.     Chart was reviewed. Last EKG 7/7 with premature atrial complexes. TTE on 7/7 was normal.     Plan:   --EKG and troponin stat   --Will consider cards consult after discussing with NCrit given recurrence of vtach over 3 days     Addendum:  --Patient chart was reviewed and is on high replacement protocol for Mg, K, and Phos   "

## 2017-07-09 NOTE — CONSULTS
"Cardiology Consult Note    Reason for consult: NSVT    HPI: Ernesto Rawls is a 72 year old with HTN, DM and CAD (per chart) who [facility] now s/p laminectomy, cardiology consulted for recurrent NSVT.     Patient reports not noticing while in NSVT. Denies any palpitations or skipped heart beats. He denies any hx of CAD of heart disease. He reports cutting wood on a regular basis limited by fatigue. Denies any chest pain or dyspnea. No lower extremity edema, PND or orthopnea. He does report some claudication with 1/2 mile of walking. Denies any prior hx of arrhythmia.      Tele review revealed 3 episodes of WCT, longest 26beats. HR between 150-200. In the last 24hrs.     Gen: -fever, -chills  HEENT: -decreased vision, -throat pain  Pulm: -dyspnea, -cough, -hemoptysis  CV: -chest pain, -exertional dyspnea, -orthopnea, -PND, -edema, -palpitations, +claudication  GI: -nausea, -vomiting, -diarrhea, -hematochezia  Heme: -NS, -weight loss  Neuro: -syncope, -dizziness  Endocrine: -polyuria, -polydypsia, -polyphagia  MSK: -myalgias  Skin: -rash or ecchymosis    PAST MEDICAL HISTORY:  HTN  DM     PAST SURGICAL HISTORY:  S/p Laminectomy     SH and FH reviewed in EPIC  Never smoker, occasional etoh, no illicits   No family hx cardiac disease    Cardiac meds: ASA 81mg daily    ALL  Allergies   Allergen Reactions     Contrast Dye        VITAL SIGNS:  BP (!) 167/91  Pulse 64  Temp 99.5  F (37.5  C) (Oral)  Resp 18  Ht 1.854 m (6' 1\")  Wt 89.6 kg (197 lb 8.5 oz)  SpO2 97%  BMI 26.06 kg/m2    PHYSICAL EXAM  General: NAD  Eyes:  EOMI, sclera white, conjuctiva pink   ENT- patent nares, normal external ears  Respiratory: CTAB, no wheezes, rales  Cardiac: JVP cannot evaluated do to c-collar, tachycardic, normal S1/S2. - S3 or S4. - murmurs. +2 radial pulses, +1 PT pulses b/l  GI: soft, non tender, non distended  MSK: no deformities  Skin: no rash  Neurologic: alert, " oriented    LABS: reviewed in EPIC  Cr 0.048  k 3.4  Mg 2.1  po4- 2.1    hgb 10.4    Trop negative     ECG: Sinus Rhythm with diffuse TWI in the precordial leads      ECG: Sinus rhythm, possible LVH, +PAC    -----------------  Assessment:  Ernesto Rawls is a 72 year old with HTN, DM and CAD (per chart) who [facility] now s/p laminectomy, cardiology consulted for recurrent NSVT.  Review of telemetry does show 3 separate episodes of NSVT longest 26beats with shortest cycle length of 280ms. Patient with TTE done yesterday showing normal ventricular function. In the absence of a cardiomyopathy ischemia related ventricular arrhythmia is the most likely etiology. Patient is currently asymptomatic with no evidence of ongoing ischemia. Without 12 lead ecg of event it is hard to determine whether VT is scar medicated. VT is likely being precipitated by high catacholamines state in the setting of electrolyte imbalance. Of most concern would be ischemia related or CAD associated ventricular arrhythmia.     # NSVT  # Cervical Epidural Abscess s/p laminectomy  # HTN  # DM     Recommendations:  - Start metoprolol 25mg BID for arrhythmia suppression  - Aggressive lyte repletion, K>4, Mg>2, PO4 >2.4   - NM lexiscan tomorrow  - If still having NSVT despite lyte repletion and BB may consider coronary angiogram   - Continue tele    Patient discuss with Dr. Juárez.     Patient to be formally staff and seen tomorrow.     Dwain Ingram MD   Cardiology Fellow   *[phone]

## 2017-07-09 NOTE — PROGRESS NOTES
07/09/17 1500   Quick Adds   Type of Visit Initial Occupational Therapy Evaluation   Living Environment   Lives With spouse   Living Arrangements house   Home Accessibility stairs within home;stairs to enter home;tub/shower is not walk in   Number of Stairs to Enter Home 5   Number of Stairs Within Home 12   Transportation Available car;family or friend will provide   Living Environment Comment (main floor has tub/shower, walk in on lower level)   Self-Care   Dominant Hand right   Usual Activity Tolerance good   Current Activity Tolerance fair   Regular Exercise no   Activity/Exercise/Self-Care Comment active at home, cutting firewod, yard work, was very active up until 3 weeks ago   Functional Level Prior   Ambulation 1-->assistive equipment   Transferring 0-->independent   Toileting 0-->independent   Bathing 0-->independent   Dressing 0-->independent   Eating 0-->independent   Communication 0-->understands/communicates without difficulty   Swallowing 0-->swallows foods/liquids without difficulty   Cognition 0 - no cognition issues reported   Fall history within last six months yes   Number of times patient has fallen within last six months 3   Which of the above functional risks had a recent onset or change? ambulation;transferring;toileting;bathing;dressing;eating;swallowing;cognition;fall history   Prior Functional Level Comment IND prior, rare use of cane   General Information   Onset of Illness/Injury or Date of Surgery - Date 07/07/17   Referring Physician Bob Marcus MD   Patient/Family Goals Statement To return to IND living   Additional Occupational Profile Info/Pertinent History of Current Problem 72 year old male with rheumatoid arthritis, DM, CAD, wet gangrene of right great toe, and cervical spinal stenosis, who underwent amputation of the right great toe on 7/6 at the [facility]. Afterwards, he was found to be weak in his lower extremities and was taken for emergent CT & MR to  evaluate for CVA. MRI demonstrated significant cervical stenosis and he was transferred here to the neurosurgical team. He subsequently developed total paralysis of the upper extremities as well upon his arrival. He was brought to the OR and underwent posterior cervical 3-4-5 and partial cervical 6 laminectomy and decompression.   Precautions/Limitations (cervical precautions, aspen collar)   General Observations Pt pleasant and agreeable, wife and daughter present.  Pt somewhat Delirious warning writer not to run into the large thermostat as it may kill me.    General Info Comments activity: up w A   Cognitive Status Examination   Orientation place;person   Level of Consciousness alert;confused   Able to Follow Commands mild impairment   Personal Safety (Cognitive) mild impairment   Memory impaired   Cognitive Comment Below hias baseline, scored 12/28 on SBT   Sensory Examination   Sensory Comments mild deficits, needs formal testing   Pain Assessment   Patient Currently in Pain No   Range of Motion (ROM)   ROM Comment Deficits with full shoulder flexion PROM.  Pt limited due to weakness.    Strength   Strength Comments Pt with deficits in B UE L>R.  Per family some improvement noted during eval: unable to move R thumb  yesterday but able to move gravity eliminated full ROM.  0/5 wrist flexion or extension.    Hand Strength   Hand Strength Comments poor  strength  R>L   Muscle Tone Assessment   Muscle Tone Comments decreased tone   Coordination   Gross Motor Coordination impaired   Fine Motor Coordination impaired   Mobility   Bed Mobility Comments Dependent   Transfer Skills   Transfer Comments Dependent via lift   Upper Body Dressing   Level of [place]: Dress Upper Body dependent (less than 25% patients effort)   Lower Body Dressing   Level of [place]: Dress Lower Body dependent (less than 25% patients effort)   Toileting   Level of [place]: Toilet dependent (less than 25% patients effort)  "  Grooming   Level of [place]: Grooming maximum assist (25% patients effort)   Instrumental Activities of Daily Living (IADL)   Previous Responsibilities meal prep;housekeeping;laundry;shopping;yardwork;driving   IADL Comments Pt is retired.    Activities of Daily Living Analysis   Impairments Contributing to Impaired Activities of Daily Living balance impaired;cognition impaired;coordination impaired;post surgical precautions;ROM decreased;strength decreased   General Therapy Interventions   Planned Therapy Interventions ADL retraining;IADL retraining;cognition;fine motor coordination training;neuromuscular re-education;ROM;strengthening   Clinical Impression   Criteria for Skilled Therapeutic Interventions Met yes, treatment indicated   OT Diagnosis SCI   Influenced by the following impairments weakness, confusion, decreased ROM and sensation   Assessment of Occupational Performance 3-5 Performance Deficits   Identified Performance Deficits all ADLs and IADLs are impacted   Clinical Decision Making (Complexity) Low complexity   Therapy Frequency daily   Predicted Duration of Therapy Intervention (days/wks) 3 weeks   Anticipated Discharge Disposition Acute Rehabilitation Facility   Risks and Benefits of Treatment have been explained. Yes   Patient, Family & other staff in agreement with plan of care Yes   [facility] iWelcome  \"6 Clicks\"   2016, Trustees of [facility], under license to [employer] [facility].  All rights reserved.   6 Clicks Short Forms Daily Activity Inpatient Short Form   [facility] AM-PAC  \"6 Clicks\" Daily Activity Inpatient Short Form   1. Putting on and taking off regular lower body clothing? 1 - Total   2. Bathing (including washing, rinsing, drying)? 1 - Total   3. Toileting, which includes using toilet, bedpan or urinal? 1 - Total   4. Putting on and taking off regular upper body clothing? 1 - Total   5. Taking care of personal grooming such as brushing teeth? 2 - A Lot   6. " Eating meals? 2 - A Lot   Daily Activity Raw Score (Score out of 24.Lower scores equate to lower levels of function) 8   Total Evaluation Time   Total Evaluation Time (Minutes) 20

## 2017-07-09 NOTE — PLAN OF CARE
Problem: Goal Outcome Summary  Goal: Goal Outcome Summary  Pt seen for dysphagia therapy. Pt continues to demonstrate significant dysphagia. Recommend NPO with alternative methods of nutrition/hydaration/medication. SLP to follow per POC.

## 2017-07-09 NOTE — PHARMACY-CONSULT NOTE
Pharmacy Tube Feeding Consult    Medication reviewed for administration by feeding tube and for potential food/drug interactions.    Recommendation: Recommend changing the following medications to a liquid dosage form: omeprazole.     Pharmacy will continue to follow as new medications are ordered.    Olya Steel, PharmD  July 9, 2017

## 2017-07-09 NOTE — OP NOTE
Preoperative diagnosis:  Abscess at C5 with significant cervical stenosis and spinal cord injury.    Postoperative diagnosis:  Abscess at C5 with significant cervical stenosis and spinal cord injury.    Procedure performed:  Posterior cervical 3, 4, 5 and partial C6 laminectomy for decompression of cervical stenosis.    DATE OF SERVICE:  July 9, 2017    Surgeon:  Derick Holly MD    Assistant:  Dequan Killian MD    Anesthesia:  General endotracheal anesthesia.    Position:  Prone on gel rolls with the head in the Eller head wilkinson.      Indications for procedure:  Mr. Ernesto Rawls is a 72-year-old gentleman who has been having difficulty with gait for about 3 weeks and had falls over 3 weeks ago.  Since that time his wife noted that he has been having slight instability of his gait and some mild weakness of his upper extremities.  Most recently about a week ago he went to the [facility] in [place] for concerns of right toe gangrene which was treated by amputation.  This was performed under MAC anesthesia as per report.  Post anesthesia it was seen that he had significant weakness in upper and lower extremities and had difficulty with speech.  For this reason, [facility] which [facility], but cervical spine imaging was obtained that was concerning for significant abscess in the anterior cervical spine and the prevertebral region as well as significant central canal stenosis.  He was therefore transferred to the [facility] where after on evaluation he was found to have progressive weakness in the upper extremities as well.  He was therefore emergently brought to the operating room for cervical decompression posteriorly.      Operative summary:  Mr. Rawls was brought to the operating room by anesthesia colleagues.  He underwent awake scope-assisted intubation by the anesthesia team.  His neck was placed in neutral position at that  time in a collar.  Arterial line was then placed by the anesthesia team.  His head was then fixed in the Eller headholder and he was positioned prone on the operating bed with care taken to protect his neck at all times.  His neck was placed in neutral position and head was fixed in [place] headholder and secured to the bed.  There was a previous midline scar noted and this was marked in the cervical region.  Cervical prepping and draping was completed and a timeout was performed.  10 mL of local anesthetic was injected along the planned incision site.     A #10 blade was used to open up the planned incision and dissected performed in the median avascular plane to expose the paraspinal muscles off the spinous processes of C3, C4, C5 and C6 bilaterally.  The laminae were thus exposed and at C2 there was fusion mass noted between C2 and C3.  Then using the GonnaBe drill, drilling was performed to expose the lamina of C3, C4, C5 and superior aspect of C6 bilaterally.  Using a combination of curette and Kerrison punches, laminectomy was completed which shows a complete C3, complete C4, complete C5 and partial C6 laminectomy.  The thecal sac was seen to be decompressed well with perhaps slight elevation near the C5 region, thought to be from anterior abscess.  There was no obvious purulence noted during the surgery and there was no CSF leak.     Once adequate decompression was thought to be achieved, bipolar cautery was used for hemostasis and irrigation was performed and Hemovac drain was left in the surgical bed and tunneled underneath the skin about 3 cm away from the incision and secured to the skin with the help of 3-0 nylon stitch.    The paraspinal muscles were brought together within the the help of 0 Vicryl stitches in interrupted manner.  The fascia was closed with 0 Vicryl stitches in an interrupted watertight manner, the subcutaneous tissues were closed with 2-0 Vicryl in an inverted interrupted manner and  the skin was closed with 3-0 nylon in a running continuous manner.  Wet and dry dressings were applied and ChloraPrep was used to clean the incision.  The patient was then positioned back to the transferring bed and placed in a cervical collar.  Care was taken to protect his neck at all times.      His mean arterial pressure was kept above 80 during the entire procedure and at the end of it as well.      NEVILLE JACKSON MD    D:  07/08/2017 11:49 T:  07/08/2017 12:12  Document:  [id number] AG\CD

## 2017-07-10 ENCOUNTER — ANESTHESIA EVENT (OUTPATIENT)
Dept: SURGERY | Facility: CLINIC | Age: 72
End: 2017-07-10

## 2017-07-10 ENCOUNTER — APPOINTMENT (OUTPATIENT)
Dept: NUCLEAR MEDICINE | Facility: CLINIC | Age: 72
DRG: 028 | End: 2017-07-10
Attending: NEUROLOGICAL SURGERY
Payer: COMMERCIAL

## 2017-07-10 ENCOUNTER — APPOINTMENT (OUTPATIENT)
Dept: GENERAL RADIOLOGY | Facility: CLINIC | Age: 72
DRG: 028 | End: 2017-07-10
Attending: STUDENT IN AN ORGANIZED HEALTH CARE EDUCATION/TRAINING PROGRAM
Payer: COMMERCIAL

## 2017-07-10 LAB
ABO + RH BLD: NORMAL
ABO + RH BLD: NORMAL
ANION GAP SERPL CALCULATED.3IONS-SCNC: 6 MMOL/L (ref 3–14)
ANION GAP SERPL CALCULATED.3IONS-SCNC: 9 MMOL/L (ref 3–14)
APTT PPP: 37 SEC (ref 22–37)
BASOPHILS # BLD AUTO: 0 10E9/L (ref 0–0.2)
BASOPHILS NFR BLD AUTO: 0.2 %
BLD GP AB SCN SERPL QL: NORMAL
BLOOD BANK CMNT PATIENT-IMP: NORMAL
BUN SERPL-MCNC: 10 MG/DL (ref 7–30)
BUN SERPL-MCNC: 11 MG/DL (ref 7–30)
CALCIUM SERPL-MCNC: 8.3 MG/DL (ref 8.5–10.1)
CALCIUM SERPL-MCNC: 8.4 MG/DL (ref 8.5–10.1)
CHLORIDE SERPL-SCNC: 102 MMOL/L (ref 94–109)
CHLORIDE SERPL-SCNC: 104 MMOL/L (ref 94–109)
CO2 SERPL-SCNC: 27 MMOL/L (ref 20–32)
CO2 SERPL-SCNC: 28 MMOL/L (ref 20–32)
CREAT SERPL-MCNC: 0.46 MG/DL (ref 0.66–1.25)
CREAT SERPL-MCNC: 0.47 MG/DL (ref 0.66–1.25)
DIFFERENTIAL METHOD BLD: ABNORMAL
EOSINOPHIL # BLD AUTO: 0.1 10E9/L (ref 0–0.7)
EOSINOPHIL NFR BLD AUTO: 0.9 %
ERYTHROCYTE [DISTWIDTH] IN BLOOD BY AUTOMATED COUNT: 13.9 % (ref 10–15)
ERYTHROCYTE [DISTWIDTH] IN BLOOD BY AUTOMATED COUNT: 13.9 % (ref 10–15)
GFR SERPL CREATININE-BSD FRML MDRD: ABNORMAL ML/MIN/1.7M2
GFR SERPL CREATININE-BSD FRML MDRD: ABNORMAL ML/MIN/1.7M2
GLUCOSE BLDC GLUCOMTR-MCNC: 149 MG/DL (ref 70–99)
GLUCOSE BLDC GLUCOMTR-MCNC: 151 MG/DL (ref 70–99)
GLUCOSE BLDC GLUCOMTR-MCNC: 153 MG/DL (ref 70–99)
GLUCOSE BLDC GLUCOMTR-MCNC: 167 MG/DL (ref 70–99)
GLUCOSE SERPL-MCNC: 162 MG/DL (ref 70–99)
GLUCOSE SERPL-MCNC: 172 MG/DL (ref 70–99)
HCT VFR BLD AUTO: 30.7 % (ref 40–53)
HCT VFR BLD AUTO: 30.8 % (ref 40–53)
HGB BLD-MCNC: 10.5 G/DL (ref 13.3–17.7)
HGB BLD-MCNC: 10.5 G/DL (ref 13.3–17.7)
IMM GRANULOCYTES # BLD: 0 10E9/L (ref 0–0.4)
IMM GRANULOCYTES NFR BLD: 0.5 %
INR PPP: 1.44 (ref 0.86–1.14)
LYMPHOCYTES # BLD AUTO: 0.8 10E9/L (ref 0.8–5.3)
LYMPHOCYTES NFR BLD AUTO: 12.7 %
MAGNESIUM SERPL-MCNC: 1.8 MG/DL (ref 1.6–2.3)
MAGNESIUM SERPL-MCNC: 2.2 MG/DL (ref 1.6–2.3)
MCH RBC QN AUTO: 32.8 PG (ref 26.5–33)
MCH RBC QN AUTO: 33 PG (ref 26.5–33)
MCHC RBC AUTO-ENTMCNC: 34.1 G/DL (ref 31.5–36.5)
MCHC RBC AUTO-ENTMCNC: 34.2 G/DL (ref 31.5–36.5)
MCV RBC AUTO: 96 FL (ref 78–100)
MCV RBC AUTO: 97 FL (ref 78–100)
MONOCYTES # BLD AUTO: 0.5 10E9/L (ref 0–1.3)
MONOCYTES NFR BLD AUTO: 8.1 %
NEUTROPHILS # BLD AUTO: 5.1 10E9/L (ref 1.6–8.3)
NEUTROPHILS NFR BLD AUTO: 77.6 %
NRBC # BLD AUTO: 0 10*3/UL
NRBC BLD AUTO-RTO: 0 /100
PHOSPHATE SERPL-MCNC: 2.1 MG/DL (ref 2.5–4.5)
PHOSPHATE SERPL-MCNC: 2.5 MG/DL (ref 2.5–4.5)
PLATELET # BLD AUTO: 206 10E9/L (ref 150–450)
PLATELET # BLD AUTO: 236 10E9/L (ref 150–450)
POTASSIUM SERPL-SCNC: 3.6 MMOL/L (ref 3.4–5.3)
POTASSIUM SERPL-SCNC: 3.8 MMOL/L (ref 3.4–5.3)
RBC # BLD AUTO: 3.18 10E12/L (ref 4.4–5.9)
RBC # BLD AUTO: 3.2 10E12/L (ref 4.4–5.9)
SODIUM SERPL-SCNC: 138 MMOL/L (ref 133–144)
SODIUM SERPL-SCNC: 138 MMOL/L (ref 133–144)
SPECIMEN EXP DATE BLD: NORMAL
VANCOMYCIN SERPL-MCNC: 13.5 MG/L
WBC # BLD AUTO: 6.5 10E9/L (ref 4–11)
WBC # BLD AUTO: 6.9 10E9/L (ref 4–11)

## 2017-07-10 PROCEDURE — 25000132 ZZH RX MED GY IP 250 OP 250 PS 637: Performed by: NEUROLOGICAL SURGERY

## 2017-07-10 PROCEDURE — 25000132 ZZH RX MED GY IP 250 OP 250 PS 637: Performed by: STUDENT IN AN ORGANIZED HEALTH CARE EDUCATION/TRAINING PROGRAM

## 2017-07-10 PROCEDURE — 80048 BASIC METABOLIC PNL TOTAL CA: CPT | Performed by: NEUROLOGICAL SURGERY

## 2017-07-10 PROCEDURE — 25000128 H RX IP 250 OP 636: Performed by: STUDENT IN AN ORGANIZED HEALTH CARE EDUCATION/TRAINING PROGRAM

## 2017-07-10 PROCEDURE — 25000128 H RX IP 250 OP 636: Performed by: NEUROLOGICAL SURGERY

## 2017-07-10 PROCEDURE — 36415 COLL VENOUS BLD VENIPUNCTURE: CPT | Performed by: NEUROLOGICAL SURGERY

## 2017-07-10 PROCEDURE — 86901 BLOOD TYPING SEROLOGIC RH(D): CPT | Performed by: NEUROLOGICAL SURGERY

## 2017-07-10 PROCEDURE — A9502 TC99M TETROFOSMIN: HCPCS | Performed by: NEUROLOGICAL SURGERY

## 2017-07-10 PROCEDURE — 93018 CV STRESS TEST I&R ONLY: CPT | Performed by: INTERNAL MEDICINE

## 2017-07-10 PROCEDURE — 93017 CV STRESS TEST TRACING ONLY: CPT

## 2017-07-10 PROCEDURE — 00000146 ZZHCL STATISTIC GLUCOSE BY METER IP

## 2017-07-10 PROCEDURE — 84100 ASSAY OF PHOSPHORUS: CPT | Performed by: NEUROLOGICAL SURGERY

## 2017-07-10 PROCEDURE — 85610 PROTHROMBIN TIME: CPT | Performed by: STUDENT IN AN ORGANIZED HEALTH CARE EDUCATION/TRAINING PROGRAM

## 2017-07-10 PROCEDURE — 27210429 ZZH NUTRITION PRODUCT INTERMEDIATE LITER

## 2017-07-10 PROCEDURE — 78452 HT MUSCLE IMAGE SPECT MULT: CPT | Mod: 26 | Performed by: INTERNAL MEDICINE

## 2017-07-10 PROCEDURE — 36415 COLL VENOUS BLD VENIPUNCTURE: CPT | Performed by: STUDENT IN AN ORGANIZED HEALTH CARE EDUCATION/TRAINING PROGRAM

## 2017-07-10 PROCEDURE — 86850 RBC ANTIBODY SCREEN: CPT | Performed by: NEUROLOGICAL SURGERY

## 2017-07-10 PROCEDURE — 40000986 XR ABDOMEN PORT F1 VW

## 2017-07-10 PROCEDURE — 34300033 ZZH RX 343: Performed by: NEUROLOGICAL SURGERY

## 2017-07-10 PROCEDURE — 78452 HT MUSCLE IMAGE SPECT MULT: CPT

## 2017-07-10 PROCEDURE — 25000125 ZZHC RX 250: Performed by: STUDENT IN AN ORGANIZED HEALTH CARE EDUCATION/TRAINING PROGRAM

## 2017-07-10 PROCEDURE — 86900 BLOOD TYPING SEROLOGIC ABO: CPT | Performed by: NEUROLOGICAL SURGERY

## 2017-07-10 PROCEDURE — 84100 ASSAY OF PHOSPHORUS: CPT | Performed by: STUDENT IN AN ORGANIZED HEALTH CARE EDUCATION/TRAINING PROGRAM

## 2017-07-10 PROCEDURE — 80048 BASIC METABOLIC PNL TOTAL CA: CPT | Performed by: STUDENT IN AN ORGANIZED HEALTH CARE EDUCATION/TRAINING PROGRAM

## 2017-07-10 PROCEDURE — 93016 CV STRESS TEST SUPVJ ONLY: CPT | Performed by: INTERNAL MEDICINE

## 2017-07-10 PROCEDURE — 44500 INTRO GASTROINTESTINAL TUBE: CPT | Performed by: DIETITIAN, REGISTERED

## 2017-07-10 PROCEDURE — 83735 ASSAY OF MAGNESIUM: CPT | Performed by: STUDENT IN AN ORGANIZED HEALTH CARE EDUCATION/TRAINING PROGRAM

## 2017-07-10 PROCEDURE — 27210913 ZZH NUTRITION PRODUCT INTERMEDIATE PACKET

## 2017-07-10 PROCEDURE — 80202 ASSAY OF VANCOMYCIN: CPT | Performed by: NEUROLOGICAL SURGERY

## 2017-07-10 PROCEDURE — 25000125 ZZHC RX 250: Performed by: NEUROLOGICAL SURGERY

## 2017-07-10 PROCEDURE — 20000004 ZZH R&B ICU UMMC

## 2017-07-10 PROCEDURE — 83735 ASSAY OF MAGNESIUM: CPT | Performed by: NEUROLOGICAL SURGERY

## 2017-07-10 PROCEDURE — 85730 THROMBOPLASTIN TIME PARTIAL: CPT | Performed by: STUDENT IN AN ORGANIZED HEALTH CARE EDUCATION/TRAINING PROGRAM

## 2017-07-10 PROCEDURE — 85027 COMPLETE CBC AUTOMATED: CPT | Performed by: STUDENT IN AN ORGANIZED HEALTH CARE EDUCATION/TRAINING PROGRAM

## 2017-07-10 PROCEDURE — 25000128 H RX IP 250 OP 636: Performed by: INTERNAL MEDICINE

## 2017-07-10 PROCEDURE — 85025 COMPLETE CBC W/AUTO DIFF WBC: CPT | Performed by: NEUROLOGICAL SURGERY

## 2017-07-10 RX ORDER — CEFAZOLIN SODIUM 1 G/3ML
1 INJECTION, POWDER, FOR SOLUTION INTRAMUSCULAR; INTRAVENOUS SEE ADMIN INSTRUCTIONS
Status: DISCONTINUED | OUTPATIENT
Start: 2017-07-10 | End: 2017-07-12 | Stop reason: HOSPADM

## 2017-07-10 RX ORDER — CARBOXYMETHYLCELLULOSE SODIUM 5 MG/ML
2 SOLUTION/ DROPS OPHTHALMIC 3 TIMES DAILY PRN
Status: DISCONTINUED | OUTPATIENT
Start: 2017-07-10 | End: 2017-07-26 | Stop reason: HOSPADM

## 2017-07-10 RX ORDER — ACYCLOVIR 200 MG/1
0-1 CAPSULE ORAL
Status: DISCONTINUED | OUTPATIENT
Start: 2017-07-10 | End: 2017-07-12 | Stop reason: HOSPADM

## 2017-07-10 RX ORDER — REGADENOSON 0.08 MG/ML
0.4 INJECTION, SOLUTION INTRAVENOUS ONCE
Status: COMPLETED | OUTPATIENT
Start: 2017-07-10 | End: 2017-07-10

## 2017-07-10 RX ORDER — CEFAZOLIN SODIUM 2 G/100ML
2 INJECTION, SOLUTION INTRAVENOUS
Status: DISCONTINUED | OUTPATIENT
Start: 2017-07-10 | End: 2017-07-12 | Stop reason: HOSPADM

## 2017-07-10 RX ORDER — AMINOPHYLLINE 25 MG/ML
50-100 INJECTION, SOLUTION INTRAVENOUS
Status: ACTIVE | OUTPATIENT
Start: 2017-07-10 | End: 2017-07-10

## 2017-07-10 RX ORDER — ALBUTEROL SULFATE 90 UG/1
2 AEROSOL, METERED RESPIRATORY (INHALATION) EVERY 5 MIN PRN
Status: ACTIVE | OUTPATIENT
Start: 2017-07-10 | End: 2017-07-10

## 2017-07-10 RX ADMIN — LIDOCAINE 3 PATCH: 50 PATCH CUTANEOUS at 09:23

## 2017-07-10 RX ADMIN — INSULIN ASPART 1 UNITS: 100 INJECTION, SOLUTION INTRAVENOUS; SUBCUTANEOUS at 09:23

## 2017-07-10 RX ADMIN — CARBOXYMETHYLCELLULOSE SODIUM 2 DROP: 5 SOLUTION/ DROPS OPHTHALMIC at 21:30

## 2017-07-10 RX ADMIN — SODIUM CHLORIDE: 900 INJECTION, SOLUTION INTRAVENOUS at 21:30

## 2017-07-10 RX ADMIN — TETROFOSMIN 9.5 MCI.: 1.38 INJECTION, POWDER, LYOPHILIZED, FOR SOLUTION INTRAVENOUS at 13:21

## 2017-07-10 RX ADMIN — POTASSIUM PHOSPHATE, MONOBASIC AND POTASSIUM PHOSPHATE, DIBASIC 10 MMOL: 224; 236 INJECTION, SOLUTION INTRAVENOUS at 09:14

## 2017-07-10 RX ADMIN — FOLIC ACID 1 MG: 1 TABLET ORAL at 16:35

## 2017-07-10 RX ADMIN — POTASSIUM CHLORIDE 20 MEQ: 1.5 POWDER, FOR SOLUTION ORAL at 20:47

## 2017-07-10 RX ADMIN — SENNOSIDES AND DOCUSATE SODIUM 1 TABLET: 8.6; 5 TABLET ORAL at 16:34

## 2017-07-10 RX ADMIN — Medication 2 G: at 21:46

## 2017-07-10 RX ADMIN — METOPROLOL TARTRATE 5 MG: 5 INJECTION INTRAVENOUS at 09:20

## 2017-07-10 RX ADMIN — POTASSIUM CHLORIDE 10 MEQ: 14.9 INJECTION, SOLUTION, CONCENTRATE PARENTERAL at 06:30

## 2017-07-10 RX ADMIN — TETROFOSMIN 35 MCI.: 1.38 INJECTION, POWDER, LYOPHILIZED, FOR SOLUTION INTRAVENOUS at 14:40

## 2017-07-10 RX ADMIN — VANCOMYCIN HYDROCHLORIDE 1500 MG: 10 INJECTION, POWDER, LYOPHILIZED, FOR SOLUTION INTRAVENOUS at 01:33

## 2017-07-10 RX ADMIN — METOPROLOL TARTRATE 5 MG: 5 INJECTION INTRAVENOUS at 16:26

## 2017-07-10 RX ADMIN — LORAZEPAM 0.5 MG: 2 INJECTION INTRAMUSCULAR; INTRAVENOUS at 06:38

## 2017-07-10 RX ADMIN — ENOXAPARIN SODIUM 40 MG: 40 INJECTION SUBCUTANEOUS at 09:21

## 2017-07-10 RX ADMIN — METRONIDAZOLE 500 MG: 500 INJECTION, SOLUTION INTRAVENOUS at 16:27

## 2017-07-10 RX ADMIN — POTASSIUM CHLORIDE 10 MEQ: 14.9 INJECTION, SOLUTION, CONCENTRATE PARENTERAL at 00:21

## 2017-07-10 RX ADMIN — METOPROLOL TARTRATE 5 MG: 5 INJECTION INTRAVENOUS at 01:27

## 2017-07-10 RX ADMIN — ACETAMINOPHEN 650 MG: 650 SUPPOSITORY RECTAL at 06:05

## 2017-07-10 RX ADMIN — Medication 20 MG: at 16:35

## 2017-07-10 RX ADMIN — Medication 1 PACKET: at 17:01

## 2017-07-10 RX ADMIN — METRONIDAZOLE 500 MG: 500 INJECTION, SOLUTION INTRAVENOUS at 09:21

## 2017-07-10 RX ADMIN — MULTIVIT AND MINERALS-FERROUS GLUCONATE 9 MG IRON/15 ML ORAL LIQUID 15 ML: at 16:26

## 2017-07-10 RX ADMIN — CEFEPIME HYDROCHLORIDE 2 G: 2 INJECTION, POWDER, FOR SOLUTION INTRAVENOUS at 05:11

## 2017-07-10 RX ADMIN — SODIUM CHLORIDE: 900 INJECTION, SOLUTION INTRAVENOUS at 09:57

## 2017-07-10 RX ADMIN — LIDOCAINE HYDROCHLORIDE 5 ML: 20 SOLUTION ORAL; TOPICAL at 11:38

## 2017-07-10 RX ADMIN — REGADENOSON 0.4 MG: 0.08 INJECTION, SOLUTION INTRAVENOUS at 14:35

## 2017-07-10 RX ADMIN — INSULIN ASPART 1 UNITS: 100 INJECTION, SOLUTION INTRAVENOUS; SUBCUTANEOUS at 16:32

## 2017-07-10 RX ADMIN — VANCOMYCIN HYDROCHLORIDE 1750 MG: 10 INJECTION, POWDER, LYOPHILIZED, FOR SOLUTION INTRAVENOUS at 16:20

## 2017-07-10 RX ADMIN — LORAZEPAM 1 MG: 0.5 TABLET ORAL at 20:08

## 2017-07-10 RX ADMIN — POLYETHYLENE GLYCOL 3350 17 G: 17 POWDER, FOR SOLUTION ORAL at 16:31

## 2017-07-10 RX ADMIN — LORAZEPAM 0.5 MG: 0.5 TABLET ORAL at 19:39

## 2017-07-10 RX ADMIN — METOPROLOL TARTRATE 5 MG: 5 INJECTION INTRAVENOUS at 21:34

## 2017-07-10 RX ADMIN — Medication 1 PACKET: at 19:40

## 2017-07-10 RX ADMIN — POTASSIUM PHOSPHATE, MONOBASIC AND POTASSIUM PHOSPHATE, DIBASIC 15 MMOL: 224; 236 INJECTION, SOLUTION INTRAVENOUS at 21:36

## 2017-07-10 RX ADMIN — CEFEPIME HYDROCHLORIDE 2 G: 2 INJECTION, POWDER, FOR SOLUTION INTRAVENOUS at 23:58

## 2017-07-10 RX ADMIN — METRONIDAZOLE 500 MG: 500 INJECTION, SOLUTION INTRAVENOUS at 04:03

## 2017-07-10 RX ADMIN — POTASSIUM CHLORIDE 10 MEQ: 14.9 INJECTION, SOLUTION, CONCENTRATE PARENTERAL at 08:14

## 2017-07-10 RX ADMIN — CEFEPIME HYDROCHLORIDE 2 G: 2 INJECTION, POWDER, FOR SOLUTION INTRAVENOUS at 16:21

## 2017-07-10 ASSESSMENT — PAIN DESCRIPTION - DESCRIPTORS
DESCRIPTORS: HEADACHE
DESCRIPTORS: HEADACHE
DESCRIPTORS: ACHING
DESCRIPTORS: ACHING
DESCRIPTORS: SORE
DESCRIPTORS: ACHING

## 2017-07-10 ASSESSMENT — VISUAL ACUITY
OU: NORMAL ACUITY;GLASSES

## 2017-07-10 NOTE — PROGRESS NOTES
Pt here for Lexiscan.  Test, medication and side effects reviewed with patient.  Lung sounds clear.  Pt denies caffeine use. Pt tolerated Lexiscan dose without any adverse reactions.

## 2017-07-10 NOTE — PROGRESS NOTES
[facility] ID SERVICE: COURTESY NOTE   Ernesto Rawls : 1945 Sex: male:   Medical record number [id number] Attending Physician: Derick Holly  Date of Service: July 10, 2017    DISCUSSION:   Received a call regarding an ID consult on this gentleman who had an urgent posterior cervical 3, 4, 5 and partial C6 laminectomy for decompression of cervical stenosis thought due to an abscess at C5 on 2017. He has been on empiric Cefepime, Vancomycin and Metronidazole since 17. There are no intra-operative cultures pending in [facility] and there was no mention of sending cultures in the operative note. It is absolutely critical that fluid/bone cultures be sent; otherwise we will have no idea whether this is an abscess and if so, how to treat it. He has been on antibiotics for 3 days at this point; the yield may already be compromised. There is mention of returning to the OR on .        RECOMMENDATIONS:   1. Obtain perispinal fluid/vertebral bone cultures for gram stain and culture, fungal culture and AFB culture (in priority order, so target the bacterial culture/stain if limited samples)  2. Consider stopping metronidazole (no particular role for anaerobic coverage in this type of setting).    Formal ID consult to follow.      MONTSERRAT Rincon M.D.  [facility] ID Service Staff  [phone]

## 2017-07-10 NOTE — PROGRESS NOTES
Neuroscience Intensive Care Progress Note    07/10/2017    Ernesto Rawls is a 72 year old year old male admitted on 2017 with four extremity weakness that progressed to quadriplegia.       Problem List  1. Epidural fluid collection s/p decompression  2. Weakness of all extremities    24 hour events:  No acute events overnight.     24 Hour Vital Signs Summary:  Temperatures:  Current - Temp: 98.8  F (37.1  C); Max - Temp  Av.1  F (37.3  C)  Min: 97.9  F (36.6  C)  Max: 101.3  F (38.5  C)  Respiration range: Resp  Av  Min: 10  Max: 25  Pulse range: No Data Recorded  Blood pressure range: Systolic (24hrs), Av , Min:134 , Max:176   ; Diastolic (24hrs), Av, Min:62, Max:96    Pulse oximetry range: SpO2  Av.6 %  Min: 93 %  Max: 99 %    Ventilator Settings  Resp: 20      Intake/Output Summary (Last 24 hours) at 07/10/17 1012  Last data filed at 07/10/17 1000   Gross per 24 hour   Intake             3270 ml   Output             2600 ml   Net              670 ml       BP - Mean:  [] 105    Current Medications:    lidocaine (viscous)  5 mL Topical Once     multivitamins with minerals  15 mL Per Feeding Tube Daily     protein modular  1 packet Per Feeding Tube TID     omeprazole  20 mg Oral or Feeding Tube Daily     metoprolol  5 mg Intravenous Q6H     magnesium sulfate  2 g Intravenous Once     potassium chloride  20 mEq Oral Once     potassium phosphate (KPHOS) in D5W IV  20 mmol Intravenous Once     ceFEPIme (MAIXPIME) IV  2 g Intravenous Q8H     metroNIDAZOLE  500 mg Intravenous Q6H     vancomycin (VANCOCIN) IV  1,500 mg Intravenous Q12H     lidocaine (viscous)  5 mL Topical Once     LORazepam  0.5-4 mg Intravenous See Admin Instructions    Or     LORazepam  0.5-4 mg Oral See Admin Instructions     sodium chloride (PF)  3 mL Intracatheter Q8H     acetaminophen  975 mg Oral Q8H     lidocaine  3 patch Transdermal Q24H     lidocaine   Transdermal Q24h     lidocaine   Transdermal Q8H     menthol  "  Transdermal Q8H     senna-docusate  1-2 tablet Oral BID     polyethylene glycol  17 g Oral BID     insulin aspart  1-7 Units Subcutaneous TID AC     insulin aspart  1-5 Units Subcutaneous At Bedtime     folic acid  1 mg Oral Daily       PRN Medications:  potassium chloride, potassium chloride, potassium chloride, potassium chloride with lidocaine, potassium chloride, magnesium sulfate, magnesium sulfate, potassium phosphate (KPHOS) in D5W IV, potassium phosphate (KPHOS) in D5W IV, potassium phosphate (KPHOS) in D5W IV, potassium phosphate (KPHOS) in D5W IV, potassium phosphate (KPHOS) in D5W IV, IV fluid REPLACEMENT ONLY, acetaminophen, lidocaine (buffered or not buffered), lidocaine 4%, sodium chloride (PF), hydrALAZINE, naloxone, labetalol, sore throat lozenge, oxyCODONE, menthol **AND** menthol **AND** menthol, ondansetron **OR** ondansetron, prochlorperazine **OR** prochlorperazine, bisacodyl, glucose **OR** dextrose **OR** glucagon, cyclobenzaprine    Infusions:    IV fluid REPLACEMENT ONLY       NaCl 100 mL/hr at 07/10/17 0957       Allergies   Allergen Reactions     Contrast Dye        Physical Examination:  BP (!) 134/96  Pulse 64  Temp 98.8  F (37.1  C) (Oral)  Resp 20  Ht 1.854 m (6' 1\")  Wt 89.3 kg (196 lb 13.9 oz)  SpO2 98%  BMI 25.97 kg/m2    General: NAD, in c-collar   CV: RRR, slight systolic murmur  Resp: decreased breath sounds on left, no wheeze  Abd: soft, nontender, nondistended  Ext: no edema, peripherla pulses intact  Neuro:  MS: alert/oriented. Follows commands  CN: 2-12 intact  Motor: power in upper extremities 4/5 throughout with left slightly stronger than right; power in lower extremities diminished - 2/5 in proximal LLE, and 1/5 in RLE  Sensory: intact to light touch throughout  Reflexes: 1+ and symmetric in UE, absent in LE      Labs/Studies:  Recent Labs   Lab Test  07/10/17   0418  07/10/17   0417  07/09/17 2007 07/09/17   0717   07/08/17   1137   07/08/17   0256   NA   --  "  138   --   137   --    --    --   137   POTASSIUM   --   3.8  3.5  3.4   < >   --    --   4.4   CHLORIDE   --   102   --   102   --    --    --   104   CO2   --   27   --   28   --    --    --   24   ANIONGAP   --   9   --   7   --    --    --   8   GLC   --   172*   --   161*   --    --    --   160*   BUN   --   10   --   8   --    --    --   12   CR   --   0.46*   --   0.48*   --    --    --   0.55*   KAROL   --   8.4*   --   8.3*   --    --    --   8.3*   WBC  6.5   --    --   5.4   --   7.8   < >  4.9   RBC  3.18*   --    --   3.19*   --   3.04*   < >  3.23*   HGB  10.5*   --    --   10.5*   --   10.1*   < >  10.7*   PLT  206   --    --   215   --   254   < >  88*    < > = values in this interval not displayed.       Recent Labs   Lab Test  07/08/17   1137  07/07/17   0552   INR  1.17*  1.10   PTT  31  29           Recent Labs  Lab 07/07/17  0848 07/07/17  0705   PH 7.42 7.38   PCO2 35 42   PO2 290* 380*   HCO3 23 25   O2PER 50.0 73.0           Imaging:  XR abdomen pending    Assessment/Plan  Ernesto Rawls is a 72 year old h/o rheumatoid arthritis, DM, CAD, and wet gangrene of right foot admitted 7/7/2017 with weakness in all extremities that progressed to quadriplegia.     Plan  Neuro:  # Epidural fluid collection s/p decompression of C3-5 w/ partial 6 on POD#3:  -Mild clinical improvement post-op  -Epidural fluid collection cultures still pending  -Neurosurgery planning on returning to OR tomorrow pending cardiology clearance  -Unknown if infectious source, consider ID consult    Resp:  Stable on room air, last CXR with mild left base opacity    CV:  Hemodynamically stable. MAP goal >80.     # H/O CAD:  # Runs of V-tach:  Cardiology on board, planning for NM lexiscan today to clear for surgery.  -Replace lytes: K>4, Mg>2, PO4>2.4  -Continue tele    # Concern for endocarditis:  TTE normal. Spiked fever overnight.     Renal:  No active issues. Creatinine at baseline.    Endo:    CORNELIO  No active issues. H/O  DM  -SSI, maintain euglycemia, goal     Heme:  # Thrombocytopenia, resolved:  -Decreased to 80s during hospitalization  -Currently stable at 206    GI:  No active issues, NJ tube placed    ID:  # Concern for epidural abscess:  -Unclear cause of epidural fluid collection, infection possible  -Patient spiked fever overnight although WBC remains stable  -Currently on broad spectrum abx for coverage, cultures pending  -Consider ID consult    FEN: NJ tube  PPX:    DVT prophylaxis: SCDs, lovenox    GI: omeprazole      Patient discussed with staff physician, Dr. Ferreira.      Pablo Guido  Neurology PGY-2

## 2017-07-10 NOTE — PROCEDURES
Bridle Placement:   Reason for bridle placement: Securement of FT per RN   Medicine delivered during procedure: lubricating jelly    Procedure: Successful  Location of top of clip on FT: @ 108 cm marker   Condition of nose/skin at time of bridle placement: Unremarkable   Face to Face time with patient: <5 minutes.  Jackie Koo RDN, LD  Pgr: [phone]

## 2017-07-10 NOTE — ANESTHESIA PREPROCEDURE EVALUATION
Anesthesia Evaluation     . Pt has had prior anesthetic. Type: General    No history of anesthetic complications          ROS/MED HX    ENT/Pulmonary:  - neg pulmonary ROS     Neurologic:     (+)other neuro quadraplegia 2/2 cervical compression    Cardiovascular:     (+) --CAD, --. : . . . :. . Previous cardiac testing Echodate:7/8/2017results:Global and regional left ventricular function is normal with an EF of 60-65%.  Global right ventricular function is normal.  No significant valvular abnormalities were noted.  The inferior vena cava was normal in size with preserved respiratory  variability.  No pericardial effusion is present.date: results:ECG reviewed date:7/7/2017 results:Sinus date: results:          METS/Exercise Tolerance:     Hematologic:  - neg hematologic  ROS       Musculoskeletal:  - neg musculoskeletal ROS (+) , , other musculoskeletal- Rheumatoid Arthritis      GI/Hepatic:  - neg GI/hepatic ROS       Renal/Genitourinary:  - ROS Renal section negative       Endo:     (+) type II DM .      Psychiatric:  - neg psychiatric ROS       Infectious Disease:  - neg infectious disease ROS       Malignancy:      - no malignancy   Other:                                    Anesthesia Plan      History & Physical Review      ASA Status:  4 .    NPO Status:  > 8 hours    Plan for General and ETT with Intravenous and Propofol induction. Maintenance will be Balanced.    PONV prophylaxis:  Ondansetron (or other 5HT-3) and Dexamethasone or Solumedrol  Additional equipment: 2nd IV, Arterial Line and Fiberoptic bronchoscope      Postoperative Care  Postoperative pain management:  IV analgesics and Oral pain medications.      Consents        ANESTHESIA PREOP EVALUATION    Procedure: Procedure(s):  Right Sided Approach Anterior Cervical C4 Possible C5 Corpectomy, Cervical 3-6 Fusion - Wound Class:     HPI: Ernesto Rawls is a 72 year old male with RA, DM s/p right 1st toe ampulation, CAD presenting for above procedure.  "Patient recently intubated via fiberoptic scope on 7/7/2017 for posterior cervical laminectomy.    PMHx/PSHx/ROS:  No past medical history on file.    Past Surgical History:   Procedure Laterality Date     LAMINECTOMY CERIVCAL POSTERIOR THREE+ LEVELS N/A 7/7/2017    Procedure: LAMINECTOMY CERVICAL POSTERIOR THREE+ LEVELS;  Posterior cervical 3-4-5 and partial cervical 6 laminectomy and decompression;  Surgeon: Derick Holly MD;  Location:  OR         Past Anes Hx: No personal or family h/o anesthesia problems    Soc Hx:   Social History   Substance Use Topics     Smoking status: Not on file     Smokeless tobacco: Not on file     Alcohol use Not on file       Allergies:   Allergies   Allergen Reactions     Contrast Dye        Meds:   No prescriptions prior to admission.       No current outpatient prescriptions on file.       Physical Exam:  Vitals: /79  Pulse 64  Temp 36.9  C (98.5  F) (Oral)  Resp 20  Ht 1.854 m (6' 1\")  Wt 89.3 kg (196 lb 13.9 oz)  SpO2 97%  BMI 25.97 kg/m2  BMI= Body mass index is 25.97 kg/(m^2).      Labs:  UPT: No results found for: HCGQUANT      BMP:  Recent Labs   Lab Test  07/10/17   0417   NA  138   POTASSIUM  3.8   CHLORIDE  102   CO2  27   BUN  10   CR  0.46*   GLC  172*   KAROL  8.4*     CBC:   Recent Labs   Lab Test  07/10/17   0418   WBC  6.5   RBC  3.18*   HGB  10.5*   HCT  30.7*   MCV  97   MCH  33.0   MCHC  34.2   RDW  13.9   PLT  206     Coags:  Recent Labs   Lab Test  07/08/17   1137   INR  1.17*   PTT  31   FIBR  474*       Assessment/Plan:  - ASA 4  - GETA with standard ASA monitors, IV induction, balanced anesthetic  - Pre-Op   - No Versed  - Pre-induction:   - PIVx2   - Arterial line   - Antibiotics per surgeon  - Induction:   - Consider fiberoptic scope   - ETT 7.0   - Propofol, Rocuronium, Fentanyl, Lidocaine  - Maintenance:   - Sevoflurane   - Analgesia: Fentanyl, Dilaudid   - Fluids: LR 1 mL/kg/hr maint  - Emergence:   - Reversal: Sugammadex   - " PONV prophylaxis: georgie Rose Jr., MD  Anesthesia Resident - CA2  Pager: [phone]  7/10/2017  3:42 PM

## 2017-07-10 NOTE — PHARMACY-VANCOMYCIN DOSING SERVICE
Pharmacy Vancomycin Note  Date of Service July 10, 2017  Patient's  [id number]   72 year old, male    Indication: Abscess and Meningitis  Goal Trough Level: 15-20 mg/L  Day of Therapy: 3  Current Vancomycin regimen: 1500 mg IV q12h    Current estimated CrCl = Estimated Creatinine Clearance: 180.6 mL/min (based on Cr of 0.46).    Creatinine for last 3 days  2017:  2:56 AM Creatinine 0.55 mg/dL  2017:  7:17 AM Creatinine 0.48 mg/dL  7/10/2017:  4:17 AM Creatinine 0.46 mg/dL    Recent Vancomycin Levels (past 3 days)  7/10/2017:  1:02 PM Vancomycin Level 13.5 mg/L    Vancomycin IV Administrations (past 72 hours)                   vancomycin (VANCOCIN) 1,750 mg in NaCl 0.9 % 500 mL intermittent infusion (mg) 1,750 mg New Bag 07/10/17 1620    vancomycin (VANCOCIN) 1,500 mg in NaCl 0.9 % 250 mL intermittent infusion (mg) 1,500 mg New Bag 07/10/17 0133     1,500 mg New Bag 17 1421     1,500 mg New Bag  0159                Nephrotoxins and other renal medications (Future)    Start     Dose/Rate Route Frequency Ordered Stop    07/10/17 1600  vancomycin (VANCOCIN) 1,750 mg in NaCl 0.9 % 500 mL intermittent infusion      1,750 mg Intravenous EVERY 12 HOURS 07/10/17 1548               Contrast Orders - past 72 hours (72h ago through future)    Start     Dose/Rate Route Frequency Ordered Stop    07/10/17 1400  technetium Tc 99m tetrofosmin 2UD study (MYOVIEW) radioisotope injection 3-42 mCi      3-42 mCi Intravenous EVERY 2 HOURS 07/10/17 1311 07/10/17 1440    17 1030  perflutren diluted 1mL to 2mL with saline (OPTISON) diluted injection 4 mL      4 mL Intravenous ONCE 17 1018 17 1030    17 1730  gadobutrol (GADAVIST) injection 10 mL      10 mL Intravenous ONCE 17 1729 17 1828          Interpretation of levels and current regimen:  Trough level is  Subtherapeutic  Has serum creatinine changed > 50% in last 72 hours: No  Urine output:  good urine output  Renal Function:  Stable    Plan:  1.  Increase Dose to 1750 mg q12h.  2.  Pharmacy will check trough levels as appropriate in 1-3 Days.    3. Serum creatinine levels will be ordered daily for the first week of therapy and at least twice weekly for subsequent weeks.      Abdelrahman Jacobson, PharmD, MS

## 2017-07-10 NOTE — PROGRESS NOTES
Cardiology follow up note    Lexiscan did not show any inducible myocardial ischemia at a diagnostic workload.  No further evaluation is necessary to stratify his cardiovascular risk prior to undergoing the planned surgery.  The normal study also makes myocardial ischemia unlikely to be the cause of his NSVT than the increased adrenergic tone from his active infection, perhaps compounded by the hypokalemia a couple days ago.    Please page the cardiology consult pager at [phone] with any questions.    Abelardo Shepherd MD  Cardiovascular disease fellow

## 2017-07-10 NOTE — PROGRESS NOTES
[facility], [place]    Neurosurgery Daily Progress Note         Assessment   Ernesto Rawls is a 72 year old male who is postoperative day # 3 from C3-5 and C6 laminectomy.          Plan     Continue current pain control regimen.    Routine neuro exams per unit protocol.    Aspen collar on at all times. Plan to return to OR in coming days.     Several runs of ventricular tachycardia. Appreciate cardiology recommendations: metoprolol, aggressive electrolyte replacement. Will continue to monitor. NM lexiscan today.     Dentistry: no apparent oral abscess, should follow up with a dentist as an outpatient    Podiatry: daily dressing changes    ENT: esophageal scope - no abscess target to drain via esophagus    MAPS > 80, has been maintaining without pressors    Incentive spirometry Q1H while awake.    SLP: NPO    Nutrition consult for enteric access and starting TFs    Bowel regimen. Anti-emetics.     SCDs while in bed.    Started on CIWA protocol d/t hallucinations    PT/OT --- mobilize.    Ambulate with assistance as tolerated.    Dispo; Stable in ICU.     Please dial * * * and enter job code [phone] to reach the neurosurgery team if you have any questions.      Patient and above plan discussed with neurosurgery chief resident.         Subjective     Patient continues to exhibits significant dysphagia.            Objective   Temp:  [97.9  F (36.6  C)-101.3  F (38.5  C)] 98.1  F (36.7  C)  Heart Rate:  [] 86  Resp:  [10-24] 18  BP: (145-185)/(67-91) 145/67  SpO2:  [90 %-99 %] 96 %    I/O last 3 completed shifts:  In: 3322 [I.V.:3322]  Out: 3330 [Urine:3325; Drains:5]    Physical Exam  General: Appears comfortable, NAD  Wound: Incision, clean, dry, intact without strikethrough  Neurologic Exam:  - AOx2.  - Follows commands.  - Speech fluent, spontaneous. No aphasia or dysarthria.  - No gaze preference. No apparent hemineglect.  - PERRL, EOMI.  - Face symmetric with sensation intact to light  touch.  Motor: Normal bulk/tone; no tremor, rigidity, or bradykinesia.     Delt Bi Tri FE IP Quad Hamst TibAnt EHL Gastroc    C5 C6 C7 C8/T1 L2 L3 L4-S1 L4 L5 S1   R 4 4 4 2 1 1 1 4- 3 4-   L 4 4 4 2 1 1 1 4- 3 4-       Sensory:  Deltoid sensory level.       Labs and Imaging     BMP    Recent Labs  Lab 07/09/17 2007 07/09/17 0717 07/08/17  2211 07/08/17  0256 07/07/17  0848 07/07/17  0705 07/07/17  0552   NA  --  137  --  137 133 133 133   POTASSIUM 3.5 3.4 3.1* 4.4 4.0 4.1 4.6   CHLORIDE  --  102  --  104  --   --  98   KAROL  --  8.3*  --  8.3*  --   --  9.2   CO2  --  28  --  24  --   --  26   BUN  --  8  --  12  --   --  18   CR  --  0.48*  --  0.55*  --   --  0.70   GLC  --  161*  --  160* 191* 186* 190*     CBC    Recent Labs  Lab 07/09/17 0717 07/08/17  1137 07/08/17  0920 07/08/17  0256   WBC 5.4 7.8 9.4 4.9   RBC 3.19* 3.04* 3.17* 3.23*   HGB 10.5* 10.1* 10.5* 10.7*   HCT 30.6* 29.4* 30.6* 31.4*   MCV 96 97 97 97   MCH 32.9 33.2* 33.1* 33.1*   MCHC 34.3 34.4 34.3 34.1   RDW 13.7 13.9 14.0 14.2    254 247 88*     INR    Recent Labs  Lab 07/08/17 1137 07/07/17  0552   INR 1.17* 1.10       Imaging: Reviewed      Contact the neurosurgery resident on call with questions.    Dial * * *[phone]: Enter [phone] when prompted.   Tommy Edwards MD, PhD  Neurosurgery PGY-3    I saw and evaluated the patient and reviewed the imaging findings.  I agree with the assessment and plan as documented in the resident's note.

## 2017-07-10 NOTE — PLAN OF CARE
Problem: Goal Outcome Summary  Goal: Goal Outcome Summary  Outcome: Improving  S: Since 0400 has had sensation and improved movement/strength all 4 extremities. T max 101.3. MD notified. Tylenol suppository per orders and sputum sent for gram stain. Afebrile since. Pt expectorating frequent large amts thick sputum, requiring oral-pharyngeal sx for removal. 2100 given 0.5mg ativan for MSSA score still 15 after tylenol and metoprolol (pt conversing with 2 men I couldn't see). Improved mentation until neuro team rounded. Wanted help getting down the stairs, insisting a hole was in head causing headache, and asked the MD if he heard that man praying behind him. Given ativan (MSSA 9) and tylenol supp.  B: Cervical fusion.  A: Stable.  R: Continue replacing electrolytes (only 1 run PSVT tonight). FT placement today. Tx MSSA as needed.

## 2017-07-10 NOTE — PROCEDURES
Small Bowel Feeding Tube Placement Assessment  Reason for Feeding Tube Placement: Provider request for post-pyloric FT  Cortrak Start Time: 10:26   Cortrak End Time: 10:32  Medicine Delivered During Procedure: Lidocaine  Placement Successful:  Presume post-pyloric (pending AXR confirmation)    Procedure Complications: None  Final Placement Ed at exit of nare 107 cm  Face to Face time with patient: 15 minutes    Jackie Koo RDN, LD  Pgr: [phone]

## 2017-07-11 ENCOUNTER — ANESTHESIA (OUTPATIENT)
Dept: SURGERY | Facility: CLINIC | Age: 72
End: 2017-07-11

## 2017-07-11 ENCOUNTER — MEDICAL CORRESPONDENCE (OUTPATIENT)
Dept: HEALTH INFORMATION MANAGEMENT | Facility: CLINIC | Age: 72
End: 2017-07-11

## 2017-07-11 ENCOUNTER — APPOINTMENT (OUTPATIENT)
Dept: OCCUPATIONAL THERAPY | Facility: CLINIC | Age: 72
DRG: 028 | End: 2017-07-11
Attending: NEUROLOGICAL SURGERY
Payer: COMMERCIAL

## 2017-07-11 ENCOUNTER — APPOINTMENT (OUTPATIENT)
Dept: CT IMAGING | Facility: CLINIC | Age: 72
DRG: 028 | End: 2017-07-11
Attending: NEUROLOGICAL SURGERY
Payer: COMMERCIAL

## 2017-07-11 LAB
ANION GAP SERPL CALCULATED.3IONS-SCNC: 5 MMOL/L (ref 3–14)
BACTERIA SPEC CULT: ABNORMAL
BASE EXCESS BLDA CALC-SCNC: 2.5 MMOL/L
BASOPHILS # BLD AUTO: 0 10E9/L (ref 0–0.2)
BASOPHILS NFR BLD AUTO: 0.2 %
BUN SERPL-MCNC: 9 MG/DL (ref 7–30)
CALCIUM SERPL-MCNC: 8.4 MG/DL (ref 8.5–10.1)
CHLORIDE SERPL-SCNC: 100 MMOL/L (ref 94–109)
CO2 SERPL-SCNC: 29 MMOL/L (ref 20–32)
CREAT SERPL-MCNC: 0.42 MG/DL (ref 0.66–1.25)
DIFFERENTIAL METHOD BLD: ABNORMAL
EOSINOPHIL # BLD AUTO: 0.1 10E9/L (ref 0–0.7)
EOSINOPHIL NFR BLD AUTO: 1.9 %
ERYTHROCYTE [DISTWIDTH] IN BLOOD BY AUTOMATED COUNT: 13.8 % (ref 10–15)
GFR SERPL CREATININE-BSD FRML MDRD: ABNORMAL ML/MIN/1.7M2
GLUCOSE BLDC GLUCOMTR-MCNC: 150 MG/DL (ref 70–99)
GLUCOSE BLDC GLUCOMTR-MCNC: 165 MG/DL (ref 70–99)
GLUCOSE BLDC GLUCOMTR-MCNC: 179 MG/DL (ref 70–99)
GLUCOSE BLDC GLUCOMTR-MCNC: 195 MG/DL (ref 70–99)
GLUCOSE SERPL-MCNC: 165 MG/DL (ref 70–99)
HCO3 BLD-SCNC: 27 MMOL/L (ref 21–28)
HCT VFR BLD AUTO: 30.6 % (ref 40–53)
HGB BLD-MCNC: 10.5 G/DL (ref 13.3–17.7)
IMM GRANULOCYTES # BLD: 0 10E9/L (ref 0–0.4)
IMM GRANULOCYTES NFR BLD: 0.3 %
INTERPRETATION ECG - MUSE: NORMAL
INTERPRETATION ECG - MUSE: NORMAL
LYMPHOCYTES # BLD AUTO: 0.6 10E9/L (ref 0.8–5.3)
LYMPHOCYTES NFR BLD AUTO: 9.8 %
Lab: ABNORMAL
MAGNESIUM SERPL-MCNC: 1.9 MG/DL (ref 1.6–2.3)
MCH RBC QN AUTO: 33.1 PG (ref 26.5–33)
MCHC RBC AUTO-ENTMCNC: 34.3 G/DL (ref 31.5–36.5)
MCV RBC AUTO: 97 FL (ref 78–100)
MICRO REPORT STATUS: ABNORMAL
MICROORGANISM SPEC CULT: ABNORMAL
MONOCYTES # BLD AUTO: 0.8 10E9/L (ref 0–1.3)
MONOCYTES NFR BLD AUTO: 12.8 %
NEUTROPHILS # BLD AUTO: 4.4 10E9/L (ref 1.6–8.3)
NEUTROPHILS NFR BLD AUTO: 75 %
NRBC # BLD AUTO: 0 10*3/UL
NRBC BLD AUTO-RTO: 0 /100
O2/TOTAL GAS SETTING VFR VENT: 21 %
PCO2 BLD: 40 MM HG (ref 35–45)
PH BLD: 7.43 PH (ref 7.35–7.45)
PHOSPHATE SERPL-MCNC: 2.2 MG/DL (ref 2.5–4.5)
PLATELET # BLD AUTO: 241 10E9/L (ref 150–450)
PO2 BLD: 49 MM HG (ref 80–105)
POTASSIUM SERPL-SCNC: 3.6 MMOL/L (ref 3.4–5.3)
RBC # BLD AUTO: 3.17 10E12/L (ref 4.4–5.9)
SODIUM SERPL-SCNC: 134 MMOL/L (ref 133–144)
SPECIMEN SOURCE: ABNORMAL
WBC # BLD AUTO: 5.9 10E9/L (ref 4–11)

## 2017-07-11 PROCEDURE — 83735 ASSAY OF MAGNESIUM: CPT | Performed by: NEUROLOGICAL SURGERY

## 2017-07-11 PROCEDURE — 25000128 H RX IP 250 OP 636: Performed by: NEUROLOGICAL SURGERY

## 2017-07-11 PROCEDURE — 85025 COMPLETE CBC W/AUTO DIFF WBC: CPT | Performed by: NEUROLOGICAL SURGERY

## 2017-07-11 PROCEDURE — 97110 THERAPEUTIC EXERCISES: CPT | Mod: GO | Performed by: OCCUPATIONAL THERAPIST

## 2017-07-11 PROCEDURE — 27210913 ZZH NUTRITION PRODUCT INTERMEDIATE PACKET

## 2017-07-11 PROCEDURE — 25000128 H RX IP 250 OP 636: Performed by: STUDENT IN AN ORGANIZED HEALTH CARE EDUCATION/TRAINING PROGRAM

## 2017-07-11 PROCEDURE — 40000275 ZZH STATISTIC RCP TIME EA 10 MIN

## 2017-07-11 PROCEDURE — 36415 COLL VENOUS BLD VENIPUNCTURE: CPT | Performed by: NEUROLOGICAL SURGERY

## 2017-07-11 PROCEDURE — 83735 ASSAY OF MAGNESIUM: CPT | Performed by: STUDENT IN AN ORGANIZED HEALTH CARE EDUCATION/TRAINING PROGRAM

## 2017-07-11 PROCEDURE — 80048 BASIC METABOLIC PNL TOTAL CA: CPT | Performed by: NEUROLOGICAL SURGERY

## 2017-07-11 PROCEDURE — 20000004 ZZH R&B ICU UMMC

## 2017-07-11 PROCEDURE — 72125 CT NECK SPINE W/O DYE: CPT

## 2017-07-11 PROCEDURE — 25000132 ZZH RX MED GY IP 250 OP 250 PS 637: Performed by: STUDENT IN AN ORGANIZED HEALTH CARE EDUCATION/TRAINING PROGRAM

## 2017-07-11 PROCEDURE — 25000125 ZZHC RX 250: Performed by: NEUROLOGICAL SURGERY

## 2017-07-11 PROCEDURE — 40000133 ZZH STATISTIC OT WARD VISIT: Performed by: OCCUPATIONAL THERAPIST

## 2017-07-11 PROCEDURE — 82803 BLOOD GASES ANY COMBINATION: CPT | Performed by: NEUROLOGICAL SURGERY

## 2017-07-11 PROCEDURE — 00000146 ZZHCL STATISTIC GLUCOSE BY METER IP

## 2017-07-11 PROCEDURE — 36600 WITHDRAWAL OF ARTERIAL BLOOD: CPT

## 2017-07-11 PROCEDURE — 84100 ASSAY OF PHOSPHORUS: CPT | Performed by: NEUROLOGICAL SURGERY

## 2017-07-11 PROCEDURE — 25000125 ZZHC RX 250: Performed by: STUDENT IN AN ORGANIZED HEALTH CARE EDUCATION/TRAINING PROGRAM

## 2017-07-11 PROCEDURE — 25000132 ZZH RX MED GY IP 250 OP 250 PS 637: Performed by: NEUROLOGICAL SURGERY

## 2017-07-11 RX ADMIN — Medication 1 PACKET: at 20:35

## 2017-07-11 RX ADMIN — INSULIN ASPART 1 UNITS: 100 INJECTION, SOLUTION INTRAVENOUS; SUBCUTANEOUS at 07:56

## 2017-07-11 RX ADMIN — POTASSIUM PHOSPHATE, MONOBASIC AND POTASSIUM PHOSPHATE, DIBASIC 15 MMOL: 224; 236 INJECTION, SOLUTION INTRAVENOUS at 07:55

## 2017-07-11 RX ADMIN — VANCOMYCIN HYDROCHLORIDE 1750 MG: 10 INJECTION, POWDER, LYOPHILIZED, FOR SOLUTION INTRAVENOUS at 03:07

## 2017-07-11 RX ADMIN — Medication 20 MG: at 09:04

## 2017-07-11 RX ADMIN — METOPROLOL TARTRATE 5 MG: 5 INJECTION INTRAVENOUS at 21:47

## 2017-07-11 RX ADMIN — CEFEPIME HYDROCHLORIDE 2 G: 2 INJECTION, POWDER, FOR SOLUTION INTRAVENOUS at 15:34

## 2017-07-11 RX ADMIN — POLYETHYLENE GLYCOL 3350 17 G: 17 POWDER, FOR SOLUTION ORAL at 09:04

## 2017-07-11 RX ADMIN — POTASSIUM CHLORIDE 20 MEQ: 1.5 POWDER, FOR SOLUTION ORAL at 06:47

## 2017-07-11 RX ADMIN — SENNOSIDES AND DOCUSATE SODIUM 2 TABLET: 8.6; 5 TABLET ORAL at 09:04

## 2017-07-11 RX ADMIN — Medication 1 PACKET: at 09:13

## 2017-07-11 RX ADMIN — LORAZEPAM 1 MG: 2 INJECTION INTRAMUSCULAR; INTRAVENOUS at 04:53

## 2017-07-11 RX ADMIN — INSULIN ASPART 2 UNITS: 100 INJECTION, SOLUTION INTRAVENOUS; SUBCUTANEOUS at 11:20

## 2017-07-11 RX ADMIN — INSULIN ASPART 1 UNITS: 100 INJECTION, SOLUTION INTRAVENOUS; SUBCUTANEOUS at 16:04

## 2017-07-11 RX ADMIN — VANCOMYCIN HYDROCHLORIDE 1750 MG: 10 INJECTION, POWDER, LYOPHILIZED, FOR SOLUTION INTRAVENOUS at 15:45

## 2017-07-11 RX ADMIN — MULTIVIT AND MINERALS-FERROUS GLUCONATE 9 MG IRON/15 ML ORAL LIQUID 15 ML: at 09:04

## 2017-07-11 RX ADMIN — CEFEPIME HYDROCHLORIDE 2 G: 2 INJECTION, POWDER, FOR SOLUTION INTRAVENOUS at 07:52

## 2017-07-11 RX ADMIN — LIDOCAINE 3 PATCH: 50 PATCH CUTANEOUS at 08:00

## 2017-07-11 RX ADMIN — CARBOXYMETHYLCELLULOSE SODIUM 2 DROP: 5 SOLUTION/ DROPS OPHTHALMIC at 08:07

## 2017-07-11 RX ADMIN — METOPROLOL TARTRATE 5 MG: 5 INJECTION INTRAVENOUS at 09:05

## 2017-07-11 RX ADMIN — METOPROLOL TARTRATE 5 MG: 5 INJECTION INTRAVENOUS at 03:09

## 2017-07-11 RX ADMIN — METOPROLOL TARTRATE 5 MG: 5 INJECTION INTRAVENOUS at 15:45

## 2017-07-11 RX ADMIN — FOLIC ACID 1 MG: 1 TABLET ORAL at 09:05

## 2017-07-11 RX ADMIN — Medication 2 G: at 06:53

## 2017-07-11 RX ADMIN — SODIUM CHLORIDE: 900 INJECTION, SOLUTION INTRAVENOUS at 15:45

## 2017-07-11 ASSESSMENT — VISUAL ACUITY
OU: NORMAL ACUITY;GLASSES

## 2017-07-11 ASSESSMENT — PAIN DESCRIPTION - DESCRIPTORS: DESCRIPTORS: ACHING

## 2017-07-11 NOTE — PROGRESS NOTES
Care Coordinator- Assessment Note     Admission Date/Time:  7/7/2017  Attending MD:  Derick Holly,*     Data  Chart reviewed, discussed with interdisciplinary team.      History: rheumatoid arthritis, diabetes, CAD and wet gangrene of his right foot great toe    Patient currently admitted with: surgery     RNCC Assessment  Concerns with insurance coverage for discharge needs: None.  Current Living Situation: Patient lives with spouse.  Support System: Supportive  Services Involved[facility]  Transportation: Family or Friend will provide    Assessment: patient was transferred from [facility].  [facility] by [facility] end of week.  Pl[facility] a call to the [facility] Referral line [phone]. I met with Cristian his wife and Ananya his daughter, they understand the process of transferring [facility]. Per the neurosurgery team he will be ready for transfer prior to the end of the week.     Plan  Anticipated Discharge Date:  2-3 days  Anticipated Discharge Plan: [facility] for [facility] medical care    CTS Handoff completed: ruiz Toledo RN, MSN, PHN  RNCCPH: [phone]  Pager: [phone]  Covering for : Michaelle Wood, ICU RNCC

## 2017-07-11 NOTE — PLAN OF CARE
Problem: Goal Outcome Summary  Goal: Goal Outcome Summary  SLP: Chart reviewed and consulted with nursing student. Patient NPO for surgery scheduled at 1400. Will reschedule for 7- pending patient progress.

## 2017-07-11 NOTE — PLAN OF CARE
Problem: Goal Outcome Summary  Goal: Goal Outcome Summary  Outcome: No Change  Pt cooperative most of the night, gets angry and agitated at times with cares, following MSSA assessments for PRN Ativan dosing.   BP stable, sinus rhythm 80s-90s, afebrile.   No resp distress, continues with frequent coughing, large amounts white sputum, on 2-3L NC O2.   Tube feeds off at midnight as pt to be NPO for surgery today. Moderate loose incontinent stool.   Good urine output via armstrong.   No family visits or phone calls overnight.   Pt updated on all cares throughout shift.   Please see flowsheets for further detailed information.

## 2017-07-11 NOTE — PLAN OF CARE
Problem: Goal Outcome Summary  Goal: Goal Outcome Summary  PT 4A: CANCEL; pt heading to OR this PM. Will reschedule.

## 2017-07-11 NOTE — PLAN OF CARE
Problem: Goal Outcome Summary  Goal: Goal Outcome Summary  OT 4AB:  Pt. tolerated session well up in chair; lt. UE there.ex./activity. VSS throughout, pt. alert and following basic 1 step commands consistently throughout session.  Recommend d/c to rehab; ARU vs.TCU.

## 2017-07-11 NOTE — PLAN OF CARE
Problem: Goal Outcome Summary  Goal: Goal Outcome Summary  Outcome: No Change  D: 72 year old male admitted 07/07 with severe cervical stenosis, cervical spine hematoma and abcess, near total quadreplegia who underwent posterior 3 level cervical spine fusion. Continues to have intermittent runs of SVT/Afib noted at 19:50 today.  I/A: Monitoring neuro status every 2 hours, no new neurological changes noted. Gross motor movement of upper extremities, weak bilateral hand grasps, + sensation in arms and legs. BP stable. Good cough effort, productive sputum. Afebrile. Nuc med stress test completed. All electrolytes replaced see MAR.  P: NPO after midnight for anterior spinal fusion tomorrow

## 2017-07-11 NOTE — PROGRESS NOTES
CLINICAL NUTRITION SERVICES - BRIEF NOTE    Nutrition Prescription    RECOMMENDATIONS FOR MDs/PROVIDERS TO ORDER:  - Consider ordering enteral Phos supplementation (NeutraPhos 1 pkt TID-QID) in addition to IV replacement. Also recommend change to high intensity IV Phos replacement protocol (replace Phos <2.7 mg/dL) if not already.     Recommendations already ordered by Registered Dietitian (RD):  - continue EN regimen as ordered    Future/Additional Recommendations:  - EN; lytes      Nutrition Progress Note - f/u for progress towards previous nutrition POC (see previous 7/7 reassessment for details)     Brayan Goldstein RD, LD  Neuro ICU  Pager: [phone]
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
## 2017-07-11 NOTE — PLAN OF CARE
Problem: Goal Outcome Summary  Goal: Goal Outcome Summary  Outcome: No Change  D: 72 year old male admitted 07/07/17 S/P right middle toe amputation who experienced near total quadreplegia, found to have a cervical hematoma/mass/abcess. Subsequently underwent a posterior spinal fusion and plan for subsequent anterior fusion.  I/A: No new neuro changes noted today. Up in the chair for 5 hours tolerated well. Intermittently confused. Vitals stable, afebrile, TF restarted after surgical date was postponed. 2 medium loose stools today. Electrolytes replaced   P: Titrate TF to gaol, monitor neuro status closely

## 2017-07-11 NOTE — PROGRESS NOTES
[facility], [place]    Neurosurgery Daily Progress Note         Assessment   Ernesto Rawls is a 72 year old male who is postoperative day # 4 from C3-5 and C6 laminectomy.          Plan     Continue current pain control regimen.    Routine neuro exams per unit protocol.    Aspen collar on at all times. Plan to return to OR today    NM lexiscan negative. Cardiology OK for return to OR.     Dentistry: no apparent oral abscess, should follow up with a dentist as an outpatient    Podiatry: daily dressing changes    ENT: esophageal scope - no abscess target to drain via esophagus    MAPS > 80, has been maintaining without pressors    Incentive spirometry Q1H while awake.    SLP: NPO    Nutrition consult for enteric access and starting TFs    Bowel regimen. Anti-emetics.     SCDs while in bed.    Started on CIWA protocol d/t hallucinations    PT/OT --- mobilize. as tolerated.    Dispo; Stable in ICU.     Please dial * * * and enter job [phone] to reach the neurosurgery team if you have any questions.      Patient and above plan discussed with neurosurgery chief resident.         Subjective     Patient prepared for OR.            Objective   Temp:  [97.9  F (36.6  C)-98.5  F (36.9  C)] 98  F (36.7  C)  Heart Rate:  [] 100  Resp:  [13-24] 20  BP: (125-180)/(62-98) 125/83  SpO2:  [94 %-99 %] 96 %    I/O last 3 completed shifts:  In: 3542.5 [I.V.:3212.5; NG/GT:260]  Out: 3525 [Urine:3525]    Physical Exam  General: Appears comfortable, NAD. Asprn cervical collar in place.   Wound: Incision, clean, dry, without strikethrough  Neurologic Exam:  - AOx2.  - Follows commands.  - Speech fluent, spontaneous. No aphasia or dysarthria.  - No gaze preference. No apparent hemineglect.  - PERRL, EOMI.  - Face symmetric with sensation intact to light touch.  Motor: Normal bulk/tone; no tremor, rigidity, or bradykinesia.     Delt Bi Tri FE IP Quad Hamst TibAnt EHL Gastroc    C5 C6 C7 C8/T1 L2 L3 L4-S1 L4  L5 S1   R 4 4 4 2 1 1 1 4- 3 4-   L 4 4 4 2 1 1 1 4- 3 4-       Sensory:  Deltoid sensory level.       Labs and Imaging     BMP    Recent Labs  Lab 07/11/17  0547 07/10/17  2007 07/10/17  0417 07/09/17  2007 07/09/17  0717    138 138  --  137   POTASSIUM 3.6 3.6 3.8 3.5 3.4   CHLORIDE 100 104 102  --  102   KAROL 8.4* 8.3* 8.4*  --  8.3*   CO2 29 28 27  --  28   BUN 9 11 10  --  8   CR 0.42* 0.47* 0.46*  --  0.48*   * 162* 172*  --  161*     CBC    Recent Labs  Lab 07/11/17  0547 07/10/17  2007 07/10/17  0418 07/09/17  0717   WBC 5.9 6.9 6.5 5.4   RBC 3.17* 3.20* 3.18* 3.19*   HGB 10.5* 10.5* 10.5* 10.5*   HCT 30.6* 30.8* 30.7* 30.6*   MCV 97 96 97 96   MCH 33.1* 32.8 33.0 32.9   MCHC 34.3 34.1 34.2 34.3   RDW 13.8 13.9 13.9 13.7    236 206 215     INR    Recent Labs  Lab 07/10/17  2007 07/08/17  1137 07/07/17  0552   INR 1.44* 1.17* 1.10       Imaging: Reviewed      Contact the neurosurgery resident on call with questions.    Dial * * [phone]: Enter [phone] when prompted.   Tommy Edwards MD, PhD  Neurosurgery PGY-3    I saw and evaluated the patient and reviewed the imaging findings.  I agree with the assessment and plan as documented in the resident's note.

## 2017-07-11 NOTE — PROGRESS NOTES
Neuroscience Intensive Care Progress Note    2017    Ernesto Rawls is a 72 year old year old male admitted on 2017 with four extremity weakness that progressed to quadriplegia likely due to epidural abscess.       Problem List  1. Epidural fluid collection s/p decompression  2. Weakness of all extremities    24 hour events:  No acute events overnight.     Temp: 98  F (36.7  C) Temp  Min: 97.9  F (36.6  C)  Max: 98.5  F (36.9  C)  Resp: 20 Resp  Min: 13  Max: 24  SpO2: 96 % SpO2  Min: 94 %  Max: 99 %    No Data Recorded  Heart Rate: 100 Heart Rate  Min: 75  Max: 154  BP: 125/83 Systolic (24hrs), Av , Min:125 , Max:180   Diastolic (24hrs), Av, Min:62, Max:98      Ventilator Settings  2LPM NC    Intake/Output Summary (Last 24 hours) at 17 0954  Last data filed at 17 0925   Gross per 24 hour   Intake             3825 ml   Output             3760 ml   Net               65 ml           BP - Mean:  [] 94    Current Medications:    lidocaine (viscous)  5 mL Topical Once     ceFAZolin  2 g Intravenous Pre-Op/Pre-procedure x 1 dose     ceFAZolin  1 g Intravenous See Admin Instructions     vancomycin (VANCOCIN) IV  1,750 mg Intravenous Q12H     multivitamins with minerals  15 mL Per Feeding Tube Daily     protein modular  1 packet Per Feeding Tube TID     omeprazole  20 mg Oral or Feeding Tube Daily     metoprolol  5 mg Intravenous Q6H     magnesium sulfate  2 g Intravenous Once     potassium chloride  20 mEq Oral Once     potassium phosphate (KPHOS) in D5W IV  20 mmol Intravenous Once     ceFEPIme (MAIXPIME) IV  2 g Intravenous Q8H     LORazepam  0.5-4 mg Intravenous See Admin Instructions    Or     LORazepam  0.5-4 mg Oral See Admin Instructions     sodium chloride (PF)  3 mL Intracatheter Q8H     lidocaine  3 patch Transdermal Q24H     lidocaine   Transdermal Q24h     lidocaine   Transdermal Q8H     menthol   Transdermal Q8H     senna-docusate  1-2 tablet Oral BID     polyethylene glycol   "17 g Oral BID     insulin aspart  1-7 Units Subcutaneous TID AC     insulin aspart  1-5 Units Subcutaneous At Bedtime     folic acid  1 mg Oral Daily       PRN Medications:  sodium chloride (PF), HOLD MEDICATION, HOLD MEDICATION, HOLD MEDICATION, HOLD MEDICATION, HOLD MEDICATION, sodium chloride (PF), sodium chloride bacteriostatic, sodium chloride (PF), carboxymethylcellulose, potassium chloride, potassium chloride, potassium chloride, potassium chloride with lidocaine, potassium chloride, magnesium sulfate, magnesium sulfate, potassium phosphate (KPHOS) in D5W IV, potassium phosphate (KPHOS) in D5W IV, potassium phosphate (KPHOS) in D5W IV, potassium phosphate (KPHOS) in D5W IV, potassium phosphate (KPHOS) in D5W IV, IV fluid REPLACEMENT ONLY, acetaminophen, lidocaine (buffered or not buffered), lidocaine 4%, sodium chloride (PF), hydrALAZINE, naloxone, labetalol, sore throat lozenge, oxyCODONE, menthol **AND** menthol **AND** menthol, ondansetron **OR** ondansetron, prochlorperazine **OR** prochlorperazine, bisacodyl, glucose **OR** dextrose **OR** glucagon, cyclobenzaprine    Infusions:    IV fluid REPLACEMENT ONLY       NaCl 100 mL/hr at 07/10/17 2130       Allergies   Allergen Reactions     Contrast Dye        Physical Examination:  /83  Pulse 64  Temp 98  F (36.7  C) (Oral)  Resp 20  Ht 1.854 m (6' 1\")  Wt 89.3 kg (196 lb 13.9 oz)  SpO2 96%  BMI 25.97 kg/m2    General: NAD, in c-collar   CV: RRR, slight systolic murmur  Resp: decreased breath sounds on left,  Scant wheezes and rhonchi appreciated on left.   Abd: soft, nontender, nondistended  Ext: no edema, peripheral pulses intact  Neuro:  MS: alert/oriented. Follows commands  CN: 2-12 intact  Motor: Antigravity in b/l UE and LLE. 2/5 in RLE. Tone decreased throughout.  Sensory: intact to light touch throughout but patient crosses L v R.   Reflexes: 1+ and symmetric in UE, absent in LE      Labs/Studies:  Recent Labs   Lab Test  07/10/17   7508  " 07/10/17   0417  07/09/17 2007 07/09/17 0717 07/08/17   1137   07/08/17   0256   NA   --   138   --   137   --    --    --   137   POTASSIUM   --   3.8  3.5  3.4   < >   --    --   4.4   CHLORIDE   --   102   --   102   --    --    --   104   CO2   --   27   --   28   --    --    --   24   ANIONGAP   --   9   --   7   --    --    --   8   GLC   --   172*   --   161*   --    --    --   160*   BUN   --   10   --   8   --    --    --   12   CR   --   0.46*   --   0.48*   --    --    --   0.55*   KAROL   --   8.4*   --   8.3*   --    --    --   8.3*   WBC  6.5   --    --   5.4   --   7.8   < >  4.9   RBC  3.18*   --    --   3.19*   --   3.04*   < >  3.23*   HGB  10.5*   --    --   10.5*   --   10.1*   < >  10.7*   PLT  206   --    --   215   --   254   < >  88*    < > = values in this interval not displayed.       Recent Labs   Lab Test  07/08/17   1137  07/07/17   0552   INR  1.17*  1.10   PTT  31  29           Recent Labs  Lab 07/07/17  0848 07/07/17  0705   PH 7.42 7.38   PCO2 35 42   PO2 290* 380*   HCO3 23 25   O2PER 50.0 73.0           Imaging:  XR abdomen  Interval placement of feeding tube with the distal tip  projects over distal duodenum. Mildly prominent loops of small bowel, could represent ileus or partial obstruction, given the overall normal appearance of the visualized colon. Recommend attention on follow-up.    Assessment/Plan  Ernesto Rawls is a 72 year old h/o rheumatoid arthritis, DM, CAD, and wet gangrene of right foot admitted 7/7/2017 with weakness in all extremities that progressed to quadriplegia.     Plan  Neuro:  # Epidural fluid collection s/p decompression of C3-5 w/ partial 6 on POD#3:  -Mild clinical improvement post-op  -Epidural fluid collection cultures still pending likely due to limited sample; ID requests more samples from surgery today.  -Neurosurgery planning anterior spinal fusion today.    Resp:  Stable on 2LPM NC, last CXR with mild left base opacity. Pulm exam  benign.    CV:  Hemodynamically stable. MAP goal >80.     # H/O CAD:  # Runs of V-tach:  NST performed yesterday indicates no ischemia or infarcts with EF 62%. TTE confirms normal ventricular function. Cardiology following, they recommend metoprolol 25 mg BID, lyte repletion, and consider angiogram if still sympteomatic  -Replace lytes: K>4, Mg>2, PO4>2.4  -metoprolol 25 mg BID  -Continue tele    Renal:  No active issues. Creatinine at baseline.    Endo:    CORNELIO  No active issues. H/O DM  -SSI, maintain euglycemia, goal     Heme:  # Thrombocytopenia, resolved:  -Decreased to 80s during hospitalization. Uptrending now  -Currently 241 and stable from yesterday    GI:  No active issues, NJ tube placed but NPO for surgery    ID:  # Concern for epidural abscess:  -Unclear cause of epidural fluid collection, infection possible  -Patient spiked fever overnight although WBC remains stable  -Currently on broad spectrum abx for coverage, cultures pending  -ID Following, recommend cefipime and vanc, stop flagyl.    FEN: NPO, resume TF per NJ tube after surgery  PPX:    DVT prophylaxis: SCDs, lovenox    GI: omeprazole    Disposition: Neurosurgery primary team, NCC will continue to follow    I personally saw and examined the patient. Note written w/ help of Israel Alcocer, MS-4, who was acting as a medical scribe.      Patient discussed with staff physician, Dr. Ferreira.      Pablo Guido  Neurology PGY-2

## 2017-07-12 ENCOUNTER — ANESTHESIA EVENT (OUTPATIENT)
Dept: SURGERY | Facility: CLINIC | Age: 72
DRG: 028 | End: 2017-07-12
Payer: COMMERCIAL

## 2017-07-12 ENCOUNTER — APPOINTMENT (OUTPATIENT)
Dept: GENERAL RADIOLOGY | Facility: CLINIC | Age: 72
DRG: 028 | End: 2017-07-12
Attending: NEUROLOGICAL SURGERY
Payer: COMMERCIAL

## 2017-07-12 ENCOUNTER — ANESTHESIA (OUTPATIENT)
Dept: SURGERY | Facility: CLINIC | Age: 72
DRG: 028 | End: 2017-07-12
Payer: COMMERCIAL

## 2017-07-12 PROBLEM — G95.9 MYELOPATHY (H): Status: ACTIVE | Noted: 2017-07-12

## 2017-07-12 LAB
ANION GAP SERPL CALCULATED.3IONS-SCNC: 5 MMOL/L (ref 3–14)
APTT PPP: 39 SEC (ref 22–37)
BACTERIA SPEC CULT: NORMAL
BASE EXCESS BLDA CALC-SCNC: 2.8 MMOL/L
BASOPHILS # BLD AUTO: 0 10E9/L (ref 0–0.2)
BASOPHILS NFR BLD AUTO: 0.2 %
BUN SERPL-MCNC: 10 MG/DL (ref 7–30)
CA-I BLD-MCNC: 4.5 MG/DL (ref 4.4–5.2)
CALCIUM SERPL-MCNC: 8.4 MG/DL (ref 8.5–10.1)
CHLORIDE SERPL-SCNC: 101 MMOL/L (ref 94–109)
CO2 SERPL-SCNC: 28 MMOL/L (ref 20–32)
CREAT SERPL-MCNC: 0.39 MG/DL (ref 0.66–1.25)
DIFFERENTIAL METHOD BLD: ABNORMAL
EOSINOPHIL # BLD AUTO: 0.2 10E9/L (ref 0–0.7)
EOSINOPHIL NFR BLD AUTO: 2.3 %
ERYTHROCYTE [DISTWIDTH] IN BLOOD BY AUTOMATED COUNT: 13.8 % (ref 10–15)
GFR SERPL CREATININE-BSD FRML MDRD: ABNORMAL ML/MIN/1.7M2
GLUCOSE BLD-MCNC: 156 MG/DL (ref 70–99)
GLUCOSE BLDC GLUCOMTR-MCNC: 141 MG/DL (ref 70–99)
GLUCOSE BLDC GLUCOMTR-MCNC: 148 MG/DL (ref 70–99)
GLUCOSE BLDC GLUCOMTR-MCNC: 196 MG/DL (ref 70–99)
GLUCOSE BLDC GLUCOMTR-MCNC: 200 MG/DL (ref 70–99)
GLUCOSE BLDC GLUCOMTR-MCNC: 245 MG/DL (ref 70–99)
GLUCOSE SERPL-MCNC: 227 MG/DL (ref 70–99)
HCO3 BLD-SCNC: 27 MMOL/L (ref 21–28)
HCT VFR BLD AUTO: 30.8 % (ref 40–53)
HGB BLD-MCNC: 10.6 G/DL (ref 13.3–17.7)
HGB BLD-MCNC: 9.6 G/DL (ref 13.3–17.7)
IMM GRANULOCYTES # BLD: 0 10E9/L (ref 0–0.4)
IMM GRANULOCYTES NFR BLD: 0.6 %
INR PPP: 1.32 (ref 0.86–1.14)
LYMPHOCYTES # BLD AUTO: 1 10E9/L (ref 0.8–5.3)
LYMPHOCYTES NFR BLD AUTO: 14.8 %
Lab: NORMAL
MAGNESIUM SERPL-MCNC: 1.9 MG/DL (ref 1.6–2.3)
MAGNESIUM SERPL-MCNC: 2.1 MG/DL (ref 1.6–2.3)
MAGNESIUM SERPL-MCNC: 2.3 MG/DL (ref 1.6–2.3)
MCH RBC QN AUTO: 32.7 PG (ref 26.5–33)
MCHC RBC AUTO-ENTMCNC: 34.4 G/DL (ref 31.5–36.5)
MCV RBC AUTO: 95 FL (ref 78–100)
MICRO REPORT STATUS: NORMAL
MONOCYTES # BLD AUTO: 0.7 10E9/L (ref 0–1.3)
MONOCYTES NFR BLD AUTO: 10.2 %
NEUTROPHILS # BLD AUTO: 4.7 10E9/L (ref 1.6–8.3)
NEUTROPHILS NFR BLD AUTO: 71.9 %
NRBC # BLD AUTO: 0 10*3/UL
NRBC BLD AUTO-RTO: 0 /100
O2/TOTAL GAS SETTING VFR VENT: 50 %
PCO2 BLD: 37 MM HG (ref 35–45)
PH BLD: 7.47 PH (ref 7.35–7.45)
PHOSPHATE SERPL-MCNC: 1.8 MG/DL (ref 2.5–4.5)
PHOSPHATE SERPL-MCNC: 2.3 MG/DL (ref 2.5–4.5)
PHOSPHATE SERPL-MCNC: 2.6 MG/DL (ref 2.5–4.5)
PLATELET # BLD AUTO: 299 10E9/L (ref 150–450)
PO2 BLD: 202 MM HG (ref 80–105)
POTASSIUM BLD-SCNC: 4.2 MMOL/L (ref 3.4–5.3)
POTASSIUM SERPL-SCNC: 3.4 MMOL/L (ref 3.4–5.3)
POTASSIUM SERPL-SCNC: 3.7 MMOL/L (ref 3.4–5.3)
POTASSIUM SERPL-SCNC: 3.9 MMOL/L (ref 3.4–5.3)
RBC # BLD AUTO: 3.24 10E12/L (ref 4.4–5.9)
SODIUM BLD-SCNC: 135 MMOL/L (ref 133–144)
SODIUM SERPL-SCNC: 134 MMOL/L (ref 133–144)
SPECIMEN SOURCE: NORMAL
WBC # BLD AUTO: 6.5 10E9/L (ref 4–11)

## 2017-07-12 PROCEDURE — 87081 CULTURE SCREEN ONLY: CPT | Performed by: INTERNAL MEDICINE

## 2017-07-12 PROCEDURE — 86900 BLOOD TYPING SEROLOGIC ABO: CPT | Performed by: STUDENT IN AN ORGANIZED HEALTH CARE EDUCATION/TRAINING PROGRAM

## 2017-07-12 PROCEDURE — 0RT30ZZ RESECTION OF CERVICAL VERTEBRAL DISC, OPEN APPROACH: ICD-10-PCS | Performed by: NEUROLOGICAL SURGERY

## 2017-07-12 PROCEDURE — 36000072 ZZH SURGERY LEVEL 5 W FLUORO 1ST 30 MIN - UMMC: Performed by: NEUROLOGICAL SURGERY

## 2017-07-12 PROCEDURE — 40000556 ZZH STATISTIC PERIPHERAL IV START W US GUIDANCE

## 2017-07-12 PROCEDURE — 25000565 ZZH ISOFLURANE, EA 15 MIN: Performed by: NEUROLOGICAL SURGERY

## 2017-07-12 PROCEDURE — 25000564 ZZH DESFLURANE, EA 15 MIN: Performed by: NEUROLOGICAL SURGERY

## 2017-07-12 PROCEDURE — 27211024 ZZHC OR SUPPLY OTHER OPNP: Performed by: NEUROLOGICAL SURGERY

## 2017-07-12 PROCEDURE — 85730 THROMBOPLASTIN TIME PARTIAL: CPT | Performed by: STUDENT IN AN ORGANIZED HEALTH CARE EDUCATION/TRAINING PROGRAM

## 2017-07-12 PROCEDURE — 40000014 ZZH STATISTIC ARTERIAL MONITORING DAILY

## 2017-07-12 PROCEDURE — 84100 ASSAY OF PHOSPHORUS: CPT | Performed by: STUDENT IN AN ORGANIZED HEALTH CARE EDUCATION/TRAINING PROGRAM

## 2017-07-12 PROCEDURE — 25000128 H RX IP 250 OP 636: Performed by: NEUROLOGICAL SURGERY

## 2017-07-12 PROCEDURE — 87640 STAPH A DNA AMP PROBE: CPT | Performed by: INTERNAL MEDICINE

## 2017-07-12 PROCEDURE — 27210995 ZZH RX 272: Performed by: NEUROLOGICAL SURGERY

## 2017-07-12 PROCEDURE — 82330 ASSAY OF CALCIUM: CPT | Performed by: NEUROLOGICAL SURGERY

## 2017-07-12 PROCEDURE — 25000128 H RX IP 250 OP 636: Performed by: STUDENT IN AN ORGANIZED HEALTH CARE EDUCATION/TRAINING PROGRAM

## 2017-07-12 PROCEDURE — 86850 RBC ANTIBODY SCREEN: CPT | Performed by: STUDENT IN AN ORGANIZED HEALTH CARE EDUCATION/TRAINING PROGRAM

## 2017-07-12 PROCEDURE — C1762 CONN TISS, HUMAN(INC FASCIA): HCPCS | Performed by: NEUROLOGICAL SURGERY

## 2017-07-12 PROCEDURE — 25000132 ZZH RX MED GY IP 250 OP 250 PS 637: Performed by: STUDENT IN AN ORGANIZED HEALTH CARE EDUCATION/TRAINING PROGRAM

## 2017-07-12 PROCEDURE — 82947 ASSAY GLUCOSE BLOOD QUANT: CPT | Performed by: NEUROLOGICAL SURGERY

## 2017-07-12 PROCEDURE — 40000275 ZZH STATISTIC RCP TIME EA 10 MIN

## 2017-07-12 PROCEDURE — C9399 UNCLASSIFIED DRUGS OR BIOLOG: HCPCS | Performed by: NURSE ANESTHETIST, CERTIFIED REGISTERED

## 2017-07-12 PROCEDURE — 71000015 ZZH RECOVERY PHASE 1 LEVEL 2 EA ADDTL HR: Performed by: NEUROLOGICAL SURGERY

## 2017-07-12 PROCEDURE — 80048 BASIC METABOLIC PNL TOTAL CA: CPT | Performed by: NEUROLOGICAL SURGERY

## 2017-07-12 PROCEDURE — 83735 ASSAY OF MAGNESIUM: CPT | Performed by: STUDENT IN AN ORGANIZED HEALTH CARE EDUCATION/TRAINING PROGRAM

## 2017-07-12 PROCEDURE — 40000171 ZZH STATISTIC PRE-PROCEDURE ASSESSMENT III: Performed by: NEUROLOGICAL SURGERY

## 2017-07-12 PROCEDURE — 85610 PROTHROMBIN TIME: CPT | Performed by: STUDENT IN AN ORGANIZED HEALTH CARE EDUCATION/TRAINING PROGRAM

## 2017-07-12 PROCEDURE — 87186 SC STD MICRODIL/AGAR DIL: CPT | Performed by: NEUROLOGICAL SURGERY

## 2017-07-12 PROCEDURE — C1713 ANCHOR/SCREW BN/BN,TIS/BN: HCPCS | Performed by: NEUROLOGICAL SURGERY

## 2017-07-12 PROCEDURE — 25000125 ZZHC RX 250: Performed by: NEUROLOGICAL SURGERY

## 2017-07-12 PROCEDURE — 86901 BLOOD TYPING SEROLOGIC RH(D): CPT | Performed by: STUDENT IN AN ORGANIZED HEALTH CARE EDUCATION/TRAINING PROGRAM

## 2017-07-12 PROCEDURE — 84100 ASSAY OF PHOSPHORUS: CPT | Performed by: NEUROLOGICAL SURGERY

## 2017-07-12 PROCEDURE — 83735 ASSAY OF MAGNESIUM: CPT | Performed by: NEUROLOGICAL SURGERY

## 2017-07-12 PROCEDURE — 25000132 ZZH RX MED GY IP 250 OP 250 PS 637: Performed by: NEUROLOGICAL SURGERY

## 2017-07-12 PROCEDURE — 82803 BLOOD GASES ANY COMBINATION: CPT | Performed by: NEUROLOGICAL SURGERY

## 2017-07-12 PROCEDURE — 87641 MR-STAPH DNA AMP PROBE: CPT | Performed by: INTERNAL MEDICINE

## 2017-07-12 PROCEDURE — 36415 COLL VENOUS BLD VENIPUNCTURE: CPT | Performed by: NEUROLOGICAL SURGERY

## 2017-07-12 PROCEDURE — 25000125 ZZHC RX 250: Performed by: STUDENT IN AN ORGANIZED HEALTH CARE EDUCATION/TRAINING PROGRAM

## 2017-07-12 PROCEDURE — 37000008 ZZH ANESTHESIA TECHNICAL FEE, 1ST 30 MIN: Performed by: NEUROLOGICAL SURGERY

## 2017-07-12 PROCEDURE — 84132 ASSAY OF SERUM POTASSIUM: CPT | Performed by: STUDENT IN AN ORGANIZED HEALTH CARE EDUCATION/TRAINING PROGRAM

## 2017-07-12 PROCEDURE — 87070 CULTURE OTHR SPECIMN AEROBIC: CPT | Performed by: NEUROLOGICAL SURGERY

## 2017-07-12 PROCEDURE — 25000128 H RX IP 250 OP 636: Performed by: NURSE ANESTHETIST, CERTIFIED REGISTERED

## 2017-07-12 PROCEDURE — 25000125 ZZHC RX 250: Performed by: NURSE ANESTHETIST, CERTIFIED REGISTERED

## 2017-07-12 PROCEDURE — 85025 COMPLETE CBC W/AUTO DIFF WBC: CPT | Performed by: NEUROLOGICAL SURGERY

## 2017-07-12 PROCEDURE — 01N10ZZ RELEASE CERVICAL NERVE, OPEN APPROACH: ICD-10-PCS | Performed by: NEUROLOGICAL SURGERY

## 2017-07-12 PROCEDURE — 87075 CULTR BACTERIA EXCEPT BLOOD: CPT | Performed by: NEUROLOGICAL SURGERY

## 2017-07-12 PROCEDURE — 36415 COLL VENOUS BLD VENIPUNCTURE: CPT | Performed by: STUDENT IN AN ORGANIZED HEALTH CARE EDUCATION/TRAINING PROGRAM

## 2017-07-12 PROCEDURE — 71000014 ZZH RECOVERY PHASE 1 LEVEL 2 FIRST HR: Performed by: NEUROLOGICAL SURGERY

## 2017-07-12 PROCEDURE — 25000125 ZZHC RX 250

## 2017-07-12 PROCEDURE — 27210794 ZZH OR GENERAL SUPPLY STERILE: Performed by: NEUROLOGICAL SURGERY

## 2017-07-12 PROCEDURE — 25000128 H RX IP 250 OP 636: Performed by: ANESTHESIOLOGY

## 2017-07-12 PROCEDURE — 36000070 ZZH SURGERY LEVEL 5 EA 15 ADDTL MIN - UMMC: Performed by: NEUROLOGICAL SURGERY

## 2017-07-12 PROCEDURE — 20000004 ZZH R&B ICU UMMC

## 2017-07-12 PROCEDURE — 84132 ASSAY OF SERUM POTASSIUM: CPT | Performed by: NEUROLOGICAL SURGERY

## 2017-07-12 PROCEDURE — 37000009 ZZH ANESTHESIA TECHNICAL FEE, EACH ADDTL 15 MIN: Performed by: NEUROLOGICAL SURGERY

## 2017-07-12 PROCEDURE — 87077 CULTURE AEROBIC IDENTIFY: CPT | Performed by: NEUROLOGICAL SURGERY

## 2017-07-12 PROCEDURE — 40000277 XR SURGERY CARM FLUORO LESS THAN 5 MIN W STILLS: Mod: TC

## 2017-07-12 PROCEDURE — 00000146 ZZHCL STATISTIC GLUCOSE BY METER IP

## 2017-07-12 PROCEDURE — 94002 VENT MGMT INPAT INIT DAY: CPT

## 2017-07-12 PROCEDURE — 0RG20A0 FUSION OF 2 OR MORE CERVICAL VERTEBRAL JOINTS WITH INTERBODY FUSION DEVICE, ANTERIOR APPROACH, ANTERIOR COLUMN, OPEN APPROACH: ICD-10-PCS | Performed by: NEUROLOGICAL SURGERY

## 2017-07-12 PROCEDURE — 84295 ASSAY OF SERUM SODIUM: CPT | Performed by: NEUROLOGICAL SURGERY

## 2017-07-12 DEVICE — GRAFT BONE CRUSH CANC 15ML 400075: Type: IMPLANTABLE DEVICE | Site: SPINE CERVICAL | Status: FUNCTIONAL

## 2017-07-12 DEVICE — IMPLANTABLE DEVICE: Type: IMPLANTABLE DEVICE | Site: SPINE CERVICAL | Status: FUNCTIONAL

## 2017-07-12 RX ORDER — FENTANYL CITRATE 50 UG/ML
25-50 INJECTION, SOLUTION INTRAMUSCULAR; INTRAVENOUS
Status: DISCONTINUED | OUTPATIENT
Start: 2017-07-12 | End: 2017-07-12 | Stop reason: HOSPADM

## 2017-07-12 RX ORDER — SODIUM CHLORIDE, SODIUM LACTATE, POTASSIUM CHLORIDE, CALCIUM CHLORIDE 600; 310; 30; 20 MG/100ML; MG/100ML; MG/100ML; MG/100ML
INJECTION, SOLUTION INTRAVENOUS CONTINUOUS
Status: DISCONTINUED | OUTPATIENT
Start: 2017-07-12 | End: 2017-07-12 | Stop reason: HOSPADM

## 2017-07-12 RX ORDER — ONDANSETRON 2 MG/ML
4 INJECTION INTRAMUSCULAR; INTRAVENOUS EVERY 30 MIN PRN
Status: DISCONTINUED | OUTPATIENT
Start: 2017-07-12 | End: 2017-07-12 | Stop reason: HOSPADM

## 2017-07-12 RX ORDER — HYDRALAZINE HYDROCHLORIDE 20 MG/ML
10-20 INJECTION INTRAMUSCULAR; INTRAVENOUS EVERY 30 MIN PRN
Status: DISCONTINUED | OUTPATIENT
Start: 2017-07-12 | End: 2017-07-14

## 2017-07-12 RX ORDER — PROPOFOL 10 MG/ML
INJECTION, EMULSION INTRAVENOUS CONTINUOUS PRN
Status: DISCONTINUED | OUTPATIENT
Start: 2017-07-12 | End: 2017-07-12

## 2017-07-12 RX ORDER — SODIUM CHLORIDE 9 MG/ML
INJECTION, SOLUTION INTRAVENOUS CONTINUOUS PRN
Status: DISCONTINUED | OUTPATIENT
Start: 2017-07-12 | End: 2017-07-12

## 2017-07-12 RX ORDER — LIDOCAINE 40 MG/G
CREAM TOPICAL
Status: DISCONTINUED | OUTPATIENT
Start: 2017-07-12 | End: 2017-07-26 | Stop reason: HOSPADM

## 2017-07-12 RX ORDER — PROPOFOL 10 MG/ML
INJECTION, EMULSION INTRAVENOUS PRN
Status: DISCONTINUED | OUTPATIENT
Start: 2017-07-12 | End: 2017-07-12

## 2017-07-12 RX ORDER — LABETALOL HYDROCHLORIDE 5 MG/ML
10-40 INJECTION, SOLUTION INTRAVENOUS EVERY 10 MIN PRN
Status: DISCONTINUED | OUTPATIENT
Start: 2017-07-12 | End: 2017-07-26 | Stop reason: HOSPADM

## 2017-07-12 RX ORDER — ONDANSETRON 2 MG/ML
INJECTION INTRAMUSCULAR; INTRAVENOUS PRN
Status: DISCONTINUED | OUTPATIENT
Start: 2017-07-12 | End: 2017-07-12

## 2017-07-12 RX ORDER — ONDANSETRON 4 MG/1
4 TABLET, ORALLY DISINTEGRATING ORAL EVERY 30 MIN PRN
Status: DISCONTINUED | OUTPATIENT
Start: 2017-07-12 | End: 2017-07-12 | Stop reason: HOSPADM

## 2017-07-12 RX ORDER — LIDOCAINE HYDROCHLORIDE 20 MG/ML
INJECTION, SOLUTION INFILTRATION; PERINEURAL PRN
Status: DISCONTINUED | OUTPATIENT
Start: 2017-07-12 | End: 2017-07-12

## 2017-07-12 RX ORDER — NALOXONE HYDROCHLORIDE 0.4 MG/ML
.1-.4 INJECTION, SOLUTION INTRAMUSCULAR; INTRAVENOUS; SUBCUTANEOUS
Status: DISCONTINUED | OUTPATIENT
Start: 2017-07-12 | End: 2017-07-26 | Stop reason: HOSPADM

## 2017-07-12 RX ORDER — LABETALOL HYDROCHLORIDE 5 MG/ML
10 INJECTION, SOLUTION INTRAVENOUS
Status: COMPLETED | OUTPATIENT
Start: 2017-07-12 | End: 2017-07-12

## 2017-07-12 RX ORDER — HYDROMORPHONE HYDROCHLORIDE 1 MG/ML
.3-.5 INJECTION, SOLUTION INTRAMUSCULAR; INTRAVENOUS; SUBCUTANEOUS EVERY 5 MIN PRN
Status: DISCONTINUED | OUTPATIENT
Start: 2017-07-12 | End: 2017-07-12 | Stop reason: HOSPADM

## 2017-07-12 RX ORDER — FENTANYL CITRATE 50 UG/ML
INJECTION, SOLUTION INTRAMUSCULAR; INTRAVENOUS PRN
Status: DISCONTINUED | OUTPATIENT
Start: 2017-07-12 | End: 2017-07-12

## 2017-07-12 RX ORDER — HYDROMORPHONE HCL/0.9% NACL/PF 0.2MG/0.2
0.2 SYRINGE (ML) INTRAVENOUS
Status: DISPENSED | OUTPATIENT
Start: 2017-07-12 | End: 2017-07-13

## 2017-07-12 RX ORDER — BUPIVACAINE HYDROCHLORIDE AND EPINEPHRINE 2.5; 5 MG/ML; UG/ML
INJECTION, SOLUTION INFILTRATION; PERINEURAL PRN
Status: DISCONTINUED | OUTPATIENT
Start: 2017-07-12 | End: 2017-07-12 | Stop reason: HOSPADM

## 2017-07-12 RX ADMIN — ROCURONIUM BROMIDE 50 MG: 10 INJECTION INTRAVENOUS at 14:28

## 2017-07-12 RX ADMIN — PHENYLEPHRINE HYDROCHLORIDE 50 MCG: 10 INJECTION, SOLUTION INTRAMUSCULAR; INTRAVENOUS; SUBCUTANEOUS at 15:28

## 2017-07-12 RX ADMIN — FENTANYL CITRATE 25 MCG: 50 INJECTION, SOLUTION INTRAMUSCULAR; INTRAVENOUS at 21:22

## 2017-07-12 RX ADMIN — PHENYLEPHRINE HYDROCHLORIDE 50 MCG: 10 INJECTION, SOLUTION INTRAMUSCULAR; INTRAVENOUS; SUBCUTANEOUS at 16:13

## 2017-07-12 RX ADMIN — PHENYLEPHRINE HYDROCHLORIDE 100 MCG: 10 INJECTION, SOLUTION INTRAMUSCULAR; INTRAVENOUS; SUBCUTANEOUS at 14:42

## 2017-07-12 RX ADMIN — PHENYLEPHRINE HYDROCHLORIDE 25 MCG: 10 INJECTION, SOLUTION INTRAMUSCULAR; INTRAVENOUS; SUBCUTANEOUS at 18:20

## 2017-07-12 RX ADMIN — PHENYLEPHRINE HYDROCHLORIDE 25 MCG: 10 INJECTION, SOLUTION INTRAMUSCULAR; INTRAVENOUS; SUBCUTANEOUS at 18:02

## 2017-07-12 RX ADMIN — ONDANSETRON 4 MG: 2 INJECTION INTRAMUSCULAR; INTRAVENOUS at 18:14

## 2017-07-12 RX ADMIN — INSULIN ASPART 3 UNITS: 100 INJECTION, SOLUTION INTRAVENOUS; SUBCUTANEOUS at 07:39

## 2017-07-12 RX ADMIN — CARBOXYMETHYLCELLULOSE SODIUM 2 DROP: 5 SOLUTION/ DROPS OPHTHALMIC at 10:40

## 2017-07-12 RX ADMIN — FENTANYL CITRATE 25 MCG: 50 INJECTION, SOLUTION INTRAMUSCULAR; INTRAVENOUS at 21:13

## 2017-07-12 RX ADMIN — FOLIC ACID 1 MG: 1 TABLET ORAL at 07:39

## 2017-07-12 RX ADMIN — PROPOFOL 30 MG: 10 INJECTION, EMULSION INTRAVENOUS at 15:42

## 2017-07-12 RX ADMIN — CEFEPIME HYDROCHLORIDE 2 G: 2 INJECTION, POWDER, FOR SOLUTION INTRAVENOUS at 07:41

## 2017-07-12 RX ADMIN — SODIUM CHLORIDE: 9 INJECTION, SOLUTION INTRAVENOUS at 15:35

## 2017-07-12 RX ADMIN — POTASSIUM CHLORIDE 20 MEQ: 1.5 POWDER, FOR SOLUTION ORAL at 02:02

## 2017-07-12 RX ADMIN — FENTANYL CITRATE 25 MCG: 50 INJECTION, SOLUTION INTRAMUSCULAR; INTRAVENOUS at 21:37

## 2017-07-12 RX ADMIN — PROPOFOL 20 MG: 10 INJECTION, EMULSION INTRAVENOUS at 15:43

## 2017-07-12 RX ADMIN — HYDROMORPHONE HYDROCHLORIDE 0.5 MG: 1 INJECTION, SOLUTION INTRAMUSCULAR; INTRAVENOUS; SUBCUTANEOUS at 22:07

## 2017-07-12 RX ADMIN — REMIFENTANIL HYDROCHLORIDE 0.05 MCG/KG/MIN: 1 INJECTION, POWDER, LYOPHILIZED, FOR SOLUTION INTRAVENOUS at 14:42

## 2017-07-12 RX ADMIN — VANCOMYCIN HYDROCHLORIDE 1750 MG: 10 INJECTION, POWDER, LYOPHILIZED, FOR SOLUTION INTRAVENOUS at 04:24

## 2017-07-12 RX ADMIN — FENTANYL CITRATE 50 MCG: 50 INJECTION, SOLUTION INTRAMUSCULAR; INTRAVENOUS at 15:42

## 2017-07-12 RX ADMIN — PROPOFOL 40 MG: 10 INJECTION, EMULSION INTRAVENOUS at 14:39

## 2017-07-12 RX ADMIN — PROPOFOL 160 MG: 10 INJECTION, EMULSION INTRAVENOUS at 14:28

## 2017-07-12 RX ADMIN — POTASSIUM CHLORIDE 10 MEQ: 14.9 INJECTION, SOLUTION, CONCENTRATE PARENTERAL at 11:47

## 2017-07-12 RX ADMIN — SODIUM CHLORIDE: 900 INJECTION, SOLUTION INTRAVENOUS at 23:51

## 2017-07-12 RX ADMIN — Medication 10 MG: at 20:56

## 2017-07-12 RX ADMIN — LIDOCAINE HYDROCHLORIDE 100 MG: 20 INJECTION, SOLUTION INFILTRATION; PERINEURAL at 14:28

## 2017-07-12 RX ADMIN — VANCOMYCIN HYDROCHLORIDE 1750 MG: 10 INJECTION, POWDER, LYOPHILIZED, FOR SOLUTION INTRAVENOUS at 17:35

## 2017-07-12 RX ADMIN — PHENYLEPHRINE HYDROCHLORIDE 0.8 MCG/KG/MIN: 10 INJECTION, SOLUTION INTRAMUSCULAR; INTRAVENOUS; SUBCUTANEOUS at 15:36

## 2017-07-12 RX ADMIN — Medication 2 G: at 02:02

## 2017-07-12 RX ADMIN — CEFEPIME HYDROCHLORIDE 2 G: 2 INJECTION, POWDER, FOR SOLUTION INTRAVENOUS at 00:12

## 2017-07-12 RX ADMIN — POTASSIUM CHLORIDE 10 MEQ: 14.9 INJECTION, SOLUTION, CONCENTRATE PARENTERAL at 10:44

## 2017-07-12 RX ADMIN — PHENYLEPHRINE HYDROCHLORIDE 50 MCG: 10 INJECTION, SOLUTION INTRAMUSCULAR; INTRAVENOUS; SUBCUTANEOUS at 15:35

## 2017-07-12 RX ADMIN — METOPROLOL TARTRATE 5 MG: 5 INJECTION INTRAVENOUS at 09:38

## 2017-07-12 RX ADMIN — PHENYLEPHRINE HYDROCHLORIDE 50 MCG: 10 INJECTION, SOLUTION INTRAMUSCULAR; INTRAVENOUS; SUBCUTANEOUS at 16:08

## 2017-07-12 RX ADMIN — PROPOFOL: 10 INJECTION, EMULSION INTRAVENOUS at 17:26

## 2017-07-12 RX ADMIN — POTASSIUM PHOSPHATE, MONOBASIC AND POTASSIUM PHOSPHATE, DIBASIC 20 MMOL: 224; 236 INJECTION, SOLUTION INTRAVENOUS at 01:56

## 2017-07-12 RX ADMIN — PHENYLEPHRINE HYDROCHLORIDE 50 MCG: 10 INJECTION, SOLUTION INTRAMUSCULAR; INTRAVENOUS; SUBCUTANEOUS at 14:56

## 2017-07-12 RX ADMIN — PROPOFOL: 10 INJECTION, EMULSION INTRAVENOUS at 16:10

## 2017-07-12 RX ADMIN — PHENYLEPHRINE HYDROCHLORIDE 100 MCG: 10 INJECTION, SOLUTION INTRAMUSCULAR; INTRAVENOUS; SUBCUTANEOUS at 14:37

## 2017-07-12 RX ADMIN — LIDOCAINE 3 PATCH: 50 PATCH CUTANEOUS at 07:50

## 2017-07-12 RX ADMIN — PHENYLEPHRINE HYDROCHLORIDE 50 MCG: 10 INJECTION, SOLUTION INTRAMUSCULAR; INTRAVENOUS; SUBCUTANEOUS at 16:22

## 2017-07-12 RX ADMIN — PHENYLEPHRINE HYDROCHLORIDE 50 MCG: 10 INJECTION, SOLUTION INTRAMUSCULAR; INTRAVENOUS; SUBCUTANEOUS at 15:20

## 2017-07-12 RX ADMIN — PROPOFOL 50 MCG/KG/MIN: 10 INJECTION, EMULSION INTRAVENOUS at 14:42

## 2017-07-12 RX ADMIN — METOPROLOL TARTRATE 5 MG: 5 INJECTION INTRAVENOUS at 04:12

## 2017-07-12 RX ADMIN — Medication 0.2 MG: at 23:49

## 2017-07-12 RX ADMIN — Medication 20 MG: at 07:34

## 2017-07-12 RX ADMIN — SODIUM CHLORIDE: 9 INJECTION, SOLUTION INTRAVENOUS at 14:15

## 2017-07-12 RX ADMIN — LORAZEPAM 2 MG: 0.5 TABLET ORAL at 01:36

## 2017-07-12 RX ADMIN — SUGAMMADEX 180 MG: 100 INJECTION, SOLUTION INTRAVENOUS at 15:13

## 2017-07-12 RX ADMIN — MULTIVIT AND MINERALS-FERROUS GLUCONATE 9 MG IRON/15 ML ORAL LIQUID 15 ML: at 07:34

## 2017-07-12 RX ADMIN — PHENYLEPHRINE HYDROCHLORIDE 50 MCG: 10 INJECTION, SOLUTION INTRAMUSCULAR; INTRAVENOUS; SUBCUTANEOUS at 15:14

## 2017-07-12 RX ADMIN — FENTANYL CITRATE 25 MCG: 50 INJECTION, SOLUTION INTRAMUSCULAR; INTRAVENOUS at 21:50

## 2017-07-12 RX ADMIN — PHENYLEPHRINE HYDROCHLORIDE 100 MCG: 10 INJECTION, SOLUTION INTRAMUSCULAR; INTRAVENOUS; SUBCUTANEOUS at 14:51

## 2017-07-12 RX ADMIN — FENTANYL CITRATE 200 MCG: 50 INJECTION, SOLUTION INTRAMUSCULAR; INTRAVENOUS at 14:28

## 2017-07-12 ASSESSMENT — VISUAL ACUITY
OU: OTHER (SEE COMMENT)
OU: OTHER (SEE COMMENT)
OU: BASELINE
OU: OTHER (SEE COMMENT)
OU: NORMAL ACUITY;GLASSES
OU: OTHER (SEE COMMENT)
OU: OTHER (SEE COMMENT)
OU: NORMAL ACUITY;GLASSES
OU: OTHER (SEE COMMENT)
OU: OTHER (SEE COMMENT)
OU: NORMAL ACUITY;GLASSES
OU: BASELINE
OU: BASELINE;GLASSES
OU: BASELINE
OU: OTHER (SEE COMMENT)
OU: OTHER (SEE COMMENT)

## 2017-07-12 ASSESSMENT — PAIN DESCRIPTION - DESCRIPTORS: DESCRIPTORS: SORE

## 2017-07-12 NOTE — PLAN OF CARE
Problem: Goal Outcome Summary  Goal: Goal Outcome Summary  D: 72 year old male POD # 5 cervical spine decompression and fusion with planned anterior approach today.  I/A: vitals stable, afebrile, NPO @ 0440 am for OR. Remains confused and forgetful, no new neurological deficits noted. Neck dressing changed by MD, right foot dressing CDI. Electrolytes rechecked this morning. Replacing. Transferred to Pre-op area @ 1200 in preparation for surgery. Wife and grandson at bedside, updated of the POC.  P: Continue post op cares

## 2017-07-12 NOTE — PROGRESS NOTES
[facility], [place]    Neurosurgery Daily Progress Note         Assessment   Ernesto Rawls is a 72 year old male who is postoperative day # 5 from C3-5 and C6 laminectomy.          Plan     Continue current pain control regimen.    Routine neuro exams per unit protocol.    Aspen collar on at all times. Plan to return to OR today    NM lexiscan negative. Cardiology OK for return to OR.     Dentistry: no apparent oral abscess, should follow up with a dentist as an outpatient    Podiatry: daily dressing changes    MAPS > 80, has been maintaining without pressors    Incentive spirometry Q1H while awake.    SLP: NPO    Nutrition consult for enteric access and starting TFs    Bowel regimen. Anti-emetics.     SCDs while in bed.    Started on CIWA protocol d/t hallucinations    PT/OT --- mobilize. as tolerated.    Dispo; Stable in ICU.     Please dial * * * and enter job code [phone] to reach the neurosurgery team if you have any questions.      Patient and above plan discussed with neurosurgery chief resident.         Subjective     Patient prepared for OR.            Objective   Temp:  [97.4  F (36.3  C)-98.7  F (37.1  C)] 97.4  F (36.3  C)  Heart Rate:  [] 90  Resp:  [10-28] 24  BP: (113-192)/(54-93) 166/85  SpO2:  [92 %-99 %] 97 %    I/O last 3 completed shifts:  In: 3930.8 [I.V.:2862.5; NG/GT:895]  Out: 3045 [Urine:3045]    Physical Exam  General: Appears comfortable, NAD. Asprn cervical collar in place.   Wound: Incision, clean, dry, without strikethrough  Neurologic Exam:  - AOx2.  - Follows commands.  - Speech fluent, spontaneous. No aphasia or dysarthria.  - No gaze preference. No apparent hemineglect.  - PERRL, EOMI.  - Face symmetric with sensation intact to light touch.  Motor: Normal bulk/tone; no tremor, rigidity, or bradykinesia.     Delt Bi Tri FE IP Quad Hamst TibAnt EHL Gastroc    C5 C6 C7 C8/T1 L2 L3 L4-S1 L4 L5 S1   R 4 4 4 2 1 1 1 4- 3 4-   L 4 4 4 2 1 1 1 4- 3 4-        Sensory:  Deltoid sensory level.       Labs and Imaging     BMP    Recent Labs  Lab 07/12/17  0345 07/12/17  0117 07/11/17  0547 07/10/17  2007 07/10/17  0417     --  134 138 138   POTASSIUM 3.9 3.4 3.6 3.6 3.8   CHLORIDE 101  --  100 104 102   KAROL 8.4*  --  8.4* 8.3* 8.4*   CO2 28  --  29 28 27   BUN 10  --  9 11 10   CR 0.39*  --  0.42* 0.47* 0.46*   *  --  165* 162* 172*     CBC    Recent Labs  Lab 07/12/17  0345 07/11/17  0547 07/10/17  2007 07/10/17  0418   WBC 6.5 5.9 6.9 6.5   RBC 3.24* 3.17* 3.20* 3.18*   HGB 10.6* 10.5* 10.5* 10.5*   HCT 30.8* 30.6* 30.8* 30.7*   MCV 95 97 96 97   MCH 32.7 33.1* 32.8 33.0   MCHC 34.4 34.3 34.1 34.2   RDW 13.8 13.8 13.9 13.9    241 236 206     INR    Recent Labs  Lab 07/10/17  2007 07/08/17  1137 07/07/17  0552   INR 1.44* 1.17* 1.10       Imaging: Reviewed      Contact the neurosurgery resident on call with questions.    Dial * * [phone]: Enter [phone] when prompted.   Tommy Edwards MD, PhD  Neurosurgery PGY-3

## 2017-07-12 NOTE — PLAN OF CARE
Problem: Goal Outcome Summary  Goal: Goal Outcome Summary  4A - Pt in OR and not available for PT session this PM

## 2017-07-12 NOTE — PROGRESS NOTES
[facility] Service: Follow Up Note      Patient:  Ernesto Rawls, Date of birth 1945, Medical record number [id number]  Date of Visit:  July 12, 2017         Assessment and Recommendations:   Problem List:  1.  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  2.  Cevical stenosis s/p C3-5 and partial C6 laminectomy and decompression.  3.  Recent history of wet gangrene of toe on right foot with subsequent amputation.  4.  Bibasilar atelectasis versus pneumonia.  5.  Type 2 Diabetes Mellitus  5.  Rheumatoid Arthritis.    Recommendations:  - Continue Vancomycin 1.750g q12h and Cefepime 2g q8h.   - Vancomycin trough was 13.5 on 7/10, would like trough between 25-30 for CNS infections and 15-20 for bacteremia.  - Cultures are needed in order to target therapy and deescalate antibiotic therapy as needed.  Would like either bone or abscess cultures, with Gram stain & culture, fungal culture, and AFB culture.  As patient has been on antibiotic therapy since 7/08, samples may not produce viable cultures.  - Patient will need long term antibiotic therapy for 6-8 weeks (at minimum until 8/18) due to bone and nervous system involvement.  - There is no role for routine MRI follow up imaging, unless patient symptoms acutely worsen.  Vertebral bony lesions often persist on imaging even after appropriate therapy and clinical resolution has been achieved.   - If neurosurgery is unable to access the abscess today, may consider consulting Gastroenterology for potential transesophageal aspiration.  - Continue to monitor respiratory status.  Patient reports no increased difficulty breathing, and oxygenation on room air remains appropriate.  Remains afebrile.  Left-sided inspiratory basilar crackles noted.  CXR and cultures still currently may represent atelectasis with colonization of sputum.  Empiric coverage of epidural abscess covers sputum organisms.      Discussion:  Etiology of epidural  abscess and vertebral osteomyelitis/discitis may be from hematogenous seeding secondary to toe infection v. Direct oropharyngeal migration v. Others.  Patient history and risk factors notable for increased falls and constitutional symptoms in the past month, old age, poorly-controlled diabetes mellitus, therapy for RA with Hydroxychloroquine and MTX, and recent surgery for a gangrenous right toes.  MRI demonstrated the abscess, and echocardiogram was within normal limits.  Other risk factors for epidural abscesses include IV drug access, central lines, and hemodialysis.  Most often Staphylococcus Aureus is the cause, followed by gram-negative bacilli.  Brucella and Mycobacterium Tuberculosis are seen in endemic areas.  Fever and elevated WBC count can notably be absent upon diagnosis.  CRP/ESR will increase 2-3 weeks after treatment, and trending them downward can assess risk of treatment failure (25-33% reduction at 4 weeks is a low risk, and 50% reduction at 4 weeks is a rare risk of treatment failure).  10-30% failure rate identified in treatment of vertebral osteomyelitis.  Antibiotic treatment of osteomyelitis with epidural abscess is long-term, lasting approximately 6-8 weeks.    Scribe Disclosure:   I, Rey Iraheta, am serving as a scribe; to document services personally performed by MONTSERRAT Rincon- -based on data collection and the provider's statements to me.     Provider Disclosure:  I agree with above History, Review of Systems, Physical exam and Plan.  I have reviewed the content of the documentation and have edited it as needed. I have personally performed the services documented here and the documentation accurately represents those services and the decisions I have made.  Vertebral osteomyelitis most often occurs from hematogenous spread, but also from infected contiguous tissue or direct inoculation into the spinal canal.    MONTSERRAT Rincon M.D.  [facility] ID Service Staff  [phone]           Interval History:   HPI:  Mr. Rawls is a 71yo M with a PMH notable for rheumatoid arthritis, diabetes, coronary artery disease, and wet gangrene of his right foot great toe (with subsequent amputation on 7/06/17).  He presented to this hospital on 7/07/17 [facility]renous toe, due to quadriplegia post-surgery.  He then demonstrated spinal stenosis at C3-5 and epidural abscess with adjacent osteomyelitis and discitis at C3-5.  He is being managed surgically by the neurosurgery team, with posterior C3-5/partial 6 decompression on 7/7, with planned anterior decompression on 7/12.    Today, patient is up in his chair and reports that he feels similar to yesterday.  Notes no new increased weakness, no new numbness or tingling, no new SOB or chest pain.  States his neck continues to hurt, and he feels best lying down.  Had one loose bowel movement last evening.  Per nursing, he had confusion and disorientation last night, which resolved this AM.           Review of Systems:   CONSTITUTIONAL:  No fevers or chills  EYES: negative for icterus  ENT:  negative for oral lesions, hearing loss, tinnitus and sore throat  RESPIRATORY:  negative for shortness of breath, dyspnea.  CARDIOVASCULAR:  negative for chest pain  GASTROINTESTINAL:  negative for nausea, vomiting, constipation  GENITOURINARY:  negative for dysuria  HEME:  No easy bruising/bleeding  INTEGUMENT:  negative for rash and pruritus  NEURO:  Negative for headache.  Admits to neck pain.  No new weakness or numbness since exam on 7/11/17.         Current Antimicrobials            Physical Exam:   Ranges for vital signs:  Temp:  [97.4  F (36.3  C)-98.7  F (37.1  C)] 97.9  F (36.6  C)  Heart Rate:  [] 92  Resp:  [10-28] 16  BP: (119-192)/(68-93) 158/76  SpO2:  [92 %-99 %] 97 %    Intake/Output Summary (Last 24 hours) at 07/12/17 1355  Last data filed at 07/12/17 1200   Gross per 24 hour   Intake           3203.3 ml    Output             3135 ml   Net             68.3 ml     Exam:  GENERAL:  well-developed, well-nourished, sitting in chair.  Patient has difficulty speaking and clearing secretions.   ENT:  Head is normocephalic, atraumatic. Oropharynx is moist without exudates or ulcers.  EYES:  Eyes have anicteric sclerae.    NECK:  Neck brace in place.  LUNGS:  Decreased breath sounds.  Left basilar crackles.  CARDIOVASCULAR:  Regular rate and rhythm with no murmurs, gallops or rubs.  ABDOMEN:  Normal bowel sounds, soft, nontender.  EXT: Extremities cool and without edema.  SKIN:  No acute rashes.  Line is in place without any surrounding erythema.  Right foot dressed with dressing change on 7/11  NEUROLOGIC:  Continued limited movement of upper extremities.  No notable movement of lower extremities.         Laboratory Data:     Creatinine   Date Value Ref Range Status   07/12/2017 0.39 (L) 0.66 - 1.25 mg/dL Final   07/11/2017 0.42 (L) 0.66 - 1.25 mg/dL Final   07/10/2017 0.47 (L) 0.66 - 1.25 mg/dL Final   07/10/2017 0.46 (L) 0.66 - 1.25 mg/dL Final   07/09/2017 0.48 (L) 0.66 - 1.25 mg/dL Final     WBC   Date Value Ref Range Status   07/12/2017 6.5 4.0 - 11.0 10e9/L Final   07/11/2017 5.9 4.0 - 11.0 10e9/L Final   07/10/2017 6.9 4.0 - 11.0 10e9/L Final   07/10/2017 6.5 4.0 - 11.0 10e9/L Final   07/09/2017 5.4 4.0 - 11.0 10e9/L Final     Hemoglobin   Date Value Ref Range Status   07/12/2017 10.6 (L) 13.3 - 17.7 g/dL Final     Platelet Count   Date Value Ref Range Status   07/12/2017 299 150 - 450 10e9/L Final     Sed Rate   Date Value Ref Range Status   07/07/2017 50 (H) 0 - 20 mm/h Final     CRP Inflammation   Date Value Ref Range Status   07/07/2017 27.0 (H) 0.0 - 8.0 mg/L Final     Lab Results   Component Value Date     07/12/2017    BUN 10 07/12/2017    CO2 28 07/12/2017       Culture data:  Sputum Cultures:  Enterobacter Aerogenes with susceptibility to  Cefepime (only resistant to Ampicillin and  Ampicillin-Sulbactam).    Blood Cultures:  NTD on 7/12.

## 2017-07-12 NOTE — PROGRESS NOTES
Neuroscience Intensive Care Progress Note    2017    Ernesto Rawls is a 72 year old year old male admitted on 2017 with four extremity weakness that progressed to quadriplegia likely due to epidural abscess.       Problem List  1. Epidural fluid collection s/p decompression  2. Weakness of all extremities    24 hour events:  No acute events overnight.     Subjectively the patient complains of significant discomfort when he is turned. He denies other pain but says he feels weaker today. He denies chest or abdominal pain and says he had a BM over night.    Temp: 97.4  F (36.3  C) Temp  Min: 97.4  F (36.3  C)  Max: 98.7  F (37.1  C)  Resp: 24 Resp  Min: 10  Max: 28  SpO2: 97 % SpO2  Min: 92 %  Max: 99 %    No Data Recorded  Heart Rate: 90 Heart Rate  Min: 78  Max: 102  BP: 166/85 Systolic (24hrs), Av , Min:113 , Max:192   Diastolic (24hrs), Av, Min:54, Max:93      Not on oxygen      Intake/Output Summary (Last 24 hours) at 17 0941  Last data filed at 17 0745   Gross per 24 hour   Intake           3510.8 ml   Output             3010 ml   Net            500.8 ml       BP - Mean:  [] 122    Current Medications:    lidocaine (viscous)  5 mL Topical Once     ceFAZolin  2 g Intravenous Pre-Op/Pre-procedure x 1 dose     ceFAZolin  1 g Intravenous See Admin Instructions     vancomycin (VANCOCIN) IV  1,750 mg Intravenous Q12H     multivitamins with minerals  15 mL Per Feeding Tube Daily     protein modular  1 packet Per Feeding Tube TID     omeprazole  20 mg Oral or Feeding Tube Daily     metoprolol  5 mg Intravenous Q6H     magnesium sulfate  2 g Intravenous Once     potassium chloride  20 mEq Oral Once     potassium phosphate (KPHOS) in D5W IV  20 mmol Intravenous Once     ceFEPIme (MAIXPIME) IV  2 g Intravenous Q8H     LORazepam  0.5-4 mg Intravenous See Admin Instructions    Or     LORazepam  0.5-4 mg Oral See Admin Instructions     sodium chloride (PF)  3 mL Intracatheter Q8H      "lidocaine  3 patch Transdermal Q24H     lidocaine   Transdermal Q24h     lidocaine   Transdermal Q8H     menthol   Transdermal Q8H     senna-docusate  1-2 tablet Oral BID     polyethylene glycol  17 g Oral BID     insulin aspart  1-7 Units Subcutaneous TID AC     insulin aspart  1-5 Units Subcutaneous At Bedtime     folic acid  1 mg Oral Daily       PRN Medications:  sodium chloride (PF), HOLD MEDICATION, HOLD MEDICATION, HOLD MEDICATION, HOLD MEDICATION, HOLD MEDICATION, sodium chloride (PF), sodium chloride bacteriostatic, sodium chloride (PF), carboxymethylcellulose, potassium chloride, potassium chloride, potassium chloride, potassium chloride with lidocaine, potassium chloride, magnesium sulfate, magnesium sulfate, potassium phosphate (KPHOS) in D5W IV, potassium phosphate (KPHOS) in D5W IV, potassium phosphate (KPHOS) in D5W IV, potassium phosphate (KPHOS) in D5W IV, potassium phosphate (KPHOS) in D5W IV, IV fluid REPLACEMENT ONLY, acetaminophen, lidocaine (buffered or not buffered), lidocaine 4%, sodium chloride (PF), hydrALAZINE, naloxone, labetalol, sore throat lozenge, oxyCODONE, menthol **AND** menthol **AND** menthol, ondansetron **OR** ondansetron, prochlorperazine **OR** prochlorperazine, bisacodyl, glucose **OR** dextrose **OR** glucagon, cyclobenzaprine    Infusions:    IV fluid REPLACEMENT ONLY       NaCl 10 mL/hr at 07/12/17 0000       Allergies   Allergen Reactions     Contrast Dye        Physical Examination:  /85  Pulse 64  Temp 97.4  F (36.3  C) (Axillary)  Resp 24  Ht 1.854 m (6' 1\")  Wt 89.9 kg (198 lb 3.1 oz)  SpO2 97%  BMI 26.15 kg/m2    General: NAD, in c-collar   CV: RRR, no murmur heard today  Resp: decreased breath sounds on left,  Scant expiratory wheezes and rhonchi appreciated on left.   Abd: soft, nontender, nondistended  Ext: no edema, peripheral pulses intact  Neuro:  MS: alert/oriented to person and place but not time. Follows commands  CN: 2-12 intact  Motor: " Antigravity in b/l UE and LLE. 2/5 in RLE. Tone decreased throughout.  Sensory: intact to light touch throughout but patient crosses L v R on repeat exam.  Reflexes: 1+ and symmetric in UE, absent in LE      Labs/Studies:  Recent Labs   Lab Test  07/10/17   0418  07/10/17   0417  07/09/17   2007  07/09/17   0717   07/08/17   1137   07/08/17   0256   NA   --   138   --   137   --    --    --   137   POTASSIUM   --   3.8  3.5  3.4   < >   --    --   4.4   CHLORIDE   --   102   --   102   --    --    --   104   CO2   --   27   --   28   --    --    --   24   ANIONGAP   --   9   --   7   --    --    --   8   GLC   --   172*   --   161*   --    --    --   160*   BUN   --   10   --   8   --    --    --   12   CR   --   0.46*   --   0.48*   --    --    --   0.55*   KAROL   --   8.4*   --   8.3*   --    --    --   8.3*   WBC  6.5   --    --   5.4   --   7.8   < >  4.9   RBC  3.18*   --    --   3.19*   --   3.04*   < >  3.23*   HGB  10.5*   --    --   10.5*   --   10.1*   < >  10.7*   PLT  206   --    --   215   --   254   < >  88*    < > = values in this interval not displayed.       Recent Labs   Lab Test  07/08/17   1137  07/07/17   0552   INR  1.17*  1.10   PTT  31  29           Recent Labs  Lab 07/11/17  0940 07/07/17  0848 07/07/17  0705   PH 7.43 7.42 7.38   PCO2 40 35 42   PO2 49* 290* 380*   HCO3 27 23 25   O2PER 21 50.0 73.0           Imaging:  none    Assessment/Plan  Ernesto Rawls is a 72 year old h/o rheumatoid arthritis, DM, CAD, and wet gangrene of right foot admitted 7/7/2017 with weakness in all extremities that progressed to quadriplegia.     Plan  Neuro:  # Epidural fluid collection s/p decompression of C3-5 w/ partial 6 on POD#4:  -Mild clinical improvement post-op  -Epidural fluid collection cultures still pending likely due to limited sample; ID requests more samples from surgery today.  -Neurosurgery planning anterior spinal fusion today.    Resp:  Stable, no oxygen requirements, last CXR with mild left  base opacity. Pulm exam benign.    CV:  Hemodynamically stable. MAP goal >80.     # H/O CAD:  # Runs of V-tach:  NST performed yesterday indicates no ischemia or infarcts with EF 62%. TTE confirms normal ventricular function. Cardiology following, they recommend metoprolol 25 mg BID, lyte repletion, and consider angiogram if still sympteomatic  -Replace lytes: K>4, Mg>2, PO4>2.4  -metoprolol 25 mg BID  -Continue tele    Renal:  No active issues. Creatinine at baseline.    Endo:    CORNELIO  No active issues. H/O DM  -SSI, maintain euglycemia, goal     Heme:  # Thrombocytopenia, resolved:  -Decreased to 80s during hospitalization. Uptrending now  -Currently 241 and stable from yesterday    GI:  No active issues, NJ tube placed but NPO for surgery    ID:  # Concern for epidural abscess:  -Unclear cause of epidural fluid collection, infection possible  -Patient spiked fever overnight although WBC remains stable  -Currently on broad spectrum abx for coverage, cultures pending  -ID Following, recommend cefipime and vanc    FEN: NPO, resume TF per NJ tube after surgery  PPX:    DVT prophylaxis: SCDs, lovenox    GI: omeprazole    Disposition: Neurosurgery primary team, to floor after surgery today.    I personally saw and examined the patient. Note written w/ help of Israel Alcocer, MS-4, who was acting as a medical scribe.       Patient discussed with staff physician, Dr. Ferreira.      Pablo Olympia Fields  Neurology PGY-2

## 2017-07-12 NOTE — PROGRESS NOTES
Care Coordinator- Discharge Planning     Admission Date/Time:  7/7/2017  Attending MD:  Derick Holly,*     Data  Date of initial CC assessment:  Chart reviewed, discussed with interdisciplinary team.   [facility]     Assessment   I spoke to the Sophia De Leon, [facility] (Ph: [phone]) regarding this patient. The patient is in the OR today. Per Neuro Surgery, the patient could be medically stable for transfer as early as this Friday, 7/14/17. The [facility] Case management team has received a call from RN MAYLIN Pena yesterday but requests more information about the patient's condition and needs after his surgery is complete today. I did inform Sophia that this patient has a transfer agreement.    RNCC will continue to follow after the patient returns from the OR and update [facility] Case management.    Coordination of Care and Referrals: [facility] CM Referral     Plan  Anticipated Discharge Date: TBD pending medical plan of care post-op  Anticipated Discharge Plan: [facility] once medically stable.    Sari Queen

## 2017-07-12 NOTE — BRIEF OP NOTE
[facility], [place]    Brief Operative Note    Pre-operative diagnosis: spinal epidural abcess  Post-operative diagnosis Same  Procedure: Procedure(s):  Right Anterior Cervical 5 corpectomy with cage and plate reconstruction and fusion. - Wound Class: III-Contaminated  Surgeon: Surgeon(s) and Role:     * Rafael Lo MD - Primary     * Jenny Sal MD - Resident - Assisting  Anesthesia: General   Estimated blood loss: 50 mL  Drains: Chadd-Awan  Specimens:   ID Type Source Tests Collected by Time Destination   1 : Cervical 4-5 tissue Tissue Spine, Cervical ANAEROBIC BACTERIAL CULTURE, TISSUE CULTURE AEROBIC BACTERIAL Rafael Lo MD 7/12/2017  4:09 PM    2 : cervical 4-5 swab Fluid Spine, Cervical ANAEROBIC BACTERIAL CULTURE, FLUID CULTURE AEROBIC BACTERIAL Rafael Lo MD 7/12/2017  4:10 PM    3 : Epidural abcess Fluid Spine, Cervical ANAEROBIC BACTERIAL CULTURE, FLUID CULTURE AEROBIC BACTERIAL Rafael Lo MD 7/12/2017  4:28 PM      Findings:   Purulent material encountered in epidural space.  Complications: None.  Implants:  Synthes cage and plate    Plan:  - 6A  - C-collar at all times  - F/U intra-op cultures       Please contact neurosurgery resident on call with questions.    Dial * * [phone], enter [phone] when prompted.

## 2017-07-12 NOTE — PLAN OF CARE
Problem: Goal Outcome Summary  Goal: Goal Outcome Summary     OT-4a: Cx- pt in OR this PM upon attempt.

## 2017-07-12 NOTE — ANESTHESIA PREPROCEDURE EVALUATION
Anesthesia Evaluation     . Pt has had prior anesthetic. Type: General    No history of anesthetic complications          ROS/MED HX    ENT/Pulmonary:  - neg pulmonary ROS     Neurologic:     (+)other neuro quadraplegia 2/2 cervical compression    Cardiovascular:     (+) --CAD, --. : . . . :. . Previous cardiac testing Echodate:7/8/2017results:Global and regional left ventricular function is normal with an EF of 60-65%.  Global right ventricular function is normal.  No significant valvular abnormalities were noted.  The inferior vena cava was normal in size with preserved respiratory  variability.  No pericardial effusion is present.date: results:ECG reviewed date:7/7/2017 results:Sinus date: results:          METS/Exercise Tolerance:     Hematologic:  - neg hematologic  ROS       Musculoskeletal:  - neg musculoskeletal ROS (+) , , other musculoskeletal- Rheumatoid Arthritis      GI/Hepatic:  - neg GI/hepatic ROS       Renal/Genitourinary:  - ROS Renal section negative       Endo:     (+) type II DM .      Psychiatric:  - neg psychiatric ROS       Infectious Disease:  - neg infectious disease ROS       Malignancy:      - no malignancy   Other:                     Physical Exam      Airway   Mallampati: III  TM distance: <3 FB  Neck ROM: limited    Dental   (+) missing and chipped    Cardiovascular   Rhythm and rate: regular and normal      Pulmonary    breath sounds clear to auscultation    Other findings: C-collar present                Anesthesia Plan      History & Physical Review  History and physical reviewed and following examination; no interval change.    ASA Status:  4 .    NPO Status:  > 8 hours    Plan for General and ETT with Intravenous induction. Maintenance will be Inhalation.    PONV prophylaxis:  Ondansetron (or other 5HT-3) and Dexamethasone or Solumedrol  Additional equipment: Videolaryngoscope, Difficult Airway Cart, Arterial Line, 2nd IV and Fiberoptic bronchoscope      Postoperative  Care  Postoperative pain management:  IV analgesics and Oral pain medications.      Consents  Anesthetic plan, risks, benefits and alternatives discussed with:  Patient..                          .

## 2017-07-12 NOTE — PLAN OF CARE
Problem: Goal Outcome Summary  Goal: Goal Outcome Summary  D: POD #5 initial surgery.   A/I:   Neuro: confused, restless, picking at equipment, visual hallucinations. 2mg ativan given at 0136. This morning more oriented & appropriate. Following commands. Sensation intact, all four extremities pale/cool.   CV: SR-ST w/intermittent episodes of sinus arrhythmia, frequent PACs, PVCs noted at start of shift (2300). Lytes checked & replaced. Two runs of SVT at 0145 & 0215 with rate as high as 160 lasting approx 6-8 seconds each. Rhythm normalized during the next few hours during/after lyte replacement.   Resp: Lungs clear. Congested cough, makes an effort to cough up sputum. Able to suction out from back of throat. No O2 needs overnight.   GI: TF had been advanced to 20mL/hr at midnight. Stopped at 0440 per MD request for OR. Large watery stool x1.   : -150mL/hr.  P: Return to OR this afternoon. Preop bath done.

## 2017-07-13 ENCOUNTER — APPOINTMENT (OUTPATIENT)
Dept: PHYSICAL THERAPY | Facility: CLINIC | Age: 72
DRG: 028 | End: 2017-07-13
Attending: NEUROLOGICAL SURGERY
Payer: COMMERCIAL

## 2017-07-13 ENCOUNTER — APPOINTMENT (OUTPATIENT)
Dept: GENERAL RADIOLOGY | Facility: CLINIC | Age: 72
DRG: 028 | End: 2017-07-13
Attending: NEUROLOGICAL SURGERY
Payer: COMMERCIAL

## 2017-07-13 LAB
ANION GAP SERPL CALCULATED.3IONS-SCNC: 6 MMOL/L (ref 3–14)
ANION GAP SERPL CALCULATED.3IONS-SCNC: 6 MMOL/L (ref 3–14)
APTT PPP: 37 SEC (ref 22–37)
BACTERIA SPEC CULT: NO GROWTH
BACTERIA SPEC CULT: NORMAL
BASE EXCESS BLDA CALC-SCNC: 4.1 MMOL/L
BASOPHILS # BLD AUTO: 0 10E9/L (ref 0–0.2)
BASOPHILS NFR BLD AUTO: 0.2 %
BUN SERPL-MCNC: 8 MG/DL (ref 7–30)
BUN SERPL-MCNC: 9 MG/DL (ref 7–30)
CALCIUM SERPL-MCNC: 8.4 MG/DL (ref 8.5–10.1)
CALCIUM SERPL-MCNC: 8.4 MG/DL (ref 8.5–10.1)
CHLORIDE SERPL-SCNC: 102 MMOL/L (ref 94–109)
CHLORIDE SERPL-SCNC: 103 MMOL/L (ref 94–109)
CO2 SERPL-SCNC: 27 MMOL/L (ref 20–32)
CO2 SERPL-SCNC: 28 MMOL/L (ref 20–32)
CREAT SERPL-MCNC: 0.45 MG/DL (ref 0.66–1.25)
CREAT SERPL-MCNC: 0.45 MG/DL (ref 0.66–1.25)
DIFFERENTIAL METHOD BLD: ABNORMAL
EOSINOPHIL # BLD AUTO: 0.1 10E9/L (ref 0–0.7)
EOSINOPHIL NFR BLD AUTO: 2 %
ERYTHROCYTE [DISTWIDTH] IN BLOOD BY AUTOMATED COUNT: 14 % (ref 10–15)
ERYTHROCYTE [DISTWIDTH] IN BLOOD BY AUTOMATED COUNT: 14 % (ref 10–15)
FIBRINOGEN PPP-MCNC: 568 MG/DL (ref 200–420)
GFR SERPL CREATININE-BSD FRML MDRD: ABNORMAL ML/MIN/1.7M2
GFR SERPL CREATININE-BSD FRML MDRD: ABNORMAL ML/MIN/1.7M2
GLUCOSE BLDC GLUCOMTR-MCNC: 178 MG/DL (ref 70–99)
GLUCOSE BLDC GLUCOMTR-MCNC: 185 MG/DL (ref 70–99)
GLUCOSE BLDC GLUCOMTR-MCNC: 206 MG/DL (ref 70–99)
GLUCOSE BLDC GLUCOMTR-MCNC: 222 MG/DL (ref 70–99)
GLUCOSE BLDC GLUCOMTR-MCNC: 239 MG/DL (ref 70–99)
GLUCOSE SERPL-MCNC: 173 MG/DL (ref 70–99)
GLUCOSE SERPL-MCNC: 182 MG/DL (ref 70–99)
HCO3 BLD-SCNC: 28 MMOL/L (ref 21–28)
HCT VFR BLD AUTO: 27.8 % (ref 40–53)
HCT VFR BLD AUTO: 29.7 % (ref 40–53)
HGB BLD-MCNC: 10.1 G/DL (ref 13.3–17.7)
HGB BLD-MCNC: 9.5 G/DL (ref 13.3–17.7)
IMM GRANULOCYTES # BLD: 0.1 10E9/L (ref 0–0.4)
IMM GRANULOCYTES NFR BLD: 0.8 %
INR PPP: 1.23 (ref 0.86–1.14)
LYMPHOCYTES # BLD AUTO: 0.8 10E9/L (ref 0.8–5.3)
LYMPHOCYTES NFR BLD AUTO: 11.9 %
MCH RBC QN AUTO: 32.6 PG (ref 26.5–33)
MCH RBC QN AUTO: 33 PG (ref 26.5–33)
MCHC RBC AUTO-ENTMCNC: 34 G/DL (ref 31.5–36.5)
MCHC RBC AUTO-ENTMCNC: 34.2 G/DL (ref 31.5–36.5)
MCV RBC AUTO: 96 FL (ref 78–100)
MCV RBC AUTO: 97 FL (ref 78–100)
MICRO REPORT STATUS: NORMAL
MICRO REPORT STATUS: NORMAL
MONOCYTES # BLD AUTO: 1 10E9/L (ref 0–1.3)
MONOCYTES NFR BLD AUTO: 15.3 %
MRSA DNA SPEC QL NAA+PROBE: NORMAL
NEUTROPHILS # BLD AUTO: 4.5 10E9/L (ref 1.6–8.3)
NEUTROPHILS NFR BLD AUTO: 69.8 %
NRBC # BLD AUTO: 0 10*3/UL
NRBC BLD AUTO-RTO: 0 /100
O2/TOTAL GAS SETTING VFR VENT: 30 %
PCO2 BLD: 40 MM HG (ref 35–45)
PH BLD: 7.46 PH (ref 7.35–7.45)
PLATELET # BLD AUTO: 250 10E9/L (ref 150–450)
PLATELET # BLD AUTO: 276 10E9/L (ref 150–450)
PO2 BLD: 107 MM HG (ref 80–105)
POTASSIUM SERPL-SCNC: 4.1 MMOL/L (ref 3.4–5.3)
POTASSIUM SERPL-SCNC: 4.3 MMOL/L (ref 3.4–5.3)
RBC # BLD AUTO: 2.91 10E12/L (ref 4.4–5.9)
RBC # BLD AUTO: 3.06 10E12/L (ref 4.4–5.9)
SODIUM SERPL-SCNC: 135 MMOL/L (ref 133–144)
SODIUM SERPL-SCNC: 137 MMOL/L (ref 133–144)
SPECIMEN SOURCE: NORMAL
VANCOMYCIN SERPL-MCNC: 18.6 MG/L
WBC # BLD AUTO: 5.4 10E9/L (ref 4–11)
WBC # BLD AUTO: 6.4 10E9/L (ref 4–11)

## 2017-07-13 PROCEDURE — 25000128 H RX IP 250 OP 636: Performed by: STUDENT IN AN ORGANIZED HEALTH CARE EDUCATION/TRAINING PROGRAM

## 2017-07-13 PROCEDURE — 97110 THERAPEUTIC EXERCISES: CPT | Mod: GP | Performed by: PHYSICAL THERAPIST

## 2017-07-13 PROCEDURE — 97530 THERAPEUTIC ACTIVITIES: CPT | Mod: GP | Performed by: PHYSICAL THERAPIST

## 2017-07-13 PROCEDURE — 80048 BASIC METABOLIC PNL TOTAL CA: CPT | Performed by: NEUROLOGICAL SURGERY

## 2017-07-13 PROCEDURE — 27210913 ZZH NUTRITION PRODUCT INTERMEDIATE PACKET

## 2017-07-13 PROCEDURE — 20000004 ZZH R&B ICU UMMC

## 2017-07-13 PROCEDURE — 40000008 ZZH STATISTIC AIRWAY CARE

## 2017-07-13 PROCEDURE — 85384 FIBRINOGEN ACTIVITY: CPT | Performed by: NEUROLOGICAL SURGERY

## 2017-07-13 PROCEDURE — 72040 X-RAY EXAM NECK SPINE 2-3 VW: CPT

## 2017-07-13 PROCEDURE — 25000132 ZZH RX MED GY IP 250 OP 250 PS 637: Performed by: NEUROLOGICAL SURGERY

## 2017-07-13 PROCEDURE — 25000128 H RX IP 250 OP 636: Performed by: NEUROLOGICAL SURGERY

## 2017-07-13 PROCEDURE — 82803 BLOOD GASES ANY COMBINATION: CPT | Performed by: NEUROLOGICAL SURGERY

## 2017-07-13 PROCEDURE — 25000125 ZZHC RX 250: Performed by: NEUROLOGICAL SURGERY

## 2017-07-13 PROCEDURE — 00000146 ZZHCL STATISTIC GLUCOSE BY METER IP

## 2017-07-13 PROCEDURE — 27210429 ZZH NUTRITION PRODUCT INTERMEDIATE LITER

## 2017-07-13 PROCEDURE — 85025 COMPLETE CBC W/AUTO DIFF WBC: CPT | Performed by: NEUROLOGICAL SURGERY

## 2017-07-13 PROCEDURE — 36415 COLL VENOUS BLD VENIPUNCTURE: CPT | Performed by: NEUROLOGICAL SURGERY

## 2017-07-13 PROCEDURE — 40000275 ZZH STATISTIC RCP TIME EA 10 MIN

## 2017-07-13 PROCEDURE — 85610 PROTHROMBIN TIME: CPT | Performed by: NEUROLOGICAL SURGERY

## 2017-07-13 PROCEDURE — 85027 COMPLETE CBC AUTOMATED: CPT | Performed by: NEUROLOGICAL SURGERY

## 2017-07-13 PROCEDURE — 40000193 ZZH STATISTIC PT WARD VISIT: Performed by: PHYSICAL THERAPIST

## 2017-07-13 PROCEDURE — 25000132 ZZH RX MED GY IP 250 OP 250 PS 637: Performed by: STUDENT IN AN ORGANIZED HEALTH CARE EDUCATION/TRAINING PROGRAM

## 2017-07-13 PROCEDURE — 40000014 ZZH STATISTIC ARTERIAL MONITORING DAILY

## 2017-07-13 PROCEDURE — 71010 XR CHEST PORT 1 VW: CPT

## 2017-07-13 PROCEDURE — 85730 THROMBOPLASTIN TIME PARTIAL: CPT | Performed by: NEUROLOGICAL SURGERY

## 2017-07-13 PROCEDURE — 80202 ASSAY OF VANCOMYCIN: CPT | Performed by: NEUROLOGICAL SURGERY

## 2017-07-13 PROCEDURE — 94003 VENT MGMT INPAT SUBQ DAY: CPT

## 2017-07-13 RX ORDER — ACETAMINOPHEN 325 MG/1
650 TABLET ORAL EVERY 4 HOURS PRN
Status: DISCONTINUED | OUTPATIENT
Start: 2017-07-13 | End: 2017-07-26 | Stop reason: HOSPADM

## 2017-07-13 RX ORDER — NICOTINE POLACRILEX 4 MG
15-30 LOZENGE BUCCAL
Status: DISCONTINUED | OUTPATIENT
Start: 2017-07-13 | End: 2017-07-15

## 2017-07-13 RX ORDER — AMINO ACIDS/PROTEIN HYDROLYS 15G-100/30
1 LIQUID (ML) ORAL 3 TIMES DAILY
Status: DISCONTINUED | OUTPATIENT
Start: 2017-07-13 | End: 2017-07-26 | Stop reason: HOSPADM

## 2017-07-13 RX ORDER — DEXTROSE MONOHYDRATE 25 G/50ML
25-50 INJECTION, SOLUTION INTRAVENOUS
Status: DISCONTINUED | OUTPATIENT
Start: 2017-07-13 | End: 2017-07-15

## 2017-07-13 RX ADMIN — Medication 1 PACKET: at 20:06

## 2017-07-13 RX ADMIN — CEFEPIME HYDROCHLORIDE 2 G: 2 INJECTION, POWDER, FOR SOLUTION INTRAVENOUS at 00:17

## 2017-07-13 RX ADMIN — INSULIN ASPART 1 UNITS: 100 INJECTION, SOLUTION INTRAVENOUS; SUBCUTANEOUS at 07:47

## 2017-07-13 RX ADMIN — FOLIC ACID 1 MG: 1 TABLET ORAL at 07:47

## 2017-07-13 RX ADMIN — Medication 1 PACKET: at 14:31

## 2017-07-13 RX ADMIN — MULTIVIT AND MINERALS-FERROUS GLUCONATE 9 MG IRON/15 ML ORAL LIQUID 15 ML: at 07:46

## 2017-07-13 RX ADMIN — INSULIN ASPART 2 UNITS: 100 INJECTION, SOLUTION INTRAVENOUS; SUBCUTANEOUS at 11:21

## 2017-07-13 RX ADMIN — METOPROLOL TARTRATE 5 MG: 5 INJECTION INTRAVENOUS at 09:54

## 2017-07-13 RX ADMIN — MENTHOL 1 PATCH: 205.5 PATCH TOPICAL at 06:24

## 2017-07-13 RX ADMIN — CEFEPIME HYDROCHLORIDE 2 G: 2 INJECTION, POWDER, FOR SOLUTION INTRAVENOUS at 15:25

## 2017-07-13 RX ADMIN — METOPROLOL TARTRATE 5 MG: 5 INJECTION INTRAVENOUS at 15:23

## 2017-07-13 RX ADMIN — METOPROLOL TARTRATE 5 MG: 5 INJECTION INTRAVENOUS at 04:17

## 2017-07-13 RX ADMIN — Medication 20 MG: at 00:22

## 2017-07-13 RX ADMIN — CEFEPIME HYDROCHLORIDE 2 G: 2 INJECTION, POWDER, FOR SOLUTION INTRAVENOUS at 07:47

## 2017-07-13 RX ADMIN — Medication 1 PACKET: at 09:33

## 2017-07-13 RX ADMIN — ACETAMINOPHEN 650 MG: 325 TABLET, FILM COATED ORAL at 20:08

## 2017-07-13 RX ADMIN — METOPROLOL TARTRATE 5 MG: 5 INJECTION INTRAVENOUS at 22:47

## 2017-07-13 RX ADMIN — Medication 20 MG: at 17:23

## 2017-07-13 RX ADMIN — Medication 0.2 MG: at 22:53

## 2017-07-13 RX ADMIN — Medication 1 PACKET: at 07:47

## 2017-07-13 RX ADMIN — Medication 20 MG: at 12:31

## 2017-07-13 RX ADMIN — VANCOMYCIN HYDROCHLORIDE 1750 MG: 10 INJECTION, POWDER, LYOPHILIZED, FOR SOLUTION INTRAVENOUS at 04:15

## 2017-07-13 RX ADMIN — VANCOMYCIN HYDROCHLORIDE 1750 MG: 10 INJECTION, POWDER, LYOPHILIZED, FOR SOLUTION INTRAVENOUS at 15:23

## 2017-07-13 RX ADMIN — Medication 20 MG: at 07:46

## 2017-07-13 ASSESSMENT — VISUAL ACUITY
OU: OTHER (SEE COMMENT)

## 2017-07-13 NOTE — PLAN OF CARE
Problem: Goal Outcome Summary  Goal: Goal Outcome Summary  D/I: Pt extubated this morning around 9:50am, currently on RA at this time with clear to coarse lung sounds, weak to fair productive cough. Pt alert, confused to time, sometimes situation and place, has aspen collar on at all time; moderate strength to upper extremities and weak lower extremities, following command, sensation intact. Pt sinus rhythm to sinus tach, given labetalol prn x2 to keep sbp below 140. TF currently at 20ml/hr with Q4h 30ml water flushes.     A/P: Advance TF at 1am, pt to go for xray of spine this evening, notify team for any changes or concern.

## 2017-07-13 NOTE — PLAN OF CARE
Problem: Goal Outcome Summary  Goal: Goal Outcome Summary  OT/4A: Cancel- Pt declined attempt, just returning to bed

## 2017-07-13 NOTE — OR NURSING
Patient arrived to PACU still intubated d/t slow/delayed wake up from anesthesia. Dr. Shaw (anesthesia) at bedside as well as Neurosurgery team. Per Neurosurgery- patient is stronger on the right side vs the left side at baseline. Greenwood collar is to remain on patient at all times. Unable to complete full/accurate neurological assessment d/t patient's sedation level. Will extubate in PACU once patient is able to follow commands.

## 2017-07-13 NOTE — PHARMACY-VANCOMYCIN DOSING SERVICE
Pharmacy Vancomycin Note  Date of Service 2017  Patient's  [id number]   72 year old, male    Indication: Abscess and Meningitis  Goal Trough Level: 15-20 mg/L  Day of Therapy: 6  Current Vancomycin regimen:  1750 mg IV q12h    Current estimated CrCl = Estimated Creatinine Clearance: 179.9 mL/min (based on Cr of 0.45).    Creatinine for last 3 days  7/10/2017:  8:07 PM Creatinine 0.47 mg/dL  2017:  5:47 AM Creatinine 0.42 mg/dL  2017:  3:45 AM Creatinine 0.39 mg/dL  2017:  3:22 AM Creatinine 0.45 mg/dL;  4:52 AM Creatinine 0.45 mg/dL    Recent Vancomycin Levels (past 3 days)  7/10/2017:  1:02 PM Vancomycin Level 13.5 mg/L  2017:  3:22 AM Vancomycin Level 18.6 mg/L    Vancomycin IV Administrations (past 72 hours)                   vancomycin (VANCOCIN) 1,750 mg in NaCl 0.9 % 500 mL intermittent infusion (mg) 1,750 mg New Bag 17 0415     1,750 mg Bolus 17 1735     1,750 mg New Bag  0424     1,750 mg New Bag 17 1545     1,750 mg New Bag  0307     1,750 mg New Bag 07/10/17 1620                Nephrotoxins and other renal medications (Future)    Start     Dose/Rate Route Frequency Ordered Stop    07/10/17 1600  vancomycin (VANCOCIN) 1,750 mg in NaCl 0.9 % 500 mL intermittent infusion      1,750 mg Intravenous EVERY 12 HOURS 07/10/17 1548               Contrast Orders - past 72 hours (72h ago through future)    Start     Dose/Rate Route Frequency Ordered Stop    07/10/17 1400  technetium Tc 99m tetrofosmin 2UD study (MYOVIEW) radioisotope injection 3-42 mCi      3-42 mCi Intravenous EVERY 2 HOURS 07/10/17 1311 07/10/17 1440          Interpretation of levels and current regimen:  Trough level is  Therapeutic  Has serum creatinine changed > 50% in last 72 hours: No  Urine output: good urine output (1 mL/kg/hr over last 7 hours)   Renal Function: Stable    Plan:  1. Continue current vancomycin dose of 1750 mg (20 mg/kg) q12h.   2. Pharmacy will check trough levels as  appropriate in 3-5 Days.    3. Serum creatinine levels will be ordered daily for the first week of therapy and at least twice weekly for subsequent weeks.      Abdelrahman Jacobson, PharmD, MS

## 2017-07-13 NOTE — PROGRESS NOTES
[facility], [place]    Neurosurgery Daily Progress Note         Assessment   Ernesto Rawls is a 72 year old male who is postoperative day # 6/1 from C3-5 and C6 laminectomy and C5 corpectomy with cage placement .          Plan     Continue current pain control regimen.    Routine neuro exams per unit protocol.    Aspen collar on at all times.     Intubated currently - plan for extubation in AM pending ABG and CXR.     Dentistry: follow up with a dentist as an outpatient    Podiatry: daily dressing changes    MAPS > 80, has been maintaining without pressors    Incentive spirometry Q1H while awake.    SLP: ADAT    Bowel regimen. Anti-emetics.     SCDs while in bed.    PT/OT    Dispo; Stable in ICU.     Please dial * * * and enter job [phone] to reach the neurosurgery team if you have any questions.      Patient and above plan discussed with neurosurgery chief resident.         Subjective     Patient kept intubated after OR. Plan for extubation in AM.            Objective   Temp:  [97.4  F (36.3  C)-99  F (37.2  C)] 99  F (37.2  C)  Heart Rate:  [] 80  Resp:  [7-54] 14  BP: (119-177)/(64-99) 150/69  MAP:  [87 mmHg-108 mmHg] 92 mmHg  Arterial Line BP: (136-165)/(21-76) 145/62  FiO2 (%):  [60 %-100 %] 100 %  SpO2:  [90 %-100 %] 100 %    I/O last 3 completed shifts:  In: 3373.3 [I.V.:2845; NG/GT:425]  Out: 3160 [Urine:3110; Blood:50]    Physical Exam  General: Intubated. Aspen cervical collar in place.   Wound: Incision, clean, dry, without strikethrough, ALONA drain in place.   Neurologic Exam:  - Follows commands in the bilateral lower extremities  - Moves upper extremities antigravity to painful stimuli.  - PERRL, EOMI.  Motor: Normal bulk/tone; no tremor, rigidity, or bradykinesia.        Labs and Imaging     BMP    Recent Labs  Lab 07/12/17  1635 07/12/17  0915 07/12/17  0345 07/12/17  0117 07/11/17  0547 07/10/17  2007 07/10/17  0417     --  134  --  134 138 138   POTASSIUM 4.2  3.7 3.9 3.4 3.6 3.6 3.8   CHLORIDE  --   --  101  --  100 104 102   KAROL  --   --  8.4*  --  8.4* 8.3* 8.4*   CO2  --   --  28  --  29 28 27   BUN  --   --  10  --  9 11 10   CR  --   --  0.39*  --  0.42* 0.47* 0.46*   *  --  227*  --  165* 162* 172*     CBC    Recent Labs  Lab 07/12/17  1635 07/12/17  0345 07/11/17  0547 07/10/17  2007 07/10/17  0418   WBC  --  6.5 5.9 6.9 6.5   RBC  --  3.24* 3.17* 3.20* 3.18*   HGB 9.6* 10.6* 10.5* 10.5* 10.5*   HCT  --  30.8* 30.6* 30.8* 30.7*   MCV  --  95 97 96 97   MCH  --  32.7 33.1* 32.8 33.0   MCHC  --  34.4 34.3 34.1 34.2   RDW  --  13.8 13.8 13.9 13.9   PLT  --  299 241 236 206     INR    Recent Labs  Lab 07/12/17  0915 07/10/17  2007 07/08/17  1137 07/07/17  0552   INR 1.32* 1.44* 1.17* 1.10       Imaging: Reviewed      Contact the neurosurgery resident on call with questions.    Dial * * [phone]: Enter [phone] when prompted.   Tommy Edwards MD, PhD  Neurosurgery PGY-3

## 2017-07-13 NOTE — PLAN OF CARE
Problem: Goal Outcome Summary  Goal: Goal Outcome Summary  SLP: Chart reviewed and consulted with RN. Patient extubated this a.m. And not appropriate for dysphagia intervention this date. Will reschedule for 7-.

## 2017-07-13 NOTE — PROGRESS NOTES
[facility] Service: Follow Up Note      Patient:  Ernesto Rawls, Date of birth 1945, Medical record number [id number]  Date of Visit:  July 13, 2017         Assessment and Recommendations:   Problem List:  1.  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  2.  Cevical stenosis s/p C3-5 and partial C6 laminectomy and decompression.  3.  Recent history of wet gangrene of toe on right foot with subsequent amputation.  4.  Bibasilar atelectasis versus pneumonia.  5.  Type 2 Diabetes Mellitus  5.  Rheumatoid Arthritis.    Recommendations:  - Continue Vancomycin 1.750g q12h and Cefepime 2g q8h.   - Vancomycin trough was 18.6 on 7/13, would like trough between 25-30 for CNS infections and 15-20 for bacteremia.  Creatinine is stable at 0.45 on 7/13.  - Patient will need long term antibiotic therapy for 6-8 weeks (at minimum until 8/18) due to bone and nervous system involvement.  - Tissue, fluid, and abscess cultures from C4-5 region have been obtained.  Appreciate neurosurgery obtaining cultures, as this may help guide therapy.  Cultures NTD as of 7/13.  - There is no role for routine MRI follow up imaging, unless patient symptoms acutely worsen.  Vertebral bony lesions often persist on imaging even after appropriate therapy and clinical resolution has been achieved.   - Continue to monitor respiratory status.  Remains afebrile.  Patient is being extubated today.  Left-sided inspiratory basilar crackles noted.  CXR on 7/13 demonstrated left-sided pleural effusion with continued left-sided atelectasis, and cultures still currently may represent atelectasis with colonization of sputum.  Empiric coverage of epidural abscess covers sputum organisms.    Discussion:    Etiology of epidural abscess and vertebral osteomyelitis/discitis may be from hematogenous seeding secondary to toe infection v. Direct oropharyngeal migration v. Others.  Patient history and risk factors notable for  increased falls and constitutional symptoms in the past month, old age, poorly-controlled diabetes mellitus, therapy for RA with Hydroxychloroquine and MTX, and recent surgery for a gangrenous right toes.  MRI demonstrated the abscess, and echocardiogram was within normal limits.  Other risk factors for epidural abscesses include IV drug access, central lines, and hemodialysis.  Most often Staphylococcus Aureus is the cause, followed by gram-negative bacilli.  Brucella and Mycobacterium Tuberculosis are seen in endemic areas.  Fever and elevated WBC count can notably be absent upon diagnosis.  CRP/ESR will increase 2-3 weeks after treatment, and trending them downward can assess risk of treatment failure (25-33% reduction at 4 weeks is a low risk, and 50% reduction at 4 weeks is a rare risk of treatment failure).  10-30% failure rate identified in treatment of vertebral osteomyelitis.  Antibiotic treatment of osteomyelitis with epidural abscess is long-term, lasting approximately 6-8 weeks.  Therapy may be longer due to cage and plate placement during surgery.    Scribe Disclosure:   I, Rey Iraheta, am serving as a scribe; to document services personally performed by MONTSERRAT Rincon- -based on data collection and the provider's statements to me.      Provider Disclosure:  I agree with above History, Review of Systems, Physical exam and Plan.  I have reviewed the content of the documentation and have edited it as needed. I have personally performed the services documented here and the documentation accurately represents those services and the decisions I have made.  Vertebral osteomyelitis most often occurs from hematogenous spread, but also from infected contiguous tissue or direct inoculation into the spinal canal.     MONTSERRAT Rincon M.D.  [facility] Service Staff  [phone]         Interval History:     HPI:  Mr. Rawls is a 71yo M with a PMH notable for rheumatoid arthritis, diabetes, coronary artery disease,  and wet gangrene of his right foot great toe (with subsequent amputation on 7/06/17).  He presented to this hospital on 7/07/17 [facility] his right gangrenous toe, due to quadriplegia post-surgery.  He then demonstrated spinal stenosis at C3-5 and epidural abscess with adjacent osteomyelitis and discitis at C3-5.  He is being managed surgically by the neurosurgery team, with posterior C3-5/partial 6 decompression on 7/7.    Interval History:  Patient is post-op day 1 from C5 Corpectomy with cage and plate reconstruction and fusion.  He had a prolonged course awaking from anesthesia, but has stabilized and will be extubated from endotracheal ventilation today.  Upon visit today, patient is able to follow commands from neurosurgery team, including deep inspiration and movement of hands and feet.  He was still intubated upon visit, but was able to nod in understanding when told of current plan.  Per neurosurgery, multiple samples were obtained for culture.    Culture data:  C4-5 Tissue Culture from 7/12:  NTD on 7/13  C4-5  Fluid Culture from 7/12:  NTD on 7/13  Spinal Epidural Abscess Culture from 7/12:  NTD on 7/13  Sputum Culture from 7/09:  Enterobacter Aerogenes with resistance to Ampicillin and Unasyn, otherwise susceptible.         Review of Systems:   CONSTITUTIONAL:  No fevers or chills  EYES: negative for icterus  ENT:  negative for oral lesions, hearing loss, tinnitus and sore throat  RESPIRATORY:  negative for cough and dyspnea  CARDIOVASCULAR:  negative for chest pain, palpitations  GASTROINTESTINAL:  negative for nausea, vomiting, diarrhea and constipation  GENITOURINARY:  negative for dysuria  HEME:  No easy bruising/bleeding  INTEGUMENT:  negative for rash and pruritus  NEURO:  Negative for headache.  Persistent paralysis of limbs.         Current Antimicrobials   Cefepime 2 g q8h  Vancomycin 1750 mg q12h         Physical Exam:   Ranges for vital signs:  Temp:  [97.7  F  (36.5  C)-99.5  F (37.5  C)] 99.5  F (37.5  C)  Heart Rate:  [] 87  Resp:  [8-18] 12  BP: (128-166)/(61-99) 147/70  MAP:  [70 mmHg-108 mmHg] 85 mmHg  Arterial Line BP: (100-165)/(21-79) 145/45  FiO2 (%):  [30 %-100 %] 30 %  SpO2:  [96 %-100 %] 100 %    Intake/Output Summary (Last 24 hours) at 07/13/17 1333  Last data filed at 07/13/17 1200   Gross per 24 hour   Intake          2461.08 ml   Output             2680 ml   Net          -218.92 ml     Exam:  GENERAL:  well-developed, well-nourished, Lying in bed intubated, but will nod or shake his head to respond appropriately.  Appropriate response when asked to perform actions.  ENT:  Head is normocephalic, atraumatic. Oropharynx is moist without exudates or ulcers.  EYES:  Eyes have anicteric sclerae.    NECK:  Supple.  LUNGS:  Decreased breath sounds.  Left basilar inspiratory crackles.  CARDIOVASCULAR:  Regular rate and rhythm with no murmurs, gallops or rubs.  ABDOMEN:  Normal bowel sounds, soft, nontender.  EXT: Extremities warm and without edema.  SKIN:  No acute rashes.  Line is in place without any surrounding erythema.  NEUROLOGIC:  Patient is able to move hands and feet when asked to do so.         Laboratory Data:     Creatinine   Date Value Ref Range Status   07/13/2017 0.45 (L) 0.66 - 1.25 mg/dL Final   07/13/2017 0.45 (L) 0.66 - 1.25 mg/dL Final   07/12/2017 0.39 (L) 0.66 - 1.25 mg/dL Final   07/11/2017 0.42 (L) 0.66 - 1.25 mg/dL Final   07/10/2017 0.47 (L) 0.66 - 1.25 mg/dL Final     WBC   Date Value Ref Range Status   07/13/2017 5.4 4.0 - 11.0 10e9/L Final   07/13/2017 6.4 4.0 - 11.0 10e9/L Final   07/12/2017 6.5 4.0 - 11.0 10e9/L Final   07/11/2017 5.9 4.0 - 11.0 10e9/L Final   07/10/2017 6.9 4.0 - 11.0 10e9/L Final     Hemoglobin   Date Value Ref Range Status   07/13/2017 9.5 (L) 13.3 - 17.7 g/dL Final     Platelet Count   Date Value Ref Range Status   07/13/2017 250 150 - 450 10e9/L Final     Sed Rate   Date Value Ref Range Status   07/07/2017  50 (H) 0 - 20 mm/h Final     CRP Inflammation   Date Value Ref Range Status   07/07/2017 27.0 (H) 0.0 - 8.0 mg/L Final     Lab Results   Component Value Date     07/13/2017    BUN 8 07/13/2017    CO2 27 07/13/2017

## 2017-07-13 NOTE — PLAN OF CARE
Problem: Goal Outcome Summary  Goal: Goal Outcome Summary  Outcome: Improving  D/I= s/P= laminectomy and foraminotomy yesterday arriving on SICU/ NICU @ 2250 last evening on ventilator and remaining on ventilator overnoc.skilled nursing cares, neuro assessments,administration of medications and labwork drawn. A/P= Abg this am looks great. Has productive cough of thick cloudy secretions via ett and mouth. Follows commands and denies pain. Now able to follow commands on all 4 extremities although right upper extremity is weaker than other extremities. Urine output adequate. Plan= wean and extubate.

## 2017-07-13 NOTE — PLAN OF CARE
Problem: Goal Outcome Summary  Goal: Goal Outcome Summary  PT 4AB: Pt rolls R and L with MaxA. Dependent transfers with use of ceiling lift, sling, and Ax2. Completes body weight assisted squats on Moveo machine at incline correlating with ~25% of body weight. Appears to favor L LE over R LE with activity. Cues needed throughout session for initiation of movements and to complete full ROM d/t weakness. VSS.      Recommend discharge to rehab once medically appropriate.

## 2017-07-13 NOTE — OR NURSING
Paged Dr. Edwards (neurosurgery) regarding need for transfer patient order as well as post-surgical orders.

## 2017-07-13 NOTE — PROGRESS NOTES
CLINICAL NUTRITION SERVICES - REASSESSMENT NOTE     Nutrition Prescription    RECOMMENDATIONS FOR MDs/PROVIDERS TO ORDER:  - Consider ordering enteral Phos supplementation (NeutraPhos 1 pkt TID-QID) in addition to IV replacement. Also recommend change to high intensity IV Phos replacement protocol (replace Phos <2.7 mg/dL) if not already.  - Restart EN as soon as medically able due to prolonged inadequate nutrition     Malnutrition Status:    - Severe malnutrition in the context of acute on chronic illness     Recommendations already ordered by Registered Dietitian (RD):  - Once medically able, restart EN as ordered: TwoCal HN @ 45 ml/hr (1080 ml/day) to provide 2160 kcals (23 Kcals/kg/day) 91 g PRO ( 1.0 gms/kg/day), 756 ml free H2O, 237 g CHO and 5 g Fiber daily.  - Continue additional Prostat packet TID: provides 2460 kcals (25 kcals/kg) and 136 gm PRO (1.4 gm/kg)  - Initiate @ 15 ml/hr and advance by 10 ml q8hr as tolerated     Future/Additional Recommendations:  - Ability to restart EN; Lytes      EVALUATION OF THE PROGRESS TOWARD GOALS   Diet: NPO     Nutrition Support: TwoCal HN @ 45 ml/hr (1080 ml/day) to provide 2160 kcals (23 Kcals/kg/day) 91 g PRO ( 1.0 gms/kg/day), 756 ml free H2O, 237 g CHO and 5 g Fiber daily.  - Continue additional Prostat packet TID: provides 2460 kcals (25 kcals/kg) and 136 gm PRO (1.4 gm/kg)    Intake: Minimal since initiation due to slow advancement and hold for procedures.        NEW FINDINGS   Nutrition/GI: pt was assessed 7/8 by SLP and recommended NPO diet status. FT (Cortrak) was placed on 7/10. Pt has not received much nutrition due to advancement of EN and held for procedure. Loose BMs.    Labs/meds:  Phosphorus 2.3 (L); continues on folic acid, insulin, certavit, bowel meds; on K phos.    Skin: Salvador score of 14 with nutrition sub-score 2 (probably inadequate)    Respiratory status: pt extubated 7/13.    Weights:  Trending down since admit: 209 lbs --> 191 lbs or 9%  weight loss over ~ 1 week.     MALNUTRITION  % Intake: </=50% for >/= 1 month (severe)  % Weight Loss: > 5% in 1 month (severe)  Subcutaneous Fat Loss: None observed  Muscle Loss: Scapular bone:, Thoracic region (clavicle, acromium bone, deltoid, trapezius, pectoral):, Upper arm (bicep, tricep):, Upper leg (quadricep, hamstring):, Patellar region: and Posterior calf: moderate   Fluid Accumulation/Edema: None noted  Malnutrition Diagnosis: Severe malnutrition in the context of acute on chronic illness   *no reassessment at follow up; pt with therapies.     Previous Goals   Diet adv v nutrition support within 2-3 days.  Evaluation: Met    Previous Nutrition Diagnosis  Inadequate protein-energy intake related to inability to take oral PO/current diet status as evidenced by NPO and pt meeting 0% kcal/PRO needs.      Evaluation: Improving    CURRENT NUTRITION DIAGNOSIS  Inadequate protein-energy intake related to progression of TF regimen and TFs held for procedure as evidenced by pt received minimal EN infusion x 2+ days with poor intake PTA.      INTERVENTIONS  Implementation  Enteral Nutrition - Initiate    Goals  Total avg nutritional intake to meet a minimum of 25 kcal/kg and 1.2 g PRO/kg daily (per dosing wt 95 kg).    Monitoring/Evaluation  Progress toward goals will be monitored and evaluated per protocol.     Brayan Goldstein RD, LD  Neuro ICU  Pager: [phone]

## 2017-07-13 NOTE — PROGRESS NOTES
Neuroscience Intensive Care Progress Note    2017    Ernesto Rawls is a 72 year old year old male admitted on 2017 with four extremity weakness that progressed to quadriplegia likely due to epidural abscess.       Problem List  1. Epidural fluid collection s/p decompression  2. Weakness of all extremities    24 hour events:  Patient had C5 corpectomy with cage placement  and was not protecting his airway 2 hours after the discontinuation of sedation. He was returned to SICU intubated where he remained overnight with plans to extubate in the morning if stable.    Subjectively the patient indicates that he is not short of breath and is not air hungry.     Temp: 97.7  F (36.5  C) Temp  Min: 97.7  F (36.5  C)  Max: 99.3  F (37.4  C)  Resp: 12 Resp  Min: 7  Max: 54  SpO2: 98 % SpO2  Min: 92 %  Max: 100 %    No Data Recorded  Heart Rate: 91 Heart Rate  Min: 75  Max: 99  BP: 147/70 Systolic (24hrs), Av , Min:119 , Max:166   Diastolic (24hrs), Av, Min:61, Max:99      Ventilation Mode: CPAP/PS  FiO2 (%): 30 %  Rate Set (breaths/minute): 12 breaths/min  Tidal Volume Set (mL): 500 mL  PEEP (cm H2O): 5 cmH2O  Pressure Support (cm H2O): 7 cmH2O  Oxygen Concentration (%): 30 %  Resp: 12        Intake/Output Summary (Last 24 hours) at 17 0844  Last data filed at 17 0700   Gross per 24 hour   Intake          2364.08 ml   Output             2105 ml   Net           259.08 ml           Arterial Line BP: (100-165)/(21-76) 100/63  MAP:  [70 mmHg-108 mmHg] 81 mmHg  BP - Mean:  [] 107    Current Medications:    protein modular  1 packet Per Feeding Tube TID     sodium chloride (PF)  3 mL Intracatheter Q8H     lidocaine (viscous)  5 mL Topical Once     vancomycin (VANCOCIN) IV  1,750 mg Intravenous Q12H     multivitamins with minerals  15 mL Per Feeding Tube Daily     protein modular  1 packet Per Feeding Tube TID     omeprazole  20 mg Oral or Feeding Tube Daily     metoprolol  5 mg Intravenous Q6H  "    magnesium sulfate  2 g Intravenous Once     potassium chloride  20 mEq Oral Once     potassium phosphate (KPHOS) in D5W IV  20 mmol Intravenous Once     ceFEPIme (MAIXPIME) IV  2 g Intravenous Q8H     LORazepam  0.5-4 mg Intravenous See Admin Instructions    Or     LORazepam  0.5-4 mg Oral See Admin Instructions     lidocaine  3 patch Transdermal Q24H     lidocaine   Transdermal Q24h     lidocaine   Transdermal Q8H     menthol   Transdermal Q8H     senna-docusate  1-2 tablet Oral BID     polyethylene glycol  17 g Oral BID     insulin aspart  1-7 Units Subcutaneous TID AC     insulin aspart  1-5 Units Subcutaneous At Bedtime     folic acid  1 mg Oral Daily       PRN Medications:  HYDROmorphone, lidocaine (buffered or not buffered), lidocaine 4%, sodium chloride (PF), labetalol, hydrALAZINE, naloxone, carboxymethylcellulose, potassium chloride, potassium chloride, potassium chloride, potassium chloride with lidocaine, potassium chloride, magnesium sulfate, magnesium sulfate, potassium phosphate (KPHOS) in D5W IV, potassium phosphate (KPHOS) in D5W IV, potassium phosphate (KPHOS) in D5W IV, potassium phosphate (KPHOS) in D5W IV, potassium phosphate (KPHOS) in D5W IV, IV fluid REPLACEMENT ONLY, acetaminophen, sore throat lozenge, oxyCODONE, menthol **AND** menthol **AND** menthol, ondansetron **OR** ondansetron, prochlorperazine **OR** prochlorperazine, bisacodyl, glucose **OR** dextrose **OR** glucagon, cyclobenzaprine    Infusions:    IV fluid REPLACEMENT ONLY       NaCl 75 mL/hr at 07/13/17 0623       Allergies   Allergen Reactions     Contrast Dye        Physical Examination:  /70  Pulse 64  Temp 97.7  F (36.5  C) (Axillary)  Resp 12  Ht 1.854 m (6' 1\")  Wt 87 kg (191 lb 12.8 oz)  SpO2 98%  BMI 25.3 kg/m2    General: NAD, in c-collar   CV: RRR, no murmur heard today  Resp: decreased breath sounds on left,  Scant expiratory wheezes appreciated on left.   Abd: soft, nontender, nondistended  Ext: no " edema, peripheral pulses intact  Neuro:  MS: alert, follows commands  CN: 2-12 intact  Motor: Antigravity in b/l UE. 2/5 in BLE. Tone decreased throughout.  Sensory: intact to light touch throughout   Reflexes: absent throughout, babinski mute      Labs/Studies:  Recent Labs   Lab Test  07/10/17   0418  07/10/17   0417 07/09/17 2007 07/09/17 0717 07/08/17 1137 07/08/17   0256   NA   --   138   --   137   --    --    --   137   POTASSIUM   --   3.8  3.5  3.4   < >   --    --   4.4   CHLORIDE   --   102   --   102   --    --    --   104   CO2   --   27   --   28   --    --    --   24   ANIONGAP   --   9   --   7   --    --    --   8   GLC   --   172*   --   161*   --    --    --   160*   BUN   --   10   --   8   --    --    --   12   CR   --   0.46*   --   0.48*   --    --    --   0.55*   KAROL   --   8.4*   --   8.3*   --    --    --   8.3*   WBC  6.5   --    --   5.4   --   7.8   < >  4.9   RBC  3.18*   --    --   3.19*   --   3.04*   < >  3.23*   HGB  10.5*   --    --   10.5*   --   10.1*   < >  10.7*   PLT  206   --    --   215   --   254   < >  88*    < > = values in this interval not displayed.       Recent Labs   Lab Test  07/08/17   1137  07/07/17   0552   INR  1.17*  1.10   PTT  31  29           Recent Labs  Lab 07/13/17  0403 07/12/17  1635 07/11/17  0940 07/07/17  0848   PH 7.46* 7.47* 7.43 7.42   PCO2 40 37 40 35   PO2 107* 202* 49* 290*   HCO3 28 27 27 23   O2PER 30 50.0 21 50.0           Imaging:  CXR:  IMPRESSION: Increased left pleural effusion with left basilar  atelectasis.    Assessment/Plan  Ernesto Rawls is a 72 year old h/o rheumatoid arthritis, DM, CAD, and wet gangrene of right foot admitted 7/7/2017 with weakness in all extremities that progressed to quadriplegia, POD6 s/p laminectomy C3-6 and POD1 s/p corpectomy with cage placement.    Plan  Neuro:  # Epidural fluid collection s/p decompression of C3-5 w/ partial 6 on POD#6, corpectomy POD1:  -Mild clinical improvement post-op  -More  specimens collected and culturing  -Abx as below    Resp:  Intubated, tolerating spontaneous breathing trial. ABG reassuring. Plan for extubation today.    CV:  Hemodynamically stable. MAP goal >80.      # H/O CAD:  # Runs of V-tach:  NST performed 7/11 indicates no ischemia or infarcts with EF 62%. TTE confirms normal ventricular function. Cardiology following, they recommend metoprolol 25 mg BID, lyte repletion, and consider angiogram if still sympteomatic  -Replace lytes: K>4, Mg>2, PO4>2.4  -metoprolol 25 mg BID  -Continue tele    Renal:  No active issues. Creatinine at baseline.    Endo:    CORNELIO  No active issues. H/O DM  -SSI, maintain euglycemia, goal     Heme:  # Thrombocytopenia, resolved:  -Decreased to 80s during hospitalization. Uptrending now  -Currently 241 and stable from yesterday    GI:  No active issues, NJ tube placed but NPO for surgery    ID:  # Concern for epidural abscess:  -Unclear cause of epidural fluid collection, infection possible  -Currently on broad spectrum abx for coverage, cultures pending  -ID Following, recommend cefipime and vanc    FEN: NPO, resume TF per NJ tube after surgery  PPX:    DVT prophylaxis: SCDs, lovenox    GI: omeprazole    Disposition: Neurosurgery primary team, to floor after extubation if stable.    I personally saw and examined the patient. Note written w/ help of Israel Alcocer, MS-4, who was acting as a medical scribe.       Patient discussed with staff physician, Dr. Ferreira.      Pablo Hay  Neurology PGY-2

## 2017-07-13 NOTE — ANESTHESIA CARE TRANSFER NOTE
Patient: Ernesto Rawls    Procedure(s):  Right Anterior Cervical 5 corpectomy with cage and plate reconstruction and fusion. - Wound Class: III-Contaminated    Diagnosis: spinal epidural abcess  Diagnosis Additional Information: No value filed.    Anesthesia Type:   General, ETT     Note:  Airway :ETT  Patient transferred to:PACU  Comments: VSS on arrival, report to RN.       Vitals: (Last set prior to Anesthesia Care Transfer)    CRNA VITALS  7/12/2017 1850 - 7/12/2017 1920      7/12/2017             Ht Rate: 84                Electronically Signed By: NIK Flowers CRNA  July 12, 2017  7:20 PM

## 2017-07-13 NOTE — OR NURSING
Patient is still slow to awaken from anesthesia. Not following commands to bilateral upper extremities, only withdraws from vigorous painful stimuli. Does not open eyes or lift head off pillow. Tidal volumes on ventilator 400-500 on CPAP setting. Dr. Shaw (anesthesiology) at bedside multiple times, concerned that patient will not be able to control secretions (had copious secretions with suctioning in OR) and that patient wont be able to protect airway. Dr. Edwards (neurosurgery) at bedside to assess patient. Agree with Dr. Shaw to keep patient intubated overnight and transfer to ICU.

## 2017-07-13 NOTE — PROGRESS NOTES
Extubated patient 07/13/17 1173. RN bedside. BS coarse with strong productive cough. Moderate clear secretions suctioned orally. Patient placed on 6 LPM oxyplus, VSS. RT will continue to follow

## 2017-07-13 NOTE — CONSULTS
Social Work: Assessment with Discharge Plan    Patient Name:  Ernesto Rawls  :  1945  Age:  72 year old  MRN:  [id number]  Risk/Complexity Score:  Filed Complexity Screen Score: 5  Completed assessment with:  Patient, pt's ex-wife/AELJANDRINA Foster, team rounds, RNCC, chart review    Presenting Information   Reason for Referral:  length of stay and support  Date of Intake:  2017  Referral Source:  Chart Review  Decision Maker:  self  Alternate Decision Maker:  Per [facility] Policy, pt's dtfaheem Hare ([phone])  Health Care Directive:  Not on file  Living Situation:  House with ex-wife/S.HARPREET Foster [place] the nearest town and 60 miles from the nearest [facility] clinic.  Previous Functional Status:  Independent  Patient and family understanding of hospitalization:  restorative  Cultural/Language/Spiritual Considerations:  undesignated  Adjustment to Illness:  Unable to assess pt d/t needing frequent suctioning; pt's ex-wife appears to be adjusting appropriately.    Physical Health  Reason for Admission:  No diagnosis found.  Services Needed/Recommended:  Return to [facility] for continued medical admission.  Will need ARU vs. TCU following admission.    Mental Health/Chemical Dependency  Diagnosis:  None  Support/Services in Place:  N/a  Services Needed/Recommended:  N/a    Support System  Significant relationship at present time:  Ex-wife/S.HARPREET Foster with whom pt lives and is very involved and supportive.  Pt also has two adult children who are involved and supportive.  Family of origin is available for support:  Yes, pt's ex-wife can provide support but limited assistance as she has spinal stenosis.  Pt's leigh Hare lives in [place] and is supportive.  Unclear where pt's other adult child lives.  Other support available:  None.  Gaps in support system:  Pt has very limited support near his home as all friends are elderly.  Patient is caregiver to:  None     Provider Information   Primary Care  Physician:  Roderick Martines MD   [phone]   Clinic:  [facility] [address] [address]      :  Pt has a [facility] , but unclear contact information.    Financial   Income Source:  SSI  Financial Concerns:  Pt's ex-wife voices concern that they will not be able to afford a TCU stay past 20 days if under his Medicare benefit.  If stay is covered by VA then this will not be an issue.  Insurance:    Payor/Plan Subscriber Name Rel Member # Group #   COMMERCIAL - VA MEDIC* IVAN TERRAZAS CARTER         [id number] FEE BASIS, [address]   Pt also has Medicare according to Cristian.    Discharge Plan   Patient and family discharge goal:  Pt and Cristian's hope to is [facility] the [facility] to [facility] prior to going to rehab.  RNCC is working on transfer.  Provided education on discharge plan:  YES  Patient agreeable to discharge plan:  YES  A list of Medicare Certified Facilities was provided to the patient and/or family to encourage patient choice. Patient's choices for facility are:  Did not provide list at this time d/t the hope that pt will transfer to the [facility] to their medical unit and rehab at the [facility].  Will NH provide Skilled rehabilitation or complex medical:  YES  General information regarding anticipated insurance coverage and possible out of pocket cost was discussed. Patient and patient's family are aware patient may incur the cost of transportation to the facility, pending insurance payment: YES  Barriers to discharge:  Pt is not medically stable.    Discharge Recommendations   Anticipated Disposition:  Facility:  Pt will need ARU vs. TCU, though likely that pt will transfer back to the [facility] for continued medical stay.  Transportation Needs:  TBD pending d/c plan.  Name of Transportation Company and Phone:  TBD    Additional comments   ETELVINA met with pt and his ex-wife/S.O. Crsitian to introduce SW role and offer support.  Provided education relating to all d/c options and notified her that  RNCC continues to work on [facility] to [facility].  Answered questions and offered support.  Cristian states that pt also has Medicare along with [facility] coverage.    RNCC will continue to work on transfer back to [facility] for continued medical care.    LIZZETH Wood, [facility]  Adult ICU Clinical   Pager [phone]

## 2017-07-13 NOTE — OP NOTE
DATE OF OPERATION:  July 12, 2017      PREOPERATIVE DIAGNOSIS:  anterior cervical epidural fluid collection      POSTOPERATIVE DIAGNOSIS: anterior cervical epidural abscess       OPERATION PERFORMED:   1. C5 corpectomy.  2. C5 reconstruction using Synthes Synmesh Cage.  3. C4-6 anterior arthrodesis using allograft cancellous bone chips  4. C4-6 anterior cervical plating.      Attending Surgeon: Rafael Lo MD    Resident Surgeon: Jenny Sal MD      ANESTHESIA:  General endotracheal anesthesia        ESTIMATED BLOOD LOSS: 100 cc     SPECIMENS: epidural fluid for micro, gram stain, aerobic/anaerobic culture      IMPLANTS:   - Synthes: 25mm synmesh cage  30 mm vectra plate  16 mm screws  15 cc Allograft      INDICATIONS FOR OPERATION:  Mr. 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 He underwent amputation of that digit on 7/6/2017 @ 1430 under MAC at the [facility]. Following the procedure he was found to be weak in his bilateral upper extremities as well as his lower extremities. He was taken for an emergent CT and MRI to evaluate for stroke. An MRI of his cervical spine was also performed which demonstrated significant cervical stenosis. He was taken to the OR emergently on 7/7 for posterior decompression, and he improved subsequently. However he has persistent symptoms of weakness, and due to compression anterior he was taken for the above mentioned procedure.        DESCRIPTION OF PROCEDURE:  The patient was brought to the operating room and transferred to the operating table in supine position. General endotracheal anesthesia was induced without complication.  All appropriate IV access was obtained.      The patient was positioned with his head slightly extended. The michaels wells tongs were applied, with 10 pounds of weight. All pressure points were appropriately padded and the arms were tucked. The neck prepped and  draped in the standard sterile fashion. His pre procedure antibiotics were continued and sequential compression devices were applied.     The fluoroscopic level was identified at C4-5 level. After conducting the appropriate timeout, a #10 blade scalpel was used to make a linear incision laterally on the right neck. Dissection continued with monopolar cautery until the platysma was visualized. The supra-platysmal plane with elevated and dissected to allow for better mobilization. The muscle itself was then cut linearly in the direction of the incision. The sub platysmal plane was  and then dissection was carried out medial to the sternocleidomastoid and then deeply taking great care to remain medial to the carotid artery. Hand held retractors were used to pull the medial structures out of the surgical path. The Flexi-Spine retractor system was brought into place and the longus coli muscles were coagulated.      The level was confirmed again with fluoroscopy and then the microscope was brought into the field. Beginning at the spaces a combination of curette, kerrison and pituitary rongeur instruments were used to perform discectomies. The high speed drill was used to remove several bone spurs and large anterior osteophytes in particular at the C4-5 level. We initially began with a diskectomy at this level, and the disk was friable and easily removed with Kerrison rongeurs and curettes.  There was liquid purulent material in the epidural space which was evacuated and sent for culture. The C5 vertebral body was seen to be friable due to the infection  as well, and was not thought to be amenable to fusion, thus we proceeded with a C5 corpectomy. A high speed drill was used to remove the C5 vertebral body, then Kerrison rongeurs and curettes to complete the bony removal. Over 80% of the body was removed.  The posterior longitudinal ligament was removed as well.  The spinal canal was well decompressed. A 25 mm  cage with lordosis was packed with crushed cancellous bone, and placed using fluoroscopy. A 30 mm plate was placed anteriorly and secured using 16 mm scews at C4 and C6. This was again confirmed with fluoroscopy.     The wound was irrigated and hemostasis was obtained. A drain was placed and secured using a 3-0 nylon suture. The wound was closed in layers with 3-0 Vicryl in platysmal and dermal layers. Dermabond was used to close the skin.   Dr. Lo was present for the critical portions of the procedure and immediately available for the rest. Instrument, needle and sponge counts were correct x2 at the end of the operation.

## 2017-07-13 NOTE — ANESTHESIA POSTPROCEDURE EVALUATION
Patient: Ernesto Rawls    Procedure(s):  Right Anterior Cervical 5 corpectomy with cage and plate reconstruction and fusion. - Wound Class: III-Contaminated    Diagnosis:spinal epidural abcess  Diagnosis Additional Information: No value filed.    Anesthesia Type:  General, ETT    Note:  Anesthesia Post Evaluation    Patient location during evaluation: PACU  Patient participation: Unable to participate in evaluation secondary to underlying medical condition  Level of consciousness: responsive to painful stimuli  Pain management: adequate  Airway patency: patent  Cardiovascular status: acceptable  Respiratory status: ETT  Hydration status: acceptable  PONV: none     Anesthetic complications: yes  Additional complication comments:Other QI - See Comment  Comments: Patient has had no anesthetic in over 2 hours.  He is not spontaneously moving his upper extremities.  With deep noxious stimuli by neurosurgery he does move both.  He has not demonstrated spontaneous gag or coughing with ETT in place.  He does move his toes on command.  He is hemodynamically stable. I am concerned he will not be able to  protect his airway if he is extubated.  I discussed this with Dr. Edwards and he agrees to send the patient to the ICU intubated tonight.        Last vitals:  Vitals:    07/12/17 2000 07/12/17 2010 07/12/17 2020   BP: 158/77 164/81 158/76   Pulse:      Resp: 12 12 12   Temp:      SpO2: 100% 100% 100%         Electronically Signed By: Junior Shaw MD  July 12, 2017  8:36 PM

## 2017-07-14 ENCOUNTER — APPOINTMENT (OUTPATIENT)
Dept: SPEECH THERAPY | Facility: CLINIC | Age: 72
DRG: 028 | End: 2017-07-14
Attending: NEUROLOGICAL SURGERY
Payer: COMMERCIAL

## 2017-07-14 LAB
ANION GAP SERPL CALCULATED.3IONS-SCNC: 6 MMOL/L (ref 3–14)
BACTERIA SPEC CULT: NO GROWTH
BASOPHILS # BLD AUTO: 0 10E9/L (ref 0–0.2)
BASOPHILS NFR BLD AUTO: 0.2 %
BUN SERPL-MCNC: 9 MG/DL (ref 7–30)
CALCIUM SERPL-MCNC: 8.5 MG/DL (ref 8.5–10.1)
CHLORIDE SERPL-SCNC: 101 MMOL/L (ref 94–109)
CO2 SERPL-SCNC: 31 MMOL/L (ref 20–32)
CREAT SERPL-MCNC: 0.49 MG/DL (ref 0.66–1.25)
DIFFERENTIAL METHOD BLD: ABNORMAL
EOSINOPHIL # BLD AUTO: 0.1 10E9/L (ref 0–0.7)
EOSINOPHIL NFR BLD AUTO: 1.9 %
ERYTHROCYTE [DISTWIDTH] IN BLOOD BY AUTOMATED COUNT: 13.8 % (ref 10–15)
GFR SERPL CREATININE-BSD FRML MDRD: ABNORMAL ML/MIN/1.7M2
GLUCOSE BLDC GLUCOMTR-MCNC: 255 MG/DL (ref 70–99)
GLUCOSE BLDC GLUCOMTR-MCNC: 267 MG/DL (ref 70–99)
GLUCOSE BLDC GLUCOMTR-MCNC: 269 MG/DL (ref 70–99)
GLUCOSE BLDC GLUCOMTR-MCNC: 279 MG/DL (ref 70–99)
GLUCOSE SERPL-MCNC: 277 MG/DL (ref 70–99)
HCT VFR BLD AUTO: 28.6 % (ref 40–53)
HGB BLD-MCNC: 9.7 G/DL (ref 13.3–17.7)
IMM GRANULOCYTES # BLD: 0.1 10E9/L (ref 0–0.4)
IMM GRANULOCYTES NFR BLD: 1.1 %
LYMPHOCYTES # BLD AUTO: 0.8 10E9/L (ref 0.8–5.3)
LYMPHOCYTES NFR BLD AUTO: 12.7 %
MAGNESIUM SERPL-MCNC: 1.9 MG/DL (ref 1.6–2.3)
MCH RBC QN AUTO: 32.8 PG (ref 26.5–33)
MCHC RBC AUTO-ENTMCNC: 33.9 G/DL (ref 31.5–36.5)
MCV RBC AUTO: 97 FL (ref 78–100)
MICRO REPORT STATUS: NORMAL
MONOCYTES # BLD AUTO: 0.7 10E9/L (ref 0–1.3)
MONOCYTES NFR BLD AUTO: 10.6 %
NEUTROPHILS # BLD AUTO: 4.6 10E9/L (ref 1.6–8.3)
NEUTROPHILS NFR BLD AUTO: 73.5 %
NRBC # BLD AUTO: 0 10*3/UL
NRBC BLD AUTO-RTO: 0 /100
PHOSPHATE SERPL-MCNC: 2.1 MG/DL (ref 2.5–4.5)
PLATELET # BLD AUTO: 212 10E9/L (ref 150–450)
POTASSIUM BLD-SCNC: 3.8 MMOL/L (ref 3.4–5.3)
POTASSIUM SERPL-SCNC: 3.2 MMOL/L (ref 3.4–5.3)
RBC # BLD AUTO: 2.96 10E12/L (ref 4.4–5.9)
SODIUM SERPL-SCNC: 138 MMOL/L (ref 133–144)
SPECIMEN SOURCE: NORMAL
WBC # BLD AUTO: 6.2 10E9/L (ref 4–11)

## 2017-07-14 PROCEDURE — 27210429 ZZH NUTRITION PRODUCT INTERMEDIATE LITER

## 2017-07-14 PROCEDURE — 25000125 ZZHC RX 250: Performed by: STUDENT IN AN ORGANIZED HEALTH CARE EDUCATION/TRAINING PROGRAM

## 2017-07-14 PROCEDURE — 85025 COMPLETE CBC W/AUTO DIFF WBC: CPT | Performed by: STUDENT IN AN ORGANIZED HEALTH CARE EDUCATION/TRAINING PROGRAM

## 2017-07-14 PROCEDURE — 25000132 ZZH RX MED GY IP 250 OP 250 PS 637: Performed by: STUDENT IN AN ORGANIZED HEALTH CARE EDUCATION/TRAINING PROGRAM

## 2017-07-14 PROCEDURE — 84100 ASSAY OF PHOSPHORUS: CPT | Performed by: STUDENT IN AN ORGANIZED HEALTH CARE EDUCATION/TRAINING PROGRAM

## 2017-07-14 PROCEDURE — 27210913 ZZH NUTRITION PRODUCT INTERMEDIATE PACKET

## 2017-07-14 PROCEDURE — 99211 OFF/OP EST MAY X REQ PHY/QHP: CPT

## 2017-07-14 PROCEDURE — 25000128 H RX IP 250 OP 636: Performed by: STUDENT IN AN ORGANIZED HEALTH CARE EDUCATION/TRAINING PROGRAM

## 2017-07-14 PROCEDURE — 36415 COLL VENOUS BLD VENIPUNCTURE: CPT | Performed by: STUDENT IN AN ORGANIZED HEALTH CARE EDUCATION/TRAINING PROGRAM

## 2017-07-14 PROCEDURE — 40000225 ZZH STATISTIC SLP WARD VISIT: Performed by: SPEECH-LANGUAGE PATHOLOGIST

## 2017-07-14 PROCEDURE — 92526 ORAL FUNCTION THERAPY: CPT | Mod: GN | Performed by: SPEECH-LANGUAGE PATHOLOGIST

## 2017-07-14 PROCEDURE — 12000008 ZZH R&B INTERMEDIATE UMMC

## 2017-07-14 PROCEDURE — 25000132 ZZH RX MED GY IP 250 OP 250 PS 637: Performed by: NEUROLOGICAL SURGERY

## 2017-07-14 PROCEDURE — 83735 ASSAY OF MAGNESIUM: CPT | Performed by: STUDENT IN AN ORGANIZED HEALTH CARE EDUCATION/TRAINING PROGRAM

## 2017-07-14 PROCEDURE — 00000146 ZZHCL STATISTIC GLUCOSE BY METER IP

## 2017-07-14 PROCEDURE — 25000128 H RX IP 250 OP 636: Performed by: NEUROLOGICAL SURGERY

## 2017-07-14 PROCEDURE — 84132 ASSAY OF SERUM POTASSIUM: CPT | Performed by: STUDENT IN AN ORGANIZED HEALTH CARE EDUCATION/TRAINING PROGRAM

## 2017-07-14 PROCEDURE — 80048 BASIC METABOLIC PNL TOTAL CA: CPT | Performed by: STUDENT IN AN ORGANIZED HEALTH CARE EDUCATION/TRAINING PROGRAM

## 2017-07-14 RX ORDER — METOPROLOL TARTRATE 25 MG/1
25 TABLET, FILM COATED ORAL 2 TIMES DAILY
Status: DISCONTINUED | OUTPATIENT
Start: 2017-07-14 | End: 2017-07-26 | Stop reason: HOSPADM

## 2017-07-14 RX ORDER — HYDRALAZINE HYDROCHLORIDE 20 MG/ML
10-20 INJECTION INTRAMUSCULAR; INTRAVENOUS EVERY 30 MIN PRN
Status: DISCONTINUED | OUTPATIENT
Start: 2017-07-14 | End: 2017-07-26 | Stop reason: HOSPADM

## 2017-07-14 RX ADMIN — LIDOCAINE 3 PATCH: 50 PATCH CUTANEOUS at 10:58

## 2017-07-14 RX ADMIN — Medication 20 MG: at 03:11

## 2017-07-14 RX ADMIN — POTASSIUM CHLORIDE 20 MEQ: 1.5 POWDER, FOR SOLUTION ORAL at 10:55

## 2017-07-14 RX ADMIN — CEFEPIME HYDROCHLORIDE 2 G: 2 INJECTION, POWDER, FOR SOLUTION INTRAVENOUS at 09:18

## 2017-07-14 RX ADMIN — ACETAMINOPHEN 650 MG: 325 TABLET, FILM COATED ORAL at 02:05

## 2017-07-14 RX ADMIN — Medication 1 PACKET: at 09:03

## 2017-07-14 RX ADMIN — POTASSIUM PHOSPHATE, MONOBASIC AND POTASSIUM PHOSPHATE, DIBASIC 15 MMOL: 224; 236 INJECTION, SOLUTION INTRAVENOUS at 10:51

## 2017-07-14 RX ADMIN — Medication 20 MG: at 05:10

## 2017-07-14 RX ADMIN — Medication 20 MG: at 15:04

## 2017-07-14 RX ADMIN — Medication 20 MG: at 09:03

## 2017-07-14 RX ADMIN — HYDRALAZINE HYDROCHLORIDE 10 MG: 20 INJECTION INTRAMUSCULAR; INTRAVENOUS at 08:54

## 2017-07-14 RX ADMIN — SENNOSIDES AND DOCUSATE SODIUM 1 TABLET: 8.6; 5 TABLET ORAL at 09:01

## 2017-07-14 RX ADMIN — Medication 1 PACKET: at 21:17

## 2017-07-14 RX ADMIN — POLYETHYLENE GLYCOL 3350 17 G: 17 POWDER, FOR SOLUTION ORAL at 09:01

## 2017-07-14 RX ADMIN — Medication 10 MG: at 20:59

## 2017-07-14 RX ADMIN — METOPROLOL TARTRATE 25 MG: 25 TABLET, FILM COATED ORAL at 09:02

## 2017-07-14 RX ADMIN — FOLIC ACID 1 MG: 1 TABLET ORAL at 09:01

## 2017-07-14 RX ADMIN — MULTIVIT AND MINERALS-FERROUS GLUCONATE 9 MG IRON/15 ML ORAL LIQUID 15 ML: at 09:01

## 2017-07-14 RX ADMIN — VANCOMYCIN HYDROCHLORIDE 1750 MG: 10 INJECTION, POWDER, LYOPHILIZED, FOR SOLUTION INTRAVENOUS at 21:01

## 2017-07-14 RX ADMIN — METOPROLOL TARTRATE 25 MG: 25 TABLET, FILM COATED ORAL at 21:00

## 2017-07-14 RX ADMIN — POTASSIUM CHLORIDE 40 MEQ: 1.5 POWDER, FOR SOLUTION ORAL at 09:01

## 2017-07-14 RX ADMIN — VANCOMYCIN HYDROCHLORIDE 1750 MG: 10 INJECTION, POWDER, LYOPHILIZED, FOR SOLUTION INTRAVENOUS at 04:22

## 2017-07-14 RX ADMIN — CEFEPIME HYDROCHLORIDE 2 G: 2 INJECTION, POWDER, FOR SOLUTION INTRAVENOUS at 17:16

## 2017-07-14 RX ADMIN — Medication 1 PACKET: at 15:05

## 2017-07-14 RX ADMIN — Medication 2 G: at 10:09

## 2017-07-14 RX ADMIN — ACETAMINOPHEN 650 MG: 325 TABLET, FILM COATED ORAL at 09:01

## 2017-07-14 RX ADMIN — HYALURONIDASE (HUMAN RECOMBINANT) 150 UNITS: 150 INJECTION, SOLUTION SUBCUTANEOUS at 12:04

## 2017-07-14 RX ADMIN — CEFEPIME HYDROCHLORIDE 2 G: 2 INJECTION, POWDER, FOR SOLUTION INTRAVENOUS at 00:13

## 2017-07-14 RX ADMIN — Medication 10 MG: at 10:21

## 2017-07-14 ASSESSMENT — VISUAL ACUITY
OU: NORMAL ACUITY
OU: OTHER (SEE COMMENT)
OU: NORMAL ACUITY
OU: OTHER (SEE COMMENT)
OU: OTHER (SEE COMMENT)

## 2017-07-14 ASSESSMENT — PAIN DESCRIPTION - DESCRIPTORS
DESCRIPTORS: HEADACHE
DESCRIPTORS: BURNING

## 2017-07-14 NOTE — PROGRESS NOTES
Care Coordinator- Discharge Planning     Admission Date/Time:  7/7/2017  Attending MD:  Rafael Lo MD     Data  Date of initial CC assessment:  Chart reviewed, discussed with interdisciplinary team.      Assessment    Ernesto Rawls is a 72 year old male [facility] four extremity weakness that progressed to quadriplegia likely due to epidural abscess  who is postoperative day # 7/2 from C3-5 and C6 laminectomy & C5 corpectomy w/cage placement per medical notes. The patient had the second of his two procedures on Wed 7/12 and was extubated yesterday 7/13.    Coordination of Care and Referrals:    The patient has a transfer agreement from the [facility]. Contact was made to the [facility] Case Management referral line earlier in the week (see Care Coordinator Notes), before the patient's second procedure. I left a voicemail with Agnes Wolf this morning (Ph: [phone]) to update her on the patient's condition and plan for transfer back to the [facility]. Awaiting response.    Update: 8:40- I spoke with María Elena De Leon, who the referral  following Mr. Rawls at this time. I updated her on the patient's recent surgery, his extubation yesterday and his medically stability. I communicated to her that the neurosurgery team feels that his speciality needs have been met and that he is medically ready to return to the [facility] per his [facility] agreement. The neurosurgery team feels that this patient would benefit from spinal chord care at the [facility] and the patient and his wife are anxious to return there.  NP Vaishali Duffy is available and willing to speak to the medical team.  María Elena, the [facility] CM stated that she needs to follow up with the neuro surgery team at the [facility] and will update me after she speaks with them. I communicated this to Vaishali (Neurosurgery).     Update (11:00) Attempted to reach out and to provide María Elena at the [facility] Vaishali Duffy's contact information for the [facility] Neurosurgery team. Left vm,  awaiting response.      Anticipated Discharge Date: TBD pending [facility] Case management and patient's primary team  Anticipated Discharge Plan: TBD    Sari Queen RN Care Coordinator covering for Michaelle Wood ICU Care Coordinator    Ph: [phone]

## 2017-07-14 NOTE — PROGRESS NOTES
WOC here to see extravasation.    Medication is Potassium Phosphate.  Per RN she has notified MD and pharmacy per protocol; antidote has been given and erythema is decreasing already.      Left lower forearm: erythema measures 5 x 8.5 x 0 cm.  IV removed after picture taken.    7/14/17 left arm    No open wounds at this time.  No further follow up planned.    Please re-consult if needed.    Face to face time: 5 minutes.

## 2017-07-14 NOTE — PROGRESS NOTES
CLINICAL NUTRITION SERVICES - BRIEF NOTE    Nutrition Prescription    RECOMMENDATIONS FOR MDs/PROVIDERS TO ORDER:  - Consider ordering enteral Phos supplementation (NeutraPhos 1 pkt TID-QID) in addition to IV replacement. Also recommend change to high intensity IV Phos replacement protocol (replace Phos <2.7 mg/dL) if not already.  - Continue EN until able to adequately take PO as evidenced by 3 days of calorie counts      Malnutrition Status:    - Severe malnutrition in the context of acute on chronic illness      Recommendations already ordered by Registered Dietitian (RD):  - Continue EN as ordered: TwoCal HN @ 45 ml/hr (1080 ml/day) to provide 2160 kcals (23 Kcals/kg/day) 91 g PRO ( 1.0 gms/kg/day), 756 ml free H2O, 237 g CHO and 5 g Fiber daily.  - Continue additional Prostat packet TID: provides 2460 kcals (25 kcals/kg) and 136 gm PRO (1.4 gm/kg)  - Initiate @ 15 ml/hr and advance by 10 ml q8hr as tolerated      Future/Additional Recommendations:  - Ability to restart EN; Lytes (phos/NPhos)     Nutrition Progress Note - f/u for progress towards previous nutrition POC (see previous 7/13 reassessment for details)     Brayan Goldstein RD, LD  Neuro ICU  Pager: [phone]

## 2017-07-14 NOTE — PLAN OF CARE
"Problem: Goal Outcome Summary  Goal: Goal Outcome Summary  Outcome: Improving  D: Alert. Disoriented to time, place, situation; stated it was \"17\" for the date and that Hany was president, thought he was at the \"clinic\" but knew he was in [place]. Moves all extremities, ataxic with weakness in right arm, right hand with weak . Pupils reactive. Sensation intact. Breathing unlabored on room air. BS coarse, fair productive cough that patient suctions or swallows. Afebrile. Slightly hypertensive, SBP 140s came down to 120s with PRN antihypertensives. Due to void. Small BMx1 this AM, incontinent. Skin blotchy red with bruises and some scabs that he \"got in the woods while chopping fire wood\". Incision on anterior neck approximated with liquid bandage, WDL. Incision on posterior neck sutured, edges with erythema, approximated, covered with primapore. Right toe amputation incision is approximated, sutured, with some scabs at the edges. New wound to left forearm from IV infiltration of potassium phosphate, see below for details.      I/A: Oral care performed. York catheter removed. Attempted bedpan for BM but patient unable to go in bedpan, miralax and senna given. Tylenol given for headache. Potassium, magnesium, and phosphorous replaced per protocol.     P: Transferred to , report given, hand-off given on IV infiltration, electrolyte recheck today, and to advance tube feeding once electrolyte replacement is complete around 1600. Recheck magnesium and phosphate tomorrow morning. Continue to elevate left extremity for 48 hours from 7/14 1200. Re-apply ice packs PRN to left arm. Continue to document any changes in the left arm IV infiltration on the IV Access Flowsheet.         Approximately 10 minutes into the infusion of the potassium phosphate, the patient was moving his arm like it was in pain. Writer then assessed IV site and noted erythema and that the skin was firmer at the infusion site than the " surrounding skin. The infusion was immediately stopped, a 3mL syringe was attached to IV and aspirated 0.5mL of medication with no blood return. Pharmacist then paged because potassium phosphate was not listed in the policy as a vesicant, although the site was quite symptomatic. The pharmacist recommended that I let the MD know, see provider notification note. Extravasation Treatment order set obtained. Cold pack applied to site. Arm elevated. Hyaluronidase given to site, well-tolerated. IV catheter removed. Gauze dressing applied.

## 2017-07-14 NOTE — PROGRESS NOTES
Care Coordinator- Discharge Planning     Admission Date/Time:  7/7/2017  Attending MD:  Rafael Lo MD     Data  Date of initial CC assessment:  Chart reviewed, discussed with interdisciplinary team.      Assessment    Ernesto Rawls is a 72 year old male [facility] four extremity weakness that progressed to quadriplegia likely due to epidural abscess  who is postoperative day # 7/2 from C3-5 and C6 laminectomy & C5 corpectomy w/cage placement per medical notes. The patient had the second of his two procedures on Wed 7/12 and was extubated yesterday 7/13.    Coordination of Care and Referrals:    The [facility] a [facility] the [facility]. Contact was made to the [facility] Case Management referral line earlier in the week (see Care Coordinator Notes), before the patient's second procedure. I left a voicemail with Agnes Wolf this morning (Ph: [phone]) to update her on the patient's condition and plan for transfer back to the [facility]. Awaiting response.    Update: 8:40- I spoke with María Elena De Leon, who the referral  following Mr. Rawls at this time. I updated her on the patient's recent surgery, his extubation yesterday and his medically stability. I communicated to her that the neurosurgery team feels that his speciality needs have been met and that he is medically ready to [facility] to the [facility] per his [facility] agreement. The neurosurgery team feels that this patient would benefit from spinal chord care at the [facility] and the patient and his wife are anxious to return there.  NP Vaishali Duffy is available and willing to speak to the medical team.  María Elena, the [facility] CM stated that she needs to follow up with the neuro surgery team at the [facility] and will update me after she speaks with them. I communicated this to Vaishali (Neurosurgery).     Update (11:00) Attempted to reach out and to provide María Elena at the VA Vaishali Duffy's contact information for the [facility] Neurosurgery team. Left vm,  awaiting response.     Update (12:00)- Per Vaishali Duffy, Neurosurgery [facility], this patient's transfer is on hold, as the patient needs to return to the OR for an additional procedure next Monday or Tuesday. María Elena at the VA updated. I spoke to the patient's wife to update.     Anticipated Discharge Date: TBD pending [facility] Case management and patient's primary team  Anticipated Discharge Plan: TBD    Sari Queen RN Care Coordinator covering for Michaelle Wood ICU Care Coordinator    Ph: [phone]

## 2017-07-14 NOTE — PLAN OF CARE
Problem: Goal Outcome Summary  Goal: Goal Outcome Summary  Patient arrived to 6A from 4A, tube feeding at 20 cc an hour, K-phos running, ice pack on the left arm  PIV that got infilterated, alert, will continue to monitor.    Report given to next nurse, patient confused and MD stated that he might write haldol or another meds, patient had BM three time, incontinent of stool, , tube feeding running, nurse will continue to monitor.

## 2017-07-14 NOTE — PLAN OF CARE
Problem: Goal Outcome Summary  Goal: Goal Outcome Summary  Outcome: Therapy, progress toward functional goals is gradual  SLP:  Pt seen to f/u for swallowing this morning. Pt noted to be alert. Upright and neutral head positioning is challenged by c-collar.   Pt continues to demonstrate significant dysphagia and s/sx of aspiration with even very small moisture from toothette. Recommend continue to be strict NPO with TF for full nutritional support.  ST will plan to f/u for PO readiness.

## 2017-07-14 NOTE — PROVIDER NOTIFICATION
Mike Marcus MD notified at 1126 of IV infiltration of potassium phosphate. Received call back at 1127, verbal order for Extravasation Treatment order set.     Mkie Marcus MD notified at 1137 of patient stating that he was seeing mice. No mice seen by writer, he could be having new hallucinations. Received call back at 1138, Dr. Marcus not concerned and no change in plan needed at this time.     Mike Marcus MD notified at 1220 of lab result, Staph aureus grew in spinal fluid. Attending physician stated that this was an expected finding due to the pus in the fluid during surgery.

## 2017-07-14 NOTE — PROGRESS NOTES
[facility], [place]    Neurosurgery Daily Progress Note         Assessment   Ernesto Rawls is a 72 year old male who is postoperative day # 7/2 from C3-5 and C6 laminectomy & C5 corpectomy w/cage placement.          Plan     Continue current pain control regimen.    Routine neuro exams per unit protocol.    Aspen collar on at all times. ALONA drain removed. Post-operative X-rays completed.      Extubated. Tolerating room air.      ID: following for concern for abscess. On vancomycin and cefepime. Need to follow-up on OR cultures.     Dentistry: follow up with a dentist as an outpatient    Podiatry: daily dressing changes    MAP > 80, has been maintaining without pressors.     Several runs of V-tach. Cardiology consulted: thought to be due to infection. Metoprolol 25 mg BID and electrolyte replacements.     Incentive spirometry Q1H while awake.    SLP: Will evaluate today.     Bowel regimen. Anti-emetics.     SCDs while in bed.    PT/OT    Dispo; Stable in ICU.     Please dial * * * and enter job code [phone] to reach the neurosurgery team if you have any questions.      Patient and above plan discussed with neurosurgery chief resident.         Subjective     Patient extubated. Doing well on room air.            Objective   Temp:  [97.7  F (36.5  C)-99.5  F (37.5  C)] 97.8  F (36.6  C)  Heart Rate:  [] 87  Resp:  [12-34] 16  BP: (114-168)/(53-74) 130/60  MAP:  [70 mmHg-100 mmHg] 85 mmHg  Arterial Line BP: (100-145)/(45-79) 145/45  FiO2 (%):  [30 %] 30 %  SpO2:  [95 %-100 %] 95 %    I/O last 3 completed shifts:  In: 1489.08 [I.V.:1184.08; NG/GT:245]  Out: 2845 [Urine:2845]    Physical Exam  General: Intubated. Aspen cervical collar in place.   Wound: Incision, clean, dry, without strikethrough. .   Neurologic Exam:  - Follows commands in the bilateral lower extremities 2/5  - Moves upper extremities antigravity to painful stimuli. 4-/5  - PERRL, EOMI.  Motor: Normal bulk/tone; no tremor,  rigidity, or bradykinesia.        Labs and Imaging     BMP    Recent Labs  Lab 07/13/17 0452 07/13/17 0322 07/12/17  1635 07/12/17  0915 07/12/17  0345 07/11/17  0547    137 135  --  134  --  134   POTASSIUM 4.3 4.1 4.2 3.7 3.9  < > 3.6   CHLORIDE 102 103  --   --  101  --  100   KAROL 8.4* 8.4*  --   --  8.4*  --  8.4*   CO2 27 28  --   --  28  --  29   BUN 8 9  --   --  10  --  9   CR 0.45* 0.45*  --   --  0.39*  --  0.42*   * 173* 156*  --  227*  --  165*   < > = values in this interval not displayed.  CBC    Recent Labs  Lab 07/13/17 0452 07/13/17 0322 07/12/17 1635 07/12/17 0345 07/11/17  0547   WBC 5.4 6.4  --  6.5 5.9   RBC 2.91* 3.06*  --  3.24* 3.17*   HGB 9.5* 10.1* 9.6* 10.6* 10.5*   HCT 27.8* 29.7*  --  30.8* 30.6*   MCV 96 97  --  95 97   MCH 32.6 33.0  --  32.7 33.1*   MCHC 34.2 34.0  --  34.4 34.3   RDW 14.0 14.0  --  13.8 13.8    276  --  299 241     INR    Recent Labs  Lab 07/13/17  0452 07/12/17  0915 07/10/17  2007 07/08/17  1137   INR 1.23* 1.32* 1.44* 1.17*       Imaging: Reviewed      Contact the neurosurgery resident on call with questions.    Dial * * *[phone]: Enter [phone] when prompted.   Tommy Edwards MD, PhD  Neurosurgery PGY-3

## 2017-07-14 NOTE — PLAN OF CARE
Problem: Goal Outcome Summary  Goal: Goal Outcome Summary  Outcome: Improving  D/I= s/p laminectomy and corpectomy on 07/12 /2017 and extubated yesterday. Medicated for c/o HA twice last . Neuro assessments Q2 hrs. Medicated prn for sbp > 140. A/P= Pleasant and cooperative w/ care. Knows he is in [facility] and @ the hospital but doesn't know about the surgery he had here or day or time. Periodically, know it's 2017. HERNANDEZ and follows commands. RUE  is weaker than left . Wiggle toes to command. Tylenol effective for HA. Labetolol effectivr for treating hypertension. Wearing aspen collar constantly. Continue poc.

## 2017-07-15 ENCOUNTER — APPOINTMENT (OUTPATIENT)
Dept: PHYSICAL THERAPY | Facility: CLINIC | Age: 72
DRG: 028 | End: 2017-07-15
Attending: NEUROLOGICAL SURGERY
Payer: COMMERCIAL

## 2017-07-15 ENCOUNTER — APPOINTMENT (OUTPATIENT)
Dept: OCCUPATIONAL THERAPY | Facility: CLINIC | Age: 72
DRG: 028 | End: 2017-07-15
Attending: NEUROLOGICAL SURGERY
Payer: COMMERCIAL

## 2017-07-15 ENCOUNTER — ANESTHESIA EVENT (OUTPATIENT)
Dept: SURGERY | Facility: CLINIC | Age: 72
DRG: 028 | End: 2017-07-15
Payer: COMMERCIAL

## 2017-07-15 DIAGNOSIS — M48.02 SPINAL STENOSIS IN CERVICAL REGION: Primary | ICD-10-CM

## 2017-07-15 LAB
ABO + RH BLD: NORMAL
ABO + RH BLD: NORMAL
ALBUMIN UR-MCNC: 30 MG/DL
ANION GAP SERPL CALCULATED.3IONS-SCNC: 7 MMOL/L (ref 3–14)
ANION GAP SERPL CALCULATED.3IONS-SCNC: 8 MMOL/L (ref 3–14)
APPEARANCE UR: CLEAR
APTT PPP: 34 SEC (ref 22–37)
BACTERIA #/AREA URNS HPF: ABNORMAL /HPF
BASOPHILS # BLD AUTO: 0 10E9/L (ref 0–0.2)
BASOPHILS # BLD AUTO: 0 10E9/L (ref 0–0.2)
BASOPHILS NFR BLD AUTO: 0.1 %
BASOPHILS NFR BLD AUTO: 0.2 %
BILIRUB UR QL STRIP: NEGATIVE
BLD GP AB SCN SERPL QL: NORMAL
BLOOD BANK CMNT PATIENT-IMP: NORMAL
BUN SERPL-MCNC: 10 MG/DL (ref 7–30)
BUN SERPL-MCNC: 10 MG/DL (ref 7–30)
CALCIUM SERPL-MCNC: 8.5 MG/DL (ref 8.5–10.1)
CALCIUM SERPL-MCNC: 8.6 MG/DL (ref 8.5–10.1)
CHLORIDE SERPL-SCNC: 100 MMOL/L (ref 94–109)
CHLORIDE SERPL-SCNC: 100 MMOL/L (ref 94–109)
CO2 SERPL-SCNC: 28 MMOL/L (ref 20–32)
CO2 SERPL-SCNC: 28 MMOL/L (ref 20–32)
COLOR UR AUTO: YELLOW
CREAT SERPL-MCNC: 0.4 MG/DL (ref 0.66–1.25)
CREAT SERPL-MCNC: 0.4 MG/DL (ref 0.66–1.25)
DIFFERENTIAL METHOD BLD: ABNORMAL
DIFFERENTIAL METHOD BLD: ABNORMAL
EOSINOPHIL # BLD AUTO: 0.1 10E9/L (ref 0–0.7)
EOSINOPHIL # BLD AUTO: 0.1 10E9/L (ref 0–0.7)
EOSINOPHIL NFR BLD AUTO: 1.6 %
EOSINOPHIL NFR BLD AUTO: 1.9 %
ERYTHROCYTE [DISTWIDTH] IN BLOOD BY AUTOMATED COUNT: 13.7 % (ref 10–15)
ERYTHROCYTE [DISTWIDTH] IN BLOOD BY AUTOMATED COUNT: 13.9 % (ref 10–15)
GFR SERPL CREATININE-BSD FRML MDRD: ABNORMAL ML/MIN/1.7M2
GFR SERPL CREATININE-BSD FRML MDRD: ABNORMAL ML/MIN/1.7M2
GLUCOSE BLDC GLUCOMTR-MCNC: 281 MG/DL (ref 70–99)
GLUCOSE BLDC GLUCOMTR-MCNC: 285 MG/DL (ref 70–99)
GLUCOSE BLDC GLUCOMTR-MCNC: 313 MG/DL (ref 70–99)
GLUCOSE BLDC GLUCOMTR-MCNC: 316 MG/DL (ref 70–99)
GLUCOSE BLDC GLUCOMTR-MCNC: 344 MG/DL (ref 70–99)
GLUCOSE BLDC GLUCOMTR-MCNC: 358 MG/DL (ref 70–99)
GLUCOSE SERPL-MCNC: 252 MG/DL (ref 70–99)
GLUCOSE SERPL-MCNC: 345 MG/DL (ref 70–99)
GLUCOSE UR STRIP-MCNC: >1000 MG/DL
HCT VFR BLD AUTO: 30.1 % (ref 40–53)
HCT VFR BLD AUTO: 31.2 % (ref 40–53)
HGB BLD-MCNC: 10.2 G/DL (ref 13.3–17.7)
HGB BLD-MCNC: 10.6 G/DL (ref 13.3–17.7)
HGB UR QL STRIP: ABNORMAL
IMM GRANULOCYTES # BLD: 0.1 10E9/L (ref 0–0.4)
IMM GRANULOCYTES # BLD: 0.2 10E9/L (ref 0–0.4)
IMM GRANULOCYTES NFR BLD: 1.1 %
IMM GRANULOCYTES NFR BLD: 2.1 %
INR PPP: 1.12 (ref 0.86–1.14)
KETONES UR STRIP-MCNC: NEGATIVE MG/DL
LEUKOCYTE ESTERASE UR QL STRIP: ABNORMAL
LYMPHOCYTES # BLD AUTO: 0.9 10E9/L (ref 0.8–5.3)
LYMPHOCYTES # BLD AUTO: 0.9 10E9/L (ref 0.8–5.3)
LYMPHOCYTES NFR BLD AUTO: 10 %
LYMPHOCYTES NFR BLD AUTO: 11.7 %
MAGNESIUM SERPL-MCNC: 1.9 MG/DL (ref 1.6–2.3)
MAGNESIUM SERPL-MCNC: 2.3 MG/DL (ref 1.6–2.3)
MCH RBC QN AUTO: 32 PG (ref 26.5–33)
MCH RBC QN AUTO: 32.4 PG (ref 26.5–33)
MCHC RBC AUTO-ENTMCNC: 33.9 G/DL (ref 31.5–36.5)
MCHC RBC AUTO-ENTMCNC: 34 G/DL (ref 31.5–36.5)
MCV RBC AUTO: 94 FL (ref 78–100)
MCV RBC AUTO: 95 FL (ref 78–100)
MONOCYTES # BLD AUTO: 0.8 10E9/L (ref 0–1.3)
MONOCYTES # BLD AUTO: 1.1 10E9/L (ref 0–1.3)
MONOCYTES NFR BLD AUTO: 11 %
MONOCYTES NFR BLD AUTO: 12.8 %
MUCOUS THREADS #/AREA URNS LPF: PRESENT /LPF
NEUTROPHILS # BLD AUTO: 5.6 10E9/L (ref 1.6–8.3)
NEUTROPHILS # BLD AUTO: 6.3 10E9/L (ref 1.6–8.3)
NEUTROPHILS NFR BLD AUTO: 73.3 %
NEUTROPHILS NFR BLD AUTO: 74.2 %
NITRATE UR QL: NEGATIVE
NRBC # BLD AUTO: 0 10*3/UL
NRBC # BLD AUTO: 0 10*3/UL
NRBC BLD AUTO-RTO: 0 /100
NRBC BLD AUTO-RTO: 0 /100
PH UR STRIP: 7 PH (ref 5–7)
PHOSPHATE SERPL-MCNC: 2 MG/DL (ref 2.5–4.5)
PHOSPHATE SERPL-MCNC: 2.3 MG/DL (ref 2.5–4.5)
PLATELET # BLD AUTO: 275 10E9/L (ref 150–450)
PLATELET # BLD AUTO: 289 10E9/L (ref 150–450)
POTASSIUM SERPL-SCNC: 3.8 MMOL/L (ref 3.4–5.3)
POTASSIUM SERPL-SCNC: 3.9 MMOL/L (ref 3.4–5.3)
RBC # BLD AUTO: 3.19 10E12/L (ref 4.4–5.9)
RBC # BLD AUTO: 3.27 10E12/L (ref 4.4–5.9)
RBC #/AREA URNS AUTO: 2 /HPF (ref 0–2)
SODIUM SERPL-SCNC: 135 MMOL/L (ref 133–144)
SODIUM SERPL-SCNC: 135 MMOL/L (ref 133–144)
SP GR UR STRIP: 1.02 (ref 1–1.03)
SPECIMEN EXP DATE BLD: NORMAL
SQUAMOUS #/AREA URNS AUTO: <1 /HPF (ref 0–1)
TRANS CELLS #/AREA URNS HPF: <1 /HPF (ref 0–1)
URN SPEC COLLECT METH UR: ABNORMAL
UROBILINOGEN UR STRIP-MCNC: NORMAL MG/DL (ref 0–2)
VANCOMYCIN SERPL-MCNC: 20.4 MG/L
WBC # BLD AUTO: 7.5 10E9/L (ref 4–11)
WBC # BLD AUTO: 8.6 10E9/L (ref 4–11)
WBC #/AREA URNS AUTO: 4 /HPF (ref 0–2)

## 2017-07-15 PROCEDURE — 86900 BLOOD TYPING SEROLOGIC ABO: CPT | Performed by: STUDENT IN AN ORGANIZED HEALTH CARE EDUCATION/TRAINING PROGRAM

## 2017-07-15 PROCEDURE — 85610 PROTHROMBIN TIME: CPT | Performed by: STUDENT IN AN ORGANIZED HEALTH CARE EDUCATION/TRAINING PROGRAM

## 2017-07-15 PROCEDURE — 40000556 ZZH STATISTIC PERIPHERAL IV START W US GUIDANCE

## 2017-07-15 PROCEDURE — 25000132 ZZH RX MED GY IP 250 OP 250 PS 637: Performed by: NEUROLOGICAL SURGERY

## 2017-07-15 PROCEDURE — 25000125 ZZHC RX 250

## 2017-07-15 PROCEDURE — 84100 ASSAY OF PHOSPHORUS: CPT | Performed by: STUDENT IN AN ORGANIZED HEALTH CARE EDUCATION/TRAINING PROGRAM

## 2017-07-15 PROCEDURE — 80048 BASIC METABOLIC PNL TOTAL CA: CPT | Performed by: STUDENT IN AN ORGANIZED HEALTH CARE EDUCATION/TRAINING PROGRAM

## 2017-07-15 PROCEDURE — 36415 COLL VENOUS BLD VENIPUNCTURE: CPT | Performed by: NEUROLOGICAL SURGERY

## 2017-07-15 PROCEDURE — 83735 ASSAY OF MAGNESIUM: CPT | Performed by: STUDENT IN AN ORGANIZED HEALTH CARE EDUCATION/TRAINING PROGRAM

## 2017-07-15 PROCEDURE — 25000132 ZZH RX MED GY IP 250 OP 250 PS 637: Performed by: STUDENT IN AN ORGANIZED HEALTH CARE EDUCATION/TRAINING PROGRAM

## 2017-07-15 PROCEDURE — 40000133 ZZH STATISTIC OT WARD VISIT: Performed by: OCCUPATIONAL THERAPIST

## 2017-07-15 PROCEDURE — 25000128 H RX IP 250 OP 636: Performed by: NEUROLOGICAL SURGERY

## 2017-07-15 PROCEDURE — 97110 THERAPEUTIC EXERCISES: CPT | Mod: GO | Performed by: OCCUPATIONAL THERAPIST

## 2017-07-15 PROCEDURE — 25000128 H RX IP 250 OP 636: Performed by: STUDENT IN AN ORGANIZED HEALTH CARE EDUCATION/TRAINING PROGRAM

## 2017-07-15 PROCEDURE — 80202 ASSAY OF VANCOMYCIN: CPT | Performed by: NEUROLOGICAL SURGERY

## 2017-07-15 PROCEDURE — 85025 COMPLETE CBC W/AUTO DIFF WBC: CPT | Performed by: STUDENT IN AN ORGANIZED HEALTH CARE EDUCATION/TRAINING PROGRAM

## 2017-07-15 PROCEDURE — 85730 THROMBOPLASTIN TIME PARTIAL: CPT | Performed by: STUDENT IN AN ORGANIZED HEALTH CARE EDUCATION/TRAINING PROGRAM

## 2017-07-15 PROCEDURE — 25000131 ZZH RX MED GY IP 250 OP 636 PS 637: Performed by: STUDENT IN AN ORGANIZED HEALTH CARE EDUCATION/TRAINING PROGRAM

## 2017-07-15 PROCEDURE — 00000146 ZZHCL STATISTIC GLUCOSE BY METER IP

## 2017-07-15 PROCEDURE — 36416 COLLJ CAPILLARY BLOOD SPEC: CPT | Performed by: STUDENT IN AN ORGANIZED HEALTH CARE EDUCATION/TRAINING PROGRAM

## 2017-07-15 PROCEDURE — 25000125 ZZHC RX 250: Performed by: STUDENT IN AN ORGANIZED HEALTH CARE EDUCATION/TRAINING PROGRAM

## 2017-07-15 PROCEDURE — 81001 URINALYSIS AUTO W/SCOPE: CPT | Performed by: STUDENT IN AN ORGANIZED HEALTH CARE EDUCATION/TRAINING PROGRAM

## 2017-07-15 PROCEDURE — 12000008 ZZH R&B INTERMEDIATE UMMC

## 2017-07-15 PROCEDURE — 27210429 ZZH NUTRITION PRODUCT INTERMEDIATE LITER

## 2017-07-15 PROCEDURE — 36415 COLL VENOUS BLD VENIPUNCTURE: CPT | Performed by: STUDENT IN AN ORGANIZED HEALTH CARE EDUCATION/TRAINING PROGRAM

## 2017-07-15 PROCEDURE — 97530 THERAPEUTIC ACTIVITIES: CPT | Mod: GP | Performed by: PHYSICAL THERAPIST

## 2017-07-15 PROCEDURE — 86850 RBC ANTIBODY SCREEN: CPT | Performed by: STUDENT IN AN ORGANIZED HEALTH CARE EDUCATION/TRAINING PROGRAM

## 2017-07-15 PROCEDURE — 40000193 ZZH STATISTIC PT WARD VISIT: Performed by: PHYSICAL THERAPIST

## 2017-07-15 PROCEDURE — 86901 BLOOD TYPING SEROLOGIC RH(D): CPT | Performed by: STUDENT IN AN ORGANIZED HEALTH CARE EDUCATION/TRAINING PROGRAM

## 2017-07-15 PROCEDURE — 27210913 ZZH NUTRITION PRODUCT INTERMEDIATE PACKET

## 2017-07-15 RX ORDER — CEFAZOLIN SODIUM 2 G/100ML
2 INJECTION, SOLUTION INTRAVENOUS
Status: COMPLETED | OUTPATIENT
Start: 2017-07-15 | End: 2017-07-16

## 2017-07-15 RX ORDER — DEXTROSE MONOHYDRATE 25 G/50ML
25-50 INJECTION, SOLUTION INTRAVENOUS
Status: DISCONTINUED | OUTPATIENT
Start: 2017-07-15 | End: 2017-07-26 | Stop reason: HOSPADM

## 2017-07-15 RX ORDER — NICOTINE POLACRILEX 4 MG
15-30 LOZENGE BUCCAL
Status: DISCONTINUED | OUTPATIENT
Start: 2017-07-15 | End: 2017-07-26 | Stop reason: HOSPADM

## 2017-07-15 RX ORDER — CEFAZOLIN SODIUM 1 G/3ML
1 INJECTION, POWDER, FOR SOLUTION INTRAMUSCULAR; INTRAVENOUS SEE ADMIN INSTRUCTIONS
Status: DISCONTINUED | OUTPATIENT
Start: 2017-07-15 | End: 2017-07-16 | Stop reason: HOSPADM

## 2017-07-15 RX ORDER — SODIUM CHLORIDE 9 MG/ML
INJECTION, SOLUTION INTRAVENOUS
Status: DISCONTINUED
Start: 2017-07-15 | End: 2017-07-16 | Stop reason: HOSPADM

## 2017-07-15 RX ADMIN — INSULIN GLARGINE 10 UNITS: 100 INJECTION, SOLUTION SUBCUTANEOUS at 08:14

## 2017-07-15 RX ADMIN — Medication 1 PACKET: at 10:21

## 2017-07-15 RX ADMIN — LIDOCAINE 3 PATCH: 50 PATCH CUTANEOUS at 08:34

## 2017-07-15 RX ADMIN — CEFEPIME HYDROCHLORIDE 2 G: 2 INJECTION, POWDER, FOR SOLUTION INTRAVENOUS at 19:27

## 2017-07-15 RX ADMIN — VANCOMYCIN HYDROCHLORIDE 1750 MG: 10 INJECTION, POWDER, LYOPHILIZED, FOR SOLUTION INTRAVENOUS at 23:07

## 2017-07-15 RX ADMIN — Medication 20 MG: at 08:14

## 2017-07-15 RX ADMIN — POLYETHYLENE GLYCOL 3350 17 G: 17 POWDER, FOR SOLUTION ORAL at 08:13

## 2017-07-15 RX ADMIN — CEFEPIME HYDROCHLORIDE 2 G: 2 INJECTION, POWDER, FOR SOLUTION INTRAVENOUS at 00:01

## 2017-07-15 RX ADMIN — MULTIVIT AND MINERALS-FERROUS GLUCONATE 9 MG IRON/15 ML ORAL LIQUID 15 ML: at 08:14

## 2017-07-15 RX ADMIN — OXYCODONE HYDROCHLORIDE 5 MG: 5 TABLET ORAL at 08:13

## 2017-07-15 RX ADMIN — POTASSIUM CHLORIDE 20 MEQ: 1.5 POWDER, FOR SOLUTION ORAL at 12:07

## 2017-07-15 RX ADMIN — METOPROLOL TARTRATE 25 MG: 25 TABLET, FILM COATED ORAL at 08:13

## 2017-07-15 RX ADMIN — OXYCODONE HYDROCHLORIDE 5 MG: 5 TABLET ORAL at 12:12

## 2017-07-15 RX ADMIN — Medication 1 PACKET: at 14:47

## 2017-07-15 RX ADMIN — CEFEPIME HYDROCHLORIDE 2 G: 2 INJECTION, POWDER, FOR SOLUTION INTRAVENOUS at 08:14

## 2017-07-15 RX ADMIN — VANCOMYCIN HYDROCHLORIDE 1750 MG: 10 INJECTION, POWDER, LYOPHILIZED, FOR SOLUTION INTRAVENOUS at 12:11

## 2017-07-15 RX ADMIN — METOPROLOL TARTRATE 25 MG: 25 TABLET, FILM COATED ORAL at 21:48

## 2017-07-15 RX ADMIN — POTASSIUM PHOSPHATE, MONOBASIC AND POTASSIUM PHOSPHATE, DIBASIC 15 MMOL: 224; 236 INJECTION, SOLUTION INTRAVENOUS at 14:46

## 2017-07-15 RX ADMIN — HYDRALAZINE HYDROCHLORIDE 20 MG: 20 INJECTION INTRAMUSCULAR; INTRAVENOUS at 08:34

## 2017-07-15 RX ADMIN — Medication 29.57 PACKET: at 21:49

## 2017-07-15 RX ADMIN — SENNOSIDES AND DOCUSATE SODIUM 2 TABLET: 8.6; 5 TABLET ORAL at 08:13

## 2017-07-15 RX ADMIN — Medication 10 MG: at 05:25

## 2017-07-15 RX ADMIN — OXYCODONE HYDROCHLORIDE 5 MG: 5 TABLET ORAL at 22:09

## 2017-07-15 RX ADMIN — HUMAN INSULIN 3.5 UNITS/HR: 100 INJECTION, SOLUTION SUBCUTANEOUS at 21:44

## 2017-07-15 RX ADMIN — Medication 2 G: at 11:24

## 2017-07-15 RX ADMIN — FOLIC ACID 1 MG: 1 TABLET ORAL at 08:14

## 2017-07-15 RX ADMIN — Medication 20 MG: at 06:11

## 2017-07-15 ASSESSMENT — VISUAL ACUITY
OU: NORMAL ACUITY

## 2017-07-15 NOTE — PROGRESS NOTES
[facility], [place]    Neurosurgery Daily Progress Note         Assessment   Ernesto Rawls is a 72 year old male who is postoperative day # 8/3 from C3-5 and C6 laminectomy & C5 corpectomy w/cage placement.          Plan     Continue current pain control regimen.    Routine neuro exams per unit protocol.    Aspen collar on at all times. ALONA drain removed. Post-operative X-rays completed.      Extubated. Tolerating room air.      ID: following for concern for abscess. On vancomycin and cefepime. Need to follow-up on OR cultures.     Dentistry: follow up with a dentist as an outpatient    Podiatry: daily dressing changes    MAP > 80, has been maintaining without pressors.     Several runs of V-tach. Cardiology consulted: thought to be due to infection. Metoprolol 25 mg BID and electrolyte replacements.     Incentive spirometry Q1H while awake.     Bowel regimen. Anti-emetics.     SCDs while in bed.    PT/OT    Dispo; Stable on 6A.     Please dial * * * and enter job code [phone] to reach the neurosurgery team if you have any questions.      Patient and above plan discussed with neurosurgery chief resident.         Subjective     Patient transferred to the floor. Doing well.            Objective   Temp:  [97.2  F (36.2  C)-98.9  F (37.2  C)] 98.6  F (37  C)  Pulse:  [] 79  Heart Rate:  [] 79  Resp:  [18] 18  BP: (133-184)/(58-87) 155/58  SpO2:  [95 %-97 %] 95 %    I/O last 3 completed shifts:  In: 970 [NG/GT:350]  Out: 4550 [Urine:4550]    Physical Exam  General: Intubated. Aspen cervical collar in place.   Wound: Incision, clean, dry.    Neurologic Exam:  - Follows commands in the bilateral lower extremities RLE 2/5, LLE 4-/5  - Moves upper extremities antigravity to painful stimuli. 4-/5  - PERRL, EOMI.  Motor: Normal bulk/tone; no tremor, rigidity, or bradykinesia.        Labs and Imaging     BMP    Recent Labs  Lab 07/15/17  0804 07/14/17  1342 07/14/17  0749 07/13/17  4682  07/13/17  0322     --  138 135 137   POTASSIUM 3.8 3.8 3.2* 4.3 4.1   CHLORIDE 100  --  101 102 103   KAROL 8.5  --  8.5 8.4* 8.4*   CO2 28  --  31 27 28   BUN 10  --  9 8 9   CR 0.40*  --  0.49* 0.45* 0.45*   *  --  277* 182* 173*     CBC    Recent Labs  Lab 07/15/17  0804 07/14/17  0749 07/13/17  0452 07/13/17 0322   WBC 7.5 6.2 5.4 6.4   RBC 3.19* 2.96* 2.91* 3.06*   HGB 10.2* 9.7* 9.5* 10.1*   HCT 30.1* 28.6* 27.8* 29.7*   MCV 94 97 96 97   MCH 32.0 32.8 32.6 33.0   MCHC 33.9 33.9 34.2 34.0   RDW 13.7 13.8 14.0 14.0    212 250 276     INR    Recent Labs  Lab 07/13/17  0452 07/12/17  0915 07/10/17  2007   INR 1.23* 1.32* 1.44*       Imaging: Reviewed      Contact the neurosurgery resident on call with questions.    Dial * * *[phone]: Enter [phone] when prompted.   Tommy Edwards MD, PhD  Neurosurgery PGY-3

## 2017-07-15 NOTE — PLAN OF CARE
"Problem: Goal Outcome Summary  Goal: Goal Outcome Summary  PT/6A: Oriented to person only, reoriented to time and place. However, pt stating \"I always know where I am\" when PT corrected. Pt transferred supine to sit EOB with modAx2 for LE negotiation and trunk support. Sat EOB for approx. 5 minutes with moderate anterior support provided to maintain upright posture. Attempted some small perturbations and decreased assist to encourage more active muscle engagement, but patient was not safe. Transferred back to bed with modAx2 and dependently repositioned. Continue to recommend discharge to ARU to continue to progress functional independence and mobility.       "

## 2017-07-15 NOTE — PLAN OF CARE
"Problem: Goal Outcome Summary  Goal: Goal Outcome Summary  Outcome: No Change  Pt lethargic but arousable, stating \"I am not tired.\" Oriented to self only at midnight and when asked his name at 0400 he stated he did not know. He asked if nurse if she saw the seagulls in his room and \"where did you get that loaf of bread?\" Reoriented prn, sleeping off/on through the night. VSS except HTN, Labetolol given x1- see flowsheet. Neuros unchanged with BUE 3/5 and BLE 2-5. Denies N/T. NPO, tube feed increased to goal of 45ml/hr at 0200. Denies pain, armstrong patent and draining adequate amount clear, yellow urine. PIV x1 to lower left FA in place.  Collar on at all times, assist with repositioning prn. Anterior neck incision intact with durmabond and posterior dsg intact also. Right foot dressing CDI- per report, last changed by MD yesterday afternoon.  Plan: Continue with POC.      "

## 2017-07-15 NOTE — PLAN OF CARE
Problem: Goal Outcome Summary  Goal: Goal Outcome Summary  OT/6A: Pt alert, oriented to person and place, following all one step commands, appropriate in conversation throughout. Facilitated DANITA GLOVER to increase strength and maintain joint integrity post op. Pt fatigued with activity. Limited by weakness, post op precautions, cognition.      REC: ARU to increase safety and IND with ADLs within precautions

## 2017-07-15 NOTE — ANESTHESIA PREPROCEDURE EVALUATION
Anesthesia Evaluation     . Pt has had prior anesthetic. Type: General    No history of anesthetic complications          ROS/MED HX    ENT/Pulmonary:  - neg pulmonary ROS     Neurologic:     (+)other neuro quadraplegia 2/2 cervical compression    Cardiovascular:     (+) --CAD, --. : . . . :. . Previous cardiac testing Echodate:7/8/2017results:Global and regional left ventricular function is normal with an EF of 60-65%.  Global right ventricular function is normal.  No significant valvular abnormalities were noted.  The inferior vena cava was normal in size with preserved respiratory  variability.  No pericardial effusion is present.date: results:ECG reviewed date:7/7/2017 results:Sinus date: results:          METS/Exercise Tolerance:     Hematologic:  - neg hematologic  ROS       Musculoskeletal:  - neg musculoskeletal ROS (+) , , other musculoskeletal- Rheumatoid Arthritis      GI/Hepatic:  - neg GI/hepatic ROS       Renal/Genitourinary:  - ROS Renal section negative       Endo:     (+) type II DM Normal glucose range: 100 to 300s .      Psychiatric:  - neg psychiatric ROS       Infectious Disease:  - neg infectious disease ROS       Malignancy:      - no malignancy   Other: Comment: [place] Collar   (+) no H/O Chronic Pain,                   Physical Exam  Normal systems: cardiovascular and pulmonary    Airway   Mallampati: III  TM distance: >3 FB  Neck ROM: limited    Dental   (+) missing and chipped    Cardiovascular   Rhythm and rate: regular and normal      Pulmonary    breath sounds clear to auscultation                    Anesthesia Plan      History & Physical Review  History and physical reviewed and following examination; no interval change.    ASA Status:  3 .    NPO Status:  > 6 hours    Plan for General and ETT with Intravenous induction. Maintenance will be Balanced.    PONV prophylaxis:  Ondansetron (or other 5HT-3)  Additional equipment: Fiberoptic bronchoscope, Difficult Airway Cart,  Videolaryngoscope, 2nd IV and Arterial Line      Postoperative Care  Postoperative pain management:  IV analgesics.      Consents  Anesthetic plan, risks, benefits and alternatives discussed with:  Patient..        ANESTHESIA PREOP EVALUATION    PROCEDURE: Procedure(s):  Posterior Cervical C3-7 Fusion - Wound Class:     HPI: Ernesto Rawls is a 72 year old male with cervical stenosis and myelopathy s/p three level laminectomy on 7/7/17 and C5 corpectomy with cage placement now with concern for epidural abscess who presents for the above procedure.     PAST MEDICAL HISTORY:  History reviewed. No pertinent past medical history.    PAST SURGICAL HISTORY:  Past Surgical History:   Procedure Laterality Date     DISCECTOMY, FUSION CERVICAL ANTERIOR TWO LEVELS, COMBINED N/A 7/12/2017    Procedure: COMBINED DISCECTOMY, FUSION CERVICAL ANTERIOR TWO LEVELS;  Right Anterior Cervical 5 corpectomy with cage and plate reconstruction and fusion.;  Surgeon: Rafael Lo MD;  Location: [facility] OR     LAMINECTOMY CERIVCAL POSTERIOR THREE+ LEVELS N/A 7/7/2017    Procedure: LAMINECTOMY CERVICAL POSTERIOR THREE+ LEVELS;  Posterior cervical 3-4-5 and partial cervical 6 laminectomy and decompression;  Surgeon: Derick Holly MD;  Location: [facility] OR       SOCIAL HISTORY:   Social History   Substance Use Topics     Smoking status: Not on file     Smokeless tobacco: Not on file     Alcohol use Not on file       ALLERGIES:   Allergies   Allergen Reactions     Contrast Dye        MEDICATIONS:  No prescriptions prior to admission.       NPO STATUS: NPO after midnight.  LABS:      BMP:  Recent Labs   Lab Test  07/15/17   1618   NA  135   POTASSIUM  3.9   CHLORIDE  100   CO2  28   BUN  10   CR  0.40*   GLC  252*   KAROL  8.6     CBC:   Recent Labs   Lab Test  07/15/17   1618   WBC  8.6   RBC  3.27*   HGB  10.6*   HCT  31.2*   MCV  95   MCH  32.4   MCHC  34.0   RDW  13.9   PLT  275       Coags:  Recent Labs   Lab Test  07/13/17   0452   INR   1.23*   PTT  37   MADL  568*       Carla Beasley CA-2  pager [phone]

## 2017-07-15 NOTE — PLAN OF CARE
Problem: Goal Outcome Summary  Goal: Goal Outcome Summary  Outcome: No Change  Alert to self. VSS (SBP within parameters, <160). Denies n/t. BUE4/5(Rt slightly stronger than Lt), BLE 2-3/5. C-collar on at all times except for skin checks. Incisions, dressings look good. TFs at goal. Oral cares/suctioning done prn. TKO'd between abx and electrolytes. Phos being replaced now. K and mag replaced. Redraws ordered for 1600. No recliner available; TP q 2H. [place] for retention. No BM. For OR tomorrow AM (0730). Awaiting specific orders for rt ft dressing. BGs elevated, SSI. Lantus increased. C/t monitor.

## 2017-07-16 ENCOUNTER — APPOINTMENT (OUTPATIENT)
Dept: GENERAL RADIOLOGY | Facility: CLINIC | Age: 72
DRG: 028 | End: 2017-07-16
Attending: NEUROLOGICAL SURGERY
Payer: COMMERCIAL

## 2017-07-16 ENCOUNTER — ANESTHESIA (OUTPATIENT)
Dept: SURGERY | Facility: CLINIC | Age: 72
DRG: 028 | End: 2017-07-16
Payer: COMMERCIAL

## 2017-07-16 LAB
ANION GAP SERPL CALCULATED.3IONS-SCNC: 5 MMOL/L (ref 3–14)
BASE EXCESS BLDA CALC-SCNC: 2.3 MMOL/L
BASE EXCESS BLDA CALC-SCNC: 2.9 MMOL/L
BASOPHILS # BLD AUTO: 0 10E9/L (ref 0–0.2)
BASOPHILS NFR BLD AUTO: 0.1 %
BUN SERPL-MCNC: 10 MG/DL (ref 7–30)
CA-I BLD-MCNC: 4.6 MG/DL (ref 4.4–5.2)
CA-I BLD-MCNC: 4.7 MG/DL (ref 4.4–5.2)
CALCIUM SERPL-MCNC: 8.3 MG/DL (ref 8.5–10.1)
CHLORIDE SERPL-SCNC: 99 MMOL/L (ref 94–109)
CO2 SERPL-SCNC: 29 MMOL/L (ref 20–32)
CREAT SERPL-MCNC: 0.41 MG/DL (ref 0.66–1.25)
DIFFERENTIAL METHOD BLD: ABNORMAL
EOSINOPHIL # BLD AUTO: 0.1 10E9/L (ref 0–0.7)
EOSINOPHIL NFR BLD AUTO: 1.4 %
ERYTHROCYTE [DISTWIDTH] IN BLOOD BY AUTOMATED COUNT: 14.2 % (ref 10–15)
GFR SERPL CREATININE-BSD FRML MDRD: ABNORMAL ML/MIN/1.7M2
GLUCOSE BLD-MCNC: 169 MG/DL (ref 70–99)
GLUCOSE BLD-MCNC: 170 MG/DL (ref 70–99)
GLUCOSE BLDC GLUCOMTR-MCNC: 102 MG/DL (ref 70–99)
GLUCOSE BLDC GLUCOMTR-MCNC: 110 MG/DL (ref 70–99)
GLUCOSE BLDC GLUCOMTR-MCNC: 116 MG/DL (ref 70–99)
GLUCOSE BLDC GLUCOMTR-MCNC: 120 MG/DL (ref 70–99)
GLUCOSE BLDC GLUCOMTR-MCNC: 126 MG/DL (ref 70–99)
GLUCOSE BLDC GLUCOMTR-MCNC: 132 MG/DL (ref 70–99)
GLUCOSE BLDC GLUCOMTR-MCNC: 134 MG/DL (ref 70–99)
GLUCOSE BLDC GLUCOMTR-MCNC: 146 MG/DL (ref 70–99)
GLUCOSE BLDC GLUCOMTR-MCNC: 147 MG/DL (ref 70–99)
GLUCOSE BLDC GLUCOMTR-MCNC: 147 MG/DL (ref 70–99)
GLUCOSE BLDC GLUCOMTR-MCNC: 155 MG/DL (ref 70–99)
GLUCOSE BLDC GLUCOMTR-MCNC: 155 MG/DL (ref 70–99)
GLUCOSE BLDC GLUCOMTR-MCNC: 156 MG/DL (ref 70–99)
GLUCOSE BLDC GLUCOMTR-MCNC: 157 MG/DL (ref 70–99)
GLUCOSE BLDC GLUCOMTR-MCNC: 159 MG/DL (ref 70–99)
GLUCOSE BLDC GLUCOMTR-MCNC: 161 MG/DL (ref 70–99)
GLUCOSE BLDC GLUCOMTR-MCNC: 164 MG/DL (ref 70–99)
GLUCOSE BLDC GLUCOMTR-MCNC: 170 MG/DL (ref 70–99)
GLUCOSE BLDC GLUCOMTR-MCNC: 175 MG/DL (ref 70–99)
GLUCOSE BLDC GLUCOMTR-MCNC: 227 MG/DL (ref 70–99)
GLUCOSE BLDC GLUCOMTR-MCNC: 229 MG/DL (ref 70–99)
GLUCOSE SERPL-MCNC: 154 MG/DL (ref 70–99)
HCO3 BLD-SCNC: 26 MMOL/L (ref 21–28)
HCO3 BLD-SCNC: 27 MMOL/L (ref 21–28)
HCT VFR BLD AUTO: 28.7 % (ref 40–53)
HGB BLD-MCNC: 10 G/DL (ref 13.3–17.7)
HGB BLD-MCNC: 10.1 G/DL (ref 13.3–17.7)
HGB BLD-MCNC: 9.6 G/DL (ref 13.3–17.7)
IMM GRANULOCYTES # BLD: 0.1 10E9/L (ref 0–0.4)
IMM GRANULOCYTES NFR BLD: 1.4 %
LYMPHOCYTES # BLD AUTO: 0.8 10E9/L (ref 0.8–5.3)
LYMPHOCYTES NFR BLD AUTO: 8 %
MCH RBC QN AUTO: 31.5 PG (ref 26.5–33)
MCHC RBC AUTO-ENTMCNC: 33.4 G/DL (ref 31.5–36.5)
MCV RBC AUTO: 94 FL (ref 78–100)
MONOCYTES # BLD AUTO: 1 10E9/L (ref 0–1.3)
MONOCYTES NFR BLD AUTO: 10.2 %
NEUTROPHILS # BLD AUTO: 7.9 10E9/L (ref 1.6–8.3)
NEUTROPHILS NFR BLD AUTO: 78.9 %
NRBC # BLD AUTO: 0 10*3/UL
NRBC BLD AUTO-RTO: 0 /100
O2/TOTAL GAS SETTING VFR VENT: 75 %
O2/TOTAL GAS SETTING VFR VENT: ABNORMAL %
PCO2 BLD: 36 MM HG (ref 35–45)
PCO2 BLD: 37 MM HG (ref 35–45)
PH BLD: 7.47 PH (ref 7.35–7.45)
PH BLD: 7.47 PH (ref 7.35–7.45)
PLATELET # BLD AUTO: 243 10E9/L (ref 150–450)
PO2 BLD: 195 MM HG (ref 80–105)
PO2 BLD: 358 MM HG (ref 80–105)
POTASSIUM BLD-SCNC: 3.4 MMOL/L (ref 3.4–5.3)
POTASSIUM BLD-SCNC: 3.7 MMOL/L (ref 3.4–5.3)
POTASSIUM SERPL-SCNC: 3.7 MMOL/L (ref 3.4–5.3)
RBC # BLD AUTO: 3.05 10E12/L (ref 4.4–5.9)
SODIUM BLD-SCNC: 133 MMOL/L (ref 133–144)
SODIUM BLD-SCNC: 133 MMOL/L (ref 133–144)
SODIUM SERPL-SCNC: 133 MMOL/L (ref 133–144)
WBC # BLD AUTO: 10 10E9/L (ref 4–11)

## 2017-07-16 PROCEDURE — 82947 ASSAY GLUCOSE BLOOD QUANT: CPT | Performed by: NEUROLOGICAL SURGERY

## 2017-07-16 PROCEDURE — 27210794 ZZH OR GENERAL SUPPLY STERILE: Performed by: NEUROLOGICAL SURGERY

## 2017-07-16 PROCEDURE — C9399 UNCLASSIFIED DRUGS OR BIOLOG: HCPCS | Performed by: ANESTHESIOLOGY

## 2017-07-16 PROCEDURE — 37000008 ZZH ANESTHESIA TECHNICAL FEE, 1ST 30 MIN: Performed by: NEUROLOGICAL SURGERY

## 2017-07-16 PROCEDURE — 25000132 ZZH RX MED GY IP 250 OP 250 PS 637: Performed by: NEUROLOGICAL SURGERY

## 2017-07-16 PROCEDURE — 82947 ASSAY GLUCOSE BLOOD QUANT: CPT | Performed by: ANESTHESIOLOGY

## 2017-07-16 PROCEDURE — 40000277 XR SURGERY CARM FLUORO LESS THAN 5 MIN W STILLS: Mod: TC

## 2017-07-16 PROCEDURE — 0RG2071 FUSION OF 2 OR MORE CERVICAL VERTEBRAL JOINTS WITH AUTOLOGOUS TISSUE SUBSTITUTE, POSTERIOR APPROACH, POSTERIOR COLUMN, OPEN APPROACH: ICD-10-PCS | Performed by: NEUROLOGICAL SURGERY

## 2017-07-16 PROCEDURE — 25000128 H RX IP 250 OP 636: Performed by: STUDENT IN AN ORGANIZED HEALTH CARE EDUCATION/TRAINING PROGRAM

## 2017-07-16 PROCEDURE — 84295 ASSAY OF SERUM SODIUM: CPT | Performed by: NEUROLOGICAL SURGERY

## 2017-07-16 PROCEDURE — 85025 COMPLETE CBC W/AUTO DIFF WBC: CPT | Performed by: STUDENT IN AN ORGANIZED HEALTH CARE EDUCATION/TRAINING PROGRAM

## 2017-07-16 PROCEDURE — 84132 ASSAY OF SERUM POTASSIUM: CPT | Performed by: NEUROLOGICAL SURGERY

## 2017-07-16 PROCEDURE — 25000128 H RX IP 250 OP 636: Performed by: NEUROLOGICAL SURGERY

## 2017-07-16 PROCEDURE — 25000125 ZZHC RX 250: Performed by: NEUROLOGICAL SURGERY

## 2017-07-16 PROCEDURE — 36000072 ZZH SURGERY LEVEL 5 W FLUORO 1ST 30 MIN - UMMC: Performed by: NEUROLOGICAL SURGERY

## 2017-07-16 PROCEDURE — 82803 BLOOD GASES ANY COMBINATION: CPT | Performed by: NEUROLOGICAL SURGERY

## 2017-07-16 PROCEDURE — 71000015 ZZH RECOVERY PHASE 1 LEVEL 2 EA ADDTL HR: Performed by: NEUROLOGICAL SURGERY

## 2017-07-16 PROCEDURE — 27210995 ZZH RX 272: Performed by: NEUROLOGICAL SURGERY

## 2017-07-16 PROCEDURE — 25000125 ZZHC RX 250: Performed by: ANESTHESIOLOGY

## 2017-07-16 PROCEDURE — 25000125 ZZHC RX 250: Performed by: STUDENT IN AN ORGANIZED HEALTH CARE EDUCATION/TRAINING PROGRAM

## 2017-07-16 PROCEDURE — 82803 BLOOD GASES ANY COMBINATION: CPT | Performed by: ANESTHESIOLOGY

## 2017-07-16 PROCEDURE — 82330 ASSAY OF CALCIUM: CPT | Performed by: NEUROLOGICAL SURGERY

## 2017-07-16 PROCEDURE — 84132 ASSAY OF SERUM POTASSIUM: CPT | Performed by: ANESTHESIOLOGY

## 2017-07-16 PROCEDURE — 37000009 ZZH ANESTHESIA TECHNICAL FEE, EACH ADDTL 15 MIN: Performed by: NEUROLOGICAL SURGERY

## 2017-07-16 PROCEDURE — C1713 ANCHOR/SCREW BN/BN,TIS/BN: HCPCS | Performed by: NEUROLOGICAL SURGERY

## 2017-07-16 PROCEDURE — C1762 CONN TISS, HUMAN(INC FASCIA): HCPCS | Performed by: NEUROLOGICAL SURGERY

## 2017-07-16 PROCEDURE — 80048 BASIC METABOLIC PNL TOTAL CA: CPT | Performed by: STUDENT IN AN ORGANIZED HEALTH CARE EDUCATION/TRAINING PROGRAM

## 2017-07-16 PROCEDURE — 40000170 ZZH STATISTIC PRE-PROCEDURE ASSESSMENT II: Performed by: NEUROLOGICAL SURGERY

## 2017-07-16 PROCEDURE — 25000128 H RX IP 250 OP 636: Performed by: ANESTHESIOLOGY

## 2017-07-16 PROCEDURE — 36000070 ZZH SURGERY LEVEL 5 EA 15 ADDTL MIN - UMMC: Performed by: NEUROLOGICAL SURGERY

## 2017-07-16 PROCEDURE — 25000565 ZZH ISOFLURANE, EA 15 MIN: Performed by: NEUROLOGICAL SURGERY

## 2017-07-16 PROCEDURE — 25000132 ZZH RX MED GY IP 250 OP 250 PS 637: Performed by: STUDENT IN AN ORGANIZED HEALTH CARE EDUCATION/TRAINING PROGRAM

## 2017-07-16 PROCEDURE — 12000001 ZZH R&B MED SURG/OB UMMC

## 2017-07-16 PROCEDURE — 84295 ASSAY OF SERUM SODIUM: CPT | Performed by: ANESTHESIOLOGY

## 2017-07-16 PROCEDURE — 00000146 ZZHCL STATISTIC GLUCOSE BY METER IP

## 2017-07-16 PROCEDURE — 71000014 ZZH RECOVERY PHASE 1 LEVEL 2 FIRST HR: Performed by: NEUROLOGICAL SURGERY

## 2017-07-16 PROCEDURE — 82330 ASSAY OF CALCIUM: CPT | Performed by: ANESTHESIOLOGY

## 2017-07-16 DEVICE — GRAFT BONE CHIPS CANC 15ML 400145: Type: IMPLANTABLE DEVICE | Site: SPINE CERVICAL | Status: FUNCTIONAL

## 2017-07-16 RX ORDER — FENTANYL CITRATE 50 UG/ML
25-50 INJECTION, SOLUTION INTRAMUSCULAR; INTRAVENOUS
Status: DISCONTINUED | OUTPATIENT
Start: 2017-07-16 | End: 2017-07-16 | Stop reason: HOSPADM

## 2017-07-16 RX ORDER — ONDANSETRON 4 MG/1
4 TABLET, ORALLY DISINTEGRATING ORAL EVERY 30 MIN PRN
Status: DISCONTINUED | OUTPATIENT
Start: 2017-07-16 | End: 2017-07-16 | Stop reason: HOSPADM

## 2017-07-16 RX ORDER — CEFAZOLIN SODIUM 2 G/100ML
2 INJECTION, SOLUTION INTRAVENOUS EVERY 8 HOURS
Status: DISCONTINUED | OUTPATIENT
Start: 2017-07-16 | End: 2017-07-26 | Stop reason: HOSPADM

## 2017-07-16 RX ORDER — MEPERIDINE HYDROCHLORIDE 25 MG/ML
12.5 INJECTION INTRAMUSCULAR; INTRAVENOUS; SUBCUTANEOUS EVERY 5 MIN PRN
Status: DISCONTINUED | OUTPATIENT
Start: 2017-07-16 | End: 2017-07-16 | Stop reason: HOSPADM

## 2017-07-16 RX ORDER — SODIUM CHLORIDE, SODIUM LACTATE, POTASSIUM CHLORIDE, CALCIUM CHLORIDE 600; 310; 30; 20 MG/100ML; MG/100ML; MG/100ML; MG/100ML
INJECTION, SOLUTION INTRAVENOUS CONTINUOUS PRN
Status: DISCONTINUED | OUTPATIENT
Start: 2017-07-16 | End: 2017-07-16

## 2017-07-16 RX ORDER — HYDRALAZINE HYDROCHLORIDE 20 MG/ML
2.5-5 INJECTION INTRAMUSCULAR; INTRAVENOUS EVERY 10 MIN PRN
Status: DISCONTINUED | OUTPATIENT
Start: 2017-07-16 | End: 2017-07-16 | Stop reason: HOSPADM

## 2017-07-16 RX ORDER — SODIUM CHLORIDE, SODIUM LACTATE, POTASSIUM CHLORIDE, CALCIUM CHLORIDE 600; 310; 30; 20 MG/100ML; MG/100ML; MG/100ML; MG/100ML
INJECTION, SOLUTION INTRAVENOUS CONTINUOUS
Status: DISCONTINUED | OUTPATIENT
Start: 2017-07-16 | End: 2017-07-16 | Stop reason: HOSPADM

## 2017-07-16 RX ORDER — LABETALOL HYDROCHLORIDE 5 MG/ML
10 INJECTION, SOLUTION INTRAVENOUS
Status: DISCONTINUED | OUTPATIENT
Start: 2017-07-16 | End: 2017-07-16 | Stop reason: HOSPADM

## 2017-07-16 RX ORDER — FENTANYL CITRATE 50 UG/ML
INJECTION, SOLUTION INTRAMUSCULAR; INTRAVENOUS PRN
Status: DISCONTINUED | OUTPATIENT
Start: 2017-07-16 | End: 2017-07-16

## 2017-07-16 RX ORDER — PROPOFOL 10 MG/ML
INJECTION, EMULSION INTRAVENOUS PRN
Status: DISCONTINUED | OUTPATIENT
Start: 2017-07-16 | End: 2017-07-16

## 2017-07-16 RX ORDER — LABETALOL HYDROCHLORIDE 5 MG/ML
INJECTION, SOLUTION INTRAVENOUS PRN
Status: DISCONTINUED | OUTPATIENT
Start: 2017-07-16 | End: 2017-07-16

## 2017-07-16 RX ORDER — BUPIVACAINE HYDROCHLORIDE AND EPINEPHRINE 2.5; 5 MG/ML; UG/ML
INJECTION, SOLUTION INFILTRATION; PERINEURAL PRN
Status: DISCONTINUED | OUTPATIENT
Start: 2017-07-16 | End: 2017-07-16

## 2017-07-16 RX ORDER — HYDROMORPHONE HYDROCHLORIDE 1 MG/ML
.3-.5 INJECTION, SOLUTION INTRAMUSCULAR; INTRAVENOUS; SUBCUTANEOUS EVERY 5 MIN PRN
Status: DISCONTINUED | OUTPATIENT
Start: 2017-07-16 | End: 2017-07-16 | Stop reason: HOSPADM

## 2017-07-16 RX ORDER — ONDANSETRON 2 MG/ML
4 INJECTION INTRAMUSCULAR; INTRAVENOUS EVERY 30 MIN PRN
Status: DISCONTINUED | OUTPATIENT
Start: 2017-07-16 | End: 2017-07-16 | Stop reason: HOSPADM

## 2017-07-16 RX ADMIN — HYDROMORPHONE HYDROCHLORIDE 0.2 MG: 1 INJECTION, SOLUTION INTRAMUSCULAR; INTRAVENOUS; SUBCUTANEOUS at 10:01

## 2017-07-16 RX ADMIN — ROCURONIUM BROMIDE 50 MG: 10 INJECTION INTRAVENOUS at 07:45

## 2017-07-16 RX ADMIN — PHENYLEPHRINE HYDROCHLORIDE 100 MCG: 10 INJECTION, SOLUTION INTRAMUSCULAR; INTRAVENOUS; SUBCUTANEOUS at 09:05

## 2017-07-16 RX ADMIN — HUMAN INSULIN 3 UNITS/HR: 100 INJECTION, SOLUTION SUBCUTANEOUS at 08:42

## 2017-07-16 RX ADMIN — CEFEPIME HYDROCHLORIDE 2 G: 2 INJECTION, POWDER, FOR SOLUTION INTRAVENOUS at 03:29

## 2017-07-16 RX ADMIN — CEFAZOLIN SODIUM 2 G: 2 INJECTION, SOLUTION INTRAVENOUS at 18:44

## 2017-07-16 RX ADMIN — HUMAN INSULIN 2 UNITS/HR: 100 INJECTION, SOLUTION SUBCUTANEOUS at 03:27

## 2017-07-16 RX ADMIN — FENTANYL CITRATE 50 MCG: 50 INJECTION, SOLUTION INTRAMUSCULAR; INTRAVENOUS at 08:44

## 2017-07-16 RX ADMIN — PHENYLEPHRINE HYDROCHLORIDE 0.3 MCG/KG/MIN: 10 INJECTION, SOLUTION INTRAMUSCULAR; INTRAVENOUS; SUBCUTANEOUS at 09:05

## 2017-07-16 RX ADMIN — ONDANSETRON 4 MG: 2 INJECTION INTRAMUSCULAR; INTRAVENOUS at 09:42

## 2017-07-16 RX ADMIN — PROPOFOL 100 MG: 10 INJECTION, EMULSION INTRAVENOUS at 07:45

## 2017-07-16 RX ADMIN — PHENYLEPHRINE HYDROCHLORIDE 200 MCG: 10 INJECTION, SOLUTION INTRAMUSCULAR; INTRAVENOUS; SUBCUTANEOUS at 07:53

## 2017-07-16 RX ADMIN — LABETALOL HYDROCHLORIDE 2.5 MG: 5 INJECTION, SOLUTION INTRAVENOUS at 10:58

## 2017-07-16 RX ADMIN — SODIUM CHLORIDE: 900 INJECTION, SOLUTION INTRAVENOUS at 13:49

## 2017-07-16 RX ADMIN — ROCURONIUM BROMIDE 20 MG: 10 INJECTION INTRAVENOUS at 08:40

## 2017-07-16 RX ADMIN — CEFAZOLIN SODIUM 2 G: 2 INJECTION, SOLUTION INTRAVENOUS at 08:35

## 2017-07-16 RX ADMIN — PHENYLEPHRINE HYDROCHLORIDE 200 MCG: 10 INJECTION, SOLUTION INTRAMUSCULAR; INTRAVENOUS; SUBCUTANEOUS at 08:50

## 2017-07-16 RX ADMIN — FENTANYL CITRATE 150 MCG: 50 INJECTION, SOLUTION INTRAMUSCULAR; INTRAVENOUS at 07:45

## 2017-07-16 RX ADMIN — FENTANYL CITRATE 25 MCG: 50 INJECTION, SOLUTION INTRAMUSCULAR; INTRAVENOUS at 13:57

## 2017-07-16 RX ADMIN — PHENYLEPHRINE HYDROCHLORIDE 200 MCG: 10 INJECTION, SOLUTION INTRAMUSCULAR; INTRAVENOUS; SUBCUTANEOUS at 08:05

## 2017-07-16 RX ADMIN — PHENYLEPHRINE HYDROCHLORIDE 200 MCG: 10 INJECTION, SOLUTION INTRAMUSCULAR; INTRAVENOUS; SUBCUTANEOUS at 08:15

## 2017-07-16 RX ADMIN — HUMAN INSULIN 3 UNITS/HR: 100 INJECTION, SOLUTION SUBCUTANEOUS at 04:41

## 2017-07-16 RX ADMIN — SODIUM CHLORIDE, POTASSIUM CHLORIDE, SODIUM LACTATE AND CALCIUM CHLORIDE: 600; 310; 30; 20 INJECTION, SOLUTION INTRAVENOUS at 07:34

## 2017-07-16 RX ADMIN — Medication 1 PACKET: at 21:10

## 2017-07-16 RX ADMIN — SODIUM CHLORIDE: 900 INJECTION, SOLUTION INTRAVENOUS at 07:34

## 2017-07-16 RX ADMIN — SODIUM CHLORIDE, POTASSIUM CHLORIDE, SODIUM LACTATE AND CALCIUM CHLORIDE: 600; 310; 30; 20 INJECTION, SOLUTION INTRAVENOUS at 10:34

## 2017-07-16 RX ADMIN — ROCURONIUM BROMIDE 20 MG: 10 INJECTION INTRAVENOUS at 09:46

## 2017-07-16 RX ADMIN — PHENYLEPHRINE HYDROCHLORIDE 200 MCG: 10 INJECTION, SOLUTION INTRAMUSCULAR; INTRAVENOUS; SUBCUTANEOUS at 08:35

## 2017-07-16 RX ADMIN — PHENYLEPHRINE HYDROCHLORIDE 100 MCG: 10 INJECTION, SOLUTION INTRAMUSCULAR; INTRAVENOUS; SUBCUTANEOUS at 09:00

## 2017-07-16 RX ADMIN — CEFAZOLIN SODIUM 1 G: 2 INJECTION, SOLUTION INTRAVENOUS at 10:25

## 2017-07-16 RX ADMIN — HUMAN INSULIN 3 UNITS/HR: 100 INJECTION, SOLUTION SUBCUTANEOUS at 04:15

## 2017-07-16 RX ADMIN — LABETALOL HYDROCHLORIDE 2.5 MG: 5 INJECTION, SOLUTION INTRAVENOUS at 10:59

## 2017-07-16 RX ADMIN — OXYCODONE HYDROCHLORIDE 10 MG: 5 TABLET ORAL at 18:44

## 2017-07-16 RX ADMIN — FENTANYL CITRATE 100 MCG: 50 INJECTION, SOLUTION INTRAMUSCULAR; INTRAVENOUS at 08:52

## 2017-07-16 RX ADMIN — LABETALOL HYDROCHLORIDE 5 MG: 5 INJECTION, SOLUTION INTRAVENOUS at 10:09

## 2017-07-16 RX ADMIN — Medication 10 MG: at 06:16

## 2017-07-16 RX ADMIN — PHENYLEPHRINE HYDROCHLORIDE 200 MCG: 10 INJECTION, SOLUTION INTRAMUSCULAR; INTRAVENOUS; SUBCUTANEOUS at 08:48

## 2017-07-16 RX ADMIN — SUGAMMADEX 180 MG: 100 INJECTION, SOLUTION INTRAVENOUS at 10:43

## 2017-07-16 RX ADMIN — HUMAN INSULIN 1.5 UNITS/HR: 100 INJECTION, SOLUTION SUBCUTANEOUS at 16:37

## 2017-07-16 RX ADMIN — PHENYLEPHRINE HYDROCHLORIDE 200 MCG: 10 INJECTION, SOLUTION INTRAMUSCULAR; INTRAVENOUS; SUBCUTANEOUS at 07:55

## 2017-07-16 RX ADMIN — METOPROLOL TARTRATE 25 MG: 25 TABLET, FILM COATED ORAL at 21:10

## 2017-07-16 ASSESSMENT — VISUAL ACUITY
OU: NORMAL ACUITY
OU: NORMAL ACUITY

## 2017-07-16 ASSESSMENT — PAIN DESCRIPTION - DESCRIPTORS: DESCRIPTORS: ACHING;DISCOMFORT

## 2017-07-16 NOTE — ANESTHESIA CARE TRANSFER NOTE
Patient: Ernesto J Sinai    Procedure(s):  Posterior Cervical C3-7 Fusion - Wound Class: I-Clean    Diagnosis: Cervical Stenosis  Diagnosis Additional Information: No value filed.    Anesthesia Type:   General, ETT     Note:  Airway :ETT  Patient transferred to:PACU  Comments: VSS. Breathing spontaneously at a regular rate with adequate tidal volumes and maintaining O2 sats however slow to wake up so remains intubated. Plan to extubate in PACU when more awake. No apparent complications from anesthesia.     Bryan aBll MD  Anesthesiology resident   [facility]        Vitals: (Last set prior to Anesthesia Care Transfer)    CRNA VITALS  7/16/2017 1040 - 7/16/2017 1112      7/16/2017             NIBP: 140/51    Ht Rate: 87    Resp Rate (set): 10                Electronically Signed By: Bryan Ball MD  July 16, 2017  11:12 AM

## 2017-07-16 NOTE — BRIEF OP NOTE
[facility], [place]    Brief Operative Note    Pre-operative diagnosis: Cervical instability  Post-operative diagnosis same  Procedure: Procedure(s):  Posterior Cervical C3-7 Fusion - Wound Class: I-Clean  Surgeon: Surgeon(s) and Role:     * Rafael Lo MD - Primary     * Jenny Sal MD - Resident - Assisting     * Josh Ivey MD - Resident - Assisting  Anesthesia: General   Estimated blood loss: 50 cc  Fluids: 1500 cc  UOP: 400 cc  Drains: Hemovac  Specimens: * No specimens in log *  Findings:   As expected  Complications: None.  Implants: synthes

## 2017-07-16 NOTE — DISCHARGE SUMMARY
[facility] Discharge Summary and Instructions    Ernesto Rawls MRN# [id number]   Age: 72 year old YOB: 1945     Date of Admission:  7/7/2017  Date of Discharge::  {DISCHARGE DATE:529811}  Admitting Physician:  Rafael Lo MD  Discharge Physician:  Rafael Lo MD          Admission Diagnoses:   Epidural abscess [G06.2]  Cervical stenosis of spinal canal [M48.02]          Discharge Diagnosis:   Epidural abscess [G06.2]  Cervical stenosis of spinal canal [M48.02]          Procedures:   Posterior cervical 3, 4, 5 and partial C6 laminectomy for decompression of cervical stenosis. (7/7/2017)  Right Anterior Cervical 5 corpectomy with cage and plate reconstruction and fusion (7/12/2017)  Posterior Cervical C3-7 Fusion (7/16/2017)           Brief History of Illness:   Mr. 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 He underwent amputation of that digit on 7/6/2017 @ 1430 under MAC at the [facility]. Following the procedure he was found to be weak in his bilateral upper extremities as well as his lower extremities. He was taken for an emergent CT and MRI to evaluate for stroke. An MRI of his cervical spine was also performed which demonstrated significant cervical stenosis as well as per and post-vertebral body abscesses and osteomyelitis. He was then transferred to [facility], where his exam declined to near total paralysis of the upper and lower extremities.      He denied any recent fevers, chills, weight change, chest pain, shortness of breath, cough, abdominal pain, nausea, vomiting, and syncope. The patient also denies headaches, double vision, blurry vision, weakness, LOC, changes in sensation, taste, smell, trouble speaking, bowel or bladder incontinence, or other neurologic symptoms. As per his family he had been complaining of some mild neck pain for several weeks prior to presentation but had  no weakness, numbness or paresthesias.             Hospital Course:   Upon admission his exam showed 0/5 muscle strength in all four extremities. He was started on Vancomycin and cefepime and underwent posterior cervical 3-4-5 and partial cervical 6 laminectomy and decompression. He did well postoperatively and showed some return of strength and sensation in both his upper and lower extremities. On hospital day 2 (POD#1) telemetry revealed a run of SVT. His electrolytes were low so they were repleted. A TTE was normal with no sign of vegetation and a nuclear medicine stress test was also normal. He continued to have short runs of SVT so cardiology was consulted, and they saw no barriers to surgery from a cardiovascular standpoint but recommended starting metoprolol 25mg PO BID and outpatient followup with a cardiologist (neil sr). He continued to fail his bedside swallow tests and was having difficulty clearing secretions, so on 7/10/17 (hospital day 4) a nasojejunal tube was placed   Because his cervical spine was still unstable he underwent a right Anterior Cervical 5 corpectomy with cage and plate reconstruction and fusion on 7/12/2017 (hospital day 6). The cultures from this operation eventually came back positive for MSSA, and his antibiotics were switched to cefazolin as per ID recs. On postoperative day 6/1 (7/12/2017) he was extubated. On postoperative day 8/3 (7/14/17) he was stable and was transferred to the floor. On postoperative day 10/5 he again returned to the operating room for a posterior Cervical C3-7 Fusion.           Discharge Medications:   There are no discharge medications for this patient.              Discharge Instructions and Follow-Up:   Discharge diet: Regular   Discharge activity: You may advance activity as tolerated. No strenuous exercise or heay lifting greater than 10 lbs for 4 weeks or until seen and cleared in clinic.   Discharge follow-up: Follow-up with Dr. Rafael Fernández  "MD Wally in 2 weeks   Wound care: Ok to shower,however no scrubbing of the wound and no soaking of the wound, meaning no bathtubs or swimming pools. Pat dry only. Leave wound open to air.  Sutures are not absorbable and need to be removed in 2 weeks. If patient still at rehab by this time, the sutures may be removed by the rehab physician if he or she considers that the wound has healed completely.     Please call if you have:  1. increased pain, redness, drainage, swelling at your incision  2. fevers > 101.5 F degrees  3. with any questions or concerns.  You may reach the Neurosurgery clinic at [phone] during regular work hours. ER at [phone].    and ask for the Neurosurgery Resident on call at [phone], during off hours or weekends.         Discharge Disposition:   { :[id number]::\"Discharged to home\"}                  "

## 2017-07-16 NOTE — OP NOTE
Preoperative diagnosis cervical instability  Postoperative diagnosis cervical and stability next    Procedure performed   1. POsterior segmental instrumentation C3-7.  2. Posterior arthrodesis C3-7.    Attending surgeon  Rafael Lo MD    Resident surgeon Jenny Sal M.D.  Resident surgeon Josh Ivey MD    Anesthesia: Gen. Endotracheal anesthesia  Estimated blood loss 50 cc  Fluids 1500 cc crystalloid    Urine output 400 cc  Drain Hemovac subfascial    Implants Synthes      Indications for procedure: Mr. Rivas is a 72-year-old gentleman who presented initially about a week ago after undergoing a toe amputation at the [facility] and waking up quadriparetic upon presenting to the [facility] he underwent a posterior laminectomy for decompression as he was found to have what was likely an abscess. He did well from the surgery and began to recover function however there is still concern for anterior compression from a  Epidural abscess. Because this is taken to the OR for an anterior corpectomy at C5 with fusion from C4 to C6 area was upright x-rays he did shows continued movements thus he was taken back to the OR today for a posterior fusion from C3 to C7.    Description of procedure: After informed consent was obtained the patient was taken to the operating room and general endotracheal anesthesia was induced to his transferred prone onto the Chadd table with a sedimentation rate resting in the C-Flex head wilkinson. All pressure points were padded adequately for the duration of the case. The area was then prepped and draped in the usual sterile fashion and the previous sutures were removed after an appropriate time out a #10 blade was used to extend the incision we then removed the subcutaneous and the subcutaneous and fascial sutures using Metzenbaum scissors. Most of the dissection had been completed at during the prior laminectomy however we continue the dissection down to expose  the lateral masses of C7, and ensure exposure at C3-C6. After adequate exposure was obtained C-arm was draped and brought into the field. We then proceeded to drill a start point along the left side using an Anspach drill. We brought in the hand drill with the guide set to 14 mm and placed our  holes. We then brought in a 3.5 mm tap and tapped each  hole. Next we placed our lateral mass screws which were all 14 mm screws. We repeated the same process on the other side. We then used a Lempert to remove the soft tissue at the facets and high-speed drill to decorticate the bone. Next we brought the rods in the into the field and placed to 85 cm rods.pprocedure secured in place using set screws which were final tightened after some manipulation of the rods to obtain appropriate lordosis. Unfortunately the C7 screw on the left pulled out, so the set screws and rods were removed, and we used a 4.5x 16 mm screw at this level. We then irrigated copiously and proceeded to close a medium Hemovac drain was placed and the drain was secured using a 3-0 nylon suture. We placed crushed cancellus bone along the lateral masses to enhance fusion we closed the fascia with 0 Vicryl in interrupted fashion the subcutaneous layers were closed using a 2-0 Vicryl in an inverted interrupted fashion and the skin was closed using a running nylon were correct ×2 at the end of the case Dr. Lo was present and scrubbed for the critical portions of the procedure.

## 2017-07-16 NOTE — PROGRESS NOTES
[facility] SERVICE: COURTESY NOTE   Ernesto Rawls : 1945 Sex: male:   Medical record number [id number] Attending Physician: Rafael Lo MD  Date of Service: 2017    DISCUSSION:   Staphylococcus aureus isolated from his epidural abscess is MSSA. As such, we can now adjust his treatment. Unable to see patient today due to OR procedure.    RECOMMENDATIONS:   1. D/C Vancomycin and Cefepime  2. Start Cefazolin 2g IV Q8 hours    ID will continue to follow.      MONTSERRAT Rinocn M.D.  [facility] Service Staff  [phone]

## 2017-07-16 NOTE — PLAN OF CARE
Problem: Goal Outcome Summary  Goal: Goal Outcome Summary  Outcome: No Change  Neuros unchanged.  Confused.  Oriented to person.  Remains NPO.  Tube feeds at goal of 45ml/hour.  Blood sugars elevated and started on insulin drip at 2145.  As of 2300 drip running at 9units/hour on ALG 2 and glucose 313.  Loose stools x2 this evening.  York in place for retention.  Pre op shampoo and Tanner prep done.  Given oxycodone x1 for pain at bedtime.  Denied pain earlier.  Turned and repositioned.  Planned OR tomorrow for posterior cervical fusion.  Cervical collar in place.  Will require PICC for long term antibiotics.

## 2017-07-16 NOTE — ANESTHESIA PROCEDURE NOTES
Arterial Line Procedure Note  Staff:     Anesthesiologist:  JOSÉ ANTONIO REDDY    Resident/CRNA:  MACARIO STACY    Arterial line performed by resident/CRNA in presence of a teaching physician    Location: In OR After Induction  Procedure Start/Stop Times:     patient identified, IV checked, site marked, risks and benefits discussed, informed consent, monitors and equipment checked, pre-op evaluation and at physician/surgeon's request      Correct Patient: Yes      Correct Position: Yes      Correct Site: Yes      Correct Procedure: Yes      Correct Laterality:  Yes    Site Marked:  Yes  Line Placement:     Procedure:  Arterial Line    Insertion Site:  Brachial    Insertion laterality:  Left    Skin Prep: Chloraprep      Patient Prep: patient draped, mask, sterile gloves and hand hygiene      Local skin infiltration:  None    Ultrasound Guided?: Yes      Artery evaluated via ultrasound confirming patency.   Using realtime imaging, the artery was punctured and the needle was observed entering the artery.      A permanent image is entered into patient's chart.      Catheter size:  20 gauge, 12 cm    Cath secured with: suture      Dressing:  Tegaderm    Complications:  None obvious    Arterial waveform: Yes      IBP within 10% of NIBP: Yes

## 2017-07-16 NOTE — PLAN OF CARE
Problem: Goal Outcome Summary  Goal: Goal Outcome Summary  SLP: Dysphagia therapy cancelled.  Pt in OR this date.  ST to f/u with dysphagia tx as indicated on POC.

## 2017-07-16 NOTE — PLAN OF CARE
Problem: Goal Outcome Summary  Goal: Goal Outcome Summary  OT/6A: CX- Pt in OR this date, will cancel and reschedule for 7/17/17.

## 2017-07-16 NOTE — PLAN OF CARE
Problem: Goal Outcome Summary  Goal: Goal Outcome Summary  PT/6A: Cx. Patient in OR, will cancel and reschedule for 7/17/17.

## 2017-07-16 NOTE — OR NURSING
Extubated at 1300. Patient with moderate to large amount thick sputum. Able to clear some with good cough and support. Patient remains lethargic. AVSS.

## 2017-07-16 NOTE — ANESTHESIA POSTPROCEDURE EVALUATION
Patient: Ernesto MACHADO Sinai    Procedure(s):  Posterior Cervical C3-7 Fusion - Wound Class: I-Clean    Diagnosis:Cervical Stenosis  Diagnosis Additional Information: No value filed.    Anesthesia Type:  General, ETT    Note:  Anesthesia Post Evaluation    Patient location during evaluation: PACU and Bedside  Patient participation: Able to fully participate in evaluation  Level of consciousness: sleepy but conscious  Pain management: adequate  Airway patency: patent  Cardiovascular status: acceptable  Respiratory status: acceptable  Hydration status: acceptable  PONV: none     Anesthetic complications: None          Last vitals:  Vitals:    07/16/17 1345 07/16/17 1415 07/16/17 1445   BP:      Pulse:      Resp: 14 12 12   Temp: 37.2  C (98.9  F)  36.2  C (97.1  F)   SpO2:            Electronically Signed By: Ed Marie MD  July 16, 2017  3:00 PM

## 2017-07-16 NOTE — PLAN OF CARE
Problem: Goal Outcome Summary  Goal: Goal Outcome Summary  Outcome: No Change  Pt lethargic but arousable. Oriented to self only, able to follow most commands. VSS, Labetolol 10mg x1- see flowsheet. Neuros unchanged with BUE 3/5 and BLE 2-5. Decreased fine motor of B hands continues. Denies N/T. Tube feed off at midnight as pt is NPO. Denies pain, armstrong patent and draining adequate amount clear, yellow urine.  Collar on at all times, assist with repositioning prn. Anterior neck incision intact with durmabond and posterior dsg intact also. Right foot dressing CDI. Insulin drip titrated prn- see MAR. D10 IVF at 45ml/hr after TF stopped as per orders. Pre-op sung bath completed this am.   Plan: Continue with POC. To OR for 0730 case this am.    0650: Report given to pre-op RN

## 2017-07-16 NOTE — OR NURSING
Patient had Vista Cervical Collar on at all times. HOB 30 degrees. Extremeties weak. Surgery and anesthesia aware.

## 2017-07-16 NOTE — PROGRESS NOTES
[facility], [place]    Neurosurgery Daily Progress Note         Assessment   Ernesto Rawls is a 72 year old male who is postoperative day # 9/4 from C3-5 and C6 laminectomy & C5 corpectomy w/cage placement.          Plan     OR today for posterior C3-C7 fusion, anticipate return to 6A post OR.     Continue current pain control regimen.    Routine neuro exams per unit protocol.    Aspen collar on at all times. Post-operative X-rays completed.      Tolerating room air.      ID: following for concern for abscess. On vancomycin and cefepime. OR cultures positive for S. Aureus.      UA+, likely covered by current Abx, f/u culture    Elevated blood glucose, insulin gtt started for better control prior to OR    Dentistry: follow up with a dentist as an outpatient    Podiatry: daily dressing changes    MAP > 80, has been maintaining without pressors.     Several runs of V-tach. Cardiology consulted: thought to be due to infection. Metoprolol 25 mg BID and electrolyte replacements.     Incentive spirometry Q1H while awake.     Bowel regimen. Anti-emetics.     SCDs while in bed.    PT/OT    Dispo; Stable on 6A.  [facility] 1-2 days after surgery.       Please dial * * * and enter job code [phone] to reach the neurosurgery team if you have any questions.      Patient and above plan discussed with neurosurgery chief resident.         Subjective     Doing well, interacting appropriately with family and staff despite intermittent confusion.  Ready to return to the operating room.                Objective   Temp:  [96.8  F (36  C)-98.9  F (37.2  C)] 96.8  F (36  C)  Pulse:  [79] 79  Heart Rate:  [] 92  Resp:  [18-20] 18  BP: (133-165)/(58-83) 135/63  SpO2:  [95 %-97 %] 96 %    I/O last 3 completed shifts:  In: 435 [NG/GT:30]  Out: 2375 [Urine:2375]    Physical Exam  General: Aspen cervical collar in place.   Wound: Posterior  incision, clean, dry, swollen region near midpoint of incision.  Anterior incision c/d/i, no significant swelling, erythema, or drainage.  Right foot wrapped.      Neurologic Exam:  - Follows commands in the bilateral lower extremities RLE 2/5, LLE 3/5  - Strength in BUE 4-/5 proximally, 3/5 distally   - PERRL, EOMI.  Motor: Normal bulk/tone; no tremor, rigidity, or bradykinesia.        Labs and Imaging     BMP    Recent Labs  Lab 07/16/17  0847 07/15/17  1618 07/15/17  0804 07/14/17  1342 07/14/17  0749 07/13/17  0452    135 135  --  138 135   POTASSIUM 3.7 3.9 3.8 3.8 3.2* 4.3   CHLORIDE  --  100 100  --  101 102   KAROL  --  8.6 8.5  --  8.5 8.4*   CO2  --  28 28  --  31 27   BUN  --  10 10  --  9 8   CR  --  0.40* 0.40*  --  0.49* 0.45*   * 252* 345*  --  277* 182*     CBC    Recent Labs  Lab 07/16/17  0847 07/15/17  1618 07/15/17  0804 07/14/17  0749 07/13/17  0452   WBC  --  8.6 7.5 6.2 5.4   RBC  --  3.27* 3.19* 2.96* 2.91*   HGB 10.0* 10.6* 10.2* 9.7* 9.5*   HCT  --  31.2* 30.1* 28.6* 27.8*   MCV  --  95 94 97 96   MCH  --  32.4 32.0 32.8 32.6   MCHC  --  34.0 33.9 33.9 34.2   RDW  --  13.9 13.7 13.8 14.0   PLT  --  275 289 212 250     INR    Recent Labs  Lab 07/15/17  1618 07/13/17 0452 07/12/17 0915 07/10/17  2007   INR 1.12 1.23* 1.32* 1.44*       Imaging: Reviewed      Contact the neurosurgery resident on call with questions.    Dial * * *[phone]: Enter [phone] when prompted.   Bob Marcus MD,PhD  Neurosurgery PGY-2

## 2017-07-17 ENCOUNTER — APPOINTMENT (OUTPATIENT)
Dept: OCCUPATIONAL THERAPY | Facility: CLINIC | Age: 72
DRG: 028 | End: 2017-07-17
Attending: NEUROLOGICAL SURGERY
Payer: COMMERCIAL

## 2017-07-17 ENCOUNTER — APPOINTMENT (OUTPATIENT)
Dept: SPEECH THERAPY | Facility: CLINIC | Age: 72
DRG: 028 | End: 2017-07-17
Attending: NEUROLOGICAL SURGERY
Payer: COMMERCIAL

## 2017-07-17 LAB
ALBUMIN UR-MCNC: 30 MG/DL
ANION GAP SERPL CALCULATED.3IONS-SCNC: 6 MMOL/L (ref 3–14)
APPEARANCE UR: CLEAR
BACTERIA SPEC CULT: ABNORMAL
BACTERIA SPEC CULT: ABNORMAL
BACTERIA SPEC CULT: NO GROWTH
BASE EXCESS BLDV CALC-SCNC: 7.1 MMOL/L
BILIRUB UR QL STRIP: NEGATIVE
BUN SERPL-MCNC: 13 MG/DL (ref 7–30)
CALCIUM SERPL-MCNC: 8.6 MG/DL (ref 8.5–10.1)
CHLORIDE SERPL-SCNC: 95 MMOL/L (ref 94–109)
CO2 SERPL-SCNC: 29 MMOL/L (ref 20–32)
COLOR UR AUTO: YELLOW
CREAT SERPL-MCNC: 0.5 MG/DL (ref 0.66–1.25)
ERYTHROCYTE [DISTWIDTH] IN BLOOD BY AUTOMATED COUNT: 14.1 % (ref 10–15)
GFR SERPL CREATININE-BSD FRML MDRD: ABNORMAL ML/MIN/1.7M2
GLUCOSE BLDC GLUCOMTR-MCNC: 142 MG/DL (ref 70–99)
GLUCOSE BLDC GLUCOMTR-MCNC: 144 MG/DL (ref 70–99)
GLUCOSE BLDC GLUCOMTR-MCNC: 147 MG/DL (ref 70–99)
GLUCOSE BLDC GLUCOMTR-MCNC: 147 MG/DL (ref 70–99)
GLUCOSE BLDC GLUCOMTR-MCNC: 148 MG/DL (ref 70–99)
GLUCOSE BLDC GLUCOMTR-MCNC: 150 MG/DL (ref 70–99)
GLUCOSE BLDC GLUCOMTR-MCNC: 158 MG/DL (ref 70–99)
GLUCOSE BLDC GLUCOMTR-MCNC: 161 MG/DL (ref 70–99)
GLUCOSE BLDC GLUCOMTR-MCNC: 165 MG/DL (ref 70–99)
GLUCOSE BLDC GLUCOMTR-MCNC: 165 MG/DL (ref 70–99)
GLUCOSE BLDC GLUCOMTR-MCNC: 168 MG/DL (ref 70–99)
GLUCOSE BLDC GLUCOMTR-MCNC: 178 MG/DL (ref 70–99)
GLUCOSE BLDC GLUCOMTR-MCNC: 186 MG/DL (ref 70–99)
GLUCOSE BLDC GLUCOMTR-MCNC: 188 MG/DL (ref 70–99)
GLUCOSE BLDC GLUCOMTR-MCNC: 198 MG/DL (ref 70–99)
GLUCOSE BLDC GLUCOMTR-MCNC: 212 MG/DL (ref 70–99)
GLUCOSE BLDC GLUCOMTR-MCNC: 213 MG/DL (ref 70–99)
GLUCOSE BLDC GLUCOMTR-MCNC: 219 MG/DL (ref 70–99)
GLUCOSE BLDC GLUCOMTR-MCNC: 225 MG/DL (ref 70–99)
GLUCOSE BLDC GLUCOMTR-MCNC: 243 MG/DL (ref 70–99)
GLUCOSE SERPL-MCNC: 169 MG/DL (ref 70–99)
GLUCOSE UR STRIP-MCNC: 150 MG/DL
HCO3 BLDV-SCNC: 31 MMOL/L (ref 21–28)
HCT VFR BLD AUTO: 30 % (ref 40–53)
HGB BLD-MCNC: 10 G/DL (ref 13.3–17.7)
HGB UR QL STRIP: ABNORMAL
KETONES UR STRIP-MCNC: NEGATIVE MG/DL
LEUKOCYTE ESTERASE UR QL STRIP: ABNORMAL
Lab: ABNORMAL
Lab: ABNORMAL
Lab: NORMAL
MCH RBC QN AUTO: 31.4 PG (ref 26.5–33)
MCHC RBC AUTO-ENTMCNC: 33.3 G/DL (ref 31.5–36.5)
MCV RBC AUTO: 94 FL (ref 78–100)
MICRO REPORT STATUS: ABNORMAL
MICRO REPORT STATUS: ABNORMAL
MICRO REPORT STATUS: NORMAL
MICROORGANISM SPEC CULT: ABNORMAL
MICROORGANISM SPEC CULT: ABNORMAL
MUCOUS THREADS #/AREA URNS LPF: PRESENT /LPF
NITRATE UR QL: NEGATIVE
O2/TOTAL GAS SETTING VFR VENT: 21 %
PCO2 BLDV: 39 MM HG (ref 40–50)
PH BLDV: 7.51 PH (ref 7.32–7.43)
PH UR STRIP: 6 PH (ref 5–7)
PLATELET # BLD AUTO: 229 10E9/L (ref 150–450)
PO2 BLDV: 85 MM HG (ref 25–47)
POTASSIUM SERPL-SCNC: 3.7 MMOL/L (ref 3.4–5.3)
RBC # BLD AUTO: 3.18 10E12/L (ref 4.4–5.9)
RBC #/AREA URNS AUTO: 15 /HPF (ref 0–2)
SODIUM SERPL-SCNC: 130 MMOL/L (ref 133–144)
SODIUM SERPL-SCNC: 130 MMOL/L (ref 133–144)
SP GR UR STRIP: 1.02 (ref 1–1.03)
SPECIMEN SOURCE: ABNORMAL
SPECIMEN SOURCE: ABNORMAL
SPECIMEN SOURCE: NORMAL
TRANS CELLS #/AREA URNS HPF: <1 /HPF (ref 0–1)
URN SPEC COLLECT METH UR: ABNORMAL
UROBILINOGEN UR STRIP-MCNC: NORMAL MG/DL (ref 0–2)
WBC # BLD AUTO: 11.9 10E9/L (ref 4–11)
WBC #/AREA URNS AUTO: 3 /HPF (ref 0–2)

## 2017-07-17 PROCEDURE — 85027 COMPLETE CBC AUTOMATED: CPT | Performed by: STUDENT IN AN ORGANIZED HEALTH CARE EDUCATION/TRAINING PROGRAM

## 2017-07-17 PROCEDURE — 25000125 ZZHC RX 250: Performed by: STUDENT IN AN ORGANIZED HEALTH CARE EDUCATION/TRAINING PROGRAM

## 2017-07-17 PROCEDURE — 25000128 H RX IP 250 OP 636: Performed by: STUDENT IN AN ORGANIZED HEALTH CARE EDUCATION/TRAINING PROGRAM

## 2017-07-17 PROCEDURE — 40000133 ZZH STATISTIC OT WARD VISIT: Performed by: OCCUPATIONAL THERAPIST

## 2017-07-17 PROCEDURE — 12000008 ZZH R&B INTERMEDIATE UMMC

## 2017-07-17 PROCEDURE — 81001 URINALYSIS AUTO W/SCOPE: CPT | Performed by: NURSE PRACTITIONER

## 2017-07-17 PROCEDURE — 84295 ASSAY OF SERUM SODIUM: CPT | Performed by: STUDENT IN AN ORGANIZED HEALTH CARE EDUCATION/TRAINING PROGRAM

## 2017-07-17 PROCEDURE — 25000132 ZZH RX MED GY IP 250 OP 250 PS 637: Performed by: NEUROLOGICAL SURGERY

## 2017-07-17 PROCEDURE — 92526 ORAL FUNCTION THERAPY: CPT | Mod: GN | Performed by: SPEECH-LANGUAGE PATHOLOGIST

## 2017-07-17 PROCEDURE — 25000132 ZZH RX MED GY IP 250 OP 250 PS 637: Performed by: STUDENT IN AN ORGANIZED HEALTH CARE EDUCATION/TRAINING PROGRAM

## 2017-07-17 PROCEDURE — 82803 BLOOD GASES ANY COMBINATION: CPT | Performed by: STUDENT IN AN ORGANIZED HEALTH CARE EDUCATION/TRAINING PROGRAM

## 2017-07-17 PROCEDURE — 25000131 ZZH RX MED GY IP 250 OP 636 PS 637: Performed by: STUDENT IN AN ORGANIZED HEALTH CARE EDUCATION/TRAINING PROGRAM

## 2017-07-17 PROCEDURE — 97535 SELF CARE MNGMENT TRAINING: CPT | Mod: GO | Performed by: OCCUPATIONAL THERAPIST

## 2017-07-17 PROCEDURE — 00000146 ZZHCL STATISTIC GLUCOSE BY METER IP

## 2017-07-17 PROCEDURE — 36415 COLL VENOUS BLD VENIPUNCTURE: CPT | Performed by: STUDENT IN AN ORGANIZED HEALTH CARE EDUCATION/TRAINING PROGRAM

## 2017-07-17 PROCEDURE — 40000225 ZZH STATISTIC SLP WARD VISIT: Performed by: SPEECH-LANGUAGE PATHOLOGIST

## 2017-07-17 PROCEDURE — 80048 BASIC METABOLIC PNL TOTAL CA: CPT | Performed by: STUDENT IN AN ORGANIZED HEALTH CARE EDUCATION/TRAINING PROGRAM

## 2017-07-17 PROCEDURE — 25000128 H RX IP 250 OP 636: Performed by: NEUROLOGICAL SURGERY

## 2017-07-17 RX ORDER — LIDOCAINE 40 MG/G
CREAM TOPICAL
Status: DISCONTINUED | OUTPATIENT
Start: 2017-07-17 | End: 2017-07-26 | Stop reason: HOSPADM

## 2017-07-17 RX ORDER — SODIUM CHLORIDE 9 MG/ML
INJECTION, SOLUTION INTRAVENOUS CONTINUOUS
Status: DISCONTINUED | OUTPATIENT
Start: 2017-07-17 | End: 2017-07-19

## 2017-07-17 RX ORDER — NICOTINE POLACRILEX 4 MG
15-30 LOZENGE BUCCAL
Status: DISCONTINUED | OUTPATIENT
Start: 2017-07-17 | End: 2017-07-17

## 2017-07-17 RX ORDER — DEXTROSE MONOHYDRATE 25 G/50ML
25-50 INJECTION, SOLUTION INTRAVENOUS
Status: DISCONTINUED | OUTPATIENT
Start: 2017-07-17 | End: 2017-07-17

## 2017-07-17 RX ORDER — HEPARIN SODIUM,PORCINE 10 UNIT/ML
2-5 VIAL (ML) INTRAVENOUS
Status: COMPLETED | OUTPATIENT
Start: 2017-07-17 | End: 2017-07-20

## 2017-07-17 RX ADMIN — INSULIN ASPART 1 UNITS: 100 INJECTION, SOLUTION INTRAVENOUS; SUBCUTANEOUS at 10:55

## 2017-07-17 RX ADMIN — Medication 29.57 PACKET: at 13:34

## 2017-07-17 RX ADMIN — LIDOCAINE 3 PATCH: 50 PATCH CUTANEOUS at 08:30

## 2017-07-17 RX ADMIN — FOLIC ACID 1 MG: 1 TABLET ORAL at 08:19

## 2017-07-17 RX ADMIN — POTASSIUM CHLORIDE 20 MEQ: 1.5 POWDER, FOR SOLUTION ORAL at 12:36

## 2017-07-17 RX ADMIN — SODIUM CHLORIDE: 9 INJECTION, SOLUTION INTRAVENOUS at 22:04

## 2017-07-17 RX ADMIN — POTASSIUM PHOSPHATE, MONOBASIC AND POTASSIUM PHOSPHATE, DIBASIC 15 MMOL: 224; 236 INJECTION, SOLUTION INTRAVENOUS at 12:42

## 2017-07-17 RX ADMIN — ACETAMINOPHEN 650 MG: 325 TABLET, FILM COATED ORAL at 12:45

## 2017-07-17 RX ADMIN — OXYCODONE HYDROCHLORIDE 10 MG: 5 TABLET ORAL at 02:00

## 2017-07-17 RX ADMIN — CEFAZOLIN SODIUM 2 G: 2 INJECTION, SOLUTION INTRAVENOUS at 01:32

## 2017-07-17 RX ADMIN — CEFAZOLIN SODIUM 2 G: 2 INJECTION, SOLUTION INTRAVENOUS at 17:58

## 2017-07-17 RX ADMIN — HUMAN INSULIN 8.5 UNITS/HR: 100 INJECTION, SOLUTION SUBCUTANEOUS at 15:16

## 2017-07-17 RX ADMIN — MULTIVIT AND MINERALS-FERROUS GLUCONATE 9 MG IRON/15 ML ORAL LIQUID 15 ML: at 08:17

## 2017-07-17 RX ADMIN — INSULIN ASPART 2 UNITS: 100 INJECTION, SOLUTION INTRAVENOUS; SUBCUTANEOUS at 17:33

## 2017-07-17 RX ADMIN — HUMAN INSULIN 5.5 UNITS/HR: 100 INJECTION, SOLUTION SUBCUTANEOUS at 08:47

## 2017-07-17 RX ADMIN — Medication 1 PACKET: at 08:25

## 2017-07-17 RX ADMIN — METOPROLOL TARTRATE 25 MG: 25 TABLET, FILM COATED ORAL at 08:19

## 2017-07-17 RX ADMIN — INSULIN ASPART 1 UNITS: 100 INJECTION, SOLUTION INTRAVENOUS; SUBCUTANEOUS at 21:46

## 2017-07-17 RX ADMIN — HUMAN INSULIN 3 UNITS/HR: 100 INJECTION, SOLUTION SUBCUTANEOUS at 01:32

## 2017-07-17 RX ADMIN — CEFAZOLIN SODIUM 2 G: 2 INJECTION, SOLUTION INTRAVENOUS at 09:54

## 2017-07-17 RX ADMIN — Medication 20 MG: at 08:18

## 2017-07-17 ASSESSMENT — VISUAL ACUITY
OU: NORMAL ACUITY
OU: NORMAL ACUITY

## 2017-07-17 ASSESSMENT — PAIN DESCRIPTION - DESCRIPTORS: DESCRIPTORS: DISCOMFORT

## 2017-07-17 NOTE — PLAN OF CARE
Problem: Goal Outcome Summary  Goal: Goal Outcome Summary  PT 6A:  Pt with other providers on x2 attempts today.  Rescheduled for 7/18.  Also, Given fatigue levels, will attempt to space therapies out tomorrow.

## 2017-07-17 NOTE — PLAN OF CARE
Problem: Goal Outcome Summary  Goal: Goal Outcome Summary  OT/6A: Facilitated UB sponge bath to address BUE weakness, activity tolerance, and performance in ADLs. Pt tolerated EOB for 16 min with min-mod A and VCs to remain seated upright.  Pt required max A to complete sponge bath and demonstrated weak BUE, poor grasp, and poor activity tolerance.     Recommend: ARU to address deficits in activity tolerance and BUE strength impacting performance in ADLs

## 2017-07-17 NOTE — PROGRESS NOTES
[facility] ID Service: Follow Up Note      Patient:  Ernesto Rawls, Date of birth 1945, Medical record number [id number]  Date of Visit:  July 17, 2017         Assessment and Recommendations:   Problem List:  1.  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  Cevical stenosis s/p C3-5 and partial C6 laminectomy and decompression (7/8/17), C5 corpectomy (7/12/17) and posterior cervical C3-C7 fusion with lateral mass screws (7/16/17)  3.  Recent history of wet gangrene of toe on right foot with subsequent amputation.  4.  Bibasilar atelectasis versus pneumonia  5.  Type 2 Diabetes Mellitus  5.  Rheumatoid Arthritis.    Recommendations:  - Continue Cefazolin 2g IV Q8 hours  - Patient will need long term antibiotic therapy for 6 weeks (through 8/23/17) due to bone and nervous system involvement.  - There is no role for routine MRI follow up imaging, unless patient symptoms acutely worsen.  Vertebral bony lesions often persist on imaging even after appropriate therapy and clinical resolution has been achieved.   - Check CRP in 2 weeks, then Q2 weeks until therapy completed  - F/U with ID in clinic in mid-August to reassess duration; may consider post-treatment suppressive therapy given the new hardware    Discussion:    Etiology of epidural abscess and vertebral osteomyelitis/discitis may be from hematogenous seeding secondary to toe infection v. direct oropharyngeal migration. Patient history and risk factors notable for increased falls and constitutional symptoms in the past month, old age, poorly-controlled diabetes mellitus, therapy for RA with Hydroxychloroquine and MTX, and recent surgery for a gangrenous right toes.  MRI demonstrated the abscess, and echocardiogram was within normal limits.  Other risk factors for epidural abscesses include IV drug access, central lines, and hemodialysis.  Most often Staphylococcus  Aureus is the cause, followed by gram-negative bacilli. In this case it was MSSA. Cefazolin was selected over Nafcillin due to the epidural location. CRP/ESR will increase 2-3 weeks after treatment, and trending them downward can assess risk of treatment failure (25-33% reduction at 4 weeks is a low risk, and 50% reduction at 4 weeks is a rare risk of treatment failure). There is a 10-30% failure rate identified in treatment of vertebral osteomyelitis.     Call with further questions.  MONTSERRAT Rincon M.D.  [facility] Service Staff  [phone]          Interval History:     HPI:  Mr. Rawls is a 71yo M with a PMH notable for rheumatoid arthritis, diabetes, coronary artery disease, and wet gangrene of his right foot great toe (with subsequent amputation on 7/06/17).  He [facility] to this hospital [facility], due to quadriplegia post-surgery.  He then demonstrated spinal stenosis at C3-5 and epidural abscess with adjacent osteomyelitis and discitis at C3-5.  He is being managed surgically by the neurosurgery team, with posterior C3-5/partial 6 decompression on 7/7.    Interval History:  Patient is post-op day 1 from posterior cervical C3-C7 fusion with lateral mass screws. Patient is able to follow commands, including deep inspiration and movement of hands and feet. He is conversant and we discussed the culture results and overall antibiotic plan. Neck pain under control. No N/V, cough, SOB, abdominal pain, or chest pain.    Culture data:  C4-5 Tissue Culture from 7/12:  NGTD  C4-5  Fluid Culture from 7/12:  MSSA  Spinal Epidural Abscess Culture from 7/12:  MSSA  Sputum Culture from 7/09:  Enterobacter Aerogenes with resistance to Ampicillin and Unasyn, otherwise susceptible.         Review of Systems:   CONSTITUTIONAL:  No fevers or chills  EYES: negative for icterus  ENT:  negative for oral lesions, hearing loss, tinnitus and sore  throat  RESPIRATORY:  negative for cough and dyspnea  CARDIOVASCULAR:  negative for chest pain, palpitations  GASTROINTESTINAL:  negative for nausea, vomiting, diarrhea and constipation  GENITOURINARY:  negative for dysuria  HEME:  No easy bruising/bleeding  INTEGUMENT:  negative for rash and pruritus  NEURO:  Negative for headache.  Persistent paralysis of limbs.         Current Antimicrobials   Cefazolin 2g IV Q8 hours         Physical Exam:   Ranges for vital signs:  Temp:  [96.8  F (36  C)-99.2  F (37.3  C)] 98.1  F (36.7  C)  Pulse:  [] 90  Heart Rate:  [] 92  Resp:  [12-23] 20  BP: (123-167)/(51-87) 145/58  MAP:  [89 mmHg-272 mmHg] 89 mmHg  Arterial Line BP: (149-275)/() 157/57  SpO2:  [92 %-100 %] 97 %    Intake/Output Summary (Last 24 hours) at 07/13/17 1333  Last data filed at 07/13/17 1200   Gross per 24 hour   Intake          2461.08 ml   Output             2680 ml   Net          -218.92 ml     Exam:  GENERAL:  well-developed, well-nourished, Lying in bed; neck brace in place; NG tube in place; conversant  ENT:  Head is normocephalic, atraumatic. Oropharynx is moist without exudates or ulcers.  EYES:  Eyes have anicteric sclerae.    NECK:  Supple.  LUNGS:  Left basilar inspiratory crackles.  CARDIOVASCULAR:  Regular rate and rhythm with no murmurs, gallops or rubs.  ABDOMEN:  Normal bowel sounds, soft, nontender.  EXT: Extremities warm and without edema.  SKIN:  No acute rashes.  Line is in place without any surrounding erythema.  NEUROLOGIC:  Patient is able to move hands and feet when asked to do so on a limited/uncoordinated fashion         Laboratory Data:     Creatinine   Date Value Ref Range Status   07/17/2017 0.50 (L) 0.66 - 1.25 mg/dL Final   07/16/2017 0.41 (L) 0.66 - 1.25 mg/dL Final   07/15/2017 0.40 (L) 0.66 - 1.25 mg/dL Final   07/15/2017 0.40 (L) 0.66 - 1.25 mg/dL Final   07/14/2017 0.49 (L) 0.66 - 1.25 mg/dL Final     WBC   Date Value Ref Range Status   07/17/2017 11.9  (H) 4.0 - 11.0 10e9/L Final   07/16/2017 10.0 4.0 - 11.0 10e9/L Final   07/15/2017 8.6 4.0 - 11.0 10e9/L Final   07/15/2017 7.5 4.0 - 11.0 10e9/L Final   07/14/2017 6.2 4.0 - 11.0 10e9/L Final     Hemoglobin   Date Value Ref Range Status   07/17/2017 10.0 (L) 13.3 - 17.7 g/dL Final     Platelet Count   Date Value Ref Range Status   07/17/2017 229 150 - 450 10e9/L Final     Sed Rate   Date Value Ref Range Status   07/07/2017 50 (H) 0 - 20 mm/h Final     CRP Inflammation   Date Value Ref Range Status   07/07/2017 27.0 (H) 0.0 - 8.0 mg/L Final     Lab Results   Component Value Date     (L) 07/17/2017    BUN 13 07/17/2017    CO2 29 07/17/2017

## 2017-07-17 NOTE — PLAN OF CARE
Problem: Goal Outcome Summary  Goal: Goal Outcome Summary     POD#1 C3-7 posterior fusion. Pt presented with epidural abscess on 7/14, 1 week after having toe amputation. VSS, on 2L nc with capnography. Last IPI 10, ETO2 38. Right shoulder and posterior neck pain managed with repositioning, lidocaine patches and PRN Tylenol this shift. Pt is lethargic and confused,oriented to self only. Pt more alert this afternoon and conversing more. Pt did not remember wife this am but when asked her name later in the day pt able to state wife's name. Garbled/slurred/quiet speech, much improved later in shift. LUE 4/5, RUE 3/5, BLE 2/5. BA on for safety, has not tried to get OOB. T&R q2h. Anterior neck incision intact w/ dermabond, posterior dressing CDI, small amount of drainage w/ no change in drainage. Hemovac intact with serosang output. primapore on bilateral knees, L arm pink from previous extravasion. R toe dressing changed this am. TF via NJ at goal rate 45mL/hr, flushes 100mL q3h. PIV TKO with insulin gtt currently running at 7units/hour algorithm 3. Lantus given, plan is to stop insulin drip this afternoon. Sliding scale ordered. York in place with adequate UOP. No BM this shift. Trying to get recliner for pt. Needs to go down for cervical spine x-ray when able tolerate sitting up. OT and maybe PT to see pt yet today. Continue POC.     Update: Per Dr. Griffin, continue insulin drip.

## 2017-07-17 NOTE — PLAN OF CARE
Problem: Goal Outcome Summary  Goal: Goal Outcome Summary  Most recent Post op #1 of C3-7 posterior fusion. C-collar on at all times, skin inspection and pads changed. Blanchable redness under right anterior side, padding placed. VSS, sats maintained on 2L nc.  Lethargic and confused overnight, alert to self. Gave prn oxy due to pt restless and grimacing. When asked if having pain pt stated yes but was not able to answer where. Pt slept afterwards and slightly more lethargic than previously but cleared up this am.. Garbled slurred speech, at times can make out words. LUE 4/5, RUE 3/5, BLE 2/5. Unable to do full assessment due to patient willingness and not always able to follow direction. BA on for safety, has not set off. respotioning q2h, next due at 0830. Anterior neck incision with dermabond, posterior dressing CDI, small drainage. Hemovac with 10cc SS output. primapore on knees, left arm pink from previous extravasion. TF via NJ increased to goal rate of 45cc/hr this am per orders. Flushes 100cc q3h. PIV to tko and insulin gtt currently running at 4units/hour algorithm 3, next check due at 0800. Continue with POC.

## 2017-07-17 NOTE — PROGRESS NOTES
[facility], [place]    Neurosurgery Daily Progress Note         Assessment   Ernesto Rawls is a 72 year old male who is postoperative day # 10/5/1 from C3-5 and C6 laminectomy, C5 corpectomy w/cage placement, and C3-C7 posterior fusion.          Plan       Routine neuro exams per unit protocol.     Continue current pain control regimen    Continue hemovac until 7/18 - pending output    Follow up post-operative X-rays.    Aspen collar on at all times. Will remain for 6 weeks.       ID: OR cultures positive for MSSA. On Cefazolin with vancomycin and cefepime discontinued as per ID recs.     UA+, likely covered by current Abx, f/u culture    Transition off insulin drip today.     Dentistry: follow up with a dentist as an outpatient    Podiatry: daily dressing changes    MAP > 80, has been maintaining without pressors.     Incentive spirometry Q1H while awake.     Bowel regimen. Anti-emetics.     SCDs while in bed.    PT/OT    Dispo; Stable on 6A.  [facility]       Please dial * * * and enter job code [phone] to reach the neurosurgery team if you have any questions.      Patient and above plan discussed with neurosurgery chief resident.         Subjective     Doing well, although demonstrating confusion and lethargy which has is consistent with his previous postoperative courses.            Objective   Temp:  [96.8  F (36  C)-99.2  F (37.3  C)] 98.1  F (36.7  C)  Pulse:  [] 90  Heart Rate:  [] 92  Resp:  [12-23] 20  BP: (123-167)/(51-87) 145/58  SpO2:  [96 %-99 %] 97 %    I/O last 3 completed shifts:  In: 1555 [I.V.:180; NG/GT:700]  Out: 1910 [Urine:1750; Drains:160]    Physical Exam  General: Aspen cervical collar in place.   Wound: Anterior incision c/d/i, no significant swelling, erythema, or drainage.  Right foot wrapped.      Neurologic Exam:  - Very lethargic this morning. A&Ox1. Intermittently follows commands in the upper  extremities but not in the lower extremities. Generally does not participate in exam.  - PERRL, EOMI.  Motor: Normal bulk/tone; no tremor, rigidity, or bradykinesia.        Labs and Imaging     BMP    Recent Labs  Lab 07/17/17  1223 07/17/17  0741 07/16/17  1320 07/16/17  0950 07/16/17  0847 07/15/17  1618 07/15/17  0804   * 130* 133 133 133 135 135   POTASSIUM  --  3.7 3.7 3.4 3.7 3.9 3.8   CHLORIDE  --  95 99  --   --  100 100   KAROL  --  8.6 8.3*  --   --  8.6 8.5   CO2  --  29 29  --   --  28 28   BUN  --  13 10  --   --  10 10   CR  --  0.50* 0.41*  --   --  0.40* 0.40*   GLC  --  169* 154* 169* 170* 252* 345*     CBC    Recent Labs  Lab 07/17/17  0741 07/16/17  1320 07/16/17  0950 07/16/17  0847 07/15/17  1618 07/15/17  0804   WBC 11.9* 10.0  --   --  8.6 7.5   RBC 3.18* 3.05*  --   --  3.27* 3.19*   HGB 10.0* 9.6* 10.1* 10.0* 10.6* 10.2*   HCT 30.0* 28.7*  --   --  31.2* 30.1*   MCV 94 94  --   --  95 94   MCH 31.4 31.5  --   --  32.4 32.0   MCHC 33.3 33.4  --   --  34.0 33.9   RDW 14.1 14.2  --   --  13.9 13.7    243  --   --  275 289     INR    Recent Labs  Lab 07/15/17  1618 07/13/17  0452 07/12/17  0915 07/10/17  2007   INR 1.12 1.23* 1.32* 1.44*       Imaging: Reviewed      Contact the neurosurgery resident on call with questions.    Dial * * [phone]: Enter [phone] when prompted.   Bob Marcus MD,PhD  Neurosurgery PGY-2

## 2017-07-17 NOTE — PLAN OF CARE
"Problem: Goal Outcome Summary  Goal: Goal Outcome Summary  Outcome: No Change  Post op C3-7 posterior fusion.  Returned from PACU at 1530, lethargic.  Alert in about an hour. Follows commands but would not tell me his name or answer orientation questions.  States \"you know my name\".  Moves all extremities unchanged from pre-op.  Right foot dressed and clean.  Minimal posterior neck drainage.   Hemovac in neck with 100ml serosanguinous drainage this shift.   York with adequate volumes.  Bowel sounds active in all quadrants and tube feed started at 15ml/hour at 1815.  Tube feeds increased to 25ml/hour at 2200.  Lungs coarse with moderate to large amounts of white secretions suctioned with yankeur.   Oxycodone x1 for pain.  Cervical collar in place.  Turned and repositioned.  On insulin drip alg 3 with glucose 102 to high of 170.  Continue POC.      "

## 2017-07-17 NOTE — PLAN OF CARE
Problem: Goal Outcome Summary  Goal: Goal Outcome Summary  Outcome: Therapy, progress toward functional goals is gradual  SLP: Pt seen to f/u for swallowing today.   Pt continues to demonstrate severe dysphagia and s/sx of aspiration with even very small moisture from toothette. Recommend continue to be strict NPO with TF for full nutritional support.  ST will plan to f/u for PO readiness

## 2017-07-18 ENCOUNTER — APPOINTMENT (OUTPATIENT)
Dept: PHYSICAL THERAPY | Facility: CLINIC | Age: 72
DRG: 028 | End: 2017-07-18
Attending: NEUROLOGICAL SURGERY
Payer: COMMERCIAL

## 2017-07-18 ENCOUNTER — APPOINTMENT (OUTPATIENT)
Dept: GENERAL RADIOLOGY | Facility: CLINIC | Age: 72
DRG: 028 | End: 2017-07-18
Payer: COMMERCIAL

## 2017-07-18 ENCOUNTER — APPOINTMENT (OUTPATIENT)
Dept: GENERAL RADIOLOGY | Facility: CLINIC | Age: 72
DRG: 028 | End: 2017-07-18
Attending: NEUROLOGICAL SURGERY
Payer: COMMERCIAL

## 2017-07-18 LAB
GLUCOSE BLDC GLUCOMTR-MCNC: 144 MG/DL (ref 70–99)
GLUCOSE BLDC GLUCOMTR-MCNC: 145 MG/DL (ref 70–99)
GLUCOSE BLDC GLUCOMTR-MCNC: 145 MG/DL (ref 70–99)
GLUCOSE BLDC GLUCOMTR-MCNC: 192 MG/DL (ref 70–99)
GLUCOSE BLDC GLUCOMTR-MCNC: 225 MG/DL (ref 70–99)
GLUCOSE BLDC GLUCOMTR-MCNC: 232 MG/DL (ref 70–99)
INTERPRETATION ECG - MUSE: NORMAL
RADIOLOGIST FLAGS: NORMAL

## 2017-07-18 PROCEDURE — 97530 THERAPEUTIC ACTIVITIES: CPT | Mod: GP | Performed by: REHABILITATION PRACTITIONER

## 2017-07-18 PROCEDURE — 27210429 ZZH NUTRITION PRODUCT INTERMEDIATE LITER

## 2017-07-18 PROCEDURE — 25000132 ZZH RX MED GY IP 250 OP 250 PS 637: Performed by: NEUROLOGICAL SURGERY

## 2017-07-18 PROCEDURE — 72040 X-RAY EXAM NECK SPINE 2-3 VW: CPT

## 2017-07-18 PROCEDURE — 25000125 ZZHC RX 250

## 2017-07-18 PROCEDURE — 40000802 ZZH SITE CHECK

## 2017-07-18 PROCEDURE — 40000556 ZZH STATISTIC PERIPHERAL IV START W US GUIDANCE

## 2017-07-18 PROCEDURE — 27210208 ZZH KIT VALVED DOUBLE LUMEN

## 2017-07-18 PROCEDURE — 40000193 ZZH STATISTIC PT WARD VISIT: Performed by: REHABILITATION PRACTITIONER

## 2017-07-18 PROCEDURE — 25000128 H RX IP 250 OP 636: Performed by: STUDENT IN AN ORGANIZED HEALTH CARE EDUCATION/TRAINING PROGRAM

## 2017-07-18 PROCEDURE — 25000132 ZZH RX MED GY IP 250 OP 250 PS 637: Performed by: STUDENT IN AN ORGANIZED HEALTH CARE EDUCATION/TRAINING PROGRAM

## 2017-07-18 PROCEDURE — 12000008 ZZH R&B INTERMEDIATE UMMC

## 2017-07-18 PROCEDURE — 93010 ELECTROCARDIOGRAM REPORT: CPT | Performed by: INTERNAL MEDICINE

## 2017-07-18 PROCEDURE — 25000125 ZZHC RX 250: Performed by: NURSE PRACTITIONER

## 2017-07-18 PROCEDURE — 40000141 ZZH STATISTIC PERIPHERAL IV START W/O US GUIDANCE

## 2017-07-18 PROCEDURE — 00000146 ZZHCL STATISTIC GLUCOSE BY METER IP

## 2017-07-18 PROCEDURE — 40000986 XR CHEST PORT 1 VW

## 2017-07-18 PROCEDURE — 36569 INSJ PICC 5 YR+ W/O IMAGING: CPT

## 2017-07-18 PROCEDURE — 44500 INTRO GASTROINTESTINAL TUBE: CPT

## 2017-07-18 RX ORDER — LIDOCAINE 40 MG/G
CREAM TOPICAL
Status: DISCONTINUED | OUTPATIENT
Start: 2017-07-18 | End: 2017-07-26 | Stop reason: HOSPADM

## 2017-07-18 RX ORDER — METOPROLOL TARTRATE 1 MG/ML
5 INJECTION, SOLUTION INTRAVENOUS ONCE
Status: COMPLETED | OUTPATIENT
Start: 2017-07-18 | End: 2017-07-18

## 2017-07-18 RX ADMIN — MULTIVIT AND MINERALS-FERROUS GLUCONATE 9 MG IRON/15 ML ORAL LIQUID 15 ML: at 12:28

## 2017-07-18 RX ADMIN — CEFAZOLIN SODIUM 2 G: 2 INJECTION, SOLUTION INTRAVENOUS at 12:25

## 2017-07-18 RX ADMIN — Medication 1 PACKET: at 12:41

## 2017-07-18 RX ADMIN — INSULIN ASPART 4 UNITS: 100 INJECTION, SOLUTION INTRAVENOUS; SUBCUTANEOUS at 21:49

## 2017-07-18 RX ADMIN — Medication 1 PACKET: at 14:36

## 2017-07-18 RX ADMIN — FOLIC ACID 1 MG: 1 TABLET ORAL at 12:30

## 2017-07-18 RX ADMIN — LIDOCAINE 3 PATCH: 50 PATCH CUTANEOUS at 08:13

## 2017-07-18 RX ADMIN — INSULIN ASPART 1 UNITS: 100 INJECTION, SOLUTION INTRAVENOUS; SUBCUTANEOUS at 12:45

## 2017-07-18 RX ADMIN — Medication 20 MG: at 12:27

## 2017-07-18 RX ADMIN — CEFAZOLIN SODIUM 2 G: 2 INJECTION, SOLUTION INTRAVENOUS at 03:18

## 2017-07-18 RX ADMIN — INSULIN ASPART 4 UNITS: 100 INJECTION, SOLUTION INTRAVENOUS; SUBCUTANEOUS at 17:32

## 2017-07-18 RX ADMIN — CEFAZOLIN SODIUM 2 G: 2 INJECTION, SOLUTION INTRAVENOUS at 18:55

## 2017-07-18 RX ADMIN — METOPROLOL TARTRATE 5 MG: 5 INJECTION INTRAVENOUS at 10:23

## 2017-07-18 RX ADMIN — INSULIN ASPART 1 UNITS: 100 INJECTION, SOLUTION INTRAVENOUS; SUBCUTANEOUS at 00:52

## 2017-07-18 RX ADMIN — Medication 1 PACKET: at 21:32

## 2017-07-18 RX ADMIN — METOPROLOL TARTRATE 25 MG: 25 TABLET, FILM COATED ORAL at 21:31

## 2017-07-18 RX ADMIN — INSULIN ASPART 3 UNITS: 100 INJECTION, SOLUTION INTRAVENOUS; SUBCUTANEOUS at 05:04

## 2017-07-18 ASSESSMENT — PAIN DESCRIPTION - DESCRIPTORS: DESCRIPTORS: SORE

## 2017-07-18 ASSESSMENT — VISUAL ACUITY
OU: NORMAL ACUITY

## 2017-07-18 NOTE — PLAN OF CARE
Problem: Goal Outcome Summary  Goal: Goal Outcome Summary  OT/6A: CX- pt getting PICC placed on attempt. Spoke with RN following, reports pt very fatigued and would benefit from rest. Will cancel and reschedule for 7/19/17.

## 2017-07-18 NOTE — PLAN OF CARE
Problem: Goal Outcome Summary  Goal: Goal Outcome Summary  Outcome: No Change  VSS ex intermittently tachy in the 140s, MD aware. Pt is oriented to self only. Neuros unchanged w/ garbled speech, ataxic in BUE (LUE 3-4/5 and RUE 3/5), LLE 3/5 and RLE 2/5. Aspen collar to be worn at all times. Anterior neck incision intact w/ dermabond, posterior dressing CDI. Hemovac intact w/ serosang output. Pt has large amount of oral secretions, suctioning w/ yaunker but pt is unable to do this on his own. Very weak cough. PIV TKO. York in place for retention. Pt pulled NG out last night on evening shift, was receiving TFs and meds thru tube, will need new one placed this AM. Up w/ A2 and lift, turn q2hrs. No BM overnight. Continue to monitor and follow POC.

## 2017-07-18 NOTE — PROGRESS NOTES
Nutrition Brief - consulted for bedside FT placement via [facility] and TF Assess and Order.  Per chart review, pt had NDT placed (per [facility]) on 7/10 and started on TF of TwoCal HN with goal of 45 ml/hr. Per RN note,  pt pulled out his FT last night.     Interventions:  1. Released conditional Xray FT placement order to notify Radiology of need for bedside FT placement.   2. Notified RN to resume TF as previously ordered once new FT placed and position confirmed by MD for use.    Caro Zavala RD,LD  Pager [phone]

## 2017-07-18 NOTE — PROGRESS NOTES
Social Work Services Progress Note    Hospital Day: 12  Date of Initial Social Work Evaluation:  7/13/17  Collaborated with:  Attended Interdisciplinary Rounds    Data:  Rehab placement is anticipated upon discharge from acute hospitalization with a goal of placement at the [facility] Spinal Cord Rehab program.  Pt will discharge on a 6 week course of IV antibiotic. Pt is having a picc line placed today.  Pt's insulin drip will be discontinued today as well as the hemovac.  Pt may be ready for discharge tomorrow.    Intervention:  Attended Interdisciplinary rounds.  Phoned the [facility] referral line ([phone]) and spoke with Tamanna. Referred pt.  Tamanna will pass the referral on to the [facility] Referral , who will contact this SW.  Sw attempted to update pt.  Pt sleeping soundly and SW chose not to disturb him.    Assessment:   Rehab placement is recommended.    Plan:    Anticipated Disposition:  Goal:  Rehab placement at the [facility] spinal cord rehab program    Barriers to d/c plan:  Medical readiness    Follow Up:  SW will await a call from the [facility] Referral .    FREDDY Dooley  Social Work, 6A  Phone:  [phone]  Pager:  [phone]  7/18/2017      Addendum:  ETELVINA received a call from Agnes Wolf, Referral  at the [facility].  ETELVINA faxed referral to Agnes.       FREDDY Dooley  Social Work, 6A  Phone:  [phone]  Pager:  [phone]  7/18/2017

## 2017-07-18 NOTE — PROGRESS NOTES
[facility], [place]    Neurosurgery Daily Progress Note         Assessment   Ernesto Rawls is a 72 year old male who is postoperative day # 11/6/2 from C3-5 and C6 laminectomy, C5 corpectomy w/cage placement, and C3-C7 posterior fusion.          Plan       Routine neuro exams per unit protocol.     Continue current pain control regimen    DC hemovac until 7/18.    Follow up post-operative X-rays.    Aspen collar on at all times for 6 weeks.       ID: OR cultures positive for MSSA. On Cefazolin with vancomycin and cefepime discontinued as per ID recs. PICC line to be placed today.     Transition off insulin drip today.     Dentistry: follow up with a dentist as an outpatient    Podiatry: daily dressing changes    MAP > 80, has been maintaining without pressors.     Incentive spirometry Q1H while awake.     Bowel regimen. Anti-emetics.     SCDs while in bed.    PT/OT    Dispo; Stable on 6A.  [facility]       Please dial * * * and enter job code [phone] to reach the neurosurgery team if you have any questions.      Patient and above plan discussed with neurosurgery chief resident.         Subjective     Continuing to improve. More oriented and following commands.             Objective   Temp:  [96.4  F (35.8  C)-99.7  F (37.6  C)] 99.7  F (37.6  C)  Pulse:  [] 105  Heart Rate:  [100-144] 106  Resp:  [16-22] 18  BP: (128-167)/(56-82) 167/82  SpO2:  [91 %-96 %] 91 %    I/O last 3 completed shifts:  In: 380 [NG/GT:200]  Out: 1500 [Urine:1500]    Physical Exam  General: Aspen cervical collar in place.   Wound: Anterior incision c/d/i, no significant swelling, erythema, or drainage.  Right foot wrapped.      Neurologic Exam:  - A&Ox2. Following commands in the upper extremities but not in the lower extremities. Generally does not participate in exam.  - PERRL, EOMI.  Motor: Normal bulk/tone; no tremor, rigidity, or bradykinesia.        Labs  and Imaging     BMP    Recent Labs  Lab 07/17/17  1223 07/17/17  0741 07/16/17  1320 07/16/17  0950 07/16/17  0847 07/15/17  1618 07/15/17  0804   * 130* 133 133 133 135 135   POTASSIUM  --  3.7 3.7 3.4 3.7 3.9 3.8   CHLORIDE  --  95 99  --   --  100 100   KAROL  --  8.6 8.3*  --   --  8.6 8.5   CO2  --  29 29  --   --  28 28   BUN  --  13 10  --   --  10 10   CR  --  0.50* 0.41*  --   --  0.40* 0.40*   GLC  --  169* 154* 169* 170* 252* 345*     CBC    Recent Labs  Lab 07/17/17  0741 07/16/17  1320 07/16/17  0950 07/16/17  0847 07/15/17  1618 07/15/17  0804   WBC 11.9* 10.0  --   --  8.6 7.5   RBC 3.18* 3.05*  --   --  3.27* 3.19*   HGB 10.0* 9.6* 10.1* 10.0* 10.6* 10.2*   HCT 30.0* 28.7*  --   --  31.2* 30.1*   MCV 94 94  --   --  95 94   MCH 31.4 31.5  --   --  32.4 32.0   MCHC 33.3 33.4  --   --  34.0 33.9   RDW 14.1 14.2  --   --  13.9 13.7    243  --   --  275 289     INR    Recent Labs  Lab 07/15/17  1618 07/13/17  0452 07/12/17  0915   INR 1.12 1.23* 1.32*       Imaging: Reviewed      Contact the neurosurgery resident on call with questions.    Dial * * *[phone]: Enter [phone] when prompted.   Bob Marcus MD,PhD  Neurosurgery PGY-2

## 2017-07-18 NOTE — PLAN OF CARE
Problem: Goal Outcome Summary  Goal: Goal Outcome Summary  Outcome: Improving  Pt is POD#1 of C3-7 posterior fusion. VSS. 93% on RA. Pt is alert. Follows commands. Oriented to self only. LUE 4/5. RUE 3/5. BLE 2/5. Anterior neck incision intact w/ dermabond, posterior dressing CDI. Hemovac intact with serosang output about 50mL this evening. primapore on bilateral knees. R toe amputation, dressing changed on day shift. Pt pulled out NG tube. IV fluids started at 100cc/hr. Last . York in place with adequate UOP. Multiple loose stools. Incontinent. Copious secretions with yonker to clear. Weak cough. White/clear thick sputum. Possible PICC placement tomorrow. Turning to reposition q2h. Continue to monitor.

## 2017-07-18 NOTE — PLAN OF CARE
Problem: Goal Outcome Summary  Goal: Goal Outcome Summary  PT - per plan established by the Physical Therapist, according to functional mobility the  discharge recommendation is ARU for cont skilled PT to progress functional mobility.   Pt demo Moveo therapy today, pt demo mech lift to moveo and back. Pt able to tolerate 8 min at 20 and 15 min 25 degrees. Pt demo 2 sets of 10 at 15 degrees. Pt HR range from 107 to 148 RN alerted and in room during PT session. Ok PT session.

## 2017-07-18 NOTE — PLAN OF CARE
Problem: Goal Outcome Summary  Goal: Goal Outcome Summary  Outcome: Improving     POD#2 C3-7 posterior fusion. Pt presented with epidural abscess on 7/14, 1 week after having toe amputation. VSS, sating mid-high 90s on RA. Right shoulder and posterior neck pain mostly with movement, managed with repositioning and lidocaine patches. Pt is lethargic and confused but improved from yesterday, oriented to self and place. Garbled speech, more clear at times. LUE 3-4/5, RUE 3/5, LLE 3/5, RLE 2/5. BA on for safety, has not tried to get OOB. T&R q2h. Anterior neck incision intact w/ dermabond, posterior dressing CDI, small amount of dried drainage w/ no change. Hemovac removed by MD this am. Primapore on bilateral knees. R toe dressing changed this am. FT replaced in x-ray, OK to use. TF restarted at 1300 via NJ at goal rate 45mL/hr, flushes 100mL q3h. NJ bridled, also taped per MD request to help prevent pt from pulling. PIV infusing at 100mL/hr. Armstrong removed at 1245, please monitor for void, pt unable to void last time with trial of armstrong removal. Medium loose/watery BM this shift, hold bowel meds. Continent of BM but went some in brief before getting on bedpan. Plan is for PICC placement today. Needs to go down for cervical spine x-ray when able to tolerate sitting up. PT saw pt this am. OT may see pt yet today. Continue POC.     Update: Pt had cervical x-ray when down for FT placement. Will come do portable x-ray to verify placement of PICC.

## 2017-07-18 NOTE — PLAN OF CARE
Problem: Goal Outcome Summary  Goal: Goal Outcome Summary  SLP: Session cancelled:  Pt out of room for procedure. Will plan to f/u for swallowing tomorrow as appropriate.

## 2017-07-19 ENCOUNTER — APPOINTMENT (OUTPATIENT)
Dept: SPEECH THERAPY | Facility: CLINIC | Age: 72
DRG: 028 | End: 2017-07-19
Attending: NEUROLOGICAL SURGERY
Payer: COMMERCIAL

## 2017-07-19 ENCOUNTER — APPOINTMENT (OUTPATIENT)
Dept: OCCUPATIONAL THERAPY | Facility: CLINIC | Age: 72
DRG: 028 | End: 2017-07-19
Attending: NEUROLOGICAL SURGERY
Payer: COMMERCIAL

## 2017-07-19 ENCOUNTER — APPOINTMENT (OUTPATIENT)
Dept: GENERAL RADIOLOGY | Facility: CLINIC | Age: 72
DRG: 028 | End: 2017-07-19
Attending: NURSE PRACTITIONER
Payer: COMMERCIAL

## 2017-07-19 LAB
ALBUMIN UR-MCNC: 30 MG/DL
ANION GAP SERPL CALCULATED.3IONS-SCNC: 5 MMOL/L (ref 3–14)
APPEARANCE UR: CLEAR
BACTERIA SPEC CULT: NORMAL
BACTERIA SPEC CULT: NORMAL
BASE EXCESS BLDA CALC-SCNC: 6.5 MMOL/L
BASE EXCESS BLDA CALC-SCNC: 6.9 MMOL/L
BILIRUB UR QL STRIP: NEGATIVE
BUN SERPL-MCNC: 17 MG/DL (ref 7–30)
CALCIUM SERPL-MCNC: 8.3 MG/DL (ref 8.5–10.1)
CHLORIDE SERPL-SCNC: 97 MMOL/L (ref 94–109)
CO2 SERPL-SCNC: 31 MMOL/L (ref 20–32)
COLOR UR AUTO: YELLOW
CREAT SERPL-MCNC: 0.46 MG/DL (ref 0.66–1.25)
CRP SERPL-MCNC: 100 MG/L (ref 0–8)
ERYTHROCYTE [DISTWIDTH] IN BLOOD BY AUTOMATED COUNT: 14.4 % (ref 10–15)
GFR SERPL CREATININE-BSD FRML MDRD: ABNORMAL ML/MIN/1.7M2
GLUCOSE BLDC GLUCOMTR-MCNC: 217 MG/DL (ref 70–99)
GLUCOSE BLDC GLUCOMTR-MCNC: 230 MG/DL (ref 70–99)
GLUCOSE BLDC GLUCOMTR-MCNC: 244 MG/DL (ref 70–99)
GLUCOSE BLDC GLUCOMTR-MCNC: 251 MG/DL (ref 70–99)
GLUCOSE BLDC GLUCOMTR-MCNC: 251 MG/DL (ref 70–99)
GLUCOSE BLDC GLUCOMTR-MCNC: 254 MG/DL (ref 70–99)
GLUCOSE SERPL-MCNC: 215 MG/DL (ref 70–99)
GLUCOSE UR STRIP-MCNC: >1000 MG/DL
HCO3 BLD-SCNC: 30 MMOL/L (ref 21–28)
HCO3 BLD-SCNC: 30 MMOL/L (ref 21–28)
HCT VFR BLD AUTO: 27.1 % (ref 40–53)
HGB BLD-MCNC: 9 G/DL (ref 13.3–17.7)
HGB UR QL STRIP: ABNORMAL
KETONES UR STRIP-MCNC: NEGATIVE MG/DL
LEUKOCYTE ESTERASE UR QL STRIP: NEGATIVE
Lab: NORMAL
Lab: NORMAL
MCH RBC QN AUTO: 31.1 PG (ref 26.5–33)
MCHC RBC AUTO-ENTMCNC: 33.2 G/DL (ref 31.5–36.5)
MCV RBC AUTO: 94 FL (ref 78–100)
MICRO REPORT STATUS: NORMAL
MICRO REPORT STATUS: NORMAL
MUCOUS THREADS #/AREA URNS LPF: PRESENT /LPF
NITRATE UR QL: NEGATIVE
O2/TOTAL GAS SETTING VFR VENT: 21 %
O2/TOTAL GAS SETTING VFR VENT: 21 %
PCO2 BLD: 36 MM HG (ref 35–45)
PCO2 BLD: 37 MM HG (ref 35–45)
PH BLD: 7.52 PH (ref 7.35–7.45)
PH BLD: 7.53 PH (ref 7.35–7.45)
PH UR STRIP: 7 PH (ref 5–7)
PHOSPHATE SERPL-MCNC: 2 MG/DL (ref 2.5–4.5)
PLATELET # BLD AUTO: 214 10E9/L (ref 150–450)
PO2 BLD: 65 MM HG (ref 80–105)
PO2 BLD: 72 MM HG (ref 80–105)
POTASSIUM SERPL-SCNC: 3.5 MMOL/L (ref 3.4–5.3)
POTASSIUM SERPL-SCNC: 3.5 MMOL/L (ref 3.4–5.3)
PROCALCITONIN SERPL-MCNC: 0.17 NG/ML
RBC # BLD AUTO: 2.89 10E12/L (ref 4.4–5.9)
RBC #/AREA URNS AUTO: <1 /HPF (ref 0–2)
SODIUM SERPL-SCNC: 133 MMOL/L (ref 133–144)
SP GR UR STRIP: 1.02 (ref 1–1.03)
SPECIMEN SOURCE: NORMAL
SPECIMEN SOURCE: NORMAL
URN SPEC COLLECT METH UR: ABNORMAL
UROBILINOGEN UR STRIP-MCNC: NORMAL MG/DL (ref 0–2)
WBC # BLD AUTO: 11.9 10E9/L (ref 4–11)
WBC #/AREA URNS AUTO: <1 /HPF (ref 0–2)

## 2017-07-19 PROCEDURE — 25000132 ZZH RX MED GY IP 250 OP 250 PS 637: Performed by: STUDENT IN AN ORGANIZED HEALTH CARE EDUCATION/TRAINING PROGRAM

## 2017-07-19 PROCEDURE — 36600 WITHDRAWAL OF ARTERIAL BLOOD: CPT

## 2017-07-19 PROCEDURE — 80048 BASIC METABOLIC PNL TOTAL CA: CPT | Performed by: NURSE PRACTITIONER

## 2017-07-19 PROCEDURE — 97110 THERAPEUTIC EXERCISES: CPT | Mod: GO | Performed by: OCCUPATIONAL THERAPIST

## 2017-07-19 PROCEDURE — 85027 COMPLETE CBC AUTOMATED: CPT | Performed by: NURSE PRACTITIONER

## 2017-07-19 PROCEDURE — 82803 BLOOD GASES ANY COMBINATION: CPT | Performed by: NURSE PRACTITIONER

## 2017-07-19 PROCEDURE — 92526 ORAL FUNCTION THERAPY: CPT | Mod: GN | Performed by: SPEECH-LANGUAGE PATHOLOGIST

## 2017-07-19 PROCEDURE — 36592 COLLECT BLOOD FROM PICC: CPT | Performed by: NEUROLOGICAL SURGERY

## 2017-07-19 PROCEDURE — 12000003 ZZH R&B CRITICAL UMMC

## 2017-07-19 PROCEDURE — 25000132 ZZH RX MED GY IP 250 OP 250 PS 637: Performed by: NEUROLOGICAL SURGERY

## 2017-07-19 PROCEDURE — 40000133 ZZH STATISTIC OT WARD VISIT: Performed by: OCCUPATIONAL THERAPIST

## 2017-07-19 PROCEDURE — 40000275 ZZH STATISTIC RCP TIME EA 10 MIN

## 2017-07-19 PROCEDURE — 40000802 ZZH SITE CHECK

## 2017-07-19 PROCEDURE — 84132 ASSAY OF SERUM POTASSIUM: CPT | Performed by: NEUROLOGICAL SURGERY

## 2017-07-19 PROCEDURE — 00000146 ZZHCL STATISTIC GLUCOSE BY METER IP

## 2017-07-19 PROCEDURE — 71010 XR CHEST PORT 1 VW: CPT

## 2017-07-19 PROCEDURE — 84145 PROCALCITONIN (PCT): CPT | Performed by: NURSE PRACTITIONER

## 2017-07-19 PROCEDURE — 82803 BLOOD GASES ANY COMBINATION: CPT | Performed by: STUDENT IN AN ORGANIZED HEALTH CARE EDUCATION/TRAINING PROGRAM

## 2017-07-19 PROCEDURE — 84100 ASSAY OF PHOSPHORUS: CPT | Performed by: NEUROLOGICAL SURGERY

## 2017-07-19 PROCEDURE — 81001 URINALYSIS AUTO W/SCOPE: CPT | Performed by: NURSE PRACTITIONER

## 2017-07-19 PROCEDURE — 25000128 H RX IP 250 OP 636: Performed by: STUDENT IN AN ORGANIZED HEALTH CARE EDUCATION/TRAINING PROGRAM

## 2017-07-19 PROCEDURE — 25000125 ZZHC RX 250: Performed by: STUDENT IN AN ORGANIZED HEALTH CARE EDUCATION/TRAINING PROGRAM

## 2017-07-19 PROCEDURE — 40000225 ZZH STATISTIC SLP WARD VISIT: Performed by: SPEECH-LANGUAGE PATHOLOGIST

## 2017-07-19 PROCEDURE — 27210429 ZZH NUTRITION PRODUCT INTERMEDIATE LITER

## 2017-07-19 PROCEDURE — 25000132 ZZH RX MED GY IP 250 OP 250 PS 637: Performed by: NURSE PRACTITIONER

## 2017-07-19 PROCEDURE — 36415 COLL VENOUS BLD VENIPUNCTURE: CPT | Performed by: NURSE PRACTITIONER

## 2017-07-19 PROCEDURE — 86140 C-REACTIVE PROTEIN: CPT | Performed by: NURSE PRACTITIONER

## 2017-07-19 RX ORDER — ACETYLCYSTEINE 200 MG/ML
2 SOLUTION ORAL; RESPIRATORY (INHALATION) EVERY 4 HOURS
Status: DISCONTINUED | OUTPATIENT
Start: 2017-07-19 | End: 2017-07-19 | Stop reason: ALTCHOICE

## 2017-07-19 RX ORDER — ALBUTEROL SULFATE 0.83 MG/ML
2.5 SOLUTION RESPIRATORY (INHALATION) EVERY 6 HOURS PRN
Status: DISCONTINUED | OUTPATIENT
Start: 2017-07-19 | End: 2017-07-26 | Stop reason: HOSPADM

## 2017-07-19 RX ORDER — FUROSEMIDE 10 MG/ML
20 SOLUTION ORAL ONCE
Status: COMPLETED | OUTPATIENT
Start: 2017-07-19 | End: 2017-07-19

## 2017-07-19 RX ADMIN — INSULIN ASPART 5 UNITS: 100 INJECTION, SOLUTION INTRAVENOUS; SUBCUTANEOUS at 07:09

## 2017-07-19 RX ADMIN — CEFAZOLIN SODIUM 2 G: 2 INJECTION, SOLUTION INTRAVENOUS at 02:21

## 2017-07-19 RX ADMIN — INSULIN ASPART 4 UNITS: 100 INJECTION, SOLUTION INTRAVENOUS; SUBCUTANEOUS at 14:11

## 2017-07-19 RX ADMIN — CEFAZOLIN SODIUM 2 G: 2 INJECTION, SOLUTION INTRAVENOUS at 11:29

## 2017-07-19 RX ADMIN — POTASSIUM PHOSPHATE, MONOBASIC AND POTASSIUM PHOSPHATE, DIBASIC 15 MMOL: 224; 236 INJECTION, SOLUTION INTRAVENOUS at 20:54

## 2017-07-19 RX ADMIN — POTASSIUM CHLORIDE 20 MEQ: 1.5 POWDER, FOR SOLUTION ORAL at 17:17

## 2017-07-19 RX ADMIN — LIDOCAINE 3 PATCH: 50 PATCH CUTANEOUS at 08:58

## 2017-07-19 RX ADMIN — METOPROLOL TARTRATE 25 MG: 25 TABLET, FILM COATED ORAL at 20:54

## 2017-07-19 RX ADMIN — INSULIN ASPART 5 UNITS: 100 INJECTION, SOLUTION INTRAVENOUS; SUBCUTANEOUS at 02:21

## 2017-07-19 RX ADMIN — INSULIN ASPART 4 UNITS: 100 INJECTION, SOLUTION INTRAVENOUS; SUBCUTANEOUS at 17:17

## 2017-07-19 RX ADMIN — INSULIN ASPART 5 UNITS: 100 INJECTION, SOLUTION INTRAVENOUS; SUBCUTANEOUS at 11:28

## 2017-07-19 RX ADMIN — Medication 1 PACKET: at 14:08

## 2017-07-19 RX ADMIN — CEFAZOLIN SODIUM 2 G: 2 INJECTION, SOLUTION INTRAVENOUS at 18:56

## 2017-07-19 RX ADMIN — INSULIN ASPART 5 UNITS: 100 INJECTION, SOLUTION INTRAVENOUS; SUBCUTANEOUS at 22:16

## 2017-07-19 RX ADMIN — METOPROLOL TARTRATE 25 MG: 25 TABLET, FILM COATED ORAL at 08:53

## 2017-07-19 RX ADMIN — Medication 1 PACKET: at 08:57

## 2017-07-19 RX ADMIN — MULTIVIT AND MINERALS-FERROUS GLUCONATE 9 MG IRON/15 ML ORAL LIQUID 15 ML: at 08:53

## 2017-07-19 RX ADMIN — Medication 20 MG: at 08:51

## 2017-07-19 RX ADMIN — FUROSEMIDE 20 MG: 10 SOLUTION ORAL at 14:07

## 2017-07-19 RX ADMIN — FOLIC ACID 1 MG: 1 TABLET ORAL at 08:53

## 2017-07-19 ASSESSMENT — VISUAL ACUITY
OU: NORMAL ACUITY

## 2017-07-19 NOTE — PLAN OF CARE
Problem: Goal Outcome Summary  Goal: Goal Outcome Summary  Outcome: No Change  AVSS. D/o place and time, forgetful but reorients easily. Neuros continue to be unchanged, garbled speech, slow to track, LUE 4/5, all other extremities 3/5, MANUEL some neuros d/t pt not following commands. C collar on at all times. Denied any pain. PICC SL. NPO, TF via NJ at 45ml/hr which is goal. Straight cathed around 2300, bladder scanned around 0600 for 138. Turned q2hrs, last turned at 0650. No loose stools overnight. Continues to have congested cough, swallows secretions occasionally. Needs encouragement to cough and use oral suctioning. NT suctioned x2 for large amounts of creamy, thick secretions. Oral cares completed frequently. LLE toe dressing CDI. BA and pulse ox on. Will d/c to [facility] to spinal cord rehab unit when appropriate. Continue with POC.

## 2017-07-19 NOTE — PROGRESS NOTES
Social Work Services Progress Note    Hospital Day: 13  Date of Initial Social Work Evaluation:    7/13/17  Collaborated with:  Referral  at the [facility] (Agnes Wolf [phone]), patient ,  Vaishali Duffy (NP Neurosurgery)    Data:  Rehab placement is anticipated upon discharge from acute hospitalization.      Intervention:  Per rounds report,  Vaishali Duffy (NP Neurosurgery) reports that pt is congested and having lots of secretions.  RT and acapella ordered.      Assessment: Attended rounds report.  ETELVINA phoned  Referral  at the [facility] (Agnes Wolf [phone]) and confirmed that she received 7/18/17 faxed referral.  Agnes indicates that the referral was received. Agnes indicates that per their assessment, pt is not yet medically stable for transfer to rehab.  Agnes indicates that pt's white count is elevated and needs to be stable.  Agnes indicates that they noted hyponatremia and add's that pt's sodium level needs to be normal before they can consider pt for admit.  Agnes indicates that they will not accept pt on a insulin drip. ETELVINA informed Agnes that the insulin drip was discontinued on 7/18/17.    ETELVINA updated pt in regards to discharge planning.    Assessment:  Pt voices understanding of the discharge plan and agreement with the discharge plan.    Plan:    Anticipated Disposition:  Rehab placement (spinal cord rehab at the [facility] is being pursued)    Barriers to d/c plan:  Medical readiness    Follow Up:  ETELVINA will continue to follow for discharge planning.    FREDDY Dooley  Social Work, 6A  Phone:  [phone]  Pager:  [phone]  7/19/2017

## 2017-07-19 NOTE — PLAN OF CARE
Problem: Goal Outcome Summary  Goal: Goal Outcome Summary  Outcome: Improving  Calm and cooperative.  Oriented to person and hospital, visited with spouse.. Tube feeds at goal of 45ml/hour.  Unable to void and straight cathed for 650ml at 2245.  Incontinent of loose watery stool.  Neck incisions intact. Cervical collar on at all times.  Denies pain.  Large amounts of white secretions, strong cough. Lungs coarse.  Turned and repositioned every 2 hours.  Blood sugars in low 200's and covered with SS Insulin. Feeding replaced today and PICC placed.  Plan at discharge is rehab at [facility] in spinal cord rehab unit.  Continue POC.

## 2017-07-19 NOTE — PROGRESS NOTES
[facility], [place]    Neurosurgery Daily Progress Note         Assessment   Ernesto Rawls is a 72 year old male who is postoperative day # 12/7/3 from C3-5 and C6 laminectomy, C5 corpectomy w/cage placement, and C3-C7 posterior fusion.          Plan       Routine neuro exams per unit protocol.     Continue current pain control regimen    post-operative X-rays completed    Aspen collar on at all times for 6 weeks.       ID: OR cultures positive for MSSA. On Cefazolin with vancomycin and cefepime discontinued as per ID recs. PICC line placed.     Transition off insulin drip.     Dentistry: follow up with a dentist as an outpatient    NJ tube placed.     Podiatry: daily dressing changes    MAP > 80, has been maintaining without pressors.     Incentive spirometry Q1H while awake.     Bowel regimen. Anti-emetics.     SCDs while in bed.    PT/OT    Dispo; Stable on 6A.  [facility]       Please dial * * * and enter job code [phone] to reach the neurosurgery team if you have any questions.      Patient and above plan discussed with neurosurgery chief resident.         Subjective     Continuing to improve. More oriented and following commands.             Objective   Temp:  [96.9  F (36.1  C)-100.1  F (37.8  C)] 96.9  F (36.1  C)  Pulse:  [] 98  Resp:  [16-20] 16  BP: (139-167)/(55-82) 167/66  SpO2:  [91 %-98 %] 98 %    I/O last 3 completed shifts:  In: 1700 [I.V.:40; NG/GT:760]  Out: 1500 [Urine:1500]    Physical Exam  General: Aspen cervical collar in place.   Wound: Anterior incision c/d/i, no significant swelling, erythema, or drainage.  Right foot wrapped.      Neurologic Exam:  - A&Ox2. Following commands in the upper extremities but not in the lower extremities. Generally does not participate in exam.  - PERRL, EOMI.  Motor: Normal bulk/tone; no tremor, rigidity, or bradykinesia.        Labs and Imaging     BMP    Recent Labs  Lab  07/19/17  1430 07/19/17  0746 07/17/17  1223 07/17/17  0741 07/16/17  1320 07/16/17  0950  07/15/17  1618     --  130* 130* 133 133  < > 135   POTASSIUM 3.5 3.5  --  3.7 3.7 3.4  < > 3.9   CHLORIDE 97  --   --  95 99  --   --  100   KAROL 8.3*  --   --  8.6 8.3*  --   --  8.6   CO2 31  --   --  29 29  --   --  28   BUN 17  --   --  13 10  --   --  10   CR 0.46*  --   --  0.50* 0.41*  --   --  0.40*   *  --   --  169* 154* 169*  < > 252*   < > = values in this interval not displayed.  CBC    Recent Labs  Lab 07/19/17  1430 07/17/17  0741 07/16/17  1320 07/16/17  0950  07/15/17  1618   WBC 11.9* 11.9* 10.0  --   --  8.6   RBC 2.89* 3.18* 3.05*  --   --  3.27*   HGB 9.0* 10.0* 9.6* 10.1*  < > 10.6*   HCT 27.1* 30.0* 28.7*  --   --  31.2*   MCV 94 94 94  --   --  95   MCH 31.1 31.4 31.5  --   --  32.4   MCHC 33.2 33.3 33.4  --   --  34.0   RDW 14.4 14.1 14.2  --   --  13.9    229 243  --   --  275   < > = values in this interval not displayed.  INR    Recent Labs  Lab 07/15/17  1618 07/13/17  0452   INR 1.12 1.23*       Imaging: Reviewed      Contact the neurosurgery resident on call with questions.    Dial * * *[phone]: Enter [phone] when prompted.   Bob Marcus MD,PhD  Neurosurgery PGY-2

## 2017-07-19 NOTE — PLAN OF CARE
Problem: Goal Outcome Summary  Goal: Goal Outcome Summary  OT/6A: Facilitated AAROM for BUE to prevent deconditioning and increase strength for functional performance in ADLs.  Completed 1 set, 10 reps each across 6 planes.  BUE strength 2/5.  Noted increased tone in BUE, L>R in elbow ext.  Secretions and wet clarity noted throughout session, RN notified.  Pt limited by poor activity tolerance and decreased strength in BUE to IND perform ADLs.     Recommend: ARU with speciality in spinal cord rehab

## 2017-07-19 NOTE — PLAN OF CARE
Problem: Goal Outcome Summary  Goal: Goal Outcome Summary  AVSS. Oriented to self, forgetful (Wife visited and said pt much more confused than yesterday). Neuros continue w/ garbled speech, slow to track, LUE 4/5, all other extremities 3/5, MANUEL some neuros d/t pt not following commands. C collar on at all times. Denied any pain. PICC SL btwn abx. NPO, TF via NJ at 45ml/hr which is goal; saline flushes decreased to 30mL/4hr. Straight cathed twice today, last at 1715 for 500mL. Turned q2hrs. 3 loose incontinent stools today. Continues to have congested cough, swallows secretions occasionally. Needs encouragement to cough and use oral suctioning. NT suctioned about a dozen times today for large amounts of creamy, thick secretions. Oral cares completed. Lasix (20mg) given this afternoon. RT seeing pt as well (ABG s being drawn). Up w/lift to recliner later this afternoon, tolerated well. LLE toe dressing CDI.  K+ replaced. Ph+ needing replaced, ordered from pharmacy (will start if it comes). [facility]. Continue with POC.

## 2017-07-19 NOTE — PROGRESS NOTES
[facility], [place]    Neurosurgery Daily Progress Note         Assessment   Ernesto Rawls is a 72 year old male who is postoperative day # 13/8/4 from C3-5 and C6 laminectomy, C5 corpectomy w/cage placement, and C3-C7 posterior fusion.          Plan       Routine neuro exams per unit protocol.     Continue current pain control regimen    Feeding tube back in place    Hemovac DC'd 7/18    Post-operative X-rays cervical spine on 7/18 are stable    Bledsoe collar on at all times.     Tolerating room air.      ID: OR cultures positive for MSSA. On Cefazolin with vancomycin and cefepime discontinued as per ID recs.     DC'd insulin gtt; f/up blood glucose    Dentistry: follow up with a dentist as an outpatient    Podiatry: daily dressing changes    MAP > 80, has been maintaining without pressors.     Several runs of V-tach. Cardiology consulted: thought to be due to infection. Metoprolol 25 mg BID and electrolyte replacements.         Incentive spirometry Q1H while awake.     Bowel regimen. Anti-emetics.     SCDs while in bed.    PT/OT    Dispo; Stable on 6A.  [facility] further rehab 1-2 days after surgery.       Please dial * * * and enter job code [phone] to reach the neurosurgery team if you have any questions.      Patient and above plan discussed with neurosurgery chief resident.         Subjective   Doing well. Respiratory secretions able to be controlled.          Objective   Temp:  [96.4  F (35.8  C)-100.1  F (37.8  C)] 99.1  F (37.3  C)  Pulse:  [] 101  Resp:  [16-20] 16  BP: (139-167)/(55-82) 151/68  SpO2:  [91 %-96 %] 96 %    I/O last 3 completed shifts:  In: 1320 [NG/GT:600]  Out: 650 [Urine:650]    Physical Exam  General: Aspen cervical collar in place.   Wound: Anterior incision c/d/i, no significant swelling, erythema, or drainage.  Right foot wrapped.      Neurologic Exam:  - Alert and oriented to hospital and year but  belie[facility].  - Strength: 5/5 in deltoids bilaterally, left biceps/triceps are 4+/5 on the left, 4-/5 on the right, wrist extensors are 3/5 on the left and 2/5 on the right, finger intrinsics are 4-/5 on the left, 2/5 on the right. Iliopsoas are 4-/5 on the left and 2/5 on the right, quadriceps are 4-/5 on the left and 2/5 on the right, tibialis anterior/gastrocnemius are 4+/5 on the left, 4-/5 on the right. Poor participation in lower extremity exam.  - PERRL, EOMI.  Motor: Normal bulk/tone; no tremor, rigidity, or bradykinesia.        Labs and Imaging     BMP    Recent Labs  Lab 07/19/17  0746 07/17/17  1223 07/17/17  0741 07/16/17  1320 07/16/17  0950 07/16/17  0847 07/15/17  1618 07/15/17  0804   NA  --  130* 130* 133 133 133 135 135   POTASSIUM 3.5  --  3.7 3.7 3.4 3.7 3.9 3.8   CHLORIDE  --   --  95 99  --   --  100 100   KAROL  --   --  8.6 8.3*  --   --  8.6 8.5   CO2  --   --  29 29  --   --  28 28   BUN  --   --  13 10  --   --  10 10   CR  --   --  0.50* 0.41*  --   --  0.40* 0.40*   GLC  --   --  169* 154* 169* 170* 252* 345*     CBC    Recent Labs  Lab 07/17/17  0741 07/16/17  1320 07/16/17  0950 07/16/17  0847 07/15/17  1618 07/15/17  0804   WBC 11.9* 10.0  --   --  8.6 7.5   RBC 3.18* 3.05*  --   --  3.27* 3.19*   HGB 10.0* 9.6* 10.1* 10.0* 10.6* 10.2*   HCT 30.0* 28.7*  --   --  31.2* 30.1*   MCV 94 94  --   --  95 94   MCH 31.4 31.5  --   --  32.4 32.0   MCHC 33.3 33.4  --   --  34.0 33.9   RDW 14.1 14.2  --   --  13.9 13.7    243  --   --  275 289     INR    Recent Labs  Lab 07/15/17  1618 07/13/17  0452   INR 1.12 1.23*       Imaging: Reviewed      Contact the neurosurgery resident on call with questions.    Dial * * *[phone]: Enter [phone] when prompted.   Bob Marcus MD,PhD  Neurosurgery PGY-2

## 2017-07-19 NOTE — PLAN OF CARE
Problem: Goal Outcome Summary  Goal: Goal Outcome Summary  Outcome: Therapy, progress toward functional goals is gradual  SLP:  Pt seen to f/u for swallowing. Pt noted to have ongoing copious secretions that required frequent oral suctioning. Pt was able to swallow; however, significant difficulty clearing these secretions. Pt not safe for any PO.  Recommend strict NPO and RT for management for secretions.

## 2017-07-19 NOTE — PLAN OF CARE
Problem: Goal Outcome Summary  Goal: Goal Outcome Summary  PT 6A:  Attempted PT on 3 separate occassions today, however could not get started with therapy 2/2 scheduling conflicts.  He was receiving RN cares x2 then having STAT ABG drawn.  Rescheduled per POC

## 2017-07-20 ENCOUNTER — APPOINTMENT (OUTPATIENT)
Dept: PHYSICAL THERAPY | Facility: CLINIC | Age: 72
DRG: 028 | End: 2017-07-20
Attending: NEUROLOGICAL SURGERY
Payer: COMMERCIAL

## 2017-07-20 ENCOUNTER — APPOINTMENT (OUTPATIENT)
Dept: OCCUPATIONAL THERAPY | Facility: CLINIC | Age: 72
DRG: 028 | End: 2017-07-20
Attending: NEUROLOGICAL SURGERY
Payer: COMMERCIAL

## 2017-07-20 ENCOUNTER — APPOINTMENT (OUTPATIENT)
Dept: SPEECH THERAPY | Facility: CLINIC | Age: 72
DRG: 028 | End: 2017-07-20
Attending: NEUROLOGICAL SURGERY
Payer: COMMERCIAL

## 2017-07-20 LAB
ANION GAP SERPL CALCULATED.3IONS-SCNC: 6 MMOL/L (ref 3–14)
ANION GAP SERPL CALCULATED.3IONS-SCNC: 8 MMOL/L (ref 3–14)
BUN SERPL-MCNC: 14 MG/DL (ref 7–30)
BUN SERPL-MCNC: 14 MG/DL (ref 7–30)
CALCIUM SERPL-MCNC: 8.4 MG/DL (ref 8.5–10.1)
CALCIUM SERPL-MCNC: 8.8 MG/DL (ref 8.5–10.1)
CHLORIDE SERPL-SCNC: 96 MMOL/L (ref 94–109)
CHLORIDE SERPL-SCNC: 98 MMOL/L (ref 94–109)
CO2 SERPL-SCNC: 31 MMOL/L (ref 20–32)
CO2 SERPL-SCNC: 32 MMOL/L (ref 20–32)
CREAT SERPL-MCNC: 0.4 MG/DL (ref 0.66–1.25)
CREAT SERPL-MCNC: 0.42 MG/DL (ref 0.66–1.25)
ERYTHROCYTE [DISTWIDTH] IN BLOOD BY AUTOMATED COUNT: 14.3 % (ref 10–15)
GFR SERPL CREATININE-BSD FRML MDRD: ABNORMAL ML/MIN/1.7M2
GFR SERPL CREATININE-BSD FRML MDRD: ABNORMAL ML/MIN/1.7M2
GLUCOSE BLDC GLUCOMTR-MCNC: 220 MG/DL (ref 70–99)
GLUCOSE BLDC GLUCOMTR-MCNC: 222 MG/DL (ref 70–99)
GLUCOSE BLDC GLUCOMTR-MCNC: 239 MG/DL (ref 70–99)
GLUCOSE BLDC GLUCOMTR-MCNC: 250 MG/DL (ref 70–99)
GLUCOSE BLDC GLUCOMTR-MCNC: 282 MG/DL (ref 70–99)
GLUCOSE BLDC GLUCOMTR-MCNC: 304 MG/DL (ref 70–99)
GLUCOSE SERPL-MCNC: 266 MG/DL (ref 70–99)
GLUCOSE SERPL-MCNC: 305 MG/DL (ref 70–99)
HCT VFR BLD AUTO: 28.4 % (ref 40–53)
HGB BLD-MCNC: 9.6 G/DL (ref 13.3–17.7)
MAGNESIUM SERPL-MCNC: 2 MG/DL (ref 1.6–2.3)
MCH RBC QN AUTO: 31.7 PG (ref 26.5–33)
MCHC RBC AUTO-ENTMCNC: 33.8 G/DL (ref 31.5–36.5)
MCV RBC AUTO: 94 FL (ref 78–100)
PHOSPHATE SERPL-MCNC: 2.5 MG/DL (ref 2.5–4.5)
PLATELET # BLD AUTO: 226 10E9/L (ref 150–450)
POTASSIUM SERPL-SCNC: 3.6 MMOL/L (ref 3.4–5.3)
POTASSIUM SERPL-SCNC: 3.6 MMOL/L (ref 3.4–5.3)
RBC # BLD AUTO: 3.03 10E12/L (ref 4.4–5.9)
SODIUM SERPL-SCNC: 134 MMOL/L (ref 133–144)
SODIUM SERPL-SCNC: 136 MMOL/L (ref 133–144)
WBC # BLD AUTO: 10.8 10E9/L (ref 4–11)

## 2017-07-20 PROCEDURE — 25000132 ZZH RX MED GY IP 250 OP 250 PS 637: Performed by: NEUROLOGICAL SURGERY

## 2017-07-20 PROCEDURE — 25000128 H RX IP 250 OP 636: Performed by: STUDENT IN AN ORGANIZED HEALTH CARE EDUCATION/TRAINING PROGRAM

## 2017-07-20 PROCEDURE — 27210429 ZZH NUTRITION PRODUCT INTERMEDIATE LITER

## 2017-07-20 PROCEDURE — 27210995 ZZH RX 272

## 2017-07-20 PROCEDURE — 25000125 ZZHC RX 250: Performed by: STUDENT IN AN ORGANIZED HEALTH CARE EDUCATION/TRAINING PROGRAM

## 2017-07-20 PROCEDURE — 40000225 ZZH STATISTIC SLP WARD VISIT: Performed by: SPEECH-LANGUAGE PATHOLOGIST

## 2017-07-20 PROCEDURE — 40000802 ZZH SITE CHECK

## 2017-07-20 PROCEDURE — 40000133 ZZH STATISTIC OT WARD VISIT: Performed by: OCCUPATIONAL THERAPIST

## 2017-07-20 PROCEDURE — 92526 ORAL FUNCTION THERAPY: CPT | Mod: GN | Performed by: SPEECH-LANGUAGE PATHOLOGIST

## 2017-07-20 PROCEDURE — 80048 BASIC METABOLIC PNL TOTAL CA: CPT | Performed by: NURSE PRACTITIONER

## 2017-07-20 PROCEDURE — 84100 ASSAY OF PHOSPHORUS: CPT | Performed by: NURSE PRACTITIONER

## 2017-07-20 PROCEDURE — 25000125 ZZHC RX 250: Performed by: NEUROLOGICAL SURGERY

## 2017-07-20 PROCEDURE — 25000132 ZZH RX MED GY IP 250 OP 250 PS 637: Performed by: STUDENT IN AN ORGANIZED HEALTH CARE EDUCATION/TRAINING PROGRAM

## 2017-07-20 PROCEDURE — 97530 THERAPEUTIC ACTIVITIES: CPT | Mod: GO | Performed by: OCCUPATIONAL THERAPIST

## 2017-07-20 PROCEDURE — 83735 ASSAY OF MAGNESIUM: CPT | Performed by: NURSE PRACTITIONER

## 2017-07-20 PROCEDURE — 97110 THERAPEUTIC EXERCISES: CPT | Mod: GP

## 2017-07-20 PROCEDURE — 40000193 ZZH STATISTIC PT WARD VISIT

## 2017-07-20 PROCEDURE — 25000125 ZZHC RX 250: Performed by: NURSE PRACTITIONER

## 2017-07-20 PROCEDURE — 36592 COLLECT BLOOD FROM PICC: CPT | Performed by: NURSE PRACTITIONER

## 2017-07-20 PROCEDURE — 00000146 ZZHCL STATISTIC GLUCOSE BY METER IP

## 2017-07-20 PROCEDURE — 12000008 ZZH R&B INTERMEDIATE UMMC

## 2017-07-20 PROCEDURE — 25000128 H RX IP 250 OP 636: Performed by: NURSE PRACTITIONER

## 2017-07-20 PROCEDURE — 97530 THERAPEUTIC ACTIVITIES: CPT | Mod: GP

## 2017-07-20 PROCEDURE — 85027 COMPLETE CBC AUTOMATED: CPT | Performed by: NURSE PRACTITIONER

## 2017-07-20 RX ORDER — MAGNESIUM HYDROXIDE 1200 MG/15ML
LIQUID ORAL
Status: COMPLETED
Start: 2017-07-20 | End: 2017-07-20

## 2017-07-20 RX ORDER — HEPARIN SODIUM,PORCINE 10 UNIT/ML
5-10 VIAL (ML) INTRAVENOUS
Status: DISCONTINUED | OUTPATIENT
Start: 2017-07-20 | End: 2017-07-26 | Stop reason: HOSPADM

## 2017-07-20 RX ORDER — FUROSEMIDE 10 MG/ML
20 INJECTION INTRAMUSCULAR; INTRAVENOUS ONCE
Status: COMPLETED | OUTPATIENT
Start: 2017-07-20 | End: 2017-07-20

## 2017-07-20 RX ORDER — HEPARIN SODIUM,PORCINE 10 UNIT/ML
5-10 VIAL (ML) INTRAVENOUS EVERY 24 HOURS
Status: DISCONTINUED | OUTPATIENT
Start: 2017-07-20 | End: 2017-07-26 | Stop reason: HOSPADM

## 2017-07-20 RX ORDER — GLYCOPYRROLATE 0.2 MG/ML
0.1 INJECTION, SOLUTION INTRAMUSCULAR; INTRAVENOUS 2 TIMES DAILY
Status: DISCONTINUED | OUTPATIENT
Start: 2017-07-20 | End: 2017-07-21

## 2017-07-20 RX ADMIN — INSULIN ASPART 7 UNITS: 100 INJECTION, SOLUTION INTRAVENOUS; SUBCUTANEOUS at 09:25

## 2017-07-20 RX ADMIN — SODIUM CHLORIDE, PRESERVATIVE FREE 3 ML: 5 INJECTION INTRAVENOUS at 09:34

## 2017-07-20 RX ADMIN — CEFAZOLIN SODIUM 2 G: 2 INJECTION, SOLUTION INTRAVENOUS at 11:25

## 2017-07-20 RX ADMIN — FOLIC ACID 1 MG: 1 TABLET ORAL at 09:25

## 2017-07-20 RX ADMIN — MULTIVIT AND MINERALS-FERROUS GLUCONATE 9 MG IRON/15 ML ORAL LIQUID 15 ML: at 09:25

## 2017-07-20 RX ADMIN — Medication 1 PACKET: at 09:28

## 2017-07-20 RX ADMIN — INSULIN ASPART 4 UNITS: 100 INJECTION, SOLUTION INTRAVENOUS; SUBCUTANEOUS at 23:36

## 2017-07-20 RX ADMIN — SENNOSIDES AND DOCUSATE SODIUM 1 TABLET: 8.6; 5 TABLET ORAL at 21:07

## 2017-07-20 RX ADMIN — Medication 2 G: at 16:05

## 2017-07-20 RX ADMIN — LIDOCAINE 3 PATCH: 50 PATCH CUTANEOUS at 09:26

## 2017-07-20 RX ADMIN — SODIUM CHLORIDE, PRESERVATIVE FREE 5 ML: 5 INJECTION INTRAVENOUS at 12:44

## 2017-07-20 RX ADMIN — SODIUM CHLORIDE: 900 IRRIGANT IRRIGATION at 11:26

## 2017-07-20 RX ADMIN — CEFAZOLIN SODIUM 2 G: 2 INJECTION, SOLUTION INTRAVENOUS at 04:38

## 2017-07-20 RX ADMIN — INSULIN ASPART 4 UNITS: 100 INJECTION, SOLUTION INTRAVENOUS; SUBCUTANEOUS at 01:59

## 2017-07-20 RX ADMIN — ENOXAPARIN SODIUM 30 MG: 30 INJECTION SUBCUTANEOUS at 11:26

## 2017-07-20 RX ADMIN — CEFAZOLIN SODIUM 2 G: 2 INJECTION, SOLUTION INTRAVENOUS at 21:01

## 2017-07-20 RX ADMIN — INSULIN ASPART 6 UNITS: 100 INJECTION, SOLUTION INTRAVENOUS; SUBCUTANEOUS at 06:37

## 2017-07-20 RX ADMIN — Medication 1 PACKET: at 14:48

## 2017-07-20 RX ADMIN — METOPROLOL TARTRATE 25 MG: 25 TABLET, FILM COATED ORAL at 09:39

## 2017-07-20 RX ADMIN — POTASSIUM CHLORIDE 20 MEQ: 1.5 POWDER, FOR SOLUTION ORAL at 11:25

## 2017-07-20 RX ADMIN — INSULIN ASPART 5 UNITS: 100 INJECTION, SOLUTION INTRAVENOUS; SUBCUTANEOUS at 14:47

## 2017-07-20 RX ADMIN — INSULIN ASPART 4 UNITS: 100 INJECTION, SOLUTION INTRAVENOUS; SUBCUTANEOUS at 18:02

## 2017-07-20 RX ADMIN — Medication 20 MG: at 09:25

## 2017-07-20 RX ADMIN — ENOXAPARIN SODIUM 30 MG: 30 INJECTION SUBCUTANEOUS at 21:02

## 2017-07-20 RX ADMIN — METOPROLOL TARTRATE 25 MG: 25 TABLET, FILM COATED ORAL at 20:59

## 2017-07-20 RX ADMIN — GLYCOPYRROLATE 0.1 MG: 0.2 INJECTION, SOLUTION INTRAMUSCULAR; INTRAVENOUS at 21:01

## 2017-07-20 RX ADMIN — POTASSIUM PHOSPHATE, MONOBASIC AND POTASSIUM PHOSPHATE, DIBASIC 10 MMOL: 224; 236 INJECTION, SOLUTION INTRAVENOUS at 17:04

## 2017-07-20 RX ADMIN — FUROSEMIDE 20 MG: 10 INJECTION, SOLUTION INTRAVENOUS at 06:47

## 2017-07-20 ASSESSMENT — VISUAL ACUITY
OU: NORMAL ACUITY

## 2017-07-20 NOTE — PROGRESS NOTES
CLINICAL NUTRITION SERVICES - REASSESSMENT NOTE     Nutrition Prescription    RECOMMENDATIONS FOR MDs/PROVIDERS TO ORDER:  - Recommend starting pt on oral phosphorus replacement with Neutraphos 1 packet TID in order to help correct and stabilize low PO4 levels.    - Recommend starting pt on anti-diarrhea agents to help with loose stools.    Malnutrition Status:    Severe malnutrition in the context of acute on chronic illness    Recommendations already ordered by Registered Dietitian (RD):  - Order lab add on to check Mg and PO4  - Obtain current wt    Future/Additional Recommendations:  - Need to increase TF rate if wt remains low     EVALUATION OF THE PROGRESS TOWARD GOALS   Diet: Remains NPO per SLP recommendation today d/t unsafe for any oral intakes.    Nutrition Support: Pt continues on TF via NDT ( replaced on 7/18 secondary to pt pulling out FT) consisting of TwoCal HN at goal of 45 ml/hr (1080 ml). Provides  2160 kcals and 91 g PRO  - Pt also receives Prostat 1 packet TID which provides additional 300 kcals and 45 g PRO.    Intake: Ave TF intakes received x past 7 days = 519 ml  which provides 1038 kcals and 44 g PRO.    Ave intakes of Prostat supplement received x 7 days = 2.4 packets which provides 240 kcals and 36 g PRO.    Total ave nutritional intakes received (from TF + Prostat supplement) = 1278 kcals  and 80 g PRO     NEW FINDINGS   Weight: 88.5 kg (7/19), 87 kg (7/13), 94.8 kg (7/7 - admit wt); Wt trending upward slightly over past week, but remains 6.3 kg below admit wt (x past 2 weeks)    Labs:   PO4 2.0 (yesterday) low, no new data available today. Has been < normal since 7/12. Question in part d/t increased losses d/t ongoing loose stools vs inadequate PO4 replacement protocol.     Mg last checked on 7/15.    MALNUTRITION  % Intake: < 75% for > 7 days (non-severe)  % Weight Loss: > 2% in 1 week (severe)  Subcutaneous Fat Loss: Unable to reassess secondary to pt busy with PT. None observed per  previous assessment on 7/13.  Muscle Loss: Unable to reassess secondary to pt busy with PT. Per previous RD assessment on 7/13, pt with Scapular bone, Thoracic region (clavicle, acromium bone, deltoid, trapezius, pectoral):, Upper arm (bicep, tricep):, Upper leg (quadricep, hamstring), Patellar region: and Posterior calf: moderate   Fluid Accumulation/Edema: None noted per chart review  Malnutrition Diagnosis: Severe malnutrition in the context of acute on chronic illness    Previous Goals   Total avg nutritional intake to meet a minimum of 25 kcal/kg and 1.2 g PRO/kg daily (per dosing wt 95 kg).    Evaluation: Not met    Previous Nutrition Diagnosis  Inadequate protein-energy intake related to progression of TF regimen and TFs held for procedure as evidenced by pt received minimal EN infusion x 2+ days with poor intake PTA.      Evaluation: Improving    CURRENT NUTRITION DIAGNOSIS  Inadequate protein-energy intake related to remains dependent on TF d/t unsafe for oral intake per SLP, but pt has not received goal TF infusion vol as evidenced by wt loss of 6.3 kg x past 2 weeks and Total ave nutritional intakes received (from TF + Prostat supplement) meeting 14 kcals/kg and 0.9 g PRO/kg.    INTERVENTIONS  Implementation  Biochemical data  Obtain new weight    Goals  Total avg nutritional intake to meet a minimum of 25 kcal/kg and 1.2 g PRO/kg daily (per dosing wt 95 kg).     Monitoring/Evaluation  Progress toward goals will be monitored and evaluated per protocol.    Caro Zavala RD,LD  Pager [phone]

## 2017-07-20 NOTE — PROGRESS NOTES
"SPIRITUAL HEALTH SERVICES  SPIRITUAL ASSESSMENT Progress Note  [facility] ([place]) 6A     REFERRAL SOURCE: Initial Visit based of length of stay    PT expressed frustration \"I want to go home, but they have another thing in mind before they can give me the ok.\" PT declined visit at this time.    PLAN: SHS remains available but no further visit planned at this time.    Diego Razo   Intern  Pager [phone]    "

## 2017-07-20 NOTE — PLAN OF CARE
Problem: Goal Outcome Summary  Goal: Goal Outcome Summary  SLP: Pt seen to f/u for swallowing. Pt continued to have evidence of pharyngeal/laryngeal secretions but improved form yesterday.  Pt able to cough secretions to base of tongue and use oral suction to remove today. Oral motor ex completed and pt able to have increased success in eliciting a swallow response.  Pt continues to not be safe for any PO, recommend continue strict NPO and NJ for nutrition.  Rec consideration for PEG TF as appropriate.  ST will continue to follow

## 2017-07-20 NOTE — PLAN OF CARE
Problem: Goal Outcome Summary  Goal: Goal Outcome Summary  OT6A: Recommend pt discharge to inpatient rehab. Pt limited by cognition/hallucinations, weakness and post surgical precautions. Pt required Max A for bed mobility and Min -Mod A for sitting balance at the EOB. Unable to complete functional tasks at the EOB 2/2 poor sitting balance and confusion.

## 2017-07-20 NOTE — PLAN OF CARE
Problem: Goal Outcome Summary  Goal: Goal Outcome Summary  Outcome: No Change  AVSS. Oriented to self. Neuros continue to be unchanged, garbled speech, slow to track, LUE 4/5, all other extremities 3/5, MANUEL some neuros d/t pt not following commands. C collar on at all times. Anterior neck incision with slight edema, CDI with liquid bandage. Posterior neck incision CDI with liquid bandage. Denied any pain. PICC SL. NPO, TF via NJ at 45ml/hr which is goal. York placed overnight d/t retention, good UOP. Turned q2hrs, last turned at 0630. x2 small incontinent BM. Continues to have congested cough, swallows secretions occasionally. Needs encouragement to cough and use oral suctioning. NT suctioned x4 for large amounts of creamy, thick secretions. Oral cares completed frequently. One time dose of lasix given this AM. LLE toe dressing changed, CDI. BA, PCD's, and pulse ox on. K and Ph replaced yesterday, will have redraws this AM. Will d/c to [facility] to spinal cord rehab unit when appropriate. Continue with POC.

## 2017-07-20 NOTE — PLAN OF CARE
Problem: Goal Outcome Summary  Goal: Goal Outcome Summary  6A-PT: Pt performed STS with mod-max A x 2 and blocking of jonathan knees - however unable to obtain full stand. Attempted to perform STS with EZ stand x 2 but pt unable to follow cues to extend hips and shift weight through heels. Utilized arabella lift to transfer pt dependently back to bed. Performed seated and supine exercise to promote LE strengthening and ROM. Pt performed bed mobility - rolling R and L - with mod - max A and significantly vc for sequencing. Pt limited by activity tolerance and dec strength today.     Cont to rec dc to ARU when medically stable for further progression of overall functional mobility.

## 2017-07-20 NOTE — CONSULTS
Endocrinology Diabetes brief note-  Pt's glargine has been appropriately increased today.  Added pharmacy consult requesting change Kphos to normal saline for diluent.  Will contact primary team in morning tomorrow to complete recommendations.  Shamika Kam APRN [id number]  Diabetes Management Team job code: [id number]

## 2017-07-20 NOTE — PROGRESS NOTES
Social Work Services Progress Note    Hospital Day: 14  Date of Initial Social Work Evaluation:  7/13/17  Collaborated with:  Vaishali Duffy NP, Referral  at the [facility] (Agnes Wolf [phone])    Data:  Per Vaishali Duffy, Neuro Surgery NP, discharge is anticipated tomorrow.      Intervention:  Spoke with Vaishali Duffy NP.  Updated Vaishali in regards to this SW's 7/19/17 telephone conversation with the Referral  at the [facility] (white count elevated and needs to be stable and sodium needs to be normal).  Faxed updated clinical informations (progress notes 7/19/17 - forward, vitals, labs, chest xray results, cervical spine) to Agnes Wolf.  Phoned Agnes to inform her that discharge is anticipated tomorrow and that updated records were faxed.    Assessment: Pt may be medically ready for discharge to rehab on 7/21/17.  Per Agnes, they are currently full and may or may not be able to accommodate pt for admit tomorrow if they determine that pt is medically stable for discharge.    Plan:    Anticipated Disposition:  Pursuing spinal cord rehab at the [facility]    Barriers to d/c plan:  Medical readiness, acceptance and bed availability.    Follow Up:  SW will continue to follow.    FREDDY Dooley  Social Work, 6A  Phone:  [phone]  Pager:  [phone]  7/20/2017

## 2017-07-21 ENCOUNTER — APPOINTMENT (OUTPATIENT)
Dept: PHYSICAL THERAPY | Facility: CLINIC | Age: 72
DRG: 028 | End: 2017-07-21
Attending: NEUROLOGICAL SURGERY
Payer: COMMERCIAL

## 2017-07-21 ENCOUNTER — APPOINTMENT (OUTPATIENT)
Dept: SPEECH THERAPY | Facility: CLINIC | Age: 72
DRG: 028 | End: 2017-07-21
Attending: NEUROLOGICAL SURGERY
Payer: COMMERCIAL

## 2017-07-21 ENCOUNTER — APPOINTMENT (OUTPATIENT)
Dept: OCCUPATIONAL THERAPY | Facility: CLINIC | Age: 72
DRG: 028 | End: 2017-07-21
Attending: NEUROLOGICAL SURGERY
Payer: COMMERCIAL

## 2017-07-21 LAB
GLUCOSE BLDC GLUCOMTR-MCNC: 215 MG/DL (ref 70–99)
GLUCOSE BLDC GLUCOMTR-MCNC: 233 MG/DL (ref 70–99)
GLUCOSE BLDC GLUCOMTR-MCNC: 240 MG/DL (ref 70–99)
GLUCOSE BLDC GLUCOMTR-MCNC: 256 MG/DL (ref 70–99)
GLUCOSE BLDC GLUCOMTR-MCNC: 263 MG/DL (ref 70–99)
GLUCOSE BLDC GLUCOMTR-MCNC: 271 MG/DL (ref 70–99)
GLUCOSE BLDC GLUCOMTR-MCNC: 271 MG/DL (ref 70–99)

## 2017-07-21 PROCEDURE — 80048 BASIC METABOLIC PNL TOTAL CA: CPT | Performed by: NEUROLOGICAL SURGERY

## 2017-07-21 PROCEDURE — 97110 THERAPEUTIC EXERCISES: CPT | Mod: GP | Performed by: REHABILITATION PRACTITIONER

## 2017-07-21 PROCEDURE — 25000132 ZZH RX MED GY IP 250 OP 250 PS 637: Performed by: STUDENT IN AN ORGANIZED HEALTH CARE EDUCATION/TRAINING PROGRAM

## 2017-07-21 PROCEDURE — 92526 ORAL FUNCTION THERAPY: CPT | Mod: GN | Performed by: SPEECH-LANGUAGE PATHOLOGIST

## 2017-07-21 PROCEDURE — 27210913 ZZH NUTRITION PRODUCT INTERMEDIATE PACKET

## 2017-07-21 PROCEDURE — 25000128 H RX IP 250 OP 636: Performed by: NURSE PRACTITIONER

## 2017-07-21 PROCEDURE — 12000008 ZZH R&B INTERMEDIATE UMMC

## 2017-07-21 PROCEDURE — 25000132 ZZH RX MED GY IP 250 OP 250 PS 637: Performed by: NEUROLOGICAL SURGERY

## 2017-07-21 PROCEDURE — 25000131 ZZH RX MED GY IP 250 OP 636 PS 637: Performed by: STUDENT IN AN ORGANIZED HEALTH CARE EDUCATION/TRAINING PROGRAM

## 2017-07-21 PROCEDURE — 25000125 ZZHC RX 250: Performed by: NURSE PRACTITIONER

## 2017-07-21 PROCEDURE — 97110 THERAPEUTIC EXERCISES: CPT | Mod: GO

## 2017-07-21 PROCEDURE — 40000802 ZZH SITE CHECK

## 2017-07-21 PROCEDURE — 40000133 ZZH STATISTIC OT WARD VISIT

## 2017-07-21 PROCEDURE — 85027 COMPLETE CBC AUTOMATED: CPT | Performed by: NEUROLOGICAL SURGERY

## 2017-07-21 PROCEDURE — 25000128 H RX IP 250 OP 636: Performed by: STUDENT IN AN ORGANIZED HEALTH CARE EDUCATION/TRAINING PROGRAM

## 2017-07-21 PROCEDURE — 00000146 ZZHCL STATISTIC GLUCOSE BY METER IP

## 2017-07-21 PROCEDURE — 36592 COLLECT BLOOD FROM PICC: CPT | Performed by: NEUROLOGICAL SURGERY

## 2017-07-21 PROCEDURE — 40000193 ZZH STATISTIC PT WARD VISIT: Performed by: REHABILITATION PRACTITIONER

## 2017-07-21 PROCEDURE — 40000225 ZZH STATISTIC SLP WARD VISIT: Performed by: SPEECH-LANGUAGE PATHOLOGIST

## 2017-07-21 PROCEDURE — 25000125 ZZHC RX 250: Performed by: NEUROLOGICAL SURGERY

## 2017-07-21 PROCEDURE — 27210429 ZZH NUTRITION PRODUCT INTERMEDIATE LITER

## 2017-07-21 RX ORDER — ALBUTEROL SULFATE 0.83 MG/ML
2.5 SOLUTION RESPIRATORY (INHALATION) EVERY 6 HOURS PRN
Qty: 360 ML | COMMUNITY
Start: 2017-07-21

## 2017-07-21 RX ORDER — AMINO ACIDS/PROTEIN HYDROLYS 15G-100/30
1 LIQUID (ML) ORAL 3 TIMES DAILY
Qty: 900 PACKET | COMMUNITY
Start: 2017-07-21

## 2017-07-21 RX ORDER — HEPARIN SODIUM,PORCINE 10 UNIT/ML
5-10 VIAL (ML) INTRAVENOUS
COMMUNITY
Start: 2017-07-21

## 2017-07-21 RX ORDER — HEPARIN SODIUM,PORCINE 10 UNIT/ML
5-10 VIAL (ML) INTRAVENOUS EVERY 24 HOURS
COMMUNITY
Start: 2017-07-21

## 2017-07-21 RX ORDER — GLYCOPYRROLATE 0.2 MG/ML
0.2 INJECTION, SOLUTION INTRAMUSCULAR; INTRAVENOUS 2 TIMES DAILY
Status: DISCONTINUED | OUTPATIENT
Start: 2017-07-21 | End: 2017-07-26 | Stop reason: HOSPADM

## 2017-07-21 RX ORDER — FOLIC ACID 1 MG/1
1 TABLET ORAL DAILY
Qty: 30 TABLET | COMMUNITY
Start: 2017-07-21

## 2017-07-21 RX ORDER — BISACODYL 10 MG
10 SUPPOSITORY, RECTAL RECTAL DAILY PRN
Qty: 30 SUPPOSITORY | COMMUNITY
Start: 2017-07-21

## 2017-07-21 RX ORDER — OXYCODONE HYDROCHLORIDE 5 MG/1
5-10 TABLET ORAL EVERY 4 HOURS PRN
Refills: 0 | COMMUNITY
Start: 2017-07-21

## 2017-07-21 RX ORDER — POLYETHYLENE GLYCOL 3350 17 G/17G
17 POWDER, FOR SOLUTION ORAL 2 TIMES DAILY
Qty: 7 PACKET | COMMUNITY
Start: 2017-07-21

## 2017-07-21 RX ORDER — AMOXICILLIN 250 MG
2 CAPSULE ORAL 2 TIMES DAILY
Qty: 100 TABLET | COMMUNITY
Start: 2017-07-21

## 2017-07-21 RX ORDER — ACETAMINOPHEN 325 MG/1
650 TABLET ORAL EVERY 4 HOURS PRN
Qty: 100 TABLET | COMMUNITY
Start: 2017-07-21

## 2017-07-21 RX ORDER — CEFAZOLIN SODIUM 2 G/100ML
2 INJECTION, SOLUTION INTRAVENOUS EVERY 8 HOURS
COMMUNITY
Start: 2017-07-21 | End: 2017-08-27

## 2017-07-21 RX ORDER — CYCLOBENZAPRINE HCL 10 MG
10 TABLET ORAL 3 TIMES DAILY PRN
Qty: 42 TABLET | COMMUNITY
Start: 2017-07-21

## 2017-07-21 RX ORDER — METOPROLOL TARTRATE 25 MG/1
25 TABLET, FILM COATED ORAL 2 TIMES DAILY
Qty: 60 TABLET | COMMUNITY
Start: 2017-07-21

## 2017-07-21 RX ADMIN — GLYCOPYRROLATE 0.2 MG: 0.2 INJECTION, SOLUTION INTRAMUSCULAR; INTRAVENOUS at 08:55

## 2017-07-21 RX ADMIN — ENOXAPARIN SODIUM 30 MG: 30 INJECTION SUBCUTANEOUS at 08:55

## 2017-07-21 RX ADMIN — INSULIN ASPART 5 UNITS: 100 INJECTION, SOLUTION INTRAVENOUS; SUBCUTANEOUS at 14:11

## 2017-07-21 RX ADMIN — METOPROLOL TARTRATE 25 MG: 25 TABLET, FILM COATED ORAL at 20:21

## 2017-07-21 RX ADMIN — OXYCODONE HYDROCHLORIDE 10 MG: 5 TABLET ORAL at 08:55

## 2017-07-21 RX ADMIN — Medication 1 PACKET: at 08:58

## 2017-07-21 RX ADMIN — CEFAZOLIN SODIUM 2 G: 2 INJECTION, SOLUTION INTRAVENOUS at 04:31

## 2017-07-21 RX ADMIN — GLYCOPYRROLATE 0.2 MG: 0.2 INJECTION, SOLUTION INTRAMUSCULAR; INTRAVENOUS at 20:22

## 2017-07-21 RX ADMIN — MULTIVIT AND MINERALS-FERROUS GLUCONATE 9 MG IRON/15 ML ORAL LIQUID 15 ML: at 08:55

## 2017-07-21 RX ADMIN — FOLIC ACID 1 MG: 1 TABLET ORAL at 08:55

## 2017-07-21 RX ADMIN — Medication 20 MG: at 08:58

## 2017-07-21 RX ADMIN — INSULIN ASPART 4 UNITS: 100 INJECTION, SOLUTION INTRAVENOUS; SUBCUTANEOUS at 02:35

## 2017-07-21 RX ADMIN — Medication 1 PACKET: at 14:11

## 2017-07-21 RX ADMIN — SENNOSIDES AND DOCUSATE SODIUM 2 TABLET: 8.6; 5 TABLET ORAL at 20:26

## 2017-07-21 RX ADMIN — SODIUM CHLORIDE, PRESERVATIVE FREE 5 ML: 5 INJECTION INTRAVENOUS at 21:04

## 2017-07-21 RX ADMIN — ENOXAPARIN SODIUM 30 MG: 30 INJECTION SUBCUTANEOUS at 22:19

## 2017-07-21 RX ADMIN — INSULIN ASPART 6 UNITS: 100 INJECTION, SOLUTION INTRAVENOUS; SUBCUTANEOUS at 10:58

## 2017-07-21 RX ADMIN — CEFAZOLIN SODIUM 2 G: 2 INJECTION, SOLUTION INTRAVENOUS at 20:11

## 2017-07-21 RX ADMIN — SODIUM CHLORIDE, PRESERVATIVE FREE 5 ML: 5 INJECTION INTRAVENOUS at 10:57

## 2017-07-21 RX ADMIN — INSULIN ASPART 5 UNITS: 100 INJECTION, SOLUTION INTRAVENOUS; SUBCUTANEOUS at 06:32

## 2017-07-21 RX ADMIN — METOPROLOL TARTRATE 25 MG: 25 TABLET, FILM COATED ORAL at 08:54

## 2017-07-21 RX ADMIN — CEFAZOLIN SODIUM 2 G: 2 INJECTION, SOLUTION INTRAVENOUS at 12:52

## 2017-07-21 ASSESSMENT — VISUAL ACUITY
OU: NORMAL ACUITY

## 2017-07-21 NOTE — DISCHARGE SUMMARY
[facility] Discharge Summary and Instructions    Ernesto Rawls MRN# [id number]   Age: 72 year old YOB: 1945     Date of Admission:  7/7/2017  Date of Discharge::  7/26/2017  Admitting Physician:  Rafael Lo MD  Discharge Physician:  Rafael Lo MD          Admission Diagnoses:   Epidural abscess [G06.2]  Cervical stenosis of spinal canal [M48.02]          Discharge Diagnosis:   Epidural abscess [G06.2]  Cervical stenosis of spinal canal [M48.02]          Procedures:   7/7/2017:  C3-C5 Laminectomy and Partial C6 Laminectomy  7/12/2017: C5 Corpectomy and Cage Placement  7/16/2017: Posterior C3-C7 Instrumented Fusion           Brief History of Illness:   Ernesto Rawls is a 72 year old male patient whose past medical history is significant for Rheumatoid Arthritis, Diabetes Mellitus, Coronary Artery Disease, Alcohol Abuse, and Wet Gangrene of his Right Foot Great Toe. He underwent amputation of the right great toe at the [facility] on 7/6/2017 under MAC anesthesia. Following his surgery, the patient awoke with significant weakness in his bilateral upper and lower extremities. He was evaluated with a CT Head and MRI Brain which were negative for Stroke. An MRI of the Cervical spine was obtained which revealed significant cervical spinal stenosis as well as abscess of the anterior cervical spine and the prevertebral region at the level of C3/C4. He was subsequently transferred to [facility] for further evaluation.            Hospital Course:   Upon the patient's arrival to [facility], his upper and lower extremity weakness significantly progressed to near total paralysis. The patient was taken to the OR on 7/7/17 and underwent a Posterior Cervical 3, 4, and 5 Laminectomy and Decompression and Partial Cervical 6 laminectomy and decompression. On 7/12/17, the patient returned to the OR and underwent a Cervical 5 Corpectomy with Cage and Plate Reconstruction and Fusion and then finally underwent a  Posterior Cervical 3 - Cervical 7 instrumented fusion. The patient's surgical interventions were uncomplicated. Cultures obtained during the patient's operation grew 1 colony of Streptococcus Mitis. The patient has been treated with intravenous Cefazolin 2G IV every 8 hours (7/16/17- 8/27/17). During his hospitalization, the patient underwent placement of a PICC Line for long-term antibiotic therapy. Patient will need f/u with Infectious disease in mid-August for consideration of post treatment suppressive therapy given new hardware.  Will need CRP every 2 weeks.  The patient is also in an [place] Collar which he will need to wear for 6 weeks.     The patient's hospital course was notable for hyperglycemia for which the patient required an insulin infusion. Endocrinology was consulted and we greatly appreciate their recommendations and assistance in the management of the patient's insulin dosing. Presently, the patient is on Lantus 65 units daily in the morning as well at 17 units daily in the evening in addition to a sliding scale Aspart 2 units/30 blood glucose level for blood sugars > 140.     In regards to the patient's nutrition, the patient was evaluated by Speech Therapy and the patient was not deemed safe for oral nutrition due to the fact that he is at high risk for aspiration. Presently, he has an NJ tube. The patient will [facility] The patient was also evaluated by Physical and Occupational Therapy. The [facility]     The patient had difficulty managing his oral secretions. He was treated with intravenous Lasix as well as Chest Physiotherapy.  These issues had improved upon discharge.      On the day of hospital transfer, the patient was deemed medically and neurologically stable to transfer to the [facility].     Neurological Examination:  General Appearance:   Awake and Alert; Very  Pleasant and In No Apparent Distress    Mental Status:  Alert and Oriented to Person and Place; Disoriented to Month and Date    Motor:  Upper Extremities:     C5 (Deltoid) C5/6 (Bicep) C7 (Tricep) C8 (Finger Flexion) T1 (Interosseous)   Right 4/5 4/5 3/5 3/5 3/5   Left 4/5 4/5 3/5 3/5 3/5     Lower Extremities:   L2 (Hip Flexion) L3 (Knee Extension)  L4 (Foot Dorsiflexion) L5 (EHL Extesnion) S1 (Foot Plantar Extension)   Right 4/5 4/5 4/5 4/5 4/5   Left 4/5 4/5 4/5 4/5 4/5       Deep Tendon Reflexes:   Biceps Triceps Brachioradialis Patellar Achilles Babinski   Right 1+ 1+ 1+ 1+ 1+ NT   Left 1+ 1+ 1+ 1+ 1+ NT     De Leon's: Absent Bilaterally     Sensory: Intact to LT in the BUE and BLE    Wounds:  Posterior:  Clean, dry, wound edges are approximated, sutures have been removed   Anterior:  Clean, dry, intact, monocryl absorbable sutures in place         Discharge Medications:     Current Discharge Medication List      START taking these medications    Details   !! insulin aspart (NOVOLOG PEN) 100 UNIT/ML injection Inject 1-22 Units Subcutaneous every 4 hours    Comments: Indication: Diabetes Mellitus  Associated Diagnoses: Uncontrolled type 2 diabetes mellitus without complication, with long-term current use of insulin (H)      !! insulin glargine (LANTUS) 100 UNIT/ML injection Inject 17 Units Subcutaneous every evening    Comments: Indication: Diabetes Mellitus      !! insulin glargine (LANTUS) 100 UNIT/ML injection Inject 65 Units Subcutaneous every morning    Comments: Indication: Diabetes Mellitus      oxyCODONE (ROXICODONE) 5 MG IR tablet Take 1-2 tablets (5-10 mg) by mouth every 4 hours as needed for moderate to severe pain  Refills: 0    Comments: Indication: Post-Operative Pain      acetaminophen (TYLENOL) 325 MG tablet 2 tablets (650 mg) by Per NG tube route every 4 hours as needed for mild pain or fever  Qty: 100 tablet    Comments: Indication: Mild Pain; Fever      albuterol (2.5 MG/3ML) 0.083% neb  solution Take 1 vial (2.5 mg) by nebulization every 6 hours as needed for wheezing  Qty: 360 mL    Comments: Indication: Shortness of Breath; Wheezing      enoxaparin (LOVENOX) 30 MG/0.3ML injection Inject 0.3 mLs (30 mg) Subcutaneous every 12 hours    Comments: Indication: Venous Thromboembolism Prophylaxis      !! heparin lock flush 10 UNIT/ML SOLN injection 5-10 mLs by Intracatheter route every 24 hours    Comments: Indication: PICC Flush      !! heparin lock flush 10 UNIT/ML SOLN injection 5-10 mLs by Intracatheter route every hour as needed for other (to lock each CVC - Open Ended (Tunneled and Non-Tunneled) dormant lumen.)    Comments: Indication: PICC Flush      !! insulin aspart (NOVOLOG PEN) 100 UNIT/ML injection Inject 1-12 Units Subcutaneous every 4 hours    Comments: Indication: Diabetes Mellitus      metoprolol (LOPRESSOR) 25 MG tablet 1 tablet (25 mg) by Per NG tube route 2 times daily  Qty: 60 tablet    Comments: Indication: Hypertension      ceFAZolin sodium-dextrose (ANCEF) 2-4 GM/100ML-% SOLN infusion Inject 100 mLs (2 g) into the vein every 8 hours    Comments: Indication: Strep Mitis Infection      folic acid (FOLVITE) 1 MG tablet Take 1 tablet (1 mg) by mouth daily  Qty: 30 tablet    Comments: Indication: H/O ETOH Abuse      senna-docusate (SENOKOT-S;PERICOLACE) 8.6-50 MG per tablet Take 2 tablets by mouth 2 times daily  Qty: 100 tablet    Comments: Indication: Prevent constipation      polyethylene glycol (MIRALAX/GLYCOLAX) Packet Take 17 g by mouth 2 times daily  Qty: 7 packet    Comments: Indication: Prevent constipation      bisacodyl (DULCOLAX) 10 MG Suppository Place 1 suppository (10 mg) rectally daily as needed for constipation  Qty: 30 suppository    Comments: Indication: Prevent constipation      multivitamins with minerals (CERTAVITE/CEROVITE) LIQD liquid 15 mLs by Per Feeding Tube route daily    Comments: Indication: H/O ETOH Abuse      protein modular (PROSTAT SUGAR FREE) 1 packet  by Per Feeding Tube route 3 times daily  Qty: 900 packet    Comments: Indication: Poor Nutrition      cyclobenzaprine (FLEXERIL) 10 MG tablet Take 1 tablet (10 mg) by mouth 3 times daily as needed for muscle spasms  Qty: 42 tablet    Comments: Indication: Muscle Spasm      omeprazole (PRILOSEC) 2 mg/mL SUSP 10 mLs (20 mg) by Oral or Feeding Tube route daily    Comments: Indication: Stress Ulcer Prophylaxis       !! - Potential duplicate medications found. Please discuss with provider.                Discharge Instructions and Follow-Up:   Discharge diet: - Continue Tube Feedings with 2  HN 45 mL/hour  - Free Water Flush 30-60 mL Q 4 HR  - Continue NPO Status; All nutrition and medications should be administered with NJ Tube  -[facility]     Discharge activity: - Increase Activity as Tolerated  - Up with Nursing Assistance  - Continue Physical and Occupational Therapy Evaluations  - Maintain [place] Collar on for 6 weeks       Discharge Follow-Up: - Continue to follow-up with Neurosurgery at [facility]  - Follow-up with Infectious Disease Service in 2-3 weeks; Obtain CRP levels every 2 weeks.  - Follow-up with Endocrinology for ongoing management of Diabetes  - Follow-up with Podiatry for evaluation following toe amputation   -[facility]    Wound care: - Maintain Posterior Cervical Spine Incision open to air; Maintain Right Anterior Neck Incision Open to Air; Maintain Right Foot 3rd Digit Amputation Site Open to Air  - Monitor for signs and symptoms of infection (Redness, Swelling, Draining, Warmth)  - Pat your incisions dry should it get wet.      Please call if you have:  1. increased pain, redness, drainage, swelling at your incision  2. fevers > 101.5 F degrees  3. with any questions or concerns.  You may reach the Neurosurgery clinic at [phone] during regular work hours. ER at [phone].    and ask for the Neurosurgery Resident on call at  [phone], during off hours or weekends.         Discharge Disposition:   Transferred to [facility]

## 2017-07-21 NOTE — PLAN OF CARE
Problem: Goal Outcome Summary  Goal: Goal Outcome Summary  Outcome: No Change  VSS except mild HTN this am.  Neuros continue with oriented x 1-2, confused, garbled speech, slow to track, general weakness with LUE 4/5, all other extremities 3/5.  Denies pain all shift.  York patent and in place for retention.  Cath cares done x 1 this shift.  NPO.  TF running at 45cc/hr via NJ with 30cc NS flushes via pump.  PICC HLed btwn antibiotics, currently infusing with KPhos for Phos level of 2.5.  Mg replaced with level at 2.0 and K replaced with level at 3.6.  Oral sxn x 5-6 today for moderate amount of thick white secretions.  Much improved today.  Up to chair x 1 today and dangled at bedside x 1.  Turn [id number] while in bed.  Buttocks reddened.  Bilateral inner knee abrasions, covered.  Anterior cervical incision covered with liquid bandage.  Posterior incision intact with sutures.  C-collar on at all times, removed x 2 for skin checks.  [facility].  Continue with POC.

## 2017-07-21 NOTE — PROGRESS NOTES
6-14  No change  VS: 2400   0400  BP: 143/62 mmHg (L arm) 137/63 mmHg (L arm)  P: 89    104  RR: 18    18  T: 98.7 (oral)   98.3 (oral)  Pain: 0/10   0/10  Neuros O&A x2, confused, garbled speech, generalized weakness in LUE 4/5, while others are 3/5, denies pain, York cares done x1. BM 2030, liquid, brown. NPO, TF running at 45cc/hr via NJ with 30cc NS flushes via pump. PICC line has 2 g Ancef Q8 running, last dose 0400. Heparin locked between antibiotics. Oral secretions suctioned 2 times, thick creamy/white. Q 2 hr turns, buttocks/coccyx, and back near spine is blanchable redness. Bilateral inner knee abrasions, anterior cervical incision with liquid bandages, and posterior cervical incision with sutures. C-collar in place at all times, removed once for skin assessment. Continue with POC.

## 2017-07-21 NOTE — PROGRESS NOTES
Care Coordinator Progress Note     Admission Date/Time:  7/7/2017  Attending MD:  Dr Rafael Lo     Data  Chart reviewed, discussed with interdisciplinary team. In 6A Discharge Rounds, Vaishali Duffy NP reported pt needs a PEG tube placed. The Neurosurgery team [facility] with [facility] the [facility], they recommend having PEG placed before pt goes to rehab. [facility] and [facility], then on to rehab at the [facility]. ETELVINA Dooley, has been communicating with the [facility] for rehab placement. I will speak with the [facility] about transferring to the hospital.     Currently: tube feeding at 45cc/hr continuous via NJ tube; PICC line for IV Ancef every 8 hours; Lovenox subq every 12 hours; insulin sliding scale; Robinul IV BID. Oral suction, x2 last night. Had been receiving Mag & Phos IV replacements. Glucoses running in the 200s.        Coordination of Care and Referrals  Called the [facility] Referral Desk at [phone] and informed them pt is ready to transfer to their hospital (instead of rehab). They will call me back.        Assessment  Pt stable to transfer back to the [facility]. Needing gastrostomy tube, placement [facility] done at the [facility].      Plan  Anticipated Discharge Date:  When the [facility] can [facility] in transfer.  Anticipated Discharge Plan:  [facility] medical [facility] and gastrostomy tube placement. Eventual spinal cord rehab.   Care Coordinator will continue to communicate with the [facility] regarding hospital to hospital transfer.          Jerilyn Holbrook RN Care Coordinator  Unit 6A, [facility]        Addendum 12:05pm:  Received call back from [facility]ia at the [facility]erral [facility]. Explained pt is ready to transfer back to their hospital. She will refer with physicians and get back to me. Currently they don't have a bed.

## 2017-07-21 NOTE — PLAN OF CARE
Problem: Goal Outcome Summary  Goal: Goal Outcome Summary  Outcome: Therapy, progress toward functional goals is gradual  SLP: Pt noted to be managing his secretions better today.  Did not require any suctioning during session. Pt noted to be more somnolent today and had reduced ability to participate in therapy tasks.  Pt continues to demonstrate high risks for aspiration with any PO.  Recommend continue to be strict NPO with TF for full nutritional support.

## 2017-07-21 NOTE — PROGRESS NOTES
Care Coordinator Progress Note     Admission Date/Time:  7/7/2017  Attending MD:  Dr Rafael Lo     Data  Received call back from Sophia at the [facility]. She said they spoke with Dr Miller and he would not accept pt back in transfer. Dr Miller wants Neurosurgery resident to call Dr Mark Nielsen at the [facility] to discuss the case. Told Sophia I believe this already occurred earlier today.     Sophia reported Dr Desai, rehab physician for spinal cord injury rehab, evaluated pt for admission yesterday and had criteria for rehab admission:  1) need to clarify activity, including weight restrictions, collar restrictions and length of time for each.   2) Clarify ID plan of care; include labs and length of therapy.   3) needs to be off insulin drip  4) Leukocytosis needs to be improving  5) Up to date PT, OT & ST evaluations. Level of assist needed.   Sophia said she will follow-up on Monday, 7-24 for current therapy notes. Recent therapy notes indicate pt limited due to impaired cognition.   Sophia said they do not currently have a bed in the hospital.     Notified Vaishali Duffy NP, of above information.     Assessment  [facility] not accepting pt back at this time.    Plan  Care Coordinator will follow-up with the [facility] on Monday, 7-24 with recent therapy notes and bed availability.   [facility]          Jerilyn Holbrook RN Care Coordinator  [address], [facility]        Sophia Moises, Referral Desk [facility]. Phone: [phone].

## 2017-07-21 NOTE — PLAN OF CARE
Problem: Goal Outcome Summary  Goal: Goal Outcome Summary  PT: pt up in recliner at start of PT session, per RN pt [facility] lift to chair about 1 Hr ago. RN starting pt very sleepy today. PTA unable to keep pt awake for session.   PTA demo PROM to Aleksander ankles, knees and hips x 5.

## 2017-07-21 NOTE — PLAN OF CARE
Problem: Goal Outcome Summary  Goal: Goal Outcome Summary  OT/6A: Pt dozing off intermittently throughout session. Completed AAROM to B UE as well as B LE.        Per plan established by the OT, the recommendation for dc location is inpatient rehab.

## 2017-07-21 NOTE — PROGRESS NOTES
[facility], [place]    Neurosurgery Daily Progress Note         Assessment   Ernesto Rawls is a 72 year old male who is postoperative day # 14/9/5 from C3-5 and C6 laminectomy, C5 corpectomy w/cage placement, and C3-C7 posterior fusion.          Plan     Routine neuro exams per unit protocol.     Continue current pain control regimen    Feeding tube in place    Post-operative X-rays: stable    Aspen collar on at all times for next 6 weeks.      Tolerating room air.      ID: OR cultures positive for MSSA. On Cefazolin with vancomycin and cefepime discontinued as per ID recs.     Diabetes management: increased lantus, endocrine consulted, appreciate recommendations.     Dentistry: follow up with/ dentist as outpatient    Podiatry: daily dressing changes    MAP > 80, has been maintaining without pressors.     Incentive spirometry Q1H while awake.     Bowel regimen. Anti-emetics.     SCDs while in bed.    PT/OT: ARU    Dispo; Medically stable on 6A. [facility]       Please dial * * * and enter job code [phone] to reach the neurosurgery team if you have any questions.    Patient and above plan discussed with neurosurgery chief resident.         Subjective   Making improvements. Strength continues to improve and mental status is becoming coherent and stable.          Objective   Temp:  [95.5  F (35.3  C)-98.9  F (37.2  C)] 98.7  F (37.1  C)  Heart Rate:  [] 89  Resp:  [16-18] 18  BP: (120-156)/(54-69) 143/62  SpO2:  [93 %-97 %] 96 %    I/O last 3 completed shifts:  In: 2070 [I.V.:840; NG/GT:330]  Out: 2425 [Urine:2425]    Physical Exam  General: Aspen cervical collar in place.   Wound: Anterior incision c/d/i, no significant swelling, erythema, or drainage.  Right foot wrapped in bandage.      Neurologic Exam:  - Alert and oriented x2 but [facility].  - Strength: 5/5 in deltoids bilaterally, left biceps/triceps are 4+/5 on the left, 4-/5 on the right, wrist  extensors are 3/5 on the left and 2/5 on the right, finger intrinsics are 4-/5 on the left, 2/5 on the right. Iliopsoas are 4-/5 on the left and 2/5 on the right, quadriceps are 4-/5 on the left and 2/5 on the right, tibialis anterior/gastrocnemius are 4+/5 on the left, 4-/5 on the right. Poor participation in lower extremity exam.  - PERRL, EOMI.  Motor: Normal bulk/tone; no tremor, rigidity, or bradykinesia.        Labs and Imaging     BMP    Recent Labs  Lab 07/20/17  0929 07/20/17  0643 07/19/17  1430 07/19/17  0746 07/17/17  1223 07/17/17  0741    134 133  --  130* 130*   POTASSIUM 3.6 3.6 3.5 3.5  --  3.7   CHLORIDE 96 98 97  --   --  95   KAROL 8.8 8.4* 8.3*  --   --  8.6   CO2 32 31 31  --   --  29   BUN 14 14 17  --   --  13   CR 0.42* 0.40* 0.46*  --   --  0.50*   * 266* 215*  --   --  169*     CBC    Recent Labs  Lab 07/20/17  0929 07/19/17  1430 07/17/17  0741 07/16/17  1320   WBC 10.8 11.9* 11.9* 10.0   RBC 3.03* 2.89* 3.18* 3.05*   HGB 9.6* 9.0* 10.0* 9.6*   HCT 28.4* 27.1* 30.0* 28.7*   MCV 94 94 94 94   MCH 31.7 31.1 31.4 31.5   MCHC 33.8 33.2 33.3 33.4   RDW 14.3 14.4 14.1 14.2    214 229 243     INR    Recent Labs  Lab 07/15/17  1618   INR 1.12       Imaging: Reviewed      Contact the neurosurgery resident on call with questions.    Dial * * *[phone]: Enter [phone] when prompted.

## 2017-07-22 ENCOUNTER — APPOINTMENT (OUTPATIENT)
Dept: OCCUPATIONAL THERAPY | Facility: CLINIC | Age: 72
DRG: 028 | End: 2017-07-22
Attending: NEUROLOGICAL SURGERY
Payer: COMMERCIAL

## 2017-07-22 ENCOUNTER — NURSE TRIAGE (OUTPATIENT)
Dept: NURSING | Facility: CLINIC | Age: 72
End: 2017-07-22

## 2017-07-22 ENCOUNTER — APPOINTMENT (OUTPATIENT)
Dept: PHYSICAL THERAPY | Facility: CLINIC | Age: 72
DRG: 028 | End: 2017-07-22
Attending: NEUROLOGICAL SURGERY
Payer: COMMERCIAL

## 2017-07-22 LAB
GLUCOSE BLDC GLUCOMTR-MCNC: 153 MG/DL (ref 70–99)
GLUCOSE BLDC GLUCOMTR-MCNC: 179 MG/DL (ref 70–99)
GLUCOSE BLDC GLUCOMTR-MCNC: 192 MG/DL (ref 70–99)
GLUCOSE BLDC GLUCOMTR-MCNC: 197 MG/DL (ref 70–99)
GLUCOSE BLDC GLUCOMTR-MCNC: 210 MG/DL (ref 70–99)
GLUCOSE BLDC GLUCOMTR-MCNC: 244 MG/DL (ref 70–99)
GLUCOSE BLDC GLUCOMTR-MCNC: 306 MG/DL (ref 70–99)

## 2017-07-22 PROCEDURE — 25000132 ZZH RX MED GY IP 250 OP 250 PS 637: Performed by: STUDENT IN AN ORGANIZED HEALTH CARE EDUCATION/TRAINING PROGRAM

## 2017-07-22 PROCEDURE — 36592 COLLECT BLOOD FROM PICC: CPT | Performed by: STUDENT IN AN ORGANIZED HEALTH CARE EDUCATION/TRAINING PROGRAM

## 2017-07-22 PROCEDURE — 25000125 ZZHC RX 250: Performed by: NEUROLOGICAL SURGERY

## 2017-07-22 PROCEDURE — 97110 THERAPEUTIC EXERCISES: CPT | Mod: GP | Performed by: PHYSICAL THERAPIST

## 2017-07-22 PROCEDURE — 83605 ASSAY OF LACTIC ACID: CPT | Performed by: STUDENT IN AN ORGANIZED HEALTH CARE EDUCATION/TRAINING PROGRAM

## 2017-07-22 PROCEDURE — 25000125 ZZHC RX 250: Performed by: NURSE PRACTITIONER

## 2017-07-22 PROCEDURE — 25000128 H RX IP 250 OP 636: Performed by: NURSE PRACTITIONER

## 2017-07-22 PROCEDURE — 40000133 ZZH STATISTIC OT WARD VISIT

## 2017-07-22 PROCEDURE — 00000146 ZZHCL STATISTIC GLUCOSE BY METER IP

## 2017-07-22 PROCEDURE — 27210429 ZZH NUTRITION PRODUCT INTERMEDIATE LITER

## 2017-07-22 PROCEDURE — 25000128 H RX IP 250 OP 636: Performed by: STUDENT IN AN ORGANIZED HEALTH CARE EDUCATION/TRAINING PROGRAM

## 2017-07-22 PROCEDURE — 40000193 ZZH STATISTIC PT WARD VISIT: Performed by: PHYSICAL THERAPIST

## 2017-07-22 PROCEDURE — 25000132 ZZH RX MED GY IP 250 OP 250 PS 637: Performed by: NEUROLOGICAL SURGERY

## 2017-07-22 PROCEDURE — 97530 THERAPEUTIC ACTIVITIES: CPT | Mod: GP | Performed by: PHYSICAL THERAPIST

## 2017-07-22 PROCEDURE — 12000008 ZZH R&B INTERMEDIATE UMMC

## 2017-07-22 PROCEDURE — 97110 THERAPEUTIC EXERCISES: CPT | Mod: GO

## 2017-07-22 PROCEDURE — 25000128 H RX IP 250 OP 636: Performed by: NEUROLOGICAL SURGERY

## 2017-07-22 PROCEDURE — 27210913 ZZH NUTRITION PRODUCT INTERMEDIATE PACKET

## 2017-07-22 RX ORDER — DEXTROSE MONOHYDRATE 100 MG/ML
INJECTION, SOLUTION INTRAVENOUS CONTINUOUS PRN
Status: DISCONTINUED | OUTPATIENT
Start: 2017-07-22 | End: 2017-07-26 | Stop reason: HOSPADM

## 2017-07-22 RX ADMIN — GLYCOPYRROLATE 0.2 MG: 0.2 INJECTION, SOLUTION INTRAMUSCULAR; INTRAVENOUS at 09:14

## 2017-07-22 RX ADMIN — ALTEPLASE 2 MG: 2.2 INJECTION, POWDER, LYOPHILIZED, FOR SOLUTION INTRAVENOUS at 13:45

## 2017-07-22 RX ADMIN — Medication 1 PACKET: at 09:20

## 2017-07-22 RX ADMIN — Medication 1 PACKET: at 14:43

## 2017-07-22 RX ADMIN — METOPROLOL TARTRATE 25 MG: 25 TABLET, FILM COATED ORAL at 20:18

## 2017-07-22 RX ADMIN — ENOXAPARIN SODIUM 30 MG: 30 INJECTION SUBCUTANEOUS at 09:14

## 2017-07-22 RX ADMIN — CEFAZOLIN SODIUM 2 G: 2 INJECTION, SOLUTION INTRAVENOUS at 20:17

## 2017-07-22 RX ADMIN — MULTIVIT AND MINERALS-FERROUS GLUCONATE 9 MG IRON/15 ML ORAL LIQUID 15 ML: at 09:14

## 2017-07-22 RX ADMIN — ACETAMINOPHEN 650 MG: 325 TABLET, FILM COATED ORAL at 10:51

## 2017-07-22 RX ADMIN — FOLIC ACID 1 MG: 1 TABLET ORAL at 09:14

## 2017-07-22 RX ADMIN — SODIUM CHLORIDE, PRESERVATIVE FREE 10 ML: 5 INJECTION INTRAVENOUS at 14:42

## 2017-07-22 RX ADMIN — INSULIN GLARGINE 65 UNITS: 100 INJECTION, SOLUTION SUBCUTANEOUS at 09:14

## 2017-07-22 RX ADMIN — Medication 20 MG: at 09:14

## 2017-07-22 RX ADMIN — ACETAMINOPHEN 650 MG: 325 TABLET, FILM COATED ORAL at 04:45

## 2017-07-22 RX ADMIN — CEFAZOLIN SODIUM 2 G: 2 INJECTION, SOLUTION INTRAVENOUS at 13:45

## 2017-07-22 RX ADMIN — ENOXAPARIN SODIUM 30 MG: 30 INJECTION SUBCUTANEOUS at 20:17

## 2017-07-22 RX ADMIN — CEFAZOLIN SODIUM 2 G: 2 INJECTION, SOLUTION INTRAVENOUS at 04:45

## 2017-07-22 RX ADMIN — METOPROLOL TARTRATE 25 MG: 25 TABLET, FILM COATED ORAL at 09:14

## 2017-07-22 RX ADMIN — CYCLOBENZAPRINE HYDROCHLORIDE 10 MG: 10 TABLET, FILM COATED ORAL at 10:52

## 2017-07-22 RX ADMIN — Medication 1 PACKET: at 20:20

## 2017-07-22 RX ADMIN — SODIUM CHLORIDE, PRESERVATIVE FREE 5 ML: 5 INJECTION INTRAVENOUS at 05:37

## 2017-07-22 RX ADMIN — GLYCOPYRROLATE 0.2 MG: 0.2 INJECTION, SOLUTION INTRAMUSCULAR; INTRAVENOUS at 20:18

## 2017-07-22 ASSESSMENT — VISUAL ACUITY
OU: NORMAL ACUITY

## 2017-07-22 NOTE — CONSULTS
Diabetes/Hyperglycemia Management Consult    Chief Complaint type 2 diabetes, hyperglycemia on enteral feeds  Consult requested by: Vaishali Duffy NP, attending: Dr. Rafael Lo  History of Present Illness Mr. Ernesto Rawls is a 73 yo man with a history of type 2 diabetes, RA, CAD, and wet gangrene of his R foot great toe s/p [facility] that [facility], who [facility] on 7/7/17 with post op weakness in bilateral UE and LE.  Found to have significant spinal stenosis, he is now s/p C3-5 and C6 laminectomy, C5 corpetomy with cage placement, and C3-7 posterior fusion.    Ernesto is having some altered mental status, and he was not able to recall when he was diagnosed with diabetes or what medications he is taking.  I spoke with his wife via phone- she had medications written down at home, but did not have the list with her at that time.  She knows that he was not taking insulin PTA, and also reports that he never checked his glucoses at home.  H&P does not list any medications prior to admission.  On review of paper chart, [facility] the [facility] list metformin 500mg BID as being given while pt was inpatient, but I could not find an outpatient medication list.  Hemoglobin A1c was 7.3% from earlier this admission but pt was anemic at that time.    Ernesto was started on enteral feeds during this admission, he is currently on TwoCal at 45cc/h.  This went to goal rate on 7/17 at 0600.  He is NPO.  While on IV insulin infusion his rates were frequently around 5units/h.  He was transitioned off IV insulin on 7/17 with glargine 30 units.  Glucoses were mostly in the 200s, so glargine was increased to 40 units.  Yesterday we spoke with primary team and recommended increasing to glargine 50 units.  BG were spiking as high as 300s yesterday despite this increased dose, likely due to repletion of glycogen stores.  Today we increased glargine to 65 units and glucose is down to 244 midday.  Electrolyte replacements  "were in dextrose fluids, but this was switched to NS on 7/20.    Ernesto had no complaints when I visited.  He is somewhat confused, poor memory.      Recent Labs  Lab 07/22/17  1059 07/22/17  0648 07/22/17  0240 07/21/17  2219 07/21/17  2201 07/21/17  1804  07/20/17  0929  07/20/17  0643  07/19/17  1430  07/17/17  0741  07/16/17  1320  07/16/17  0950   GLC  --   --   --   --   --   --   --  305*  --  266*  --  215*  --  169*  --  154*  --  169*   * 306* 197* 271* 256* 233*  < >  --   < >  --   < >  --   < >  --   < >  --   < >  --    < > = values in this interval not displayed.      Diabetes Type: type 2  Diabetes Duration: unknown- pt does not recall  Usual Diabetes Regimen: oral: likely metformin 500mg BID, perhaps others? Cannot find any records in chart and pt does not recall (wife will bring med list tomorrow).  Ability to [place] Prescribed Regimen: unknown.  Wife reports that pt \"never\" checked his glucoses at home.  Diabetes Control:   Lab Results   Component Value Date    A1C 7.3 07/08/2017     Diabetes Complications: none known  Able to Detect Hypoglycemia: yes, per pt  [facility] Provider: [facility]  Factors Impacting Glucose Control: continuous enteral feeds, NPO status      Review of Systems  10 point ROS completed with pertinent positives and negatives noted in the HPI    Past medical, family and social histories are reviewed and updated.    Past Medical History  Type 2 diabetes  RA  CAD    Family History  Pt could not recall if he has family hx of diabets  Social History  Social History     Social History     Marital status:      Spouse name: N/A     Number of children: N/A     Years of education: N/A     Social History Main Topics     Smoking status: None     Smokeless tobacco: None     Alcohol use None     Drug use: None     Sexual activity: Not Asked     Other Topics Concern     None     Social History Narrative   Pt is  (wife Cristian).  He could not recall where he lives, chart " "states: [place].      Physical Exam  /58 (BP Location: Left arm)  Pulse 96  Temp 95.8  F (35.4  C) (Oral)  Resp 16  Ht 1.854 m (6' 1\")  Wt 88.5 kg (195 lb 1.6 oz)  SpO2 97%  BMI 25.74 kg/m2    General:  pleasant man resting in bed, in no distress.   HEENT: Cervial brace, PER and anicteric, non-injected, oral mucous membranes moist. NJ tube in L nares  Lungs: unlabored respiration, no cough  Skin: warm and dry, no obvious lesions  Lymp:  no LE edema   Mental status:  alert, becomes confused, disoriented  Psych:  calm, even mood    Laboratory  Recent Labs   Lab Test  07/20/17   0929  07/20/17   0643   NA  136  134   POTASSIUM  3.6  3.6   CHLORIDE  96  98   CO2  32  31   ANIONGAP  8  6   GLC  305*  266*   BUN  14  14   CR  0.42*  0.40*   KAROL  8.8  8.4*     CBC RESULTS:   Recent Labs   Lab Test  07/20/17   0929   WBC  10.8   RBC  3.03*   HGB  9.6*   HCT  28.4*   MCV  94   MCH  31.7   MCHC  33.8   RDW  14.3   PLT  226       Liver Function Studies - No results for input(s): PROTTOTAL, ALBUMIN, BILITOTAL, ALKPHOS, AST, ALT, BILIDIRECT in the last 69056 hours.    Active Medications  Current Facility-Administered Medications   Medication     insulin glargine (LANTUS) injection 65 Units     glycopyrrolate (ROBINUL) injection 0.2 mg     insulin aspart (NovoLOG) inj (RAPID ACTING)     enoxaparin (LOVENOX) injection 30 mg     sodium chloride (PF) 0.9% PF flush 10-20 mL     heparin lock flush 10 UNIT/ML injection 5-10 mL     heparin lock flush 10 UNIT/ML injection 5-10 mL     potassium phosphate 10 mmol in NaCl 0.9 % 250 mL intermittent infusion     potassium phosphate 15 mmol in NaCl 0.9 % 250 mL intermittent infusion     potassium phosphate 20 mmol in NaCl 0.9 % 250 mL intermittent infusion     potassium phosphate 20 mmol in NaCl 0.9 % 500 mL intermittent infusion     potassium phosphate 20 mmol in NaCl 0.9 % intermittent infusion     potassium phosphate 25 mmol in NaCl 0.9 % 500 mL intermittent infusion     " albuterol neb solution 2.5 mg     lidocaine (viscous) (XYLOCAINE) 2 % solution 5 mL     lidocaine 1 % 1 mL     lidocaine (LMX4) kit     sodium chloride (PF) 0.9% PF flush 10-20 mL     sodium chloride (PF) 0.9% PF flush 10 mL     lidocaine 1 % 0.5-5 mL     lidocaine (LMX4) kit     sodium chloride (PF) 0.9% PF flush 5-50 mL     ceFAZolin sodium-dextrose (ANCEF) infusion 2 g     dextrose 10 % 1,000 mL infusion     glucose 40 % gel 15-30 g    Or     dextrose 50 % injection 25-50 mL    Or     glucagon injection 1 mg     metoprolol (LOPRESSOR) tablet 25 mg     hydrALAZINE (APRESOLINE) injection 10-20 mg     protein modular (PROSTAT SUGAR FREE) packet 1 packet     acetaminophen (TYLENOL) tablet 650 mg     lidocaine 1 % 1 mL     lidocaine (LMX4) kit     sodium chloride (PF) 0.9% PF flush 3 mL     sodium chloride (PF) 0.9% PF flush 3 mL     labetalol (NORMODYNE/TRANDATE) injection 10-40 mg     naloxone (NARCAN) injection 0.1-0.4 mg     lidocaine (viscous) (XYLOCAINE) 2 % solution 5 mL     carboxymethylcellulose (REFRESH PLUS) 0.5 % ophthalmic solution 2 drop     potassium chloride SA (K-DUR/KLOR-CON M) CR tablet 20-40 mEq     potassium chloride (KLOR-CON) Packet 20-40 mEq     potassium chloride 10 mEq in 100 mL intermittent infusion     potassium chloride 10 mEq in 100 mL intermittent infusion with 10 mg lidocaine     potassium chloride 20 mEq in 50 mL intermittent infusion     magnesium sulfate 2 g in NS intermittent infusion (PharMEDium or FV Cmpd)     magnesium sulfate 4 g in 100 mL sterile water (premade)     multivitamins with minerals (CERTAVITE/CEROVITE) liquid 15 mL     omeprazole (priLOSEC) suspension 20 mg     acetaminophen (TYLENOL) Suppository 650 mg     magnesium sulfate 2 g in NS intermittent infusion (PharMEDium or FV Cmpd)     benzocaine-menthol (CHLORASEPTIC) 6-10 MG lozenge 1-2 lozenge     oxyCODONE (ROXICODONE) IR tablet 5-10 mg     lidocaine (LIDODERM) 5 % Patch 3 patch     lidocaine (LIDODERM) patch  REMOVAL     lidocaine (LIDODERM) patch in PLACE     menthol (ICY HOT) 5 % patch 1 patch    And     menthol (ICY HOT) Patch in Place    And     menthol (ICY HOT) patch REMOVAL     ondansetron (ZOFRAN-ODT) ODT tab 4 mg    Or     ondansetron (ZOFRAN) injection 4 mg     prochlorperazine (COMPAZINE) injection 5 mg    Or     prochlorperazine (COMPAZINE) tablet 5 mg     senna-docusate (SENOKOT-S;PERICOLACE) 8.6-50 MG per tablet 1-2 tablet     polyethylene glycol (MIRALAX/GLYCOLAX) Packet 17 g     bisacodyl (DULCOLAX) Suppository 10 mg     folic acid (FOLVITE) tablet 1 mg     cyclobenzaprine (FLEXERIL) tablet 10 mg     Current Outpatient Prescriptions   Medication Sig Dispense Refill     oxyCODONE (ROXICODONE) 5 MG IR tablet Take 1-2 tablets (5-10 mg) by mouth every 4 hours as needed for moderate to severe pain  0     acetaminophen (TYLENOL) 325 MG tablet 2 tablets (650 mg) by Per NG tube route every 4 hours as needed for mild pain or fever 100 tablet      albuterol (2.5 MG/3ML) 0.083% neb solution Take 1 vial (2.5 mg) by nebulization every 6 hours as needed for wheezing 360 mL      enoxaparin (LOVENOX) 30 MG/0.3ML injection Inject 0.3 mLs (30 mg) Subcutaneous every 12 hours       heparin lock flush 10 UNIT/ML SOLN injection 5-10 mLs by Intracatheter route every 24 hours       heparin lock flush 10 UNIT/ML SOLN injection 5-10 mLs by Intracatheter route every hour as needed for other (to lock each CVC - Open Ended (Tunneled and Non-Tunneled) dormant lumen.)       insulin aspart (NOVOLOG PEN) 100 UNIT/ML injection Inject 1-12 Units Subcutaneous every 4 hours       insulin glargine (LANTUS) 100 UNIT/ML injection Inject 50 Units Subcutaneous every morning (before breakfast)       metoprolol (LOPRESSOR) 25 MG tablet 1 tablet (25 mg) by Per NG tube route 2 times daily 60 tablet      ceFAZolin sodium-dextrose (ANCEF) 2-4 GM/100ML-% SOLN infusion Inject 100 mLs (2 g) into the vein every 8 hours       folic acid (FOLVITE) 1 MG  tablet Take 1 tablet (1 mg) by mouth daily 30 tablet      senna-docusate (SENOKOT-S;PERICOLACE) 8.6-50 MG per tablet Take 2 tablets by mouth 2 times daily 100 tablet      polyethylene glycol (MIRALAX/GLYCOLAX) Packet Take 17 g by mouth 2 times daily 7 packet      bisacodyl (DULCOLAX) 10 MG Suppository Place 1 suppository (10 mg) rectally daily as needed for constipation 30 suppository      multivitamins with minerals (CERTAVITE/CEROVITE) LIQD liquid 15 mLs by Per Feeding Tube route daily       protein modular (PROSTAT SUGAR FREE) 1 packet by Per Feeding Tube route 3 times daily 900 packet      cyclobenzaprine (FLEXERIL) 10 MG tablet Take 1 tablet (10 mg) by mouth 3 times daily as needed for muscle spasms 42 tablet      omeprazole (PRILOSEC) 2 mg/mL SUSP 10 mLs (20 mg) by Oral or Feeding Tube route daily         Current Diet    Active Diet Order      Advance Diet as Tolerated      NPO for Medical/Clinical Reasons      Assessment  Mr. Ernesto Rawls is a 73 yo man with a history of type 2 diabetes, RA, CAD, and wet gangrene of his R foot great toe s/p [facility] of that [facility], who [facility] on 7/7/17 with post op weakness in bilateral UE and LE.  Found to have significant spinal stenosis, he is now s/p C3-5 and C6 laminectomy, C5 corpetomy with cage placement, and C3-7 posterior fusion.  Home regimen and glucose control PTA is not certain- on orals, likely metformin.  Requiring large amounts of insulin while on continuous enteral feeds.      Plan  -glargine increased to 65 units qAM this morning.  We will add an evening dose of glargine if glucoses remain in the 200s later today.  -correction aspart: 2 units/30 for BG >140 q4h  -Please start D10 (prn order) if TFs are interrupted to prevent hypoglycemia.    We will continue to follow.    Angelita Pandey PA-C [phone]    Diabetes Management Team job code: [phone]

## 2017-07-22 NOTE — PLAN OF CARE
Problem: Goal Outcome Summary  Goal: Goal Outcome Summary  OT/6A:  Pt lethargic, dozing off intermittently throughout session. Completed AAROM to B UE as well as B LE.      Per plan established by the OT, the recommendation for dc location is inpatient rehab.

## 2017-07-22 NOTE — PLAN OF CARE
Problem: Goal Outcome Summary  Goal: Goal Outcome Summary  PT-6A: up in recliner upon PTs arrival; having hallucinations. Worked on L/E strengthening & sitting balance. Great difficulty staying on task this date. Recommend acute rehab upon hospital discharge to address mobility deficits, improve strength & endurance.

## 2017-07-22 NOTE — PLAN OF CARE
Problem: Goal Outcome Summary  Goal: Goal Outcome Summary  Outcome: No Change  VSS except slight HTN within parameters.  Neuros stabel with oriented x 1-2 (DO to time and place), confusion, garbled speech, slow to track, general weakness with LUE 4/5, all other extremities 3/5. C/o pain this morning, controlled w/PRN oxy.   York in place for retention w/good UOP.  Cath cares done x 1 this shift.  NPO.  TF running at 45cc/hr via NJ with 30cc NS flushes via pump.  PICC HL'd btwn abx. Oral suctioned x1 this morning, pt able to clear other secretions.    Up to chair most of the afternoon. Q2hr repo when in bed.  Bilateral inner knee abrasions, covered.  Anterior cervical incision covered with liquid bandage.  Posterior incision intact with sutures.  C-collar on at all times, removed x 1 for skin checks.  [facility].  Continue to monitor and follow POC.

## 2017-07-22 NOTE — TELEPHONE ENCOUNTER
Chalo Farooq is the resident on call.  He's been trying to reach Dr Lamin Howe the podiatrist on call. Chalo has been routed twice to [facility]. I put a page out on Chalo's behalf. I asked the page  to page Dr Howe to call Chalo at [phone].  Kalyn GÓMEZ RN [facility]

## 2017-07-22 NOTE — PROGRESS NOTES
[facility], [place]    Neurosurgery Daily Progress Note         Assessment   Ernesto Rawls is a 72 year old male who is postoperative day # 15/10/6 from C3-5 and C6 laminectomy, C5 corpectomy w/cage placement, and C3-C7 posterior fusion.          Plan     Routine neuro exams per unit protocol.     Continue current pain control regimen    Feeding tube in place    Post-operative X-rays: stable    Aspen collar on at all times for next 6 weeks.      Tolerating room air.      ID: OR cultures positive for MSSA. On Cefazolin with vancomycin and cefepime discontinued as per ID recs.     Diabetes management: increased lantus, endocrine consulted, appreciate recommendations.     Dentistry: follow up with/ dentist as outpatient    Podiatry: daily dressing changes    MAP > 80, has been maintaining without pressors.     Incentive spirometry Q1H while awake.     Bowel regimen. Anti-emetics.     SCDs while in bed.    PT/OT: ARU    Dispo; Medically stable on 6A. [facility]       Please dial * * * and enter job code [phone] to reach the neurosurgery team if you have any questions.    Patient and above plan discussed with neurosurgery chief resident.         Subjective   Making improvements. Strength continues to improve and mental status is becoming coherent and stable.          Objective   Temp:  [95.9  F (35.5  C)-99.2  F (37.3  C)] 97.4  F (36.3  C)  Pulse:  [] 96  Heart Rate:  [] 90  Resp:  [18-20] 18  BP: (103-152)/(52-68) 122/61  SpO2:  [93 %-97 %] 97 %    I/O last 3 completed shifts:  In: 800 [I.V.:20; NG/GT:240]  Out: 1525 [Urine:1525]    Physical Exam  General: Aspen cervical collar in place.   Wound: Anterior incision c/d/i, no significant swelling, erythema, or drainage.  Right foot wrapped in bandage.      Neurologic Exam:  - Alert and oriented x2 but [facility].  - Strength: 5/5 in deltoids bilaterally, left biceps/triceps are 4+/5 on the left, 4-/5 on  the right, wrist extensors are 3/5 on the left and 2/5 on the right, finger intrinsics are 4-/5 on the left, 2/5 on the right. Iliopsoas are 4-/5 on the left and 2/5 on the right, quadriceps are 4-/5 on the left and 2/5 on the right, tibialis anterior/gastrocnemius are 4+/5 on the left, 4-/5 on the right. Poor participation in lower extremity exam.  - PERRL, EOMI.  Motor: Normal bulk/tone; no tremor, rigidity, or bradykinesia.        Labs and Imaging     BMP    Recent Labs  Lab 07/20/17  0929 07/20/17  0643 07/19/17  1430 07/19/17  0746 07/17/17  1223 07/17/17  0741    134 133  --  130* 130*   POTASSIUM 3.6 3.6 3.5 3.5  --  3.7   CHLORIDE 96 98 97  --   --  95   KAROL 8.8 8.4* 8.3*  --   --  8.6   CO2 32 31 31  --   --  29   BUN 14 14 17  --   --  13   CR 0.42* 0.40* 0.46*  --   --  0.50*   * 266* 215*  --   --  169*     CBC    Recent Labs  Lab 07/20/17  0929 07/19/17  1430 07/17/17  0741 07/16/17  1320   WBC 10.8 11.9* 11.9* 10.0   RBC 3.03* 2.89* 3.18* 3.05*   HGB 9.6* 9.0* 10.0* 9.6*   HCT 28.4* 27.1* 30.0* 28.7*   MCV 94 94 94 94   MCH 31.7 31.1 31.4 31.5   MCHC 33.8 33.2 33.3 33.4   RDW 14.3 14.4 14.1 14.2    214 229 243     INR    Recent Labs  Lab 07/15/17  1618   INR 1.12       Imaging: Reviewed      Contact the neurosurgery resident on call with questions.    Dial * * *[phone]: Enter [phone] when prompted.

## 2017-07-22 NOTE — PLAN OF CARE
Problem: Goal Outcome Summary  Goal: Goal Outcome Summary  Outcome: No Change  VSS with slight HTN within parameters. Neuros Q4, A&O x1-2, DO to time, place and situation. Has confusion and worsens with fatigue/tiredness, garbled/lathargic speech. UE and LE are 3/5. Thick oral secretions, suctioned twice with younker. Tube feeding at 45 ml/hr. Has PICC line in R arm, purple lumen occluded and note made to address occlusion. Red lumen is heparin locked. Has York cath in place, urine is concentrated, haily color. No BM. Pt turned every 2 hours, last turned at 0630. Anterior and posterior incisions under c-collar are intact and dry, with no redness. Pt has been having trouble sleeping at night and has been very restless. Continue with POC, monitor tube feeding, and discuss with provider about ways to reorient pt to appropriate sleep schedule.

## 2017-07-23 ENCOUNTER — APPOINTMENT (OUTPATIENT)
Dept: PHYSICAL THERAPY | Facility: CLINIC | Age: 72
DRG: 028 | End: 2017-07-23
Attending: NEUROLOGICAL SURGERY
Payer: COMMERCIAL

## 2017-07-23 ENCOUNTER — APPOINTMENT (OUTPATIENT)
Dept: OCCUPATIONAL THERAPY | Facility: CLINIC | Age: 72
DRG: 028 | End: 2017-07-23
Attending: NEUROLOGICAL SURGERY
Payer: COMMERCIAL

## 2017-07-23 LAB
BACTERIA SPEC CULT: ABNORMAL
BASE EXCESS BLDA CALC-SCNC: 5.6 MMOL/L
GLUCOSE BLDC GLUCOMTR-MCNC: 156 MG/DL (ref 70–99)
GLUCOSE BLDC GLUCOMTR-MCNC: 164 MG/DL (ref 70–99)
GLUCOSE BLDC GLUCOMTR-MCNC: 172 MG/DL (ref 70–99)
GLUCOSE BLDC GLUCOMTR-MCNC: 233 MG/DL (ref 70–99)
GLUCOSE BLDC GLUCOMTR-MCNC: 238 MG/DL (ref 70–99)
GLUCOSE BLDC GLUCOMTR-MCNC: 244 MG/DL (ref 70–99)
HCO3 BLD-SCNC: 29 MMOL/L (ref 21–28)
LACTATE BLD-SCNC: 1.5 MMOL/L (ref 0.7–2.1)
LACTATE BLD-SCNC: 1.6 MMOL/L (ref 0.7–2.1)
Lab: ABNORMAL
MICRO REPORT STATUS: ABNORMAL
MICROORGANISM SPEC CULT: ABNORMAL
O2/TOTAL GAS SETTING VFR VENT: ABNORMAL %
PCO2 BLD: 37 MM HG (ref 35–45)
PH BLD: 7.5 PH (ref 7.35–7.45)
PO2 BLD: 81 MM HG (ref 80–105)
SODIUM SERPL-SCNC: 138 MMOL/L (ref 133–144)
SPECIMEN SOURCE: ABNORMAL

## 2017-07-23 PROCEDURE — 82803 BLOOD GASES ANY COMBINATION: CPT | Performed by: STUDENT IN AN ORGANIZED HEALTH CARE EDUCATION/TRAINING PROGRAM

## 2017-07-23 PROCEDURE — 25000128 H RX IP 250 OP 636: Performed by: STUDENT IN AN ORGANIZED HEALTH CARE EDUCATION/TRAINING PROGRAM

## 2017-07-23 PROCEDURE — 36592 COLLECT BLOOD FROM PICC: CPT | Performed by: NEUROLOGICAL SURGERY

## 2017-07-23 PROCEDURE — 25000132 ZZH RX MED GY IP 250 OP 250 PS 637: Performed by: NEUROLOGICAL SURGERY

## 2017-07-23 PROCEDURE — 25000132 ZZH RX MED GY IP 250 OP 250 PS 637: Performed by: STUDENT IN AN ORGANIZED HEALTH CARE EDUCATION/TRAINING PROGRAM

## 2017-07-23 PROCEDURE — 36592 COLLECT BLOOD FROM PICC: CPT | Performed by: STUDENT IN AN ORGANIZED HEALTH CARE EDUCATION/TRAINING PROGRAM

## 2017-07-23 PROCEDURE — 97530 THERAPEUTIC ACTIVITIES: CPT | Mod: GO

## 2017-07-23 PROCEDURE — 25000125 ZZHC RX 250: Performed by: NEUROLOGICAL SURGERY

## 2017-07-23 PROCEDURE — 00000146 ZZHCL STATISTIC GLUCOSE BY METER IP

## 2017-07-23 PROCEDURE — 25000131 ZZH RX MED GY IP 250 OP 636 PS 637: Performed by: PHYSICIAN ASSISTANT

## 2017-07-23 PROCEDURE — 12000008 ZZH R&B INTERMEDIATE UMMC

## 2017-07-23 PROCEDURE — 25000128 H RX IP 250 OP 636: Performed by: NURSE PRACTITIONER

## 2017-07-23 PROCEDURE — 40000193 ZZH STATISTIC PT WARD VISIT

## 2017-07-23 PROCEDURE — 36600 WITHDRAWAL OF ARTERIAL BLOOD: CPT

## 2017-07-23 PROCEDURE — 40000133 ZZH STATISTIC OT WARD VISIT

## 2017-07-23 PROCEDURE — 27210913 ZZH NUTRITION PRODUCT INTERMEDIATE PACKET

## 2017-07-23 PROCEDURE — 25000125 ZZHC RX 250: Performed by: NURSE PRACTITIONER

## 2017-07-23 PROCEDURE — 27210429 ZZH NUTRITION PRODUCT INTERMEDIATE LITER

## 2017-07-23 PROCEDURE — 83605 ASSAY OF LACTIC ACID: CPT | Performed by: STUDENT IN AN ORGANIZED HEALTH CARE EDUCATION/TRAINING PROGRAM

## 2017-07-23 PROCEDURE — 97530 THERAPEUTIC ACTIVITIES: CPT | Mod: GP

## 2017-07-23 PROCEDURE — 84295 ASSAY OF SERUM SODIUM: CPT | Performed by: STUDENT IN AN ORGANIZED HEALTH CARE EDUCATION/TRAINING PROGRAM

## 2017-07-23 PROCEDURE — 40000802 ZZH SITE CHECK

## 2017-07-23 PROCEDURE — 83605 ASSAY OF LACTIC ACID: CPT | Performed by: NEUROLOGICAL SURGERY

## 2017-07-23 RX ADMIN — ENOXAPARIN SODIUM 30 MG: 30 INJECTION SUBCUTANEOUS at 20:46

## 2017-07-23 RX ADMIN — LIDOCAINE 3 PATCH: 50 PATCH CUTANEOUS at 11:38

## 2017-07-23 RX ADMIN — MULTIVIT AND MINERALS-FERROUS GLUCONATE 9 MG IRON/15 ML ORAL LIQUID 15 ML: at 09:07

## 2017-07-23 RX ADMIN — ACETAMINOPHEN 650 MG: 325 TABLET, FILM COATED ORAL at 22:51

## 2017-07-23 RX ADMIN — GLYCOPYRROLATE 0.2 MG: 0.2 INJECTION, SOLUTION INTRAMUSCULAR; INTRAVENOUS at 09:07

## 2017-07-23 RX ADMIN — CEFAZOLIN SODIUM 2 G: 2 INJECTION, SOLUTION INTRAVENOUS at 04:11

## 2017-07-23 RX ADMIN — Medication 1 PACKET: at 20:57

## 2017-07-23 RX ADMIN — METOPROLOL TARTRATE 25 MG: 25 TABLET, FILM COATED ORAL at 09:07

## 2017-07-23 RX ADMIN — CEFAZOLIN SODIUM 2 G: 2 INJECTION, SOLUTION INTRAVENOUS at 13:28

## 2017-07-23 RX ADMIN — INSULIN GLARGINE 65 UNITS: 100 INJECTION, SOLUTION SUBCUTANEOUS at 10:45

## 2017-07-23 RX ADMIN — CYCLOBENZAPRINE HYDROCHLORIDE 10 MG: 10 TABLET, FILM COATED ORAL at 11:37

## 2017-07-23 RX ADMIN — FOLIC ACID 1 MG: 1 TABLET ORAL at 09:07

## 2017-07-23 RX ADMIN — ENOXAPARIN SODIUM 30 MG: 30 INJECTION SUBCUTANEOUS at 09:06

## 2017-07-23 RX ADMIN — Medication 1 PACKET: at 13:32

## 2017-07-23 RX ADMIN — Medication 1 PACKET: at 09:10

## 2017-07-23 RX ADMIN — Medication 20 MG: at 09:06

## 2017-07-23 RX ADMIN — SODIUM CHLORIDE, PRESERVATIVE FREE 5 ML: 5 INJECTION INTRAVENOUS at 06:26

## 2017-07-23 RX ADMIN — METOPROLOL TARTRATE 25 MG: 25 TABLET, FILM COATED ORAL at 20:45

## 2017-07-23 RX ADMIN — CEFAZOLIN SODIUM 2 G: 2 INJECTION, SOLUTION INTRAVENOUS at 21:22

## 2017-07-23 RX ADMIN — SODIUM CHLORIDE, PRESERVATIVE FREE 5 ML: 5 INJECTION INTRAVENOUS at 09:26

## 2017-07-23 RX ADMIN — GLYCOPYRROLATE 0.2 MG: 0.2 INJECTION, SOLUTION INTRAMUSCULAR; INTRAVENOUS at 20:46

## 2017-07-23 ASSESSMENT — VISUAL ACUITY
OU: NORMAL ACUITY
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
## 2017-07-23 NOTE — PLAN OF CARE
"Problem: Goal Outcome Summary  Goal: Goal Outcome Summary     SLP 6A: Attempted this AM. Pt thought it was 10 PM, not AM. Oriented pt. Pt stating \"oh I don't wanna do that now, I'm not in the mood.\" Despite encouragement from SLP, pt refused participation. SLP to follow per POC      "

## 2017-07-23 NOTE — PROGRESS NOTES
Diabetes Consult Daily  Progress Note          Assessment/Plan:   Mr. Ernesto Rawls is a 73 yo man with a history of type 2 diabetes, RA, CAD, and wet gangrene of his R foot great toe s/p [facility] on 7/7/17 with post op weakness in bilateral UE and LE.  Found to have significant spinal stenosis, he is now s/p C3-5 and C6 laminectomy, C5 corpetomy with cage placement, and C3-7 posterior fusion.  Hyperglycemia related to continuous enteral feeds.    Glucoses down to mid 100s at times, but then pops back up to the 200s.  It seems that high glucoses are more likely overnight- further out from glargine dose.    Plan:  -Glargine: continue 65 units qAM, adding 10 units qPM starting tonight to help with overnight hyperglycemia  -correction aspart 2unit/30 for BG >140 q4h     [facility]  Plan discussed with bedside RN.           Interval History:   The last 24 hours progress and nursing notes reviewed.   Continuous TFs: TwoCal at 45ml/h.   Ernesto was sleeping soundly when I visited.  Per rehab therapist, who had just worked with him, AMS seemed worse today.  No med list left by Cristian (wife) per RN.  Still planning [facility] if [facility].  Glucoses somewhat improved, but still up to 200s at times.          Recent Labs  Lab 07/23/17  1211 07/23/17  0908 07/23/17  0540 07/23/17  0142 07/22/17  2141 07/22/17  2128  07/20/17  0929  07/20/17  0643  07/19/17  1430  07/17/17  0741   GLC  --   --   --   --   --   --   --  305*  --  266*  --  215*  --  169*   * 238* 244* 233* 192* 210*  < >  --   < >  --   < >  --   < >  --    < > = values in this interval not displayed.            Review of Systems:   See interval hx          Medications:       Active Diet Order      Advance Diet as Tolerated      NPO for Medical/Clinical Reasons     Physical Exam:  Gen: Sleeping soundly, in NAD   HEENT: Cervical  "collar, mucous membranes are moist  Resp: Unlabored  Neuro:sleeping  /68 (BP Location: Left arm)  Pulse 87  Temp 96.9  F (36.1  C) (Axillary)  Resp 18  Ht 1.854 m (6' 1\")  Wt 88.5 kg (195 lb 1.6 oz)  SpO2 98%  BMI 25.74 kg/m2           Data:     Lab Results   Component Value Date    A1C 7.3 07/08/2017              CBC RESULTS:   Recent Labs   Lab Test  07/20/17   0929   WBC  10.8   RBC  3.03*   HGB  9.6*   HCT  28.4*   MCV  94   MCH  31.7   MCHC  33.8   RDW  14.3   PLT  226     Recent Labs   Lab Test  07/23/17   1246  07/20/17   0929  07/20/17   0643   NA  138  136  134   POTASSIUM   --   3.6  3.6   CHLORIDE   --   96  98   CO2   --   32  31   ANIONGAP   --   8  6   GLC   --   305*  266*   BUN   --   14  14   CR   --   0.42*  0.40*   KAROL   --   8.8  8.4*       Angelita Pandey PA-C [phone]  Diabetes Management job code [id number]          "
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
## 2017-07-23 NOTE — PLAN OF CARE
Problem: Goal Outcome Summary  Goal: Goal Outcome Summary  Posterior cervical 3, 4, 5 and partial C6 laminectomy for decompression of cervical stenosis. (7/7/2017)  Right Anterior Cervical 5 corpectomy with cage and plate reconstruction and fusion (7/12/2017)  Posterior Cervical C3-7 Fusion (7/16/2017)-per MD   alert to voice/ or alert. O to self, illogical, had difficulty following commands up in chair w/ lift. Continues to exhibit confusion. ABG completed 2nd to confusion. General weakness, Collar on @ all times, neck skin care provided  w/ assistance to stabilize neck. Pt was agitated flailing arms at wife, she stated she has not seen him behave like this. She was concerned and stated a month ago he was nothing like this. Reposition Q2hrs in bed. Lg liquid BM x1, armstrong ( for retention) cares completed.   At first denied pain later lido patches placed and flexiril per NG for upper back/ shoulders discomfort w/ activity. TF @ goal.

## 2017-07-23 NOTE — PLAN OF CARE
Problem: Goal Outcome Summary  Goal: Goal Outcome Summary  OT 6A: Patient with very limited participation in OT session, at this time not progressing toward goals and frequency decreased. Patient follows about 50% of simple one step commands and is distracted by what appears to be visual hallucinations, e.g. holding non existent items in his hands. Patient was dependently transferred bed> recliner; he requires max cues and hands on assist for simple H/G tasks and participation became increasingly limited as patient became more tired.   Patient will require extensive inpatient rehab when stable for discharge from hospital. ADL participation is limited by weakness, cognitive deficits and post op precautions.
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
## 2017-07-23 NOTE — PLAN OF CARE
Problem: Goal Outcome Summary  Goal: Goal Outcome Summary  6A-PT: Pt inconsistently following simple commands today. Required mod-max A x 2 for weight shifting/body positioning in sitting. Attempted to perform STS transfer with EZ stand - instead utilized OH lift to dependently transfer pt form chair>bed. Pt performed rolling in bed with mod-max A x 2 and max verbal cues for sequencing. Attempted to perform supine LE exercises, however pt cont to lack command following and increasingly fatigued. Pt limited by cognition and deconditioning today.      Pending pt progress, rec dc to ARU vs. TCU for continued progression of strengthening and activity tolerance. PT will update pending pt participation/progress.

## 2017-07-24 ENCOUNTER — HOSPITAL ENCOUNTER (OUTPATIENT)
Dept: VASCULAR ULTRASOUND | Facility: CLINIC | Age: 72
End: 2017-07-24
Attending: NEUROLOGICAL SURGERY
Payer: COMMERCIAL

## 2017-07-24 ENCOUNTER — TELEPHONE (OUTPATIENT)
Dept: PODIATRY | Facility: CLINIC | Age: 72
End: 2017-07-24

## 2017-07-24 ENCOUNTER — APPOINTMENT (OUTPATIENT)
Dept: SPEECH THERAPY | Facility: CLINIC | Age: 72
DRG: 028 | End: 2017-07-24
Attending: NEUROLOGICAL SURGERY
Payer: COMMERCIAL

## 2017-07-24 ENCOUNTER — APPOINTMENT (OUTPATIENT)
Dept: PHYSICAL THERAPY | Facility: CLINIC | Age: 72
DRG: 028 | End: 2017-07-24
Attending: NEUROLOGICAL SURGERY
Payer: COMMERCIAL

## 2017-07-24 ENCOUNTER — APPOINTMENT (OUTPATIENT)
Dept: OCCUPATIONAL THERAPY | Facility: CLINIC | Age: 72
DRG: 028 | End: 2017-07-24
Attending: NEUROLOGICAL SURGERY
Payer: COMMERCIAL

## 2017-07-24 LAB
GLUCOSE BLDC GLUCOMTR-MCNC: 138 MG/DL (ref 70–99)
GLUCOSE BLDC GLUCOMTR-MCNC: 168 MG/DL (ref 70–99)
GLUCOSE BLDC GLUCOMTR-MCNC: 180 MG/DL (ref 70–99)
GLUCOSE BLDC GLUCOMTR-MCNC: 206 MG/DL (ref 70–99)
GLUCOSE BLDC GLUCOMTR-MCNC: 215 MG/DL (ref 70–99)
GLUCOSE BLDC GLUCOMTR-MCNC: 238 MG/DL (ref 70–99)

## 2017-07-24 PROCEDURE — 40000141 ZZH STATISTIC PERIPHERAL IV START W/O US GUIDANCE

## 2017-07-24 PROCEDURE — 36569 INSJ PICC 5 YR+ W/O IMAGING: CPT

## 2017-07-24 PROCEDURE — 25000128 H RX IP 250 OP 636: Performed by: STUDENT IN AN ORGANIZED HEALTH CARE EDUCATION/TRAINING PROGRAM

## 2017-07-24 PROCEDURE — 97110 THERAPEUTIC EXERCISES: CPT | Mod: GO | Performed by: OCCUPATIONAL THERAPIST

## 2017-07-24 PROCEDURE — 97535 SELF CARE MNGMENT TRAINING: CPT | Mod: GO | Performed by: OCCUPATIONAL THERAPIST

## 2017-07-24 PROCEDURE — 27210913 ZZH NUTRITION PRODUCT INTERMEDIATE PACKET

## 2017-07-24 PROCEDURE — 40000193 ZZH STATISTIC PT WARD VISIT

## 2017-07-24 PROCEDURE — 92526 ORAL FUNCTION THERAPY: CPT | Mod: GN | Performed by: SPEECH-LANGUAGE PATHOLOGIST

## 2017-07-24 PROCEDURE — C1751 CATH, INF, PER/CENT/MIDLINE: HCPCS

## 2017-07-24 PROCEDURE — 00000146 ZZHCL STATISTIC GLUCOSE BY METER IP

## 2017-07-24 PROCEDURE — 25000132 ZZH RX MED GY IP 250 OP 250 PS 637: Performed by: NEUROLOGICAL SURGERY

## 2017-07-24 PROCEDURE — 40000133 ZZH STATISTIC OT WARD VISIT: Performed by: OCCUPATIONAL THERAPIST

## 2017-07-24 PROCEDURE — 40000225 ZZH STATISTIC SLP WARD VISIT: Performed by: SPEECH-LANGUAGE PATHOLOGIST

## 2017-07-24 PROCEDURE — 97110 THERAPEUTIC EXERCISES: CPT | Mod: GP

## 2017-07-24 PROCEDURE — 25000125 ZZHC RX 250: Performed by: NEUROLOGICAL SURGERY

## 2017-07-24 PROCEDURE — 25000125 ZZHC RX 250: Performed by: STUDENT IN AN ORGANIZED HEALTH CARE EDUCATION/TRAINING PROGRAM

## 2017-07-24 PROCEDURE — 25000128 H RX IP 250 OP 636: Performed by: NURSE PRACTITIONER

## 2017-07-24 PROCEDURE — 12000008 ZZH R&B INTERMEDIATE UMMC

## 2017-07-24 PROCEDURE — 25000132 ZZH RX MED GY IP 250 OP 250 PS 637: Performed by: STUDENT IN AN ORGANIZED HEALTH CARE EDUCATION/TRAINING PROGRAM

## 2017-07-24 PROCEDURE — 97530 THERAPEUTIC ACTIVITIES: CPT | Mod: GP

## 2017-07-24 PROCEDURE — 27210429 ZZH NUTRITION PRODUCT INTERMEDIATE LITER

## 2017-07-24 RX ORDER — LIDOCAINE 40 MG/G
CREAM TOPICAL
Status: DISCONTINUED | OUTPATIENT
Start: 2017-07-24 | End: 2017-07-26 | Stop reason: HOSPADM

## 2017-07-24 RX ORDER — HEPARIN SODIUM,PORCINE 10 UNIT/ML
2-5 VIAL (ML) INTRAVENOUS
Status: DISCONTINUED | OUTPATIENT
Start: 2017-07-24 | End: 2017-07-26 | Stop reason: HOSPADM

## 2017-07-24 RX ADMIN — METOPROLOL TARTRATE 25 MG: 25 TABLET, FILM COATED ORAL at 20:43

## 2017-07-24 RX ADMIN — ACETAMINOPHEN 650 MG: 325 TABLET, FILM COATED ORAL at 16:38

## 2017-07-24 RX ADMIN — METOPROLOL TARTRATE 25 MG: 25 TABLET, FILM COATED ORAL at 08:48

## 2017-07-24 RX ADMIN — ENOXAPARIN SODIUM 30 MG: 30 INJECTION SUBCUTANEOUS at 08:48

## 2017-07-24 RX ADMIN — LIDOCAINE HYDROCHLORIDE 3.5 ML: 10 INJECTION, SOLUTION INFILTRATION; PERINEURAL at 16:35

## 2017-07-24 RX ADMIN — LIDOCAINE 3 PATCH: 50 PATCH CUTANEOUS at 08:50

## 2017-07-24 RX ADMIN — CEFAZOLIN SODIUM 2 G: 2 INJECTION, SOLUTION INTRAVENOUS at 22:20

## 2017-07-24 RX ADMIN — POLYETHYLENE GLYCOL 3350 17 G: 17 POWDER, FOR SOLUTION ORAL at 08:48

## 2017-07-24 RX ADMIN — GLYCOPYRROLATE 0.2 MG: 0.2 INJECTION, SOLUTION INTRAMUSCULAR; INTRAVENOUS at 20:42

## 2017-07-24 RX ADMIN — CEFAZOLIN SODIUM 2 G: 2 INJECTION, SOLUTION INTRAVENOUS at 13:10

## 2017-07-24 RX ADMIN — INSULIN GLARGINE 65 UNITS: 100 INJECTION, SOLUTION SUBCUTANEOUS at 08:51

## 2017-07-24 RX ADMIN — FOLIC ACID 1 MG: 1 TABLET ORAL at 08:48

## 2017-07-24 RX ADMIN — CEFAZOLIN SODIUM 2 G: 2 INJECTION, SOLUTION INTRAVENOUS at 05:10

## 2017-07-24 RX ADMIN — MULTIVIT AND MINERALS-FERROUS GLUCONATE 9 MG IRON/15 ML ORAL LIQUID 15 ML: at 08:49

## 2017-07-24 RX ADMIN — GLYCOPYRROLATE 0.2 MG: 0.2 INJECTION, SOLUTION INTRAMUSCULAR; INTRAVENOUS at 13:03

## 2017-07-24 RX ADMIN — SENNOSIDES AND DOCUSATE SODIUM 2 TABLET: 8.6; 5 TABLET ORAL at 08:48

## 2017-07-24 RX ADMIN — Medication 20 MG: at 08:58

## 2017-07-24 ASSESSMENT — VISUAL ACUITY
OU: NORMAL ACUITY

## 2017-07-24 NOTE — PROGRESS NOTES
Care Coordinator Progress Note     Admission Date/Time:  7/7/2017  Attending MD:  Dr Rafael Lo     Data  Chart reviewed, discussed with interdisciplinary team. In 6A Discharge Rounds Dr Marcus reported [facility]. Will not have g-tube placed here, [facility] the [facility]. Continuing to need glucose management; IV antibiotics x6 weeks; ID & Endocrine following.     Coordination of Care and Referrals  At 9:55am called and left a message for Sophia, VA Referral Specialist at the [facility]. Phone: [phone]. Informed her [facility], will not have g-tube placed here. Received call back from Sophia at 10:15am. She said they do not have an accepting Medicine service to take pt in the hospital. She said their rehab doctor has been reviewing pt for admission and they are working on SCI rehab admission, not hospital admission. I will fax updated notes to fax# [phone]. Informed Sophia Neurosurgery does not plan to place a g-tube here. This will need to be in the progress notes. Sophia said they do not have a bed available in their hospital this AM.    The rehab doctor has questions that need to be addressed. From my previous note on 7-21:   1) need to clarify activity, including weight restrictions, collar restrictions and length of time for each.   2) Clarify ID plan of care; include labs and length of therapy.   3) needs to be off insulin drip  4) Leukocytosis needs to be improving  5) Up to date PT, OT & ST evaluations. Level of assist needed.   I asked Dr Marcus to address g-tube plan and other questions in a progress note.     ETELVINA Dooley, updated and she will follow for rehab placement.   Updated pt's significant other, Cristian and her daughter. Informed Cristian I have been communicating with the [facility] for [facility] transfer. Will be sending updated progress notes, they are assessing for SCI rehab placement. No plan to place g-tube  here. Cristian expressed understanding.   Cristian said she was  to [facility], but then . They got back together again about 10 years ago. They live together, but aren't remarried. Cristian said it was frustrating [facility] the [facility] [place] [facility]. However, she is satisfied with the [facility] and will be OK with transfer back there. Informed Cristian that Luisa, SW or I will keep her updated with discharge plans.     Assessment  The [facility] is working on rehab placement to their SCI Unit, not hospital to hospital transfer. Pt stable for transfer to the [facility] for continued care.      Plan  Anticipated Discharge Date:  When hospital or rehab bed available at the [facility].  Anticipated Discharge Plan:  [facility] vs [facility]. Will continue to update the [facility] with pt's status.   --Care Coordinator will fax updated MD notes, nursing and therapy note when available.       Jerilyn Holbrook RN Care Coordinator  Unit 6A, [facility]

## 2017-07-24 NOTE — PROGRESS NOTES
Diabetes Consult Daily  Progress Note          Assessment/Plan:   Mr. 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  Found to have significant spinal stenosis, he is now s/p C3-5 and C6 laminectomy, C5 corpetomy with cage placement, and C3-7 posterior fusion.  Hyperglycemia related to continuous enteral feeds.    Glucoses improved with BID dosing- mostly mid to high 100s overnight, popped up to low 200s this morning.  Morning increase likely related to pt getting many morning meds in solutions/suspensions/elixirs that contain dextrose.    Plan:  -Glargine: continue 65 units qAM, increased evening glargine from 10 to 17 units qPM  -correction aspart 2unit/30 for BG >140 q4h     To prevent hypoglycemia: please run D10 at 75ml/h if TFs interrupted, including if TFs stop for transport to [facility]. Discussed with bedside RN.    [facility] up: [facility].  Plan discussed with bedside RN.           Interval History:   The last 24 hours progress and nursing notes reviewed.   Continuous TFs: TwoCal at 45ml/h.   Ernesto had no complaints this morning.  No word yet if he will transfer to [facility] today- plan was to transfer if bed is available.  Glucoses improved with BID glargine dosing.  Pt pulled out his PICC this morning. Plan to place peripheral IV now, as it may take awhile to re-place the PICC.          Recent Labs  Lab 07/24/17  0843 07/24/17  0349 07/24/17  0017 07/23/17  1948 07/23/17  1630 07/23/17  1211  07/20/17  0929  07/20/17  0643  07/19/17  1430   GLC  --   --   --   --   --   --   --  305*  --  266*  --  215*   * 138* 180* 164* 172* 156*  < >  --   < >  --   < >  --    < > = values in this interval not displayed.            Review of Systems:   See interval hx          Medications:  "      Active Diet Order      Advance Diet as Tolerated      NPO for Medical/Clinical Reasons     Physical Exam:  Gen: Alert, in NAD   HEENT: Cervical collar, mucous membranes are moist, NJ tube in L nares bridled  Resp: Unlabored  Neuro:alert, oriented to person only  /80 (BP Location: Left arm)  Pulse 87  Temp 96.5  F (35.8  C) (Axillary)  Resp 20  Ht 1.854 m (6' 1\")  Wt 88.5 kg (195 lb 1.6 oz)  SpO2 95%  BMI 25.74 kg/m2           Data:     Lab Results   Component Value Date    A1C 7.3 07/08/2017            Recent Labs   Lab Test  07/23/17   1246  07/20/17   0929  07/20/17   0643   NA  138  136  134   POTASSIUM   --   3.6  3.6   CHLORIDE   --   96  98   CO2   --   32  31   ANIONGAP   --   8  6   GLC   --   305*  266*   BUN   --   14  14   CR   --   0.42*  0.40*   KAROL   --   8.8  8.4*     CBC RESULTS:   Recent Labs   Lab Test  07/20/17   0929   WBC  10.8   RBC  3.03*   HGB  9.6*   HCT  28.4*   MCV  94   MCH  31.7   MCHC  33.8   RDW  14.3   PLT  226       Angelita Pandey PA-C [phone]  Diabetes Management job code [id number]          "

## 2017-07-24 NOTE — PLAN OF CARE
Problem: Goal Outcome Summary  Goal: Goal Outcome Summary  Outcome: No Change  Pt had quiet night with stable vital signs.  He continues to experience confusion and was oriented only to person overnight.  His armstrong is patent, NG tube feeds are well-tolerated.  Sutures on neck (anterior and posterior) and R toe are CDI.  Blood sugars were stable overnight.

## 2017-07-24 NOTE — PLAN OF CARE
Problem: Goal Outcome Summary  Goal: Goal Outcome Summary  PT 6A:  Barriers to mobility include thoracic and cervical spine pain, weakness in all extremities, impaired attention.  Today pt followed 75% of commands, but fatigued easily.  Mod A-max A x1-2 bed mobility.  Sat EOB x9mins progressing from mod A to supervision level.  Completed supine LE exercises, again fatiguing quickly.  Overall therapy tolerance is fair.     Rec TCU

## 2017-07-24 NOTE — PLAN OF CARE
Problem: Goal Outcome Summary  Goal: Goal Outcome Summary  OT 6A: Pt supine <> sit max A x 2. Pt seated EOB ~7 minutes with mod A due to left lean. In order to increase participation with ADLs, pt engaged in simple g/h task increased IND for LUE, required [employer] for dominant R. Pt demonstrating fatigue and returned to supine max A x 2. To increase strength for IND in ADLs, pt completed BUE AAROM/AROM 1x10 reps. Pt following simple commands and oriented x 2.     REC: TCU when medically appropriate to increase IND with ADLs

## 2017-07-24 NOTE — PROGRESS NOTES
[facility], [place]    Neurosurgery Daily Progress Note         Assessment   Ernesto Rawls is a 72 year old male who is postoperative day # 16/11/7 from C3-5 and C6 laminectomy, C5 corpectomy w/cage placement, and C3-C7 posterior fusion.          Plan     Routine neuro exams per unit protocol.     Good sleep hygiene to mitigate sun-downing.     Continue current pain control regimen    Feeding tube in place    Post-operative X-rays: stable    Aspen collar on at all times for next 6 weeks.      Tolerating room air.      ID: OR cultures positive for MSSA. On Cefazolin with vancomycin and cefepime discontinued as per ID recs.     Diabetes management: added PM lantus, appreciate recommendations.     Dentistry: follow up with/ dentist as outpatient    Podiatry: daily dressing changes    Incentive spirometry Q1H while awake.     Bowel regimen. Anti-emetics.     SCDs while in bed.    PT/OT: ARU    Dispo; Medically stable on 6A. [facility]       Please dial * * * and enter job code [phone] to reach the neurosurgery team if you have any questions.    Patient and above plan discussed with neurosurgery chief resident.         Subjective   Delirium overnight. Spent much of the night awake and agitated.         Objective   Temp:  [95.6  F (35.3  C)-100.3  F (37.9  C)] 96.9  F (36.1  C)  Pulse:  [87] 87  Heart Rate:  [] 90  Resp:  [16-22] 18  BP: (126-151)/(63-74) 135/68  SpO2:  [92 %-100 %] 98 %    I/O last 3 completed shifts:  In: 1255 [I.V.:120; NG/GT:145]  Out: 1450 [Urine:1450]    Physical Exam  General: Aspen cervical collar in place.   Wound: Anterior incision c/d/i, no significant swelling, erythema, or drainage.  Right foot wrapped in bandage.      Neurologic Exam:  - Alert and oriented x1 (self)  - Strength: 5/5 in deltoids bilaterally, left biceps/triceps are 4+/5 on the left, 4-/5 on the right, wrist extensors are 3/5 on the left and 2/5 on the right, finger intrinsics  are 4-/5 on the left, 2/5 on the right. Iliopsoas are 4-/5 on the left and 2/5 on the right, quadriceps are 4-/5 on the left and 2/5 on the right, tibialis anterior/gastrocnemius are 4+/5 on the left, 4-/5 on the right. Poor participation in lower extremity exam.  - PERRL, EOMI.  Motor: Normal bulk/tone; no tremor, rigidity, or bradykinesia.        Labs and Imaging     BMP    Recent Labs  Lab 07/23/17  1246 07/20/17  0929 07/20/17  0643 07/19/17  1430 07/19/17  0746  07/17/17  0741    136 134 133  --   < > 130*   POTASSIUM  --  3.6 3.6 3.5 3.5  --  3.7   CHLORIDE  --  96 98 97  --   --  95   KAROL  --  8.8 8.4* 8.3*  --   --  8.6   CO2  --  32 31 31  --   --  29   BUN  --  14 14 17  --   --  13   CR  --  0.42* 0.40* 0.46*  --   --  0.50*   GLC  --  305* 266* 215*  --   --  169*   < > = values in this interval not displayed.  CBC    Recent Labs  Lab 07/20/17  0929 07/19/17  1430 07/17/17  0741   WBC 10.8 11.9* 11.9*   RBC 3.03* 2.89* 3.18*   HGB 9.6* 9.0* 10.0*   HCT 28.4* 27.1* 30.0*   MCV 94 94 94   MCH 31.7 31.1 31.4   MCHC 33.8 33.2 33.3   RDW 14.3 14.4 14.1    214 229     INR  No lab results found in last 7 days.    Imaging: Reviewed      Contact the neurosurgery resident on call with questions.    Dial * * *[phone]: Enter [phone] when prompted.

## 2017-07-24 NOTE — PROGRESS NOTES
[facility], [place]    Neurosurgery Daily Progress Note         Assessment   Ernesto Rawls is a 72 year old male who is postoperative day # 17/12/8 from C3-5 and C6 laminectomy, C5 corpectomy w/cage placement, and C3-C7 posterior fusion.          Plan     Routine neuro exams per unit protocol.     Good sleep hygiene to mitigate sun-downing.     Continue current pain control regimen    Feeding tube in place    Post-operative X-rays: stable    Aspen collar on at all times for 6 weeks post-operatively.      Tolerating room air.      ID: OR cultures positive for MSSA. On Cefazolin with vancomycin and cefepime discontinued as per ID recs.     Diabetes management: added PM lantus, appreciate recommendations.     Dentistry: follow up with/ dentist as outpatient    Podiatry: daily dressing changes    Incentive spirometry Q1H while awake.     Bowel regimen. Anti-emetics.     SCDs while in bed.    PT/OT: ARU    Dispo; Medically stable on 6A. [facility]       Please dial * * * and enter job code [phone] to reach the neurosurgery team if you have any questions.    Patient and above plan discussed with neurosurgery chief resident.         Subjective   Delirium continues.          Objective   Temp:  [96.4  F (35.8  C)-98.5  F (36.9  C)] 98.1  F (36.7  C)  Heart Rate:  [] 102  Resp:  [18-24] 18  BP: (130-159)/(55-80) 150/70  SpO2:  [95 %-98 %] 97 %    I/O last 3 completed shifts:  In: 1040 [P.O.:120; I.V.:110; NG/GT:90]  Out: 300 [Urine:300]    Physical Exam  General: Aspen cervical collar in place.   Wound: Anterior incision c/d/i, no significant swelling, erythema, or drainage.  Right foot wrapped in bandage.      Neurologic Exam:  - Alert and oriented x1 (self)  - Strength: 5/5 in deltoids bilaterally, left biceps/triceps are 4+/5 on the left, 4-/5 on the right, wrist extensors are 3/5 on the left and 2/5 on the right, finger intrinsics are 4-/5 on the left, 2/5 on the right.  Iliopsoas are 4-/5 on the left and 2/5 on the right, quadriceps are 4-/5 on the left and 2/5 on the right, tibialis anterior/gastrocnemius are 4+/5 on the left, 4-/5 on the right. Poor participation in lower extremity exam.  - PERRL, EOMI.  Motor: Normal bulk/tone; no tremor, rigidity, or bradykinesia.        Labs and Imaging     BMP    Recent Labs  Lab 07/23/17  1246 07/20/17  0929 07/20/17  0643 07/19/17  1430 07/19/17  0746    136 134 133  --    POTASSIUM  --  3.6 3.6 3.5 3.5   CHLORIDE  --  96 98 97  --    KAROL  --  8.8 8.4* 8.3*  --    CO2  --  32 31 31  --    BUN  --  14 14 17  --    CR  --  0.42* 0.40* 0.46*  --    GLC  --  305* 266* 215*  --      CBC    Recent Labs  Lab 07/20/17  0929 07/19/17  1430   WBC 10.8 11.9*   RBC 3.03* 2.89*   HGB 9.6* 9.0*   HCT 28.4* 27.1*   MCV 94 94   MCH 31.7 31.1   MCHC 33.8 33.2   RDW 14.3 14.4    214     INR  No lab results found in last 7 days.    Imaging: Reviewed      Contact the neurosurgery resident on call with questions.    Dial * * *[phone]: Enter [phone] when prompted.

## 2017-07-24 NOTE — PROGRESS NOTES
Social Work Services Progress Note    Hospital Day: 18  Date of Initial Social Work Evaluation:  7/13/17  Collaborated with:  Dr. Marcus (Neuro Surgery), Jerilyn Holbrook - 6A RN Care Coordinator    Data:  Per Dr. Marcus, pt is ready for discharge to the [facility] with a goal of now having pt placed on a inpt hospital unit with peg placement needed.    Per 6A RN CC, the [facility] [place] will not accept pt for hospital to hospital transfer (for inpt hospitalization on a medical unit).  Neuro Surgery is requesting that the [facility] place a peg.    Intervention:  Spoke with Dr. Marcus and Jerilyn Holbrook.  Phoned Agnes Luciano, Referral  at the [facility] [place] and left a message informing her that pt is ready for discharge and requesting to know whether or not they will accept pt to their spinal cord rehab program.  Faxed updated assessment materials to Agnes.  The [facility] would like the answer to the following questions as referenced in Jerilyn Holbrook 7/24/17 progress note entry:    1) need to clarify activity, including weight restrictions, collar restrictions and length of time for each.   2) Clarify ID plan of care; include labs and length of therapy.   3) needs to be off insulin drip  4) Leukocytosis needs to be improving  5) Up to date PT, OT & ST evaluations. Level of assist needed.     SW text paged Dr. Marcus to call to ask that he address the above questions in his 7/24/17 progress note entry.    Assessment:  Neurosurgery maintains that pt is medically ready for discharge    Plan:    Anticipated Disposition:  Attempting to secure placement at the Spinal Cord Rehab program - [facility] [place]    Barriers to d/c plan:  Awaiting acceptance at the [facility] [place] Spinal Cord Rehab program    Follow Up:  SW will continue to follow.    FREDDY Dooley  Social Work, 6A  Phone:  [phone]  Pager:  [phone]  7/24/2017

## 2017-07-24 NOTE — PROGRESS NOTES
Care Coordinator Progress Note     Admission Date/Time:  7/7/2017  Attending MD:  Dr Rafael Lo     Data  Received call from Vaishali Duffy NP; she reported Dr Killian spoke with Dr HIEN Nielsen, Neurosurgery at the [facility]. Dr Nielsen accepted pt for admission to the hospital there. Vaishali requested I contact the [facility] for [facility] to the [facility]. Informed her the [facility] didn't have a hospital bed this AM; their rehab MD had questions & I had asked Dr Marcus to address them. I will contact the [facility] Referral Desk and inform them Neurosurgery has accept pt to the hospital.     Coordination of Care and Referrals  Called Sophia [facility]. Informed her Neurosurgery at the  spoke with Dr Nielsen, Neurosurgery at the [facility] and the [facility] Neurosurgery team has accepted pt for admission. She also spoke with Dr Nielsen this AM. They do not have a hospital bed available today.   In the meantime, Sophia will continue to work with the SCI rehab MD for rehab placement. As soon as updated MD progress notes are available I will fax to Sophia at the [facility] for the rehab doctor to review.     Assessment  [facility]surgery [facility] the [facility] but the [facility].      Plan  --Fax updated MD progress note addressing questions from the [facility] rehab doctor.        Jerilyn Holbrook, RN Care Coordinator  Unit 6A, [facility]

## 2017-07-24 NOTE — TELEPHONE ENCOUNTER
Reason for Call:  Other call back    Detailed comments:  The caller with the [facility] - states the patient has had his toe amputated a few weeks back and would like to know when can the stitches be removed?    Please call to discuss further      Phone Number Patient can be reached at: Other phone number:  [phone]    Best Time: today    Can we leave a detailed message on this number? YES    Call taken on 7/24/2017 at 1:46 PM by Sonali Donohue

## 2017-07-24 NOTE — PLAN OF CARE
Problem: Goal Outcome Summary  Goal: Goal Outcome Summary  Outcome: No Change  Pt is AxOx1. Pt is aware of self and of family. VSS. PIV SL. PIV placed this afternoon due to pt pulling PICC out at 0700. Vascular access called at 1410 to take him down for PICC placement. Vascular working with family for consent. Pt took all medications via NG tube or PIV. Pt continues to be NPO at this time.

## 2017-07-24 NOTE — PLAN OF CARE
Problem: Goal Outcome Summary  Goal: Goal Outcome Summary  Outcome: No Change  VSS. Pt was lethargic for first half of shift, during 2000 assessment pt was more awake and able to follow some commands. Neuros wax and wane. Disoriented to situation and date (knew 2017). Pt continues to have occasionally confusion and illogical speech. C-collar on at all times, Neck incisions SKYLAR, CDI, incision cares done. Pt has NG in place with TF infusing at goal of 45ml/hr. NPO. York in place with good UO, no BM. Pt did not show any agitation this shift. R great toe amputated, sutures in place. Up with A2 and lift, was in chair this AM. PICC in place with abx currently infusing. Rarely uses call light. Will continue to monitor and follow POC

## 2017-07-24 NOTE — PLAN OF CARE
Problem: Goal Outcome Summary  Goal: Goal Outcome Summary  Outcome: Therapy, progress toward functional goals is gradual  SLP:  Pt seen to f/u for swallowing. Pt noted to be making slow but gradual improvements. No significant secretions noted today.  Pt able to participate in oral motor ex.  Pt given small PO trial of nectar thick liquids w/ increased risk for aspiration noted with progression of trials.  Recommend continue to be strict NPO with TF for full nutritional support.  ST will continue to follow for PO readiness.

## 2017-07-25 ENCOUNTER — APPOINTMENT (OUTPATIENT)
Dept: PHYSICAL THERAPY | Facility: CLINIC | Age: 72
DRG: 028 | End: 2017-07-25
Attending: NEUROLOGICAL SURGERY
Payer: COMMERCIAL

## 2017-07-25 ENCOUNTER — APPOINTMENT (OUTPATIENT)
Dept: SPEECH THERAPY | Facility: CLINIC | Age: 72
DRG: 028 | End: 2017-07-25
Attending: NEUROLOGICAL SURGERY
Payer: COMMERCIAL

## 2017-07-25 ENCOUNTER — APPOINTMENT (OUTPATIENT)
Dept: OCCUPATIONAL THERAPY | Facility: CLINIC | Age: 72
DRG: 028 | End: 2017-07-25
Attending: NEUROLOGICAL SURGERY
Payer: COMMERCIAL

## 2017-07-25 LAB
ALBUMIN UR-MCNC: 30 MG/DL
APPEARANCE UR: CLEAR
BILIRUB UR QL STRIP: NEGATIVE
CAOX CRY #/AREA URNS HPF: ABNORMAL /HPF
COLOR UR AUTO: YELLOW
ERYTHROCYTE [DISTWIDTH] IN BLOOD BY AUTOMATED COUNT: 14.7 % (ref 10–15)
GLUCOSE BLDC GLUCOMTR-MCNC: 160 MG/DL (ref 70–99)
GLUCOSE BLDC GLUCOMTR-MCNC: 181 MG/DL (ref 70–99)
GLUCOSE BLDC GLUCOMTR-MCNC: 183 MG/DL (ref 70–99)
GLUCOSE BLDC GLUCOMTR-MCNC: 191 MG/DL (ref 70–99)
GLUCOSE BLDC GLUCOMTR-MCNC: 191 MG/DL (ref 70–99)
GLUCOSE BLDC GLUCOMTR-MCNC: 197 MG/DL (ref 70–99)
GLUCOSE UR STRIP-MCNC: NEGATIVE MG/DL
HCT VFR BLD AUTO: 31.8 % (ref 40–53)
HGB BLD-MCNC: 10.3 G/DL (ref 13.3–17.7)
HGB UR QL STRIP: NEGATIVE
KETONES UR STRIP-MCNC: NEGATIVE MG/DL
LEUKOCYTE ESTERASE UR QL STRIP: NEGATIVE
MCH RBC QN AUTO: 30.8 PG (ref 26.5–33)
MCHC RBC AUTO-ENTMCNC: 32.4 G/DL (ref 31.5–36.5)
MCV RBC AUTO: 95 FL (ref 78–100)
MUCOUS THREADS #/AREA URNS LPF: PRESENT /LPF
NITRATE UR QL: NEGATIVE
PH UR STRIP: 6.5 PH (ref 5–7)
PLATELET # BLD AUTO: 335 10E9/L (ref 150–450)
RBC # BLD AUTO: 3.34 10E12/L (ref 4.4–5.9)
RBC #/AREA URNS AUTO: 20 /HPF (ref 0–2)
SP GR UR STRIP: 1.03 (ref 1–1.03)
TRANS CELLS #/AREA URNS HPF: <1 /HPF (ref 0–1)
URN SPEC COLLECT METH UR: ABNORMAL
UROBILINOGEN UR STRIP-MCNC: 2 MG/DL (ref 0–2)
WBC # BLD AUTO: 9.4 10E9/L (ref 4–11)
WBC #/AREA URNS AUTO: 1 /HPF (ref 0–2)

## 2017-07-25 PROCEDURE — 27210429 ZZH NUTRITION PRODUCT INTERMEDIATE LITER

## 2017-07-25 PROCEDURE — 27210913 ZZH NUTRITION PRODUCT INTERMEDIATE PACKET

## 2017-07-25 PROCEDURE — 00000146 ZZHCL STATISTIC GLUCOSE BY METER IP

## 2017-07-25 PROCEDURE — 40000133 ZZH STATISTIC OT WARD VISIT: Performed by: OCCUPATIONAL THERAPIST

## 2017-07-25 PROCEDURE — 85027 COMPLETE CBC AUTOMATED: CPT | Performed by: RADIOLOGY PRACTITIONER ASSISTANT

## 2017-07-25 PROCEDURE — 12000008 ZZH R&B INTERMEDIATE UMMC

## 2017-07-25 PROCEDURE — 92526 ORAL FUNCTION THERAPY: CPT | Mod: GN | Performed by: SPEECH-LANGUAGE PATHOLOGIST

## 2017-07-25 PROCEDURE — 97535 SELF CARE MNGMENT TRAINING: CPT | Mod: GO | Performed by: OCCUPATIONAL THERAPIST

## 2017-07-25 PROCEDURE — 25000125 ZZHC RX 250: Performed by: NEUROLOGICAL SURGERY

## 2017-07-25 PROCEDURE — 25000132 ZZH RX MED GY IP 250 OP 250 PS 637: Performed by: STUDENT IN AN ORGANIZED HEALTH CARE EDUCATION/TRAINING PROGRAM

## 2017-07-25 PROCEDURE — 85610 PROTHROMBIN TIME: CPT | Performed by: RADIOLOGY PRACTITIONER ASSISTANT

## 2017-07-25 PROCEDURE — 25000132 ZZH RX MED GY IP 250 OP 250 PS 637: Performed by: NEUROLOGICAL SURGERY

## 2017-07-25 PROCEDURE — 25000128 H RX IP 250 OP 636: Performed by: STUDENT IN AN ORGANIZED HEALTH CARE EDUCATION/TRAINING PROGRAM

## 2017-07-25 PROCEDURE — 36592 COLLECT BLOOD FROM PICC: CPT | Performed by: RADIOLOGY PRACTITIONER ASSISTANT

## 2017-07-25 PROCEDURE — 40000802 ZZH SITE CHECK

## 2017-07-25 PROCEDURE — 97110 THERAPEUTIC EXERCISES: CPT | Mod: GP

## 2017-07-25 PROCEDURE — 40000225 ZZH STATISTIC SLP WARD VISIT: Performed by: SPEECH-LANGUAGE PATHOLOGIST

## 2017-07-25 PROCEDURE — 97530 THERAPEUTIC ACTIVITIES: CPT | Mod: GP

## 2017-07-25 PROCEDURE — 40000193 ZZH STATISTIC PT WARD VISIT

## 2017-07-25 PROCEDURE — 81001 URINALYSIS AUTO W/SCOPE: CPT | Performed by: STUDENT IN AN ORGANIZED HEALTH CARE EDUCATION/TRAINING PROGRAM

## 2017-07-25 RX ORDER — METHYLPREDNISOLONE 32 MG/1
32 TABLET ORAL ONCE
Status: DISCONTINUED | OUTPATIENT
Start: 2017-07-26 | End: 2017-07-26 | Stop reason: HOSPADM

## 2017-07-25 RX ORDER — METHYLPREDNISOLONE 32 MG/1
32 TABLET ORAL ONCE
Status: DISCONTINUED | OUTPATIENT
Start: 2017-07-25 | End: 2017-07-25

## 2017-07-25 RX ORDER — METHYLPREDNISOLONE 32 MG/1
32 TABLET ORAL ONCE
Status: DISCONTINUED | OUTPATIENT
Start: 2017-07-26 | End: 2017-07-25

## 2017-07-25 RX ORDER — LIDOCAINE 40 MG/G
CREAM TOPICAL
Status: DISCONTINUED | OUTPATIENT
Start: 2017-07-25 | End: 2017-07-26 | Stop reason: HOSPADM

## 2017-07-25 RX ORDER — DIPHENHYDRAMINE HCL 25 MG
25-50 CAPSULE ORAL ONCE
Status: DISCONTINUED | OUTPATIENT
Start: 2017-07-25 | End: 2017-07-26 | Stop reason: HOSPADM

## 2017-07-25 RX ORDER — CEFAZOLIN SODIUM 2 G/100ML
2 INJECTION, SOLUTION INTRAVENOUS
Status: DISCONTINUED | OUTPATIENT
Start: 2017-07-26 | End: 2017-07-26 | Stop reason: HOSPADM

## 2017-07-25 RX ADMIN — CEFAZOLIN SODIUM 2 G: 2 INJECTION, SOLUTION INTRAVENOUS at 05:30

## 2017-07-25 RX ADMIN — POLYETHYLENE GLYCOL 3350 17 G: 17 POWDER, FOR SOLUTION ORAL at 21:48

## 2017-07-25 RX ADMIN — METOPROLOL TARTRATE 25 MG: 25 TABLET, FILM COATED ORAL at 08:09

## 2017-07-25 RX ADMIN — Medication 20 MG: at 08:15

## 2017-07-25 RX ADMIN — Medication 1 PACKET: at 16:02

## 2017-07-25 RX ADMIN — SENNOSIDES AND DOCUSATE SODIUM 2 TABLET: 8.6; 5 TABLET ORAL at 08:09

## 2017-07-25 RX ADMIN — METOPROLOL TARTRATE 25 MG: 25 TABLET, FILM COATED ORAL at 21:47

## 2017-07-25 RX ADMIN — CEFAZOLIN SODIUM 2 G: 2 INJECTION, SOLUTION INTRAVENOUS at 13:01

## 2017-07-25 RX ADMIN — CEFAZOLIN SODIUM 2 G: 2 INJECTION, SOLUTION INTRAVENOUS at 21:42

## 2017-07-25 RX ADMIN — LIDOCAINE 3 PATCH: 50 PATCH CUTANEOUS at 16:03

## 2017-07-25 RX ADMIN — MULTIVIT AND MINERALS-FERROUS GLUCONATE 9 MG IRON/15 ML ORAL LIQUID 15 ML: at 08:15

## 2017-07-25 RX ADMIN — INSULIN GLARGINE 65 UNITS: 100 INJECTION, SOLUTION SUBCUTANEOUS at 08:15

## 2017-07-25 RX ADMIN — Medication 1 PACKET: at 08:13

## 2017-07-25 RX ADMIN — GLYCOPYRROLATE 0.2 MG: 0.2 INJECTION, SOLUTION INTRAMUSCULAR; INTRAVENOUS at 21:48

## 2017-07-25 RX ADMIN — FOLIC ACID 1 MG: 1 TABLET ORAL at 08:09

## 2017-07-25 RX ADMIN — Medication 1 PACKET: at 21:54

## 2017-07-25 RX ADMIN — CYCLOBENZAPRINE HYDROCHLORIDE 10 MG: 10 TABLET, FILM COATED ORAL at 01:09

## 2017-07-25 RX ADMIN — GLYCOPYRROLATE 0.2 MG: 0.2 INJECTION, SOLUTION INTRAMUSCULAR; INTRAVENOUS at 08:13

## 2017-07-25 ASSESSMENT — VISUAL ACUITY
OU: NORMAL ACUITY

## 2017-07-25 NOTE — PROGRESS NOTES
Social Work Services Progress Note    Hospital Day: 19  Date of Initial Social Work Evaluation:  7/13/17  Collaborated with:  Referral  (Agnes Wolf) at the [facility] [place]    Data:  Neuro Surgery continues to maintain that pt is medically ready for discharge back to the [facility] [place].      Intervention:  ETELVINA faxed updated notes to Referral  (Agnes Wolf) at the [facility] [place].  SW phoned Agnes and informed her that pt is medically ready for discharge from [facility].  SW informed Agnes that Neurosurgery would like pt's peg tube placed at the [facility] [place].  Per Agnes, they will review updated notes and get back to this SW in regards to if they will accept pt for admit.      Assessment:  Per Neurosurgery, pt is medically stable for discharge.    Plan:    Anticipated Disposition:  Rehab placement    Barriers to d/c plan:  Awaiting decision from the [facility] [place] regarding acceptance for admission.    Follow Up:  ETELVINA will continue to follow.    FREDDY Dooley  Social Work, 6A  Phone:  [phone]  Pager:  [phone]  7/25/2017

## 2017-07-25 NOTE — PROGRESS NOTES
Social Work Services Discharge Note      Patient Name:  Ernetso Rawls     Anticipated Discharge Date:  7/26/17    Discharge Disposition:   [facility] Sp[facility] Unit ([phone])    Following MD:  Dr. Ha     Pre-Admission Screening (PAS) online form has been completed.  The Level of Care (LOC) is:  Determined  Confirmation Code is:  Not required as pt is going to the [facility].  Patient/caregiver informed of referral to [facility] for Pre-Admission Screening for skilled nursing facility (SNF) placement and to expect a phone call post discharge from SNF.     Additional Services/Equipment Arranged:  Spoke with [facility] referral , Agnes Wofl [phone]) who confirmed acceptance.  Agnes will arrange for stretcher transport through [facility], to pick pt up at [facility] tomorrow at 10am.       Patient / Family response to discharge plan:  Pt was confused and stated that he would be going to stay at [facility] and then to the [facility].  Cristian voices understanding of the discharge plan and agreement with the discharge plan.     Persons notified of above discharge plan:  Pt, Cristian (pt's ex-wife/Significant other), 6A nursing and Vaishali Duffy Neurosurgery NP    Staff Discharge Instructions:  Please fax discharge orders and signed hard scripts for any controlled substances (SW will complete this task).  Please print a packet and send with patient.     CTS Handoff completed:  YES    Medicare Notice of Rights provided to the patient/family:  NO, as per face sheet, the [facility] is [facility] primary payer source.    FREDDY Dooley  Social Work, 6A  Phone:  [phone]  Pager:  [phone]  7/25/2017

## 2017-07-25 NOTE — PROGRESS NOTES
[facility] [place]    Neurosurgery Daily Progress Note         Assessment   Ernesto Rawls is a 72 year old male who is postoperative day # 18/13/9 from C3-5 and C6 laminectomy, C5 corpectomy w/cage placement, and C3-C7 posterior fusion.          Plan     Routine neuro exams per unit protocol.     Good sleep hygiene to mitigate sun-downing.     Continue current pain control regimen    Feeding tube in place    Post-operative X-rays: stable    Aspen collar on at all times for 6 weeks post-operatively. Can be up with assistance. No lifting > 10 pounds for the 6 week duration.      Tolerating room air.      ID: OR cultures positive for MSSA. On Cefazolin for 6 weeks. ID follow-up in mid August.      Diabetes management: added PM joce, neil recommendations.     Dentistry: follow up with/ dentist as outpatient    Podiatry: daily dressing changes    Incentive spirometry Q1H while awake.     Bowel regimen. Anti-emetics.     SCDs while in bed.    PT/OT: ARU    Dispo; Medically stable on 6A. [facility] .       Please dial * * * and enter job code [phone] to reach the neurosurgery team if you have any questions.    Patient and above plan discussed with neurosurgery chief resident.         Subjective   Patient continuing to improve.          Objective   Temp:  [95.6  F (35.3  C)-98.1  F (36.7  C)] 97  F (36.1  C)  Pulse:  [83-94] 94  Heart Rate:  [] 96  Resp:  [16-18] 18  BP: (130-150)/(59-72) 142/66  SpO2:  [97 %-99 %] 99 %    I/O last 3 completed shifts:  In: 420 [NG/GT:60]  Out: 875 [Urine:875]    Physical Exam  General: Aspen cervical collar in place.   Wound: Anterior incision c/d/i, no significant swelling, erythema, or drainage.  Right foot wrapped in bandage.      Neurologic Exam:  - Alert and oriented x1 (self)  - Strength: 5/5 in deltoids bilaterally, left biceps/triceps are 4+/5 on the left, 4-/5 on the right, wrist extensors are 3/5 on the left and 2/5 on the  right, finger intrinsics are 4-/5 on the left, 2/5 on the right. Iliopsoas are 4-/5 on the left and 2/5 on the right, quadriceps are 4-/5 on the left and 2/5 on the right, tibialis anterior/gastrocnemius are 4+/5 on the left, 4-/5 on the right. Poor participation in lower extremity exam.  - PERRL, EOMI.  Motor: Normal bulk/tone; no tremor, rigidity, or bradykinesia.        Labs and Imaging     BMP    Recent Labs  Lab 07/23/17  1246 07/20/17  0929 07/20/17  0643 07/19/17  1430 07/19/17  0746    136 134 133  --    POTASSIUM  --  3.6 3.6 3.5 3.5   CHLORIDE  --  96 98 97  --    KAROL  --  8.8 8.4* 8.3*  --    CO2  --  32 31 31  --    BUN  --  14 14 17  --    CR  --  0.42* 0.40* 0.46*  --    GLC  --  305* 266* 215*  --      CBC    Recent Labs  Lab 07/20/17  0929 07/19/17  1430   WBC 10.8 11.9*   RBC 3.03* 2.89*   HGB 9.6* 9.0*   HCT 28.4* 27.1*   MCV 94 94   MCH 31.7 31.1   MCHC 33.8 33.2   RDW 14.3 14.4    214     INR  No lab results found in last 7 days.    Imaging: Reviewed      Contact the neurosurgery resident on call with questions.    Dial * * *[phone]: Enter [phone] when prompted.

## 2017-07-25 NOTE — PLAN OF CARE
Problem: Goal Outcome Summary  Goal: Goal Outcome Summary  Outcome: Therapy, progress toward functional goals as expected  SLP:  Pt seen to f/u for swallowing. Pt continues to demonstrate improvements with his swallowing, reduced delay noted and improved laryngeal elevation.   Improved tolerance of small PO trials of honey thick liquids today given by spoon. Pt continues to be at risk for aspiration.  Recommend continue to be NPO with continued TF.  Pending continued improvements, will plan to complete a formal swallow evaluation (video or FEES) over the next few days to further assess pharyngeal swallow phase and aspiration risks.

## 2017-07-25 NOTE — CONSULTS
Patient is on IR schedule 7/26/17 for a Gastrojejunal feeding tube placement    Labs WNL for procedure.    Orders for NPO, scrubs and antibiotics have been entered.   Consent will be done prior to procedure.     Please contact the IR charge RN at [phone] for estimated time of procedure.       Elizabeth Altman IR RPA  [phone] Call pager  [phone] pager

## 2017-07-25 NOTE — PROGRESS NOTES
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  Found to have significant spinal stenosis, he is now s/p C3-5 and C6 laminectomy, C5 corpetomy with cage placement, and C3-7 posterior fusion.   Type 2 diabetes: hyperglycemia related to continuous  Enteral feeds    Plan:  glargine 65 units every am  glargine 17 units every pm  Novolog correction 2 units per 30 > 140 every 4 hours  -monitor glucose every 4 hours    Will continue to follow  -To prevent hypoglycemia: please run D10 at 75ml/h if TFs interrupted, including if TFs stop for transport to [facility].    Diet: continuous TF : TwoCal HN at 45 cc/hour via nasojejunal           Interval History:     I reviewed the last 24 hours progress note  Blood sugars have improved with increase in PM basal insulin. Glucose < 200 and no hypoglycemia  Discussed diabetic plan with Mr. And Mrs. Rawls  Tolerating TF, denies nausea/vomiting or abdominal pain    Recent Labs  Lab 07/25/17  0755 07/25/17  0347 07/24/17  2356 07/24/17  2017 07/24/17  1608 07/24/17  1137  07/20/17  0929  07/20/17  0643  07/19/17  1430   GLC  --   --   --   --   --   --   --  305*  --  266*  --  215*   * 191* 181* 168* 238* 206*  < >  --   < >  --   < >  --    < > = values in this interval not displayed.            Review of Systems:      please see interval history       Medications:       Active Diet Order      Advance Diet as Tolerated      NPO for Medical/Clinical Reasons     Physical Exam:  Gen: Alert, resting in bed, in NAD   HEENT: NC/AT, mucous membranes are moist  Resp: Unlabored  Ext: moving all extremities   Neuro:oriented x3, communicating clearly  /66 (BP Location: Right arm)  Pulse 94  Temp 97  F (36.1  C)  "(Oral)  Resp 18  Ht 1.854 m (6' 1\")  Wt 88.5 kg (195 lb 1.6 oz)  SpO2 99%  BMI 25.74 kg/m2           Data:     Lab Results   Component Value Date    A1C 7.3 07/08/2017              CBC RESULTS:   Recent Labs   Lab Test  07/20/17   0929   WBC  10.8   RBC  3.03*   HGB  9.6*   HCT  28.4*   MCV  94   MCH  31.7   MCHC  33.8   RDW  14.3   PLT  226     Recent Labs   Lab Test  07/23/17   1246  07/20/17   0929  07/20/17   0643   NA  138  136  134   POTASSIUM   --   3.6  3.6   CHLORIDE   --   96  98   CO2   --   32  31   ANIONGAP   --   8  6   GLC   --   305*  266*   BUN   --   14  14   CR   --   0.42*  0.40*   KAROL   --   8.8  8.4*     Liver Function Studies - No results for input(s): PROTTOTAL, ALBUMIN, BILITOTAL, ALKPHOS, AST, ALT, BILIDIRECT in the last 98615 hours.  Lab Results   Component Value Date    INR 1.12 07/15/2017    INR 1.23 07/13/2017    INR 1.32 07/12/2017    INR 1.44 07/10/2017    INR 1.17 07/08/2017    INR 1.10 07/07/2017           Natalia Degroot, CNP pager [phone]  Diabetes Management Job Code [id number]          "

## 2017-07-25 NOTE — PROGRESS NOTES
Brief Neurosurgery update:   In respnse to Jerilyn Yusef note dated 7/24/17 at 1007:    1.  Patient may be up with assistance, cervical collar to be worn at all times- will be required for 6 weeks post operatively.  No lifting over 10 pounds.    2.  Patient will require Cefazolin for 6 weeks, will require CRP levels ever 2 weeks.  Infectious disease f/u in mid August.    3.  Insulin gtt has been discontinued, patient is on sliding scale insulin and lantus   4.  Leukocytosis has improved- patient has been afebrile   5. See therapy notes for further details

## 2017-07-25 NOTE — PLAN OF CARE
Problem: Goal Outcome Summary  Goal: Goal Outcome Summary  VSS. Neuros unchanged. Oriented to self, sometimes place. Illogical speech at times. Denies pain. PICC SL between abx. TF at 45 mL/hr via NJ, tolerates well. York in place with adequate output. Up with lift. Turned and repositioned q2h, last turned at 0630. BA on for safety. [facility]. Continue to monitor and follow current POC.

## 2017-07-25 NOTE — PLAN OF CARE
Problem: Goal Outcome Summary  Goal: Goal Outcome Summary  PT 6A:  Continues to have significant mobility limitations, requiring mod A x2 bed mobility and dependent lift bed<chair.  Completed seated balance and strengthening exercises today, tolerated better than yesterday from pain perspective.     Rec ARU. Question ability to tolerate 3hrs therapy a day, but does need comprehensive rehab provided by SCI unit.        PT D/c before last PT session. Pt D/c to rehab for cont skilled PT   Pt D/c status from 7/25 ( see above.)   At time D/c pt met 0/3 goals pt pt improving

## 2017-07-25 NOTE — PLAN OF CARE
Problem: Goal Outcome Summary  Goal: Goal Outcome Summary  Outcome: No Change  VSS. Oriented to self only. Pt follows commands intermittently. Continues to have illogical speech. C-collar on at all times. Neck incision is CDI, SKYLAR. R great toe is sutured, SKYLAR. York in place with good UOP, no BM this shift. NG in place with TF at goal of 45ml/hr. PICC was replaced this AM d/t pt pulling out. Also has PIV that is SL. Up with A2 and lift. Rarely uses call light. Will continue to monitor and follow POC.

## 2017-07-25 NOTE — PLAN OF CARE
Problem: Goal Outcome Summary  Goal: Goal Outcome Summary  OT/6A: Pt required max A x2 supine >sit.  Pt sat EOB for 14 minutes with mod A and VCs to correct lean.  While seated, pt completed g/h tasks and UB dressing with mod A. Pt able to follow commands and conversations were appropriate throughout session.  Pt shows improvement in activity participation but continues to be limited by poor activity tolerance and decreased strength in BUE.     Recommend: TCU

## 2017-07-26 ENCOUNTER — CARE COORDINATION (OUTPATIENT)
Dept: CARE COORDINATION | Facility: CLINIC | Age: 72
End: 2017-07-26

## 2017-07-26 VITALS
TEMPERATURE: 96.6 F | HEART RATE: 80 BPM | RESPIRATION RATE: 24 BRPM | OXYGEN SATURATION: 98 % | HEIGHT: 73 IN | WEIGHT: 195.1 LBS | BODY MASS INDEX: 25.86 KG/M2 | DIASTOLIC BLOOD PRESSURE: 72 MMHG | SYSTOLIC BLOOD PRESSURE: 143 MMHG

## 2017-07-26 LAB
CRP SERPL-MCNC: 16 MG/L (ref 0–8)
GLUCOSE BLDC GLUCOMTR-MCNC: 145 MG/DL (ref 70–99)
GLUCOSE BLDC GLUCOMTR-MCNC: 182 MG/DL (ref 70–99)
GLUCOSE BLDC GLUCOMTR-MCNC: 196 MG/DL (ref 70–99)
GLUCOSE BLDC GLUCOMTR-MCNC: 202 MG/DL (ref 70–99)
INR PPP: 1.12 (ref 0.86–1.14)

## 2017-07-26 PROCEDURE — 25800025 ZZH RX 258: Performed by: PHYSICIAN ASSISTANT

## 2017-07-26 PROCEDURE — 36415 COLL VENOUS BLD VENIPUNCTURE: CPT | Performed by: NURSE PRACTITIONER

## 2017-07-26 PROCEDURE — 00000146 ZZHCL STATISTIC GLUCOSE BY METER IP

## 2017-07-26 PROCEDURE — 25000132 ZZH RX MED GY IP 250 OP 250 PS 637: Performed by: STUDENT IN AN ORGANIZED HEALTH CARE EDUCATION/TRAINING PROGRAM

## 2017-07-26 PROCEDURE — 25000132 ZZH RX MED GY IP 250 OP 250 PS 637: Performed by: NEUROLOGICAL SURGERY

## 2017-07-26 PROCEDURE — 25000125 ZZHC RX 250: Performed by: NEUROLOGICAL SURGERY

## 2017-07-26 PROCEDURE — 86140 C-REACTIVE PROTEIN: CPT | Performed by: NURSE PRACTITIONER

## 2017-07-26 PROCEDURE — 25000128 H RX IP 250 OP 636: Performed by: STUDENT IN AN ORGANIZED HEALTH CARE EDUCATION/TRAINING PROGRAM

## 2017-07-26 PROCEDURE — 27210913 ZZH NUTRITION PRODUCT INTERMEDIATE PACKET

## 2017-07-26 PROCEDURE — 27210429 ZZH NUTRITION PRODUCT INTERMEDIATE LITER

## 2017-07-26 RX ORDER — SODIUM CHLORIDE 9 MG/ML
INJECTION, SOLUTION INTRAVENOUS
Status: DISCONTINUED
Start: 2017-07-26 | End: 2017-07-26 | Stop reason: HOSPADM

## 2017-07-26 RX ADMIN — CEFAZOLIN SODIUM 2 G: 2 INJECTION, SOLUTION INTRAVENOUS at 05:58

## 2017-07-26 RX ADMIN — FOLIC ACID 1 MG: 1 TABLET ORAL at 08:42

## 2017-07-26 RX ADMIN — Medication 20 MG: at 08:42

## 2017-07-26 RX ADMIN — OXYCODONE HYDROCHLORIDE 5 MG: 5 TABLET ORAL at 00:51

## 2017-07-26 RX ADMIN — INSULIN GLARGINE 65 UNITS: 100 INJECTION, SOLUTION SUBCUTANEOUS at 10:13

## 2017-07-26 RX ADMIN — MULTIVIT AND MINERALS-FERROUS GLUCONATE 9 MG IRON/15 ML ORAL LIQUID 15 ML: at 08:42

## 2017-07-26 RX ADMIN — Medication 1 PACKET: at 08:49

## 2017-07-26 RX ADMIN — DEXTROSE MONOHYDRATE: 100 INJECTION, SOLUTION INTRAVENOUS at 10:19

## 2017-07-26 RX ADMIN — METOPROLOL TARTRATE 25 MG: 25 TABLET, FILM COATED ORAL at 08:42

## 2017-07-26 RX ADMIN — GLYCOPYRROLATE 0.2 MG: 0.2 INJECTION, SOLUTION INTRAMUSCULAR; INTRAVENOUS at 08:42

## 2017-07-26 RX ADMIN — CYCLOBENZAPRINE HYDROCHLORIDE 10 MG: 10 TABLET, FILM COATED ORAL at 00:51

## 2017-07-26 ASSESSMENT — VISUAL ACUITY
OU: NORMAL ACUITY
OU: NORMAL ACUITY;GLASSES
OU: NORMAL ACUITY

## 2017-07-26 NOTE — PROGRESS NOTES
"[facility]  \"Hello, my name is Cassidy Goodson , and I am calling from the [facility].  I want to check in and see how you are doing, after leaving the hospital.  You may also receive a call from your Care Coordinator (care team), but I want to make sure you don t have any urgent needs.  I have a couple questions to review with you:     Post-Discharge Outreach                                                    Ernesto Rawls is a 72 year old male     Discharge follow-up: Follow-up with Dr. Rafael Lo MD in 2 weeks         Care Team:    Patient Care Team       Relationship Specialty Notifications Start End    Roderick Martines MD, MD PCP - General Internal Medicine  7/13/17     Comment:  Per pt.    Phone: [phone] Fax: [phone]         [address] [address]            Transition of Care Review                                                      Patient was called three times and no answer so post 24 hr DC follow up calls will be closed out, message was left with contact number for department seen by or following up with         Samara Monte [facility](left mess to call)  Samara Monte MA                   Plan                                                      Thanks for your time.  Your Care Coordinator may follow-up within the next couple days.  In the meantime if you have questions, concerns or problems call your care team.        Cassidy Goodson    "

## 2017-07-26 NOTE — PLAN OF CARE
Problem: Goal Outcome Summary  Goal: Goal Outcome Summary  Patient care [id number]  A/O to self only. Confused and restless. Neuro's remain unchanged.Attempt to get OOB twice this shift. Bed alarm on at all time. Tolerated TF via NG at goal 45cc/hr. PIV SL. York patent with adequate output. C-collar on. Patient removed lidocaine patch. [facility] to [facility]. Continue with POC.

## 2017-07-26 NOTE — PLAN OF CARE
Problem: Goal Outcome Summary  Goal: Goal Outcome Summary  Outcome: No Change  [id number]: Pt VSS stable and neuros unchanged, but had one moment of a hallucinatory episode in the AM, but since has not reoccurred. A&O x 2, illogical speech. He denies any pain, but seems very restless unless up in chair. Saline locked PICC line in R. Arm. TF at 45 ml/hr via NJ and tolerates well. York in place with adequate output, urine haily in color. Turned and repositioned every 2 hours, put back in bed and positioned at 1915. Plan is to discharge tomorrow at 10 AM to [facility]. Continue to monitor and follow POC.

## 2017-07-26 NOTE — PLAN OF CARE
Problem: Goal Outcome Summary  Goal: Goal Outcome Summary  VSS. Neuros unchanged. Pt continues to be confused with some illogical speech at times. Oriented to self and hospital. C/o some pain to neck this shift, relieved with flexeril and oxycodone. PICC SL between abx. TF at 45 mL/hr via NJ, tolerates well. York in place with adequate output. Up with lift. Turned and repositioned q2h. BA on middle zone for safety. Pt had PEG placement schedule for this AM, but will be cancelled d/t transfer to [facility] this AM. Did not receive contrast or pre-meds. Plan for d/c to [facility] at 1000 this AM with ride via [facility]. Continue to monitor and follow current POC.

## 2017-07-26 NOTE — PLAN OF CARE
Problem: Goal Outcome Summary  Goal: Goal Outcome Summary  Outcome: No Change  POD#19/14/10 s/p C3-5 and C6 lami, C5 corpectomy w/cage placement, and C3-C7 posterior fusion. VSS. Neuro's as follows: Oriented to self and intermittently month. Confused/impulsive but easily redirectable (BA/CA on at all times). BUE 4/5. BLE 3/5. No reports of n/t. No c/o pain. Anterior neck incision intact with derma-bond. Posterior neck incision approximated; sutures removed this am. C-collar in place at all times; pads changed with skin assessment this am. NPO. Double lumen PICC to RUE; +blood return. NG bridled to L nare; TF infusing at 45 mL/hr; stopped for transfer; D10 currently infusing at 75 mL/hr. York in place for retention. Incontinent of stool. Up with assist of 2 and ceiling lift; repo q2hrs. [facility] the [facility]; awaiting transport. Report called.     Update: transport arrived 1.5hrs late. Stated they could not take pt with D10 infusing. NP Vaishali Duffy called and gave a verbal to transfer pt without D10 infusing. Transport stated they will check BS x2 during 15 min transport and relay to [facility] staff that BS must be taken upon arrival with prompt start of TF.

## 2017-07-26 NOTE — PROGRESS NOTES
[facility], [place]    Neurosurgery Daily Progress Note         Assessment   Ernesto Rawls is a 72 year old male who is postoperative day # 19/14/10 from C3-5 and C6 laminectomy, C5 corpectomy w/cage placement, and C3-C7 posterior fusion.          Plan     Routine neuro exams per unit protocol.     Good sleep hygiene to mitigate sun-downing.     Continue current pain control regimen    Aspen collar on at all times for 6 weeks post-operatively. Can be up with assistance. No lifting > 10 pounds for the 6 week duration.      Tolerating room air.      ID: OR cultures positive for MSSA. On Cefazolin for 6 weeks. ID follow-up in mid August.      Diabetes management: added PM lanpilar, appreciate recommendations.     Dentistry: follow up with/ dentist as outpatient    Podiatry: daily dressing changes    Incentive spirometry Q1H while awake.     Bowel regimen. Anti-emetics.     SCDs while in bed.    PT/OT: ARU    Dispo; Medically stable on 6A. [facility] AM..       Please dial * * * and enter job code [phone] to reach the neurosurgery team if you have any questions.    Patient and above plan discussed with neurosurgery chief resident.         Subjective   Patient stable.          Objective   Temp:  [95.6  F (35.3  C)-98.3  F (36.8  C)] 98.3  F (36.8  C)  Pulse:  [] 97  Heart Rate:  [89-98] 97  Resp:  [16-18] 16  BP: (130-147)/(62-73) 147/73  SpO2:  [96 %-99 %] 96 %    I/O last 3 completed shifts:  In: 1130 [I.V.:20; NG/GT:300]  Out: 1300 [Urine:1300]    Physical Exam  General: Aspen cervical collar in place.   Wound: Anterior incision c/d/i, no significant swelling, erythema, or drainage.  Right foot wrapped in bandage.      Neurologic Exam:  - Alert and oriented x1 (self and intermittently hospital)  - Strength: 5/5 in deltoids bilaterally, left biceps/triceps are 4+/5 on the left, 4-/5 on the right, wrist extensors are 3/5 on the left and 2/5 on the right, finger  intrinsics are 4-/5 on the left, 2/5 on the right. Iliopsoas are 4-/5 on the left and 2/5 on the right, quadriceps are 4-/5 on the left and 2/5 on the right, tibialis anterior/gastrocnemius are 4+/5 on the left, 4-/5 on the right. Poor participation in lower extremity exam.  - PERRL, EOMI.  Motor: Normal bulk/tone; no tremor, rigidity, or bradykinesia.        Labs and Imaging     BMP    Recent Labs  Lab 07/23/17  1246 07/20/17  0929 07/20/17  0643 07/19/17  1430 07/19/17  0746    136 134 133  --    POTASSIUM  --  3.6 3.6 3.5 3.5   CHLORIDE  --  96 98 97  --    KAROL  --  8.8 8.4* 8.3*  --    CO2  --  32 31 31  --    BUN  --  14 14 17  --    CR  --  0.42* 0.40* 0.46*  --    GLC  --  305* 266* 215*  --      CBC    Recent Labs  Lab 07/25/17  2329 07/20/17  0929 07/19/17  1430   WBC 9.4 10.8 11.9*   RBC 3.34* 3.03* 2.89*   HGB 10.3* 9.6* 9.0*   HCT 31.8* 28.4* 27.1*   MCV 95 94 94   MCH 30.8 31.7 31.1   MCHC 32.4 33.8 33.2   RDW 14.7 14.3 14.4    226 214     INR    Recent Labs  Lab 07/25/17 2329   INR 1.12       Imaging: Reviewed      Contact the neurosurgery resident on call with questions.    Dial * * *[phone]: Enter [phone] when prompted.

## 2017-07-26 NOTE — PLAN OF CARE
Problem: Goal Outcome Summary  Goal: Goal Outcome Summary  Speech Language Therapy Discharge Summary     Reason for therapy discharge:    Discharged to transitional care facility.     Progress towards therapy goal(s). [facility] for goal details.  Goals not met.  Barriers to achieving goals:   discharge from facility.     Therapy recommendation(s):    Continued therapy is recommended.  Rationale/Recommendations:  Pt was NPO with [place] TF  for nutritional support at discharge.  Pt was making good progress w/ swallowing.  Rec. formal swallow eval in the next few days pending ongoing progress. .

## 2017-07-26 NOTE — PROGRESS NOTES
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  Found to have significant spinal stenosis, he is now s/p C3-5 and C6 laminectomy, C5 corpetomy with cage placement, and C3-7 posterior fusion.   Type 2 diabetes: hyperglycemia related to continuous  Enteral feeds     Plan:  glargine 65 units every am  glargine 17 units every pm  Novolog correction 2 units per 30 > 140 every 4 hours  -monitor glucose every 4 hours     Will continue to follow  -To prevent hypoglycemia: please run D10 at 75ml/h if TFs interrupted, including if TFs stop for transport to [facility].     Diet: continuous TF : TwoCal HN at 45 cc/hour via nasojejunal           Interval History:   I reviewed the diabetes management plan with primary team, neurosurgery  I reviewed the last 24 hours progress notes  Blood sugars have been in good control, < 200 and no hypoglycemia  Needing new lantus  pen this am, dose will be given > 2 hours late therefore glucose 202 at 0840  Advised nursing to stay on schedule with insulin management and to start D10 when TF are off for transport  Tolerating TF, no reports of n/v/d    Recent Labs  Lab 07/26/17  0840 07/26/17  0829 07/26/17  0358 07/26/17  0039 07/25/17 2002 07/25/17  1600  07/20/17  0929  07/20/17  0643  07/19/17  1430   GLC  --   --   --   --   --   --   --  305*  --  266*  --  215*   * 196* 182* 145* 197* 183*  < >  --   < >  --   < >  --    < > = values in this interval not displayed.            Review of Systems:      please see interval hsitory       Medications:       Active Diet Order      Advance Diet as Tolerated      NPO for Medical/Clinical Reasons      NPO for Medical/Clinical Reasons Except for: Other, Meds; Specify: Meds  "and a few sips of clear liquids/water 8 hours prior to procedure (NO SOLIDS).     Physical Exam:  Gen: Alert, resting in bed, in NAD   HEENT: NC/AT, mucous membranes are moist  Resp: Unlabored  Ext: No lower extremity edema   Neuro:oriented person ( place and time not assessed), communicating clearly  /72 (BP Location: Left arm)  Pulse 80  Temp 96.6  F (35.9  C) (Axillary)  Resp 24  Ht 1.854 m (6' 1\")  Wt 88.5 kg (195 lb 1.6 oz)  SpO2 98%  BMI 25.74 kg/m2           Data:     Lab Results   Component Value Date    A1C 7.3 07/08/2017              CBC RESULTS:   Recent Labs   Lab Test  07/25/17   2329   WBC  9.4   RBC  3.34*   HGB  10.3*   HCT  31.8*   MCV  95   MCH  30.8   MCHC  32.4   RDW  14.7   PLT  335     Recent Labs   Lab Test  07/23/17   1246  07/20/17   0929  07/20/17   0643   NA  138  136  134   POTASSIUM   --   3.6  3.6   CHLORIDE   --   96  98   CO2   --   32  31   ANIONGAP   --   8  6   GLC   --   305*  266*   BUN   --   14  14   CR   --   0.42*  0.40*   KAROL   --   8.8  8.4*     Liver Function Studies - No results for input(s): PROTTOTAL, ALBUMIN, BILITOTAL, ALKPHOS, AST, ALT, BILIDIRECT in the last 64189 hours.  Lab Results   Component Value Date    INR 1.12 07/25/2017    INR 1.12 07/15/2017    INR 1.23 07/13/2017    INR 1.32 07/12/2017    INR 1.44 07/10/2017    INR 1.17 07/08/2017    INR 1.10 07/07/2017           Natalia Degroot, CNP pager [phone]  Diabetes Management Job Code [phone]          "

## 2017-07-26 NOTE — PROGRESS NOTES
IR GJ tube placement on the schedule for 7/26 was cancelled per neurosurgery. Pt removed from IR schedule. [facility] will [facility].    Yarelis Simpson DNP, APRN  Interventional Radiology  Phone: [phone]  Pager: [phone]

## 2017-07-26 NOTE — PLAN OF CARE
Problem: Goal Outcome Summary  Goal: Goal Outcome Summary  Occupational Therapy Discharge Summary     Reason for therapy discharge:    Discharged [facility]     Progress towards therapy goal(s). See goals on Care Plan in [facility] electronic health record for goal details.  Goals partially met.  Barriers to achieving goals:   limited tolerance for therapy and discharge from facility.     Therapy recommendation(s):    Continued therapy is recommended.  Rationale/Recommendations:  TCU vs. ARU pending patient tolerance for therapy.  Pt will benefit from skilled OT to increase BUE strength and IND with ADLs.

## 2017-07-27 DIAGNOSIS — Z98.1 ARTHRODESIS STATUS: Primary | ICD-10-CM

## 2022-02-17 PROBLEM — E11.65 TYPE 2 DIABETES MELLITUS WITH HYPERGLYCEMIA (H): Status: ACTIVE | Noted: 2017-07-22

## 2022-08-08 NOTE — PLAN OF CARE
Mr. Eller comes in today for follow-up.  Injections have worked well in the past.  The patient would like to get a repeat injection today.  The risks, benefits and alternatives were discussed and the patient consented.  Going forward, the patient will follow-up as needed.    STEPHEN Whitmore    08/08/2022      Large Joint Arthrocentesis: L knee  Date/Time: 8/8/2022 9:23 AM  Consent given by: patient  Site marked: site marked  Timeout: Immediately prior to procedure a time out was called to verify the correct patient, procedure, equipment, support staff and site/side marked as required   Supporting Documentation  Indications: pain and joint swelling   Procedure Details  Location: knee - L knee  Preparation: Patient was prepped and draped in the usual sterile fashion  Needle gauge: 21G.  Approach: anterolateral  Medications administered: 80 mg methylPREDNISolone acetate 80 MG/ML; 2 mL lidocaine PF 1% 1 %  Patient tolerance: patient tolerated the procedure well with no immediate complications         Problem: Goal Outcome Summary  Goal: Goal Outcome Summary  Outcome: No Change  Incontinent small amount of urine at beginning of shift.  Straight cathed for 1575 ml.   No stool this shift but had 3 liquid stools on prior shift.  Armstrong placed about 2145 after several attempts to void.  Small amount of incontinence when armstrong placed.  Denies pain.  Moves all extremities,  Uppers more than lowers.  NPO.  Refuses oral care.  Does use oral suction.  Tube feeds advanced to 40ml/hour.  Advance to goal of 45ml at 0200.  Cervical collar in place, no redness or pressure areas.  Turned and repositioned.  Right foot dressing changed per MD at start of shift.  Remains confused. Oriented to self and [place]. Will need PICC for antibiotics.  Transfer back to [facility] when medically ready for transfer.

## (undated) DEVICE — CATH TRAY FOLEY SURESTEP 16FR W/URINE MTR STATLK LF A303416A

## (undated) DEVICE — SOL WATER IRRIG 1000ML BOTTLE 2F7114

## (undated) DEVICE — ESU CORD BIPOLAR GREEN 10-4000

## (undated) DEVICE — ADH FLOSEAL W/HUMAN THROMBIN 5ML 1503350

## (undated) DEVICE — SPONGE COTTONOID 1X3" 20-10S

## (undated) DEVICE — SYR 10ML LL W/O NDL

## (undated) DEVICE — DRAPE SHEET MED 44X70" 9355

## (undated) DEVICE — SPONGE COTTONOID 1/2X1/2" 20-04S

## (undated) DEVICE — NDL SPINAL 18GA 3.5" 405184

## (undated) DEVICE — SU VICRYL 3-0 SH CR 8X18" J774

## (undated) DEVICE — DRSG DRAIN 2X2" 7087

## (undated) DEVICE — DRAIN HEMOVAC RESERVOIR KIT 19FR 1/4" LG 00-2550-004-10

## (undated) DEVICE — SU VICRYL 3-0 SH 8X18" UND J864D

## (undated) DEVICE — DRAIN JACKSON PRATT RESERVOIR 100ML SU130-1305

## (undated) DEVICE — Device

## (undated) DEVICE — PREP POVIDONE IODINE SCRUB 7.5% 120ML

## (undated) DEVICE — PACK GOWN 3/PK DISP XL SBA32GPFCB

## (undated) DEVICE — PREP CHLORAPREP CLEAR 3ML 260400

## (undated) DEVICE — SU ETHILON 3-0 PS-1 18" 1663H

## (undated) DEVICE — GLOVE PROTEXIS BLUE W/NEU-THERA 8.5  2D73EB85

## (undated) DEVICE — DRAIN HEMOVAC RESERVOIR KIT 10FR 1/8" MED 00-2550-002-10

## (undated) DEVICE — PIN SKULL MAYFIELD ADULT TITANIUM 3/PK A1120

## (undated) DEVICE — SPONGE SURGIFOAM 100 1974

## (undated) DEVICE — SU MONOCRYL 4-0 PS-2 18" UND Y496G

## (undated) DEVICE — DRSG PRIMAPORE 02X3" 7133

## (undated) DEVICE — ENDO VALVE BX EVIS MAJ-210

## (undated) DEVICE — SYR 30ML LL W/O NDL 302832

## (undated) DEVICE — TAPE DURAPORE 3" SILK 1538-3

## (undated) DEVICE — SU VICRYL 0 CT-1 CR 8X18" J740D

## (undated) DEVICE — LINEN TOWEL PACK X30 5481

## (undated) DEVICE — LABEL MEDICATION SYSTEM 3303-P

## (undated) DEVICE — DRAPE C-ARM W/STRAPS 42X72" 07-CA104

## (undated) DEVICE — PREP SKIN SCRUB TRAY 4461A

## (undated) DEVICE — BUR MATCHSTICK 3MM ANSPACH L-8NS-G1

## (undated) DEVICE — LINEN TOWEL PACK X5 5464

## (undated) DEVICE — ESU GROUND PAD ADULT W/CORD E7507

## (undated) DEVICE — ESU ELEC BLADE 2.75" COATED/INSULATED E1455

## (undated) DEVICE — SPONGE KITTNER 30-101

## (undated) DEVICE — SU VICRYL 2-0 CT-2 27" J333H

## (undated) DEVICE — APPLICATOR COTTON TIP 6"X2 STERILE LF 6012

## (undated) DEVICE — GLOVE PROTEXIS MICRO 8.0  2D73PM80

## (undated) DEVICE — PREP CHLORAPREP 26ML TINTED ORANGE  260815

## (undated) DEVICE — DRAPE MICROSCOPE LEICA 54X150" AR8033650

## (undated) DEVICE — PREP POVIDONE IODINE SOLUTION 10% 120ML

## (undated) DEVICE — DRAIN JACKSON PRATT 07FR ROUND SU130-1320

## (undated) DEVICE — DRSG PRIMAPORE 03 1/8X6" 66000318

## (undated) DEVICE — DECANTER VIAL 2006S

## (undated) DEVICE — SPONGE COTTONOID 1/2X1 1/2" 20-06S

## (undated) DEVICE — WIPES FOLEY CARE SURESTEP PROVON DFC100

## (undated) DEVICE — IMM COLLAR CERVICAL VISTA ADJ ADULT 79-83380

## (undated) DEVICE — SU VICRYL 2-0 CT-2 CR 8X18" J726D

## (undated) DEVICE — ENDO VALVE SUCTION BRONCH EVIS MAJ-209

## (undated) DEVICE — LINEN TOWEL PACK X6 WHITE 5487

## (undated) DEVICE — ESU PENCIL W/COATED BLADE E2450H

## (undated) DEVICE — PROTECTOR ARM ONE-STEP TRENDELENBURG 40418

## (undated) DEVICE — PACK NEURO MINOR UMMC SNE32MNMU4

## (undated) DEVICE — SUCTION MANIFOLD DORNOCH ULTRA CART UL-CL500

## (undated) DEVICE — SOL NACL 0.9% 10ML VIAL 0409-4888-02

## (undated) DEVICE — DRILL BIT W/STOP 2.4X65MM

## (undated) DEVICE — SOL NACL 0.9% IRRIG 1000ML BOTTLE 2F7124

## (undated) DEVICE — SU DERMABOND ADVANCED .7ML DNX12

## (undated) DEVICE — GLOVE PROTEXIS MICRO 7.5  2D73PM75

## (undated) DEVICE — DRAPE MAYO STAND 23X54 8337

## (undated) DEVICE — NDL BLUNT 17GA 1.5" 8881202330

## (undated) RX ORDER — FENTANYL CITRATE 50 UG/ML
INJECTION, SOLUTION INTRAMUSCULAR; INTRAVENOUS
Status: DISPENSED
Start: 2017-07-16

## (undated) RX ORDER — PROPOFOL 10 MG/ML
INJECTION, EMULSION INTRAVENOUS
Status: DISPENSED
Start: 2017-07-12

## (undated) RX ORDER — LABETALOL HYDROCHLORIDE 5 MG/ML
INJECTION, SOLUTION INTRAVENOUS
Status: DISPENSED
Start: 2017-07-12

## (undated) RX ORDER — FENTANYL CITRATE 50 UG/ML
INJECTION, SOLUTION INTRAMUSCULAR; INTRAVENOUS
Status: DISPENSED
Start: 2017-07-12

## (undated) RX ORDER — EPHEDRINE SULFATE 50 MG/ML
INJECTION, SOLUTION INTRAMUSCULAR; INTRAVENOUS; SUBCUTANEOUS
Status: DISPENSED
Start: 2017-07-12

## (undated) RX ORDER — ROCURONIUM BROMIDE 50 MG/5 ML
SYRINGE (ML) INTRAVENOUS
Status: DISPENSED
Start: 2017-07-12

## (undated) RX ORDER — HYDROMORPHONE HYDROCHLORIDE 1 MG/ML
INJECTION, SOLUTION INTRAMUSCULAR; INTRAVENOUS; SUBCUTANEOUS
Status: DISPENSED
Start: 2017-07-12

## (undated) RX ORDER — FENTANYL CITRATE 50 UG/ML
INJECTION, SOLUTION INTRAMUSCULAR; INTRAVENOUS
Status: DISPENSED
Start: 2017-07-07

## (undated) RX ORDER — BACITRACIN 50000 [IU]/1
INJECTION, POWDER, FOR SOLUTION INTRAMUSCULAR
Status: DISPENSED
Start: 2017-07-07

## (undated) RX ORDER — ONDANSETRON 2 MG/ML
INJECTION INTRAMUSCULAR; INTRAVENOUS
Status: DISPENSED
Start: 2017-07-12

## (undated) RX ORDER — LIDOCAINE HYDROCHLORIDE 20 MG/ML
INJECTION, SOLUTION EPIDURAL; INFILTRATION; INTRACAUDAL; PERINEURAL
Status: DISPENSED
Start: 2017-07-16

## (undated) RX ORDER — CEFAZOLIN SODIUM 1 G/3ML
INJECTION, POWDER, FOR SOLUTION INTRAMUSCULAR; INTRAVENOUS
Status: DISPENSED
Start: 2017-07-16

## (undated) RX ORDER — ONDANSETRON 2 MG/ML
INJECTION INTRAMUSCULAR; INTRAVENOUS
Status: DISPENSED
Start: 2017-07-16

## (undated) RX ORDER — LIDOCAINE HYDROCHLORIDE 20 MG/ML
INJECTION, SOLUTION EPIDURAL; INFILTRATION; INTRACAUDAL; PERINEURAL
Status: DISPENSED
Start: 2017-07-12

## (undated) RX ORDER — REGADENOSON 0.08 MG/ML
INJECTION, SOLUTION INTRAVENOUS
Status: DISPENSED
Start: 2017-07-10

## (undated) RX ORDER — BACITRACIN 50000 [IU]/1
INJECTION, POWDER, FOR SOLUTION INTRAMUSCULAR
Status: DISPENSED
Start: 2017-07-11

## (undated) RX ORDER — BACITRACIN 50000 [IU]/1
INJECTION, POWDER, FOR SOLUTION INTRAMUSCULAR
Status: DISPENSED
Start: 2017-07-12

## (undated) RX ORDER — ESMOLOL HYDROCHLORIDE 10 MG/ML
INJECTION INTRAVENOUS
Status: DISPENSED
Start: 2017-07-07

## (undated) RX ORDER — ACETAMINOPHEN 325 MG/1
TABLET ORAL
Status: DISPENSED
Start: 2017-07-07

## (undated) RX ORDER — PHENYLEPHRINE HCL IN 0.9% NACL 1 MG/10 ML
SYRINGE (ML) INTRAVENOUS
Status: DISPENSED
Start: 2017-07-12

## (undated) RX ORDER — PROPOFOL 10 MG/ML
INJECTION, EMULSION INTRAVENOUS
Status: DISPENSED
Start: 2017-07-07

## (undated) RX ORDER — LABETALOL HYDROCHLORIDE 5 MG/ML
INJECTION, SOLUTION INTRAVENOUS
Status: DISPENSED
Start: 2017-07-07

## (undated) RX ORDER — SODIUM CHLORIDE 9 MG/ML
INJECTION, SOLUTION INTRAVENOUS
Status: DISPENSED
Start: 2017-07-16

## (undated) RX ORDER — PROPOFOL 10 MG/ML
INJECTION, EMULSION INTRAVENOUS
Status: DISPENSED
Start: 2017-07-16

## (undated) RX ORDER — LIDOCAINE HYDROCHLORIDE AND EPINEPHRINE 5; 5 MG/ML; UG/ML
INJECTION, SOLUTION INFILTRATION; PERINEURAL
Status: DISPENSED
Start: 2017-07-07